# Patient Record
Sex: FEMALE | Race: WHITE | NOT HISPANIC OR LATINO | Employment: OTHER | URBAN - METROPOLITAN AREA
[De-identification: names, ages, dates, MRNs, and addresses within clinical notes are randomized per-mention and may not be internally consistent; named-entity substitution may affect disease eponyms.]

---

## 2019-03-28 ENCOUNTER — APPOINTMENT (EMERGENCY)
Dept: RADIOLOGY | Facility: HOSPITAL | Age: 79
End: 2019-03-28
Payer: MEDICARE

## 2019-03-28 ENCOUNTER — HOSPITAL ENCOUNTER (EMERGENCY)
Facility: HOSPITAL | Age: 79
Discharge: HOME | End: 2019-03-29
Attending: EMERGENCY MEDICINE | Admitting: EMERGENCY MEDICINE
Payer: MEDICARE

## 2019-03-28 VITALS
WEIGHT: 207.67 LBS | OXYGEN SATURATION: 94 % | SYSTOLIC BLOOD PRESSURE: 114 MMHG | HEART RATE: 97 BPM | DIASTOLIC BLOOD PRESSURE: 65 MMHG | RESPIRATION RATE: 18 BRPM | TEMPERATURE: 97.8 F

## 2019-03-28 DIAGNOSIS — M79.602 LEFT ARM PAIN: Primary | ICD-10-CM

## 2019-03-28 LAB
ANION GAP SERPL CALCULATED.3IONS-SCNC: 11 MMOL/L (ref 4–13)
ATRIAL RATE: 416 BPM
BASOPHILS # BLD AUTO: 0.05 THOUSANDS/ΜL (ref 0–0.1)
BASOPHILS NFR BLD AUTO: 1 % (ref 0–1)
BUN SERPL-MCNC: 17 MG/DL (ref 5–25)
CALCIUM SERPL-MCNC: 9.7 MG/DL (ref 8.3–10.1)
CHLORIDE SERPL-SCNC: 99 MMOL/L (ref 100–108)
CO2 SERPL-SCNC: 28 MMOL/L (ref 21–32)
CREAT SERPL-MCNC: 0.8 MG/DL (ref 0.6–1.3)
EOSINOPHIL # BLD AUTO: 0.21 THOUSAND/ΜL (ref 0–0.61)
EOSINOPHIL NFR BLD AUTO: 2 % (ref 0–6)
ERYTHROCYTE [DISTWIDTH] IN BLOOD BY AUTOMATED COUNT: 13.3 % (ref 11.6–15.1)
GFR SERPL CREATININE-BSD FRML MDRD: 71 ML/MIN/1.73SQ M
GLUCOSE SERPL-MCNC: 159 MG/DL (ref 65–140)
HCT VFR BLD AUTO: 47.2 % (ref 34.8–46.1)
HGB BLD-MCNC: 15.8 G/DL (ref 11.5–15.4)
IMM GRANULOCYTES # BLD AUTO: 0.08 THOUSAND/UL (ref 0–0.2)
IMM GRANULOCYTES NFR BLD AUTO: 1 % (ref 0–2)
LYMPHOCYTES # BLD AUTO: 2.21 THOUSANDS/ΜL (ref 0.6–4.47)
LYMPHOCYTES NFR BLD AUTO: 22 % (ref 14–44)
MCH RBC QN AUTO: 31.2 PG (ref 26.8–34.3)
MCHC RBC AUTO-ENTMCNC: 33.5 G/DL (ref 31.4–37.4)
MCV RBC AUTO: 93 FL (ref 82–98)
MONOCYTES # BLD AUTO: 0.55 THOUSAND/ΜL (ref 0.17–1.22)
MONOCYTES NFR BLD AUTO: 6 % (ref 4–12)
NEUTROPHILS # BLD AUTO: 6.83 THOUSANDS/ΜL (ref 1.85–7.62)
NEUTS SEG NFR BLD AUTO: 68 % (ref 43–75)
NRBC BLD AUTO-RTO: 0 /100 WBCS
PLATELET # BLD AUTO: 259 THOUSANDS/UL (ref 149–390)
PMV BLD AUTO: 10.3 FL (ref 8.9–12.7)
POTASSIUM SERPL-SCNC: 3.9 MMOL/L (ref 3.5–5.3)
QRS AXIS: 44 DEGREES
QRSD INTERVAL: 84 MS
QT INTERVAL: 336 MS
QTC INTERVAL: 422 MS
RBC # BLD AUTO: 5.06 MILLION/UL (ref 3.81–5.12)
SODIUM SERPL-SCNC: 138 MMOL/L (ref 136–145)
T WAVE AXIS: 37 DEGREES
TROPONIN I SERPL-MCNC: 0.02 NG/ML
VENTRICULAR RATE: 95 BPM
WBC # BLD AUTO: 9.93 THOUSAND/UL (ref 4.31–10.16)

## 2019-03-28 PROCEDURE — 93010 ELECTROCARDIOGRAM REPORT: CPT | Performed by: INTERNAL MEDICINE

## 2019-03-28 PROCEDURE — 36415 COLL VENOUS BLD VENIPUNCTURE: CPT | Performed by: PHYSICIAN ASSISTANT

## 2019-03-28 PROCEDURE — 73080 X-RAY EXAM OF ELBOW: CPT

## 2019-03-28 PROCEDURE — 80048 BASIC METABOLIC PNL TOTAL CA: CPT | Performed by: PHYSICIAN ASSISTANT

## 2019-03-28 PROCEDURE — 99284 EMERGENCY DEPT VISIT MOD MDM: CPT

## 2019-03-28 PROCEDURE — 93005 ELECTROCARDIOGRAM TRACING: CPT

## 2019-03-28 PROCEDURE — 85025 COMPLETE CBC W/AUTO DIFF WBC: CPT | Performed by: PHYSICIAN ASSISTANT

## 2019-03-28 PROCEDURE — 84484 ASSAY OF TROPONIN QUANT: CPT | Performed by: PHYSICIAN ASSISTANT

## 2019-03-28 RX ORDER — SENNOSIDES 8.6 MG
650 CAPSULE ORAL EVERY 8 HOURS PRN
Qty: 30 TABLET | Refills: 0 | Status: SHIPPED | OUTPATIENT
Start: 2019-03-28 | End: 2019-04-02

## 2019-03-28 RX ORDER — ACETAMINOPHEN 325 MG/1
650 TABLET ORAL ONCE
Status: COMPLETED | OUTPATIENT
Start: 2019-03-28 | End: 2019-03-28

## 2019-03-28 RX ADMIN — ACETAMINOPHEN 650 MG: 325 TABLET, FILM COATED ORAL at 20:26

## 2019-10-23 ENCOUNTER — APPOINTMENT (OUTPATIENT)
Dept: CT IMAGING | Facility: HOSPITAL | Age: 79
DRG: 563 | End: 2019-10-23
Payer: MEDICARE

## 2019-10-23 ENCOUNTER — APPOINTMENT (EMERGENCY)
Dept: CT IMAGING | Facility: HOSPITAL | Age: 79
DRG: 563 | End: 2019-10-23
Payer: MEDICARE

## 2019-10-23 ENCOUNTER — HOSPITAL ENCOUNTER (INPATIENT)
Facility: HOSPITAL | Age: 79
LOS: 5 days | Discharge: NURSING, REHAB OR LONG-TERM CARE | DRG: 563 | End: 2019-10-29
Attending: EMERGENCY MEDICINE | Admitting: STUDENT IN AN ORGANIZED HEALTH CARE EDUCATION/TRAINING PROGRAM
Payer: MEDICARE

## 2019-10-23 ENCOUNTER — APPOINTMENT (EMERGENCY)
Dept: RADIOLOGY | Facility: HOSPITAL | Age: 79
DRG: 563 | End: 2019-10-23
Payer: MEDICARE

## 2019-10-23 DIAGNOSIS — Z86.73 HISTORY OF STROKE: ICD-10-CM

## 2019-10-23 DIAGNOSIS — M25.551 RIGHT HIP PAIN: ICD-10-CM
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
## 2019-10-23 DIAGNOSIS — I35.0 CRITICAL AORTIC VALVE STENOSIS: ICD-10-CM

## 2019-10-23 DIAGNOSIS — M25.561 RIGHT KNEE PAIN: ICD-10-CM

## 2019-10-23 DIAGNOSIS — K59.00 CONSTIPATION: ICD-10-CM

## 2019-10-23 PROBLEM — I48.91 ATRIAL FIBRILLATION (HCC): Status: ACTIVE | Noted: 2019-10-23

## 2019-10-23 PROBLEM — M79.604 RIGHT LEG PAIN: Status: ACTIVE | Noted: 2019-10-23

## 2019-10-23 PROBLEM — R42 DIZZINESS: Status: ACTIVE | Noted: 2019-10-23

## 2019-10-23 LAB
ALBUMIN SERPL BCP-MCNC: 3.9 G/DL (ref 3.5–5)
ALP SERPL-CCNC: 60 U/L (ref 46–116)
ALT SERPL W P-5'-P-CCNC: 24 U/L (ref 12–78)
ANION GAP SERPL CALCULATED.3IONS-SCNC: 9 MMOL/L (ref 4–13)
AST SERPL W P-5'-P-CCNC: 17 U/L (ref 5–45)
ATRIAL RATE: 102 BPM
BACTERIA UR QL AUTO: ABNORMAL /HPF
BASOPHILS # BLD AUTO: 0.05 THOUSANDS/ΜL (ref 0–0.1)
BASOPHILS NFR BLD AUTO: 1 % (ref 0–1)
BILIRUB SERPL-MCNC: 1.2 MG/DL (ref 0.2–1)
BILIRUB UR QL STRIP: NEGATIVE
BUN SERPL-MCNC: 11 MG/DL (ref 5–25)
CALCIUM SERPL-MCNC: 9.2 MG/DL (ref 8.3–10.1)
CHLORIDE SERPL-SCNC: 102 MMOL/L (ref 100–108)
CLARITY UR: CLEAR
CO2 SERPL-SCNC: 28 MMOL/L (ref 21–32)
COLOR UR: YELLOW
CREAT SERPL-MCNC: 0.8 MG/DL (ref 0.6–1.3)
DIGOXIN SERPL-MCNC: 0.5 NG/ML (ref 0.8–2)
EOSINOPHIL # BLD AUTO: 0.17 THOUSAND/ΜL (ref 0–0.61)
EOSINOPHIL NFR BLD AUTO: 2 % (ref 0–6)
ERYTHROCYTE [DISTWIDTH] IN BLOOD BY AUTOMATED COUNT: 13.2 % (ref 11.6–15.1)
GFR SERPL CREATININE-BSD FRML MDRD: 71 ML/MIN/1.73SQ M
GLUCOSE SERPL-MCNC: 135 MG/DL (ref 65–140)
GLUCOSE SERPL-MCNC: 164 MG/DL (ref 65–140)
GLUCOSE UR STRIP-MCNC: NEGATIVE MG/DL
HCT VFR BLD AUTO: 46.7 % (ref 34.8–46.1)
HGB BLD-MCNC: 15.2 G/DL (ref 11.5–15.4)
HGB UR QL STRIP.AUTO: NEGATIVE
IMM GRANULOCYTES # BLD AUTO: 0.08 THOUSAND/UL (ref 0–0.2)
IMM GRANULOCYTES NFR BLD AUTO: 1 % (ref 0–2)
KETONES UR STRIP-MCNC: NEGATIVE MG/DL
LEUKOCYTE ESTERASE UR QL STRIP: ABNORMAL
LYMPHOCYTES # BLD AUTO: 2.22 THOUSANDS/ΜL (ref 0.6–4.47)
LYMPHOCYTES NFR BLD AUTO: 21 % (ref 14–44)
MCH RBC QN AUTO: 30.8 PG (ref 26.8–34.3)
MCHC RBC AUTO-ENTMCNC: 32.5 G/DL (ref 31.4–37.4)
MCV RBC AUTO: 95 FL (ref 82–98)
MONOCYTES # BLD AUTO: 0.64 THOUSAND/ΜL (ref 0.17–1.22)
MONOCYTES NFR BLD AUTO: 6 % (ref 4–12)
NEUTROPHILS # BLD AUTO: 7.55 THOUSANDS/ΜL (ref 1.85–7.62)
NEUTS SEG NFR BLD AUTO: 69 % (ref 43–75)
NITRITE UR QL STRIP: NEGATIVE
NON-SQ EPI CELLS URNS QL MICRO: ABNORMAL /HPF
NRBC BLD AUTO-RTO: 0 /100 WBCS
PH UR STRIP.AUTO: 6 [PH]
PLATELET # BLD AUTO: 255 THOUSANDS/UL (ref 149–390)
PLATELET # BLD AUTO: 275 THOUSANDS/UL (ref 149–390)
PMV BLD AUTO: 10.2 FL (ref 8.9–12.7)
PMV BLD AUTO: 10.5 FL (ref 8.9–12.7)
POTASSIUM SERPL-SCNC: 3.8 MMOL/L (ref 3.5–5.3)
PROT SERPL-MCNC: 7.3 G/DL (ref 6.4–8.2)
PROT UR STRIP-MCNC: NEGATIVE MG/DL
QRS AXIS: 32 DEGREES
QRSD INTERVAL: 78 MS
QT INTERVAL: 288 MS
QTC INTERVAL: 385 MS
RBC # BLD AUTO: 4.94 MILLION/UL (ref 3.81–5.12)
RBC #/AREA URNS AUTO: ABNORMAL /HPF
SODIUM SERPL-SCNC: 139 MMOL/L (ref 136–145)
SP GR UR STRIP.AUTO: <=1.005 (ref 1–1.03)
T WAVE AXIS: 15 DEGREES
UROBILINOGEN UR QL STRIP.AUTO: 0.2 E.U./DL
VENTRICULAR RATE: 108 BPM
WBC # BLD AUTO: 10.71 THOUSAND/UL (ref 4.31–10.16)
WBC #/AREA URNS AUTO: ABNORMAL /HPF

## 2019-10-23 PROCEDURE — 81001 URINALYSIS AUTO W/SCOPE: CPT | Performed by: EMERGENCY MEDICINE

## 2019-10-23 PROCEDURE — 93005 ELECTROCARDIOGRAM TRACING: CPT

## 2019-10-23 PROCEDURE — 99285 EMERGENCY DEPT VISIT HI MDM: CPT

## 2019-10-23 PROCEDURE — 70450 CT HEAD/BRAIN W/O DYE: CPT

## 2019-10-23 PROCEDURE — 96374 THER/PROPH/DIAG INJ IV PUSH: CPT

## 2019-10-23 PROCEDURE — 85049 AUTOMATED PLATELET COUNT: CPT | Performed by: NURSE PRACTITIONER

## 2019-10-23 PROCEDURE — 82948 REAGENT STRIP/BLOOD GLUCOSE: CPT

## 2019-10-23 PROCEDURE — 73502 X-RAY EXAM HIP UNI 2-3 VIEWS: CPT

## 2019-10-23 PROCEDURE — 96361 HYDRATE IV INFUSION ADD-ON: CPT

## 2019-10-23 PROCEDURE — 99220 PR INITIAL OBSERVATION CARE/DAY 70 MINUTES: CPT | Performed by: INTERNAL MEDICINE

## 2019-10-23 PROCEDURE — 73564 X-RAY EXAM KNEE 4 OR MORE: CPT

## 2019-10-23 PROCEDURE — 93010 ELECTROCARDIOGRAM REPORT: CPT | Performed by: INTERNAL MEDICINE

## 2019-10-23 PROCEDURE — 73700 CT LOWER EXTREMITY W/O DYE: CPT

## 2019-10-23 PROCEDURE — 80162 ASSAY OF DIGOXIN TOTAL: CPT | Performed by: NURSE PRACTITIONER

## 2019-10-23 PROCEDURE — 96375 TX/PRO/DX INJ NEW DRUG ADDON: CPT

## 2019-10-23 PROCEDURE — 85025 COMPLETE CBC W/AUTO DIFF WBC: CPT | Performed by: EMERGENCY MEDICINE

## 2019-10-23 PROCEDURE — 1124F ACP DISCUSS-NO DSCNMKR DOCD: CPT | Performed by: INTERNAL MEDICINE

## 2019-10-23 PROCEDURE — 96376 TX/PRO/DX INJ SAME DRUG ADON: CPT

## 2019-10-23 PROCEDURE — 99285 EMERGENCY DEPT VISIT HI MDM: CPT | Performed by: EMERGENCY MEDICINE

## 2019-10-23 PROCEDURE — 73610 X-RAY EXAM OF ANKLE: CPT

## 2019-10-23 PROCEDURE — 36415 COLL VENOUS BLD VENIPUNCTURE: CPT | Performed by: EMERGENCY MEDICINE

## 2019-10-23 PROCEDURE — 80053 COMPREHEN METABOLIC PANEL: CPT | Performed by: EMERGENCY MEDICINE

## 2019-10-23 PROCEDURE — 71045 X-RAY EXAM CHEST 1 VIEW: CPT

## 2019-10-23 PROCEDURE — 73552 X-RAY EXAM OF FEMUR 2/>: CPT

## 2019-10-23 RX ORDER — DOCUSATE SODIUM 100 MG/1
100 CAPSULE, LIQUID FILLED ORAL 2 TIMES DAILY
Status: DISCONTINUED | OUTPATIENT
Start: 2019-10-23 | End: 2019-10-29 | Stop reason: HOSPADM

## 2019-10-23 RX ORDER — ACETAMINOPHEN 325 MG/1
650 TABLET ORAL EVERY 6 HOURS PRN
Status: DISCONTINUED | OUTPATIENT
Start: 2019-10-23 | End: 2019-10-29 | Stop reason: HOSPADM

## 2019-10-23 RX ORDER — ONDANSETRON 2 MG/ML
4 INJECTION INTRAMUSCULAR; INTRAVENOUS EVERY 6 HOURS PRN
Status: DISCONTINUED | OUTPATIENT
Start: 2019-10-23 | End: 2019-10-29 | Stop reason: HOSPADM

## 2019-10-23 RX ORDER — SODIUM CHLORIDE 9 MG/ML
75 INJECTION, SOLUTION INTRAVENOUS CONTINUOUS
Status: DISCONTINUED | OUTPATIENT
Start: 2019-10-23 | End: 2019-10-24

## 2019-10-23 RX ORDER — HEPARIN SODIUM 5000 [USP'U]/ML
5000 INJECTION, SOLUTION INTRAVENOUS; SUBCUTANEOUS EVERY 8 HOURS SCHEDULED
Status: DISCONTINUED | OUTPATIENT
Start: 2019-10-23 | End: 2019-10-29 | Stop reason: HOSPADM

## 2019-10-23 RX ORDER — FENTANYL CITRATE 50 UG/ML
50 INJECTION, SOLUTION INTRAMUSCULAR; INTRAVENOUS ONCE
Status: COMPLETED | OUTPATIENT
Start: 2019-10-23 | End: 2019-10-23

## 2019-10-23 RX ORDER — TEMAZEPAM 30 MG/1
30 CAPSULE ORAL
COMMUNITY

## 2019-10-23 RX ORDER — BISACODYL 10 MG
10 SUPPOSITORY, RECTAL RECTAL DAILY PRN
Status: DISCONTINUED | OUTPATIENT
Start: 2019-10-23 | End: 2019-10-29 | Stop reason: HOSPADM

## 2019-10-23 RX ORDER — ASPIRIN 81 MG/1
81 TABLET ORAL DAILY
Status: DISCONTINUED | OUTPATIENT
Start: 2019-10-24 | End: 2019-10-29 | Stop reason: HOSPADM

## 2019-10-23 RX ORDER — TEMAZEPAM 15 MG/1
30 CAPSULE ORAL DAILY PRN
Status: DISCONTINUED | OUTPATIENT
Start: 2019-10-23 | End: 2019-10-23

## 2019-10-23 RX ORDER — LORAZEPAM 0.5 MG/1
0.5 TABLET ORAL DAILY PRN
Status: DISCONTINUED | OUTPATIENT
Start: 2019-10-23 | End: 2019-10-29 | Stop reason: HOSPADM

## 2019-10-23 RX ORDER — OXYCODONE HYDROCHLORIDE 5 MG/1
2.5 TABLET ORAL EVERY 4 HOURS PRN
Status: DISCONTINUED | OUTPATIENT
Start: 2019-10-23 | End: 2019-10-29 | Stop reason: HOSPADM

## 2019-10-23 RX ORDER — OXYCODONE HYDROCHLORIDE 5 MG/1
5 TABLET ORAL EVERY 4 HOURS PRN
Status: DISCONTINUED | OUTPATIENT
Start: 2019-10-23 | End: 2019-10-29 | Stop reason: HOSPADM

## 2019-10-23 RX ORDER — ACETAMINOPHEN 325 MG/1
650 TABLET ORAL ONCE
Status: COMPLETED | OUTPATIENT
Start: 2019-10-23 | End: 2019-10-23

## 2019-10-23 RX ORDER — POLYETHYLENE GLYCOL 3350 17 G/17G
17 POWDER, FOR SOLUTION ORAL DAILY
Status: DISCONTINUED | OUTPATIENT
Start: 2019-10-24 | End: 2019-10-29 | Stop reason: HOSPADM

## 2019-10-23 RX ORDER — DIGOXIN 125 MCG
1 TABLET ORAL DAILY
COMMUNITY
Start: 2018-02-12 | End: 2020-01-31 | Stop reason: ALTCHOICE

## 2019-10-23 RX ORDER — ONDANSETRON 2 MG/ML
4 INJECTION INTRAMUSCULAR; INTRAVENOUS ONCE
Status: COMPLETED | OUTPATIENT
Start: 2019-10-23 | End: 2019-10-23

## 2019-10-23 RX ORDER — DILTIAZEM HYDROCHLORIDE 60 MG/1
120 CAPSULE, EXTENDED RELEASE ORAL EVERY 12 HOURS SCHEDULED
Status: DISCONTINUED | OUTPATIENT
Start: 2019-10-23 | End: 2019-10-29 | Stop reason: HOSPADM

## 2019-10-23 RX ORDER — DIGOXIN 125 MCG
125 TABLET ORAL DAILY
Status: DISCONTINUED | OUTPATIENT
Start: 2019-10-24 | End: 2019-10-29 | Stop reason: HOSPADM

## 2019-10-23 RX ORDER — DILTIAZEM HYDROCHLORIDE 240 MG/1
240 CAPSULE, EXTENDED RELEASE ORAL DAILY
COMMUNITY
Start: 2018-02-12 | End: 2022-07-04

## 2019-10-23 RX ORDER — ALENDRONATE SODIUM 70 MG/1
70 TABLET ORAL WEEKLY
COMMUNITY

## 2019-10-23 RX ADMIN — ACETAMINOPHEN 650 MG: 325 TABLET, FILM COATED ORAL at 10:37

## 2019-10-23 RX ADMIN — FENTANYL CITRATE 50 MCG: 50 INJECTION INTRAMUSCULAR; INTRAVENOUS at 09:58

## 2019-10-23 RX ADMIN — SODIUM CHLORIDE 75 ML/HR: 0.9 INJECTION, SOLUTION INTRAVENOUS at 18:44

## 2019-10-23 RX ADMIN — SODIUM CHLORIDE 75 ML/HR: 0.9 INJECTION, SOLUTION INTRAVENOUS at 20:43

## 2019-10-23 RX ADMIN — OXYCODONE HYDROCHLORIDE 5 MG: 5 TABLET ORAL at 18:37

## 2019-10-23 RX ADMIN — FENTANYL CITRATE 50 MCG: 50 INJECTION INTRAMUSCULAR; INTRAVENOUS at 12:49

## 2019-10-23 RX ADMIN — SODIUM CHLORIDE 500 ML: 0.9 INJECTION, SOLUTION INTRAVENOUS at 11:53

## 2019-10-23 RX ADMIN — HEPARIN SODIUM 5000 UNITS: 5000 INJECTION INTRAVENOUS; SUBCUTANEOUS at 22:07

## 2019-10-23 RX ADMIN — OXYCODONE HYDROCHLORIDE 5 MG: 5 TABLET ORAL at 23:46

## 2019-10-23 RX ADMIN — DOCUSATE SODIUM 100 MG: 100 CAPSULE, LIQUID FILLED ORAL at 18:37

## 2019-10-23 RX ADMIN — ONDANSETRON 4 MG: 2 INJECTION INTRAMUSCULAR; INTRAVENOUS at 09:59

## 2019-10-23 NOTE — ASSESSMENT & PLAN NOTE
No results found for: HGBA1C    Recent Labs     10/23/19  1557   POCGLU 135       Blood Sugar Average: Last 72 hrs:  (P) 135   Hold oral agent while in the hospital  Obtain hemoglobin A1c  SSI and blood glucose monitoring while in-patient

## 2019-10-23 NOTE — ED NOTES
1  CC-pt c/o right leg pain after fall at 0400 this morning  2  Orientation status-A&OX4    3  Abnormal labs/ abnormal focused assessment/ abnormal vitals-elevated WBC and glucose, pt is diabetic  C/o severe right leg pain  Right leg and arm have limited movement baseline due to previous stroke  4  Medications/drips-n /a    5  Last time narcotics given-1249 fentanyl 50mcg    6  IV lines/drains/etc-20G L AC    7  Isolation status-n/a    8  Skin-intact    9  Ambulation-pt states that she is unable to walk by herself since fall this morning  Normally uses a wheelchair at home, but is able to stand and transfer independently    10  ED nurse's phone number-[phone]  11   Admission related to traumatic injury-yes           Tereza Baker RN  10/23/19 8240

## 2019-10-23 NOTE — ASSESSMENT & PLAN NOTE
· Telemetry monitoring  · Patient is mostly wheelchair-bound but has stay and and can self transferring living independently at home  · Patient is unable to stand given her significant leg pain will initially check lying and sitting orthostatics blood pressures  · Check an echocardiogram  · PT OT evaluation once cleared by Ortho  · Of note Patient reports taking atorvastatin as her only new medication and feels this is contributing her dizziness to that she took before bed

## 2019-10-23 NOTE — H&P
H&P- Daryl Engle 1940, 67 y o  female MRN: [id number]    Unit/Bed#: -01 Encounter: [id number]    Primary Care Provider: Valeria Baumgarten, DO   Date and time admitted to hospital: 10/23/2019  9:32 AM        * Dizziness  Assessment & Plan  · Telemetry monitoring  · Patient is mostly wheelchair-bound but has stay and and can self transferring living independently at home  · Patient is unable to stand given her significant leg pain will initially check lying and sitting orthostatics blood pressures  · Check an echocardiogram  · PT OT evaluation once cleared by Ortho  · Of note Patient reports taking atorvastatin as her only new medication and feels this is contributing her dizziness to that she took before bed    Atrial fibrillation ([facility], [place] [address] )  Assessment & Plan  · Known history  · Patient is not on anticoagulation  · EKG did show some atrial fibrillation with RVR  · Continue Cardizem  · Telemetry monitoring  · Rate appears controlled currently    Right leg pain  Assessment & Plan  · Substantial pain with manipulation appears to be situated around the knee x-rays are negative  · Will obtain a CT of the knee and lower leg  · Orthopedic consult  · PT OT evaluation    Hypertension  Assessment & Plan  · Continue home regimen with parameters    History of stroke  Assessment & Plan  · History of CVA 2008  · Continue ASA patient now listing her atorvastatin as allergy appears to be sensitive to statin  · Recent lipid panel showing total cholesterol 247 and LDL of 147 patient has an upcoming cardiology appointment and should explore this given her stroke history this time patient is declining treatment of her cholesterol    Constipation  Assessment & Plan  · Patient reports history of no bowel movement x4 days  · Initiate MiraLax and Colace scheduled  · Suppository p r n    · Unsuccessful soapsuds enema    Diabetes mellitus [facility])  Assessment & Plan  No results found for: HGBA1C    Recent Labs     10/23/19  0285 POCGLU 135       Blood Sugar Average: Last 72 hrs:  (P) 135   Hold oral agent while in the hospital  Obtain hemoglobin A1c  SSI and blood glucose monitoring while in-patient      VTE Prophylaxis: Enoxaparin (Lovenox)  / sequential compression device   Code Status:  Full code  POLST: POLST is not applicable to this patient  Discussion with family:  None at bedside    Anticipated Length of Stay:  Patient will be admitted on an Observation basis with an anticipated length of stay of  < 2 midnights  Justification for Hospital Stay:  Need for further workup in setting of dizziness with fall    Total Time for Visit, including Counseling / Coordination of Care: 45 minutes  Greater than 50% of this total time spent on direct patient counseling and coordination of care  Chief Complaint:   I felt dizzy with transferring I think it was because MI new medication atorvastatin    History of Present Illness:    Orlin Buckley is a 67 y o  female who presents with known history of stroke 2008 with residual right-sided weakness, atrial fibrillation, hypertension, diabetes presenting after transferring herself to the toilet when she became suddenly dizzy in sustained a fall patient denies loss of consciousness  Patient reports she started her atorvastatin at the request her physician given her hyperlipidemia which patient reports other people she knows had the same thing happened so she is contributing her dizziness episode to the medication and does not want to take a moving forward and she had problems with her previous statin as well  Patient is going to a new cardiologist in January and wants to explore cholesterol medication options with them  Patient reports she would like to see PT OT as she would like to see some short-term rehab  Patient also complaining of significant right leg pain since the fall    Patient denies any dizziness headache lightheadedness currently denies visual disturbance denies shortness of breath chest pain nausea and has no other generalized complaints other than the pain that she is complaining is 8/10 currently  Review of Systems:    Review of Systems   Constitutional: Negative for chills and fatigue  HENT: Negative  Respiratory: Negative  Cardiovascular: Positive for leg swelling  Right lower extremity swelling is chronic   Gastrointestinal: Negative  Genitourinary: Negative  Musculoskeletal: Positive for gait problem  Right leg pain most prominent at the knee   Skin: Negative  Neurological: Positive for dizziness and weakness  Negative for syncope  Residual weakness from 2008 stroke   Psychiatric/Behavioral: Negative  Past Medical and Surgical History:     Past Medical History:   Diagnosis Date    Cardiac disease     Diabetes mellitus ([facility] [address] )     Hypertension     Stroke [facility])        History reviewed  No pertinent surgical history  Meds/Allergies:    Prior to Admission medications    Medication Sig Start Date End Date Taking? Authorizing Provider   alendronate (FOSAMAX) 70 mg tablet Take 70 mg by mouth Once a week   Yes Historical Provider, MD   BABY ASPIRIN PO Take 81 mg by mouth daily 2/12/18  Yes Historical Provider, MD   digoxin (LANOXIN) 0 125 mg tablet Take 1 tablet by mouth daily 2/12/18  Yes Historical Provider, MD   diltiazem (DILACOR XR) 240 MG 24 hr capsule Take 240 mg by mouth daily 2/12/18  Yes Historical Provider, MD   metFORMIN (GLUCOPHAGE) 500 mg tablet Take 500 mg by mouth 2 (two) times a day   Yes Historical Provider, MD   temazepam (RESTORIL) 30 mg capsule Take 30 mg by mouth daily as needed   Yes Historical Provider, MD     I have reviewed home medications with patient personally  Allergies: Allergies   Allergen Reactions    Atorvastatin      dizziness and shaky    Rivaroxaban      Dizziness, and shaky  Social History:     Marital Status:     Occupation:    Patient Pre-hospital Living Situation:    Patient Pre-hospital Level of Mobility:    Patient Pre-hospital Diet Restrictions:    Substance Use History:   Social History     Substance and Sexual Activity   Alcohol Use Not Currently     Social History     Tobacco Use   Smoking Status Never Smoker   Smokeless Tobacco Never Used     Social History     Substance and Sexual Activity   Drug Use Never       Family History:    non-contributory    Physical Exam:     Vitals:   Blood Pressure: 121/71 (10/23/19 1653)  Pulse: 96 (10/23/19 1653)  Temperature: (!) 97 4 °F (36 3 °C) (10/23/19 1653)  Temp Source: Oral (10/23/19 0936)  Respirations: 19 (10/23/19 1653)  Weight - Scale: 91 4 kg (201 lb 8 oz) (10/23/19 0936)  SpO2: 97 % (10/23/19 1653)    Physical Exam   Constitutional: She is oriented to person, place, and time  HENT:   Head: Normocephalic and atraumatic  Eyes: Conjunctivae and EOM are normal    Neck: Normal range of motion  Cardiovascular: Normal rate, regular rhythm and normal heart sounds  Pulmonary/Chest: Effort normal and breath sounds normal    Abdominal: Soft  Bowel sounds are normal    Musculoskeletal: She exhibits edema and tenderness  Contracture right hand ; manipulation right hip patient does not complain ear pain manipulation of right knee significant pain lower leg some pain but not near as prominent in the significant arthritis appreciated on imaging   Neurological: She is alert and oriented to person, place, and time  Skin: Skin is warm and dry  Psychiatric: She has a normal mood and affect  Her behavior is normal          Additional Data:     Lab Results: I have personally reviewed pertinent reports        Results from last 7 days   Lab Units 10/23/19  0959   WBC Thousand/uL 10 71*   HEMOGLOBIN g/dL 15 2   HEMATOCRIT % 46 7*   PLATELETS Thousands/uL 275   NEUTROS PCT % 69   LYMPHS PCT % 21   MONOS PCT % 6   EOS PCT % 2     Results from last 7 days   Lab Units 10/23/19  0959   SODIUM mmol/L 139   POTASSIUM mmol/L 3 8   CHLORIDE mmol/L 102 CO2 mmol/L 28   BUN mg/dL 11   CREATININE mg/dL 0 80   ANION GAP mmol/L 9   CALCIUM mg/dL 9 2   ALBUMIN g/dL 3 9   TOTAL BILIRUBIN mg/dL 1 20*   ALK PHOS U/L 60   ALT U/L 24   AST U/L 17   GLUCOSE RANDOM mg/dL 164*         Results from last 7 days   Lab Units 10/23/19  1557   POC GLUCOSE mg/dl 135               Imaging: I have personally reviewed pertinent reports  XR chest 1 view portable   Final Result by Sophia Thurston MD (10/23 1332)      Mild enlargement of cardiac silhouette and bilateral calcified pleural plaques  No definite acute pulmonary disease            Workstation performed: [id number]         XR hip/pelv 2-3 vws right   Final Result by Sophia Thurston MD (10/23 1325)      No acute osseous abnormality  Workstation performed: [id number]         XR femur 2 views RIGHT   Final Result by Sophia Thurston MD (10/23 1319)      No acute osseous abnormality  Workstation performed: [id number]         XR ankle 3+ views RIGHT   Final Result by Sophia Thurston MD (10/23 1318)      No acute osseous abnormality  Workstation performed: [id number]         XR knee 4+ vw right injury   Final Result by Sophia Thurston MD (10/23 1317)      No acute osseous abnormality  Tricompartmental right knee osteoarthritis and joint effusion         Workstation performed: [id number]         CT head without contrast   Final Result by Bladimir Edwards DO (10/23 1017)      No acute intracranial abnormality  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

## 2019-10-23 NOTE — ED PROVIDER NOTES
Pt Name: Chavo Phipps  MRN: [id number]  Reymundogfjacob 1940  Age/Sex: 67 y o  female  Date of evaluation: 10/23/2019  PCP: Laurie Betancourt, [address] [place] [address]    Chief Complaint   Patient presents with    Fall     pt got dizzy and fell at about 0400 this morning while attempting to use the bathroom  denies any head injury or LOC  c/o right leg pain     Trauma: Evaluation      Mechanism of Injury:  Fall    LOC:  None  Airway:  Intact and widely patent  Breathing:  Equal breath sounds bilaterally, normal rate  Circulation:  2+ pulses in all 4 extremities  Disability:  Right-sided weakness at baseline  Exposure:  As indicated        Numbers; 3-15 (gcs): 15      none      Pt Direct To CT: no         HPI    67 y o  female presenting with right leg pain status post fall  Patient states that about 4 this morning she woke up use the restroom  She states she was able to transfer to her wheelchair, made it to the restroom, when she stood up to transfer to the toilet she fell  She is not sure if her leg gave out because his week or because she was dizzy  Patient does note residual weakness in the right arm and right leg since her stroke  She now complains of sharp, moderate pain in the entire right leg, radiating throughout the leg, worse with movement of the leg and better at rest   Denies any other pain or symptoms to include loss conscious, new numbness or weakness, changes in speech or vision, nausea or vomiting, abdominal pain, chest pain  HPI      Past Medical and Surgical History    Past Medical History:   Diagnosis Date    Cardiac disease     Diabetes mellitus ([facility] [address] )     Hypertension     Stroke [facility])        History reviewed  No pertinent surgical history  History reviewed  No pertinent family history      Social History     Tobacco Use    Smoking status: Never Smoker    Smokeless tobacco: Never Used   Substance Use Topics    Alcohol use: Not Currently    Drug use: Never Allergies    Allergies   Allergen Reactions    Atorvastatin      dizziness and shaky    Rivaroxaban      Dizziness, and shaky  Home Medications    Prior to Admission medications    Not on File           Review of Systems    Review of Systems   Constitutional: Negative for activity change, chills and fever  HENT: Negative for drooling and facial swelling  Eyes: Negative for pain, discharge and visual disturbance  Respiratory: Negative for apnea, cough, chest tightness, shortness of breath and wheezing  Cardiovascular: Negative for chest pain and leg swelling  Gastrointestinal: Negative for abdominal pain, constipation, diarrhea, nausea and vomiting  Genitourinary: Negative for difficulty urinating, dysuria and urgency  Musculoskeletal: Positive for arthralgias and gait problem  Negative for back pain  Skin: Negative for color change and rash  Neurological: Negative for dizziness, speech difficulty, weakness and headaches  Psychiatric/Behavioral: Negative for agitation, behavioral problems and confusion  All other systems reviewed and negative  Physical Exam      ED Triage Vitals [10/23/19 0936]   Temperature Pulse Respirations Blood Pressure SpO2   98 °F (36 7 °C) (!) 112 15 (!) 174/81 98 %      Temp Source Heart Rate Source Patient Position - Orthostatic VS BP Location FiO2 (%)   Oral Monitor Sitting Left arm --      Pain Score       4               Physical Exam   Constitutional: She is oriented to person, place, and time  She appears well-developed and well-nourished  HENT:   Head: Normocephalic and atraumatic  Nose: Nose normal    Mouth/Throat: Oropharynx is clear and moist    Eyes: Pupils are equal, round, and reactive to light  Conjunctivae and EOM are normal    Neck: Normal range of motion  Neck supple  Cardiovascular: Normal heart sounds and intact distal pulses     Irregularly irregular, tachycardic   Pulmonary/Chest: Effort normal and breath sounds normal  No respiratory distress  She has no wheezes  She has no rales  Abdominal: Soft  She exhibits no distension  There is no tenderness  There is no rebound and no guarding  Musculoskeletal: Normal range of motion  She exhibits tenderness  She exhibits no edema or deformity  Patient tender to palpation along the entire right leg, worst at the right hip and right femur  Pulse and sensation intact distal    Neurological: She is alert and oriented to person, place, and time  No cranial nerve deficit or sensory deficit  She exhibits abnormal muscle tone  Coordination normal    5/5 strength in left upper extremity and left lower extremity, 2/5 strength in right lower extremity and 3/5 strength in the right upper extremity shoulder but minimal movement of the contracted hand and forearm, at baseline per patient   Skin: Skin is warm and dry  No rash noted  No erythema  Psychiatric: She has a normal mood and affect  Her behavior is normal  Judgment and thought content normal    Nursing note and vitals reviewed  Diagnostic Results    EKG Interpretation    Rate:  108  BPM  Rhythm:  Atrial fibrillation with rapid ventricular response   Axis:  Normal   Intervals: Normal, no blocks, QTc  385 ms  Q waves:  Normal   T waves:  Normal   ST segments:  No significant elevations or depressions     Impression:  Atrial fibrillation with rapid ventricular response but no evidence of acute ischemia      EKG for comparison:  EKG dated 28 March 2019 similar in character with rate of 95 at that time but no other significant changes    EKG interpreted by me       Labs:    Results for orders placed or performed during the hospital encounter of 10/23/19   CBC and differential   Result Value Ref Range    WBC 10 71 (H) 4 31 - 10 16 Thousand/uL    RBC 4 94 3 81 - 5 12 Million/uL    Hemoglobin 15 2 11 5 - 15 4 g/dL    Hematocrit 46 7 (H) 34 8 - 46 1 %    MCV 95 82 - 98 fL    MCH 30 8 26 8 - 34 3 pg    MCHC 32 5 31 4 - 37 4 g/dL RDW 13 2 11 6 - 15 1 %    MPV 10 5 8 9 - 12 7 fL    Platelets 964 928 - 138 Thousands/uL    nRBC 0 /100 WBCs    Neutrophils Relative 69 43 - 75 %    Immat GRANS % 1 0 - 2 %    Lymphocytes Relative 21 14 - 44 %    Monocytes Relative 6 4 - 12 %    Eosinophils Relative 2 0 - 6 %    Basophils Relative 1 0 - 1 %    Neutrophils Absolute 7 55 1 85 - 7 62 Thousands/µL    Immature Grans Absolute 0 08 0 00 - 0 20 Thousand/uL    Lymphocytes Absolute 2 22 0 60 - 4 47 Thousands/µL    Monocytes Absolute 0 64 0 17 - 1 22 Thousand/µL    Eosinophils Absolute 0 17 0 00 - 0 61 Thousand/µL    Basophils Absolute 0 05 0 00 - 0 10 Thousands/µL   Comprehensive metabolic panel   Result Value Ref Range    Sodium 139 136 - 145 mmol/L    Potassium 3 8 3 5 - 5 3 mmol/L    Chloride 102 100 - 108 mmol/L    CO2 28 21 - 32 mmol/L    ANION GAP 9 4 - 13 mmol/L    BUN 11 5 - 25 mg/dL    Creatinine 0 80 0 60 - 1 30 mg/dL    Glucose 164 (H) 65 - 140 mg/dL    Calcium 9 2 8 3 - 10 1 mg/dL    AST 17 5 - 45 U/L    ALT 24 12 - 78 U/L    Alkaline Phosphatase 60 46 - 116 U/L    Total Protein 7 3 6 4 - 8 2 g/dL    Albumin 3 9 3 5 - 5 0 g/dL    Total Bilirubin 1 20 (H) 0 20 - 1 00 mg/dL    eGFR 71 ml/min/1 73sq m   UA w Reflex to Microscopic w Reflex to Culture   Result Value Ref Range    Color, UA Yellow     Clarity, UA Clear     Specific Gravity, UA <=1 005 1 003 - 1 030    pH, UA 6 0 4 5, 5 0, 5 5, 6 0, 6 5, 7 0, 7 5, 8 0    Leukocytes, UA Trace (A) Negative    Nitrite, UA Negative Negative    Protein, UA Negative Negative mg/dl    Glucose, UA Negative Negative mg/dl    Ketones, UA Negative Negative mg/dl    Urobilinogen, UA 0 2 0 2, 1 0 E U /dl E U /dl    Bilirubin, UA Negative Negative    Blood, UA Negative Negative   Urine Microscopic   Result Value Ref Range    RBC, UA None Seen None Seen, 0-5 /hpf    WBC, UA 2-4 (A) None Seen, 0-5, 5-55, 5-65 /hpf    Epithelial Cells Occasional None Seen, Occasional /hpf    Bacteria, UA Occasional None Seen, Occasional /hpf   ECG 12 lead   Result Value Ref Range    Ventricular Rate 108 BPM    Atrial Rate 102 BPM    KY Interval  ms    QRSD Interval 78 ms    QT Interval 288 ms    QTC Interval 385 ms    P Axis  degrees    QRS Axis 32 degrees    T Wave Axis 15 degrees   Fingerstick Glucose (POCT)   Result Value Ref Range    POC Glucose 135 65 - 140 mg/dl       All labs reviewed and utilized in the medical decision making process    Radiology:    XR chest 1 view portable   Final Result      Mild enlargement of cardiac silhouette and bilateral calcified pleural plaques  No definite acute pulmonary disease            Workstation performed: [id number]         XR hip/pelv 2-3 vws right   Final Result      No acute osseous abnormality  Workstation performed: [id number]         XR femur 2 views RIGHT   Final Result      No acute osseous abnormality  Workstation performed: [id number]         XR ankle 3+ views RIGHT   Final Result      No acute osseous abnormality  Workstation performed: [id number]         XR knee 4+ vw right injury   Final Result      No acute osseous abnormality  Tricompartmental right knee osteoarthritis and joint effusion         Workstation performed: [id number]         CT head without contrast   Final Result      No acute intracranial abnormality  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  Workstation performed: [id number]         CT knee right wo contrast    (Results Pending)       All radiology studies independently viewed by me and interpreted by the radiologist     Procedure    Procedures        ED Course of Care and Re-Assessments      Pain improved with Tylenol and fentanyl  Focused Imaging obtained based on examination and returned reassuring overall  Unfortunately, patient still in significant pain and unable to move at baseline or perform own activities of daily living    As patient lives alone, admitted for further treatment until she recovers to the point of being able to care for herself or further resources are able to be mobilized  Medications   aspirin (ECOTRIN LOW STRENGTH) EC tablet 81 mg (has no administration in time range)   digoxin (LANOXIN) tablet 125 mcg (has no administration in time range)   diltiazem (CARDIZEM SR) 12 hr capsule 120 mg (has no administration in time range)   temazepam (RESTORIL) capsule 30 mg (has no administration in time range)   heparin (porcine) subcutaneous injection 5,000 Units (has no administration in time range)   acetaminophen (TYLENOL) tablet 650 mg (has no administration in time range)   oxyCODONE (ROXICODONE) IR tablet 2 5 mg (has no administration in time range)   oxyCODONE (ROXICODONE) IR tablet 5 mg (5 mg Oral Given 10/23/19 1837)   ondansetron (ZOFRAN) injection 4 mg (has no administration in time range)   sodium chloride 0 9 % infusion (has no administration in time range)   polyethylene glycol (MIRALAX) packet 17 g (has no administration in time range)   docusate sodium (COLACE) capsule 100 mg (100 mg Oral Given 10/23/19 1837)   bisacodyl (DULCOLAX) rectal suppository 10 mg (has no administration in time range)   ondansetron (ZOFRAN) injection 4 mg (4 mg Intravenous Given 10/23/19 0959)   acetaminophen (TYLENOL) tablet 650 mg (650 mg Oral Given 10/23/19 1037)   fentanyl citrate (PF) 100 MCG/2ML 50 mcg (50 mcg Intravenous Given 10/23/19 0958)   sodium chloride 0 9 % bolus 500 mL (0 mL Intravenous Stopped 10/23/19 1419)   fentanyl citrate (PF) 100 MCG/2ML 50 mcg (50 mcg Intravenous Given 10/23/19 1249)           FINAL IMPRESSION    Final diagnoses:   Fall, initial encounter   Right hip pain   Right knee pain         DISPOSITION/PLAN    Fall with right lower extremity pain as above  Vital signs reassuring, examination for pain as above  Workup overall negative, but patient unable to move at baseline or perform ADLs  Admitted as above, hemodynamically stable and comfortable at that time    Time reflects when diagnosis was documented in both MDM as applicable and the Disposition within this note     Time User Action Codes Description Comment    10/23/2019  3:49 PM Wanda Diana EUGENE] Fall, initial encounter     10/23/2019  3:49 PM Wanda Miketa Add [M25 [id number]] Right hip pain     10/23/2019  3:50 PM Wanda Ortiz Pradeep [W84 [id number]] Right knee pain       ED Disposition     ED Disposition Condition Date/Time Comment    Admit Stable Wed Oct 23, 2019  3:49 PM Case was discussed with CHRISTAL and the patient's admission status was agreed to be Admission Status: observation status to the service of Dr Cohen Prudent   Follow-up Information    None           PATIENT REFERRED TO:    No follow-up provider specified  DISCHARGE MEDICATIONS:    Current Discharge Medication List      CONTINUE these medications which have NOT CHANGED    Details   alendronate (FOSAMAX) 70 mg tablet Take 70 mg by mouth Once a week      BABY ASPIRIN PO Take 81 mg by mouth daily      digoxin (LANOXIN) 0 125 mg tablet Take 1 tablet by mouth daily      diltiazem (DILACOR XR) 240 MG 24 hr capsule Take 240 mg by mouth daily      metFORMIN (GLUCOPHAGE) 500 mg tablet Take 500 mg by mouth 2 (two) times a day      temazepam (RESTORIL) 30 mg capsule Take 30 mg by mouth daily as needed             No discharge procedures on file           MD Yadira Lizarraga MD  10/23/19 1325

## 2019-10-23 NOTE — ASSESSMENT & PLAN NOTE
· Patient reports history of no bowel movement x4 days  · Initiate MiraLax and Colace scheduled  · Suppository p r n    · Unsuccessful soapsuds enema

## 2019-10-23 NOTE — SOCIAL WORK
Cm met with patient at bedside, patient alert and oriented during interview  Patient reports residing alone in a first floor one level apartment with ramp access  Patient reports having a previous stroke in 2008 has some left sided residual but reports she is able to transfer herself and even reports being able to cook  patient mentioned having a rolling walker and attempts to use it every day with intentions to keep her muscles strong  Patient denies any current vna and refused vna at this time  Patient mentioned her daughter Klaus Cooper resides in the same apartment community, transports her to MD appointments and assists with her care if needed  Patient fills her prescriptions at [facility] with no current co-payment barriers  Patient denies any MH/SA  Patient mentioned if recommended for str she would like to discharge to [facility]  Cm team will continue to follow  CM reviewed discharge planning process including the following: identifying help at home, patient preference for discharge planning needs, pharmacy preference, and availability of treatment team to discuss questions or concerns patient and/or family may have regarding understanding medications and recognizing signs and symptoms once discharged  CM also encouraged patient to follow up with all recommended appointments after discharge  Patient advised of importance for patient and family to participate in managing patients medical well being

## 2019-10-23 NOTE — ASSESSMENT & PLAN NOTE
· Substantial pain with manipulation appears to be situated around the knee x-rays are negative  · Will obtain a CT of the knee and lower leg  · Orthopedic consult  · PT OT evaluation

## 2019-10-23 NOTE — PLAN OF CARE
Problem: Potential for Falls  Goal: Patient will remain free of falls  Description  INTERVENTIONS:  - Assess patient frequently for physical needs  -  Identify cognitive and physical deficits and behaviors that affect risk of falls    -  [place] fall precautions as indicated by assessment   - Educate patient/family on patient safety including physical limitations  - Instruct patient to call for assistance with activity based on assessment  - Modify environment to reduce risk of injury  - Consider OT/PT consult to assist with strengthening/mobility  10/23/2019 1702 by Anahy Sahni RN  Outcome: Progressing  10/23/2019 1702 by Anahy Sahni RN  Outcome: Progressing     Problem: Prexisting or High Potential for Compromised Skin Integrity  Goal: Skin integrity is maintained or improved  Description  INTERVENTIONS:  - Identify patients at risk for skin breakdown  - Assess and monitor skin integrity  - Assess and monitor nutrition and hydration status  - Monitor labs   - Assess for incontinence   - Turn and reposition patient  - Assist with mobility/ambulation  - Relieve pressure over bony prominences  - Avoid friction and shearing  - Provide appropriate hygiene as needed including keeping skin clean and dry  - Evaluate need for skin moisturizer/barrier cream  - Collaborate with interdisciplinary team   - Patient/family teaching  - Consider wound care consult   10/23/2019 1702 by Anahy Sahni RN  Outcome: Progressing  10/23/2019 1702 by Anahy Sahni RN  Outcome: Progressing     Problem: PAIN - ADULT  Goal: Verbalizes/displays adequate comfort level or baseline comfort level  Description  Interventions:  - Encourage patient to monitor pain and request assistance  - Assess pain using appropriate pain scale  - Administer analgesics based on type and severity of pain and evaluate response  - Implement non-pharmacological measures as appropriate and evaluate response  - Consider cultural and social influences on pain and pain management  - Notify physician/advanced practitioner if interventions unsuccessful or patient reports new pain  10/23/2019 1702 by Jennifer Aguilera RN  Outcome: Progressing  10/23/2019 1702 by Jennifer Aguilera RN  Outcome: Progressing     Problem: INFECTION - ADULT  Goal: Absence or prevention of progression during hospitalization  Description  INTERVENTIONS:  - Assess and monitor for signs and symptoms of infection  - Monitor lab/diagnostic results  - Monitor all insertion sites, i e  indwelling lines, tubes, and drains  - Monitor endotracheal if appropriate and nasal secretions for changes in amount and color  - [place] appropriate cooling/warming therapies per order  - Administer medications as ordered  - Instruct and encourage patient and family to use good hand hygiene technique  - Identify and instruct in appropriate isolation precautions for identified infection/condition  10/23/2019 1702 by Jennifer Aguilera RN  Outcome: Progressing  10/23/2019 1702 by Jennifer Aguilera RN  Outcome: Progressing  Goal: Absence of fever/infection during neutropenic period  Description  INTERVENTIONS:  - Monitor WBC    10/23/2019 1702 by Jennifer Aguilera RN  Outcome: Progressing  10/23/2019 1702 by Jennifer Aguilera RN  Outcome: Progressing     Problem: SAFETY ADULT  Goal: Patient will remain free of falls  Description  INTERVENTIONS:  - Assess patient frequently for physical needs  -  Identify cognitive and physical deficits and behaviors that affect risk of falls    -  [place] fall precautions as indicated by assessment   - Educate patient/family on patient safety including physical limitations  - Instruct patient to call for assistance with activity based on assessment  - Modify environment to reduce risk of injury  - Consider OT/PT consult to assist with strengthening/mobility  10/23/2019 1702 by Jennifer Aguilera RN  Outcome: Progressing  10/23/2019 1702 by Jennifer Aguilera RN  Outcome: Progressing  Goal: Maintain or return to baseline ADL function  Description  INTERVENTIONS:  -  Assess patient's ability to carry out ADLs; assess patient's baseline for ADL function and identify physical deficits which impact ability to perform ADLs (bathing, care of mouth/teeth, toileting, grooming, dressing, etc )  - Assess/evaluate cause of self-care deficits   - Assess range of motion  - Assess patient's mobility; develop plan if impaired  - Assess patient's need for assistive devices and provide as appropriate  - Encourage maximum independence but intervene and supervise when necessary  - Involve family in performance of ADLs  - Assess for home care needs following discharge   - Consider OT consult to assist with ADL evaluation and planning for discharge  - Provide patient education as appropriate  10/23/2019 1702 by Jesús Mtz RN  Outcome: Progressing  10/23/2019 1702 by Jesús Mtz RN  Outcome: Progressing  Goal: Maintain or return mobility status to optimal level  Description  INTERVENTIONS:  - Assess patient's baseline mobility status (ambulation, transfers, stairs, etc )    - Identify cognitive and physical deficits and behaviors that affect mobility  - Identify mobility aids required to assist with transfers and/or ambulation (gait belt, sit-to-stand, lift, walker, cane, etc )  - [place] fall precautions as indicated by assessment  - Record patient progress and toleration of activity level on Mobility SBAR; progress patient to next Phase/Stage  - Instruct patient to call for assistance with activity based on assessment  - Consider rehabilitation consult to assist with strengthening/weightbearing, etc   10/23/2019 1702 by Jesús Mtz RN  Outcome: Progressing  10/23/2019 1702 by Jesús Mtz RN  Outcome: Progressing     Problem: DISCHARGE PLANNING  Goal: Discharge to home or other facility with appropriate resources  Description  INTERVENTIONS:  - Identify barriers to discharge w/patient and caregiver  - Arrange for needed discharge resources and transportation as appropriate  - Identify discharge learning needs (meds, wound care, etc )  - Arrange for interpretive services to assist at discharge as needed  - Refer to Case Management Department for coordinating discharge planning if the patient needs post-hospital services based on physician/advanced practitioner order or complex needs related to functional status, cognitive ability, or social support system  10/23/2019 1702 by Amanda Lee RN  Outcome: Progressing  10/23/2019 1702 by Amanda Lee RN  Outcome: Progressing     Problem: Knowledge Deficit  Goal: Patient/family/caregiver demonstrates understanding of disease process, treatment plan, medications, and discharge instructions  Description  Complete learning assessment and assess knowledge base    Interventions:  - Provide teaching at level of understanding  - Provide teaching via preferred learning methods  10/23/2019 1702 by Amanda Lee RN  Outcome: Progressing  10/23/2019 1702 by Amanda Lee RN  Outcome: Progressing

## 2019-10-23 NOTE — ASSESSMENT & PLAN NOTE
· Known history  · Patient is not on anticoagulation  · EKG did show some atrial fibrillation with RVR  · Continue Cardizem  · Telemetry monitoring  · Rate appears controlled currently

## 2019-10-23 NOTE — ASSESSMENT & PLAN NOTE
· History of CVA 2008  · Continue ASA patient now listing her atorvastatin as allergy appears to be sensitive to statin  · Recent lipid panel showing total cholesterol 247 and LDL of 147 patient has an upcoming cardiology appointment and should explore this given her stroke history this time patient is declining treatment of her cholesterol

## 2019-10-24 ENCOUNTER — APPOINTMENT (OUTPATIENT)
Dept: NON INVASIVE DIAGNOSTICS | Facility: HOSPITAL | Age: 79
DRG: 563 | End: 2019-10-24
Payer: MEDICARE

## 2019-10-24 LAB
ANION GAP SERPL CALCULATED.3IONS-SCNC: 9 MMOL/L (ref 4–13)
BUN SERPL-MCNC: 13 MG/DL (ref 5–25)
CALCIUM SERPL-MCNC: 8 MG/DL (ref 8.3–10.1)
CHLORIDE SERPL-SCNC: 101 MMOL/L (ref 100–108)
CO2 SERPL-SCNC: 26 MMOL/L (ref 21–32)
CREAT SERPL-MCNC: 0.62 MG/DL (ref 0.6–1.3)
ERYTHROCYTE [DISTWIDTH] IN BLOOD BY AUTOMATED COUNT: 13.3 % (ref 11.6–15.1)
EST. AVERAGE GLUCOSE BLD GHB EST-MCNC: 177 MG/DL
GFR SERPL CREATININE-BSD FRML MDRD: 87 ML/MIN/1.73SQ M
GLUCOSE SERPL-MCNC: 154 MG/DL (ref 65–140)
GLUCOSE SERPL-MCNC: 163 MG/DL (ref 65–140)
GLUCOSE SERPL-MCNC: 207 MG/DL (ref 65–140)
HBA1C MFR BLD: 7.8 % (ref 4.2–6.3)
HCT VFR BLD AUTO: 40.7 % (ref 34.8–46.1)
HGB BLD-MCNC: 13.3 G/DL (ref 11.5–15.4)
MCH RBC QN AUTO: 31.3 PG (ref 26.8–34.3)
MCHC RBC AUTO-ENTMCNC: 32.7 G/DL (ref 31.4–37.4)
MCV RBC AUTO: 96 FL (ref 82–98)
PLATELET # BLD AUTO: 235 THOUSANDS/UL (ref 149–390)
PMV BLD AUTO: 10.3 FL (ref 8.9–12.7)
POTASSIUM SERPL-SCNC: 3.9 MMOL/L (ref 3.5–5.3)
RBC # BLD AUTO: 4.25 MILLION/UL (ref 3.81–5.12)
SODIUM SERPL-SCNC: 136 MMOL/L (ref 136–145)
WBC # BLD AUTO: 8.53 THOUSAND/UL (ref 4.31–10.16)

## 2019-10-24 PROCEDURE — 99232 SBSQ HOSP IP/OBS MODERATE 35: CPT | Performed by: NURSE PRACTITIONER

## 2019-10-24 PROCEDURE — 99222 1ST HOSP IP/OBS MODERATE 55: CPT | Performed by: ORTHOPAEDIC SURGERY

## 2019-10-24 PROCEDURE — 27530 TREAT KNEE FRACTURE: CPT | Performed by: ORTHOPAEDIC SURGERY

## 2019-10-24 PROCEDURE — 93306 TTE W/DOPPLER COMPLETE: CPT | Performed by: INTERNAL MEDICINE

## 2019-10-24 PROCEDURE — 83036 HEMOGLOBIN GLYCOSYLATED A1C: CPT | Performed by: NURSE PRACTITIONER

## 2019-10-24 PROCEDURE — 82948 REAGENT STRIP/BLOOD GLUCOSE: CPT

## 2019-10-24 PROCEDURE — 80048 BASIC METABOLIC PNL TOTAL CA: CPT | Performed by: NURSE PRACTITIONER

## 2019-10-24 PROCEDURE — 85027 COMPLETE CBC AUTOMATED: CPT | Performed by: NURSE PRACTITIONER

## 2019-10-24 PROCEDURE — G8987 SELF CARE CURRENT STATUS: HCPCS

## 2019-10-24 PROCEDURE — G8978 MOBILITY CURRENT STATUS: HCPCS

## 2019-10-24 PROCEDURE — 97167 OT EVAL HIGH COMPLEX 60 MIN: CPT

## 2019-10-24 PROCEDURE — 97163 PT EVAL HIGH COMPLEX 45 MIN: CPT

## 2019-10-24 PROCEDURE — 93306 TTE W/DOPPLER COMPLETE: CPT

## 2019-10-24 PROCEDURE — G8988 SELF CARE GOAL STATUS: HCPCS

## 2019-10-24 PROCEDURE — G8979 MOBILITY GOAL STATUS: HCPCS

## 2019-10-24 RX ORDER — METOPROLOL TARTRATE 5 MG/5ML
5 INJECTION INTRAVENOUS ONCE
Status: COMPLETED | OUTPATIENT
Start: 2019-10-24 | End: 2019-10-24

## 2019-10-24 RX ORDER — OXYCODONE HYDROCHLORIDE 5 MG/1
5 TABLET ORAL ONCE
Status: COMPLETED | OUTPATIENT
Start: 2019-10-24 | End: 2019-10-24

## 2019-10-24 RX ADMIN — HEPARIN SODIUM 5000 UNITS: 5000 INJECTION INTRAVENOUS; SUBCUTANEOUS at 06:16

## 2019-10-24 RX ADMIN — INSULIN LISPRO 1 UNITS: 100 INJECTION, SOLUTION INTRAVENOUS; SUBCUTANEOUS at 21:16

## 2019-10-24 RX ADMIN — HEPARIN SODIUM 5000 UNITS: 5000 INJECTION INTRAVENOUS; SUBCUTANEOUS at 14:12

## 2019-10-24 RX ADMIN — ACETAMINOPHEN 650 MG: 325 TABLET, FILM COATED ORAL at 21:29

## 2019-10-24 RX ADMIN — DIGOXIN 125 MCG: 125 TABLET ORAL at 09:33

## 2019-10-24 RX ADMIN — HEPARIN SODIUM 5000 UNITS: 5000 INJECTION INTRAVENOUS; SUBCUTANEOUS at 21:24

## 2019-10-24 RX ADMIN — SODIUM CHLORIDE 75 ML/HR: 0.9 INJECTION, SOLUTION INTRAVENOUS at 09:39

## 2019-10-24 RX ADMIN — METOPROLOL TARTRATE 5 MG: 5 INJECTION INTRAVENOUS at 17:37

## 2019-10-24 RX ADMIN — OXYCODONE HYDROCHLORIDE 5 MG: 5 TABLET ORAL at 01:55

## 2019-10-24 RX ADMIN — POLYETHYLENE GLYCOL 3350 17 G: 17 POWDER, FOR SOLUTION ORAL at 09:33

## 2019-10-24 RX ADMIN — ACETAMINOPHEN 650 MG: 325 TABLET, FILM COATED ORAL at 12:27

## 2019-10-24 RX ADMIN — DOCUSATE SODIUM 100 MG: 100 CAPSULE, LIQUID FILLED ORAL at 09:33

## 2019-10-24 RX ADMIN — ASPIRIN 81 MG: 81 TABLET, COATED ORAL at 09:33

## 2019-10-24 RX ADMIN — OXYCODONE HYDROCHLORIDE 5 MG: 5 TABLET ORAL at 14:12

## 2019-10-24 RX ADMIN — BISACODYL 10 MG: 10 SUPPOSITORY RECTAL at 12:27

## 2019-10-24 NOTE — PLAN OF CARE
Problem: PHYSICAL THERAPY ADULT  Goal: Performs mobility at highest level of function for planned discharge setting  See evaluation for individualized goals  Description  Treatment/Interventions: LE strengthening/ROM, Therapeutic exercise, Endurance training, Patient/family training, Equipment eval/education, Bed mobility, Continued evaluation, Spoke to nursing, Spoke to case management, Spoke to MD, Spoke to advanced practitioner, OT          See flowsheet documentation for full assessment, interventions and recommendations  Note:   Prognosis: Fair  Problem List: Decreased strength, Decreased range of motion, Decreased endurance, Impaired balance, Decreased mobility, Obesity, Orthopedic restrictions, Pain, Impaired tone, Decreased coordination  Assessment: Pt is 67 y o  female seen for PT evaluation s/p admit to [facility] on 10/23/2019 w/ Right leg pain  PT consulted to assess pt's functional mobility and d/c needs  Order placed for PT eval and tx, w/ up w/ A and NWB R LE (with knee immobilizer per orthopedics 10/24/2019)  order  Performed at least 2 patient identifiers during session: Name and wristband  Comorbidities affecting pt's physical performance at time of assessment include: History of stroke, hypertension, diabetes mellitus, dizziness, constipation, atrial fibrillation  PTA, pt was independent w/ all functional mobility w/ manual wheelchair, lives alone in one level apartment and retired  Personal factors affecting pt at time of IE include: inability to ambulate household distances, limited home support, positive fall history, limited insight into impairments, inability to perform IADLs and inability to perform ADLs   Please find objective findings from PT assessment regarding body systems outlined above with impairments and limitations including weakness, decreased ROM, impaired balance, decreased endurance, impaired coordination, pain, decreased activity tolerance, decreased functional mobility tolerance, fall risk, orthopedic restrictions, obesity and impaired tone  The following objective measures performed on IE also reveal limitations: Barthel Index: 20/100 and Modified [employer]: 5 (severe disability)  Pt's clinical presentation is currently unstable/unpredictable seen in pt's presentation of ongoing medical management/monitoring, evident in need for assist w/ all phases of mobility when usually mobilizing independently, and pain + fatigue impacting overall mobility status  Pt to benefit from continued PT tx to address deficits as defined above and maximize level of functional independent mobility and consistency  From PT/mobility standpoint, recommendation at time of d/c would be STR pending progress in order to facilitate return to PLOF  Barriers to Discharge: Decreased caregiver support, Inaccessible home environment     Recommendation: Short-term skilled PT     PT - OK to Discharge: Yes(when medically cleared; if to STR)    See flowsheet documentation for full assessment

## 2019-10-24 NOTE — UTILIZATION REVIEW
Initial Clinical Review  OBSERVATION 10/23/19 @ 1551 CONVERTED TO INPATIENT 10/24/19 @1509 DUE TO TIBIAL PLATEAU FRACTURE WITH PAIN, AND AFIB WITH RVR   10/24/19 1510  Inpatient Admission Once     Transfer Service: General Medicine    Expected Discharge Date: 10/25/19       Question Answer Comment   Admitting Physician Perry Foreman V    Level of Care Med Surg    Estimated length of stay More than 2 Midnights    Certification I certify that inpatient services are medically necessary for this patient for a duration of greater than two midnights  See H&P and MD Progress Notes for additional information about the patient's course of treatment  10/24/19 15         ED Arrival Information     Expected Arrival Acuity Means of Arrival Escorted By Service Admission Type    - 10/23/2019 09:32 Emergent Ambulance [address] Emergency    Arrival Complaint    -        Chief Complaint   Patient presents with    Fall     pt got dizzy and fell at about 0400 this morning while attempting to use the bathroom  denies any head injury or LOC  c/o right leg pain     Assessment/Plan: 67year old female to the ED from home via EMS with dizziness, fall about 5 hours prior to her arrival  Admitted under observation for dizziness, afib, right leg pain  H/O prior CVA with right sided deficits, she lives at home independently, mostly wheelchair bound when she was transferring herself to the toilet, she became suddenly dizzy and fell  NO LOC  Just started taking atorvastatin which she thinks caused her dizziness  She complains of right leg pain since the fall  H/O afib not on anticoagulation  Is currently in afib with RVR on the monitor  Check ECHO, PT/ot eval, orthostatic bp  Check CT of right knee  Tenderness to palpation right knee edema appears to be at baseline per patient   UPdate 10/24: Chronic afib with some episodes of RVR  IV metoprolol as needed     Right leg pain  Assessment & Plan  · Substantial pain with manipulation appears to be situated around the knee x-rays are negative  · CT right knee obtained with evidence of a tibial plateau fracture nondisplaced  · Orthopedic recommending nonweightbearing with immobilizer  · PT OT evaluation recommending short-term rehab patient is requesting [address] [facility] [address]  · Will monitor overnight as patient is having some hyper flexibility that is residual from her previous stroke with need for frequent q 1 hour turning and skin checks     Atrial fibrillation ([address] )  Assessment & Plan  · Known history  · Patient is not on anticoagulation likely suspect this is due to fall risk  · EKG did show some atrial fibrillation with RVR with a peak rate of 109 suspect this is in setting of pain will give patient a dose of metoprolol 5 mg IV  · Continue Cardizem     ED Triage Vitals [10/23/19 0936]   Temperature Pulse Respirations Blood Pressure SpO2   98 °F (36 7 °C) (!) 112 15 (!) 174/81 98 %      Temp Source Heart Rate Source Patient Position - Orthostatic VS BP Location FiO2 (%)   Oral Monitor Sitting Left arm --      Pain Score       4        Wt Readings from Last 1 Encounters:   10/23/19 91 4 kg (201 lb 8 oz)     Additional Vital Signs:   Date/Time  Temp  Pulse  Resp  BP  MAP (mmHg)  SpO2  O2 Device  Patient Position - Orthostatic VS   10/24/19 02:13:13  --  100  18  111/73  86  95 %  --  --   10/23/19 23:53:44  98 6 °F (37 °C)  101  --  94/57  69  96 %  --  --   10/23/19 2208  --  --  --  107/58  --  --  --  --   10/23/19 20:12:48  98 3 °F (36 8 °C)  105  18  113/62  79  93 %  --  --   10/23/19 16:53:49  97 4 °F (36 3 °C)Abnormal   96  19  121/71  88  97 %  --  --   10/23/19 1544  --  96  20  98/54  --  91 %  None (Room air)  Lying   10/23/19 1420  --  109Abnormal   16  121/64  --  95 %  None (Room air)  Lying   10/23/19 1201  --  107Abnormal   17  122/58  --  96 %  --  Lying   10/23/19 1130  --  99  14  118/59  --  96 %  --  Lying   10/23/19 1115  --  95  15  104/57  --  96 %  -- Lying   10/23/19 1030  --  100  15  121/60  --  92 %  --  Lying   10/23/19 09:50:53  --  104  17  124/78  --  97 %           Pertinent Labs/Diagnostic Test Results:   ECHO:  LEFT VENTRICLE: Size was normal  Ejection fraction was estimated in the range of 50 % to 55 % to be 50 %  Wall thickness was mildly increased  DOPPLER: Left ventricular diastolic function parameters were abnormal  Doppler parameters were  consistent with elevated ventricular end-diastolic filling pressure      RIGHT VENTRICLE: The ventricle was mildly dilated  Systolic function was normal      LEFT ATRIUM: The atrium was mildly dilated      RIGHT ATRIUM: Size was normal      MITRAL VALVE: There was mild annular calcification  There was mild calcification  DOPPLER: There was moderate regurgitation      AORTIC VALVE: DOPPLER: There was critical stenosis  ROCHELLE 0 4 cm2 There was mild regurgitation      TRICUSPID VALVE: DOPPLER: There was mild regurgitation  Estimated peak PA pressure was 50 mmHg      PULMONIC VALVE: Not well visualized  DOPPLER: There was no regurgitation      PERICARDIUM: There was no pericardial effusion  The pericardium was normal in appearance      AORTA: The root exhibited normal size and mild fibrocalcific change      SYSTEMIC VEINS: IVC: The inferior vena cava was normal in size and course  Respirophasic changes were normal   CT right knee:  Acute, nondisplaced fracture involving the posterior most aspect of the medial tibial plateau (series 414 images 54 through 63, and series 3 images 104 through 121 )    Severe tricompartmental osteoarthritis  EKG 10/23 @0947:  Atrial fibrillation with rapid ventricular response  Abnormal ECG  10/23/19 CXRL  Mild enlargement of cardiac silhouette and bilateral calcified pleural plaques  No definite acute pulmonary disease  Xray right hip/pelvix 10/23: No acute osseous abnormality    Xray right femur 10/23: No acute osseous abnormality  XRay right ankle 10/23: No acute osseous abnormality  Xray right knee 10/23: No acute osseous abnormality  Tricompartmental right knee osteoarthritis and joint effusion   CTA head 10/23:   No acute intracranial abnormality  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    Results from last 7 days   Lab Units 10/24/19  0558 10/23/19  2050 10/23/19  0959   WBC Thousand/uL 8 53  --  10 71*   HEMOGLOBIN g/dL 13 3  --  15 2   HEMATOCRIT % 40 7  --  46 7*   PLATELETS Thousands/uL 235 255 275   NEUTROS ABS Thousands/µL  --   --  7 55         Results from last 7 days   Lab Units 10/24/19  0558 10/23/19  0959   SODIUM mmol/L 136 139   POTASSIUM mmol/L 3 9 3 8   CHLORIDE mmol/L 101 102   CO2 mmol/L 26 28   ANION GAP mmol/L 9 9   BUN mg/dL 13 11   CREATININE mg/dL 0 62 0 80   EGFR ml/min/1 73sq m 87 71   CALCIUM mg/dL 8 0* 9 2     Results from last 7 days   Lab Units 10/23/19  0959   AST U/L 17   ALT U/L 24   ALK PHOS U/L 60   TOTAL PROTEIN g/dL 7 3   ALBUMIN g/dL 3 9   TOTAL BILIRUBIN mg/dL 1 20*     Results from last 7 days   Lab Units 10/23/19  1557   POC GLUCOSE mg/dl 135     Results from last 7 days   Lab Units 10/24/19  0558 10/23/19  0959   GLUCOSE RANDOM mg/dL 154* 164*       Results from last 7 days   Lab Units 10/23/19  0959   DIGOXIN LVL ng/mL 0 5*       Results from last 7 days   Lab Units 10/23/19  1438   CLARITY UA  Clear   COLOR UA  Yellow   SPEC GRAV UA  <=1 005   PH UA  6 0   GLUCOSE UA mg/dl Negative   KETONES UA mg/dl Negative   BLOOD UA  Negative   PROTEIN UA mg/dl Negative   NITRITE UA  Negative   BILIRUBIN UA  Negative   UROBILINOGEN UA E U /dl 0 2   LEUKOCYTES UA  Trace*   WBC UA /hpf 2-4*   RBC UA /hpf None Seen   BACTERIA UA /hpf Occasional   EPITHELIAL CELLS WET PREP /hpf Occasional     ED Treatment:   Medication Administration from 10/23/2019 0932 to 10/23/2019 1647       Date/Time Order Dose Route Action     10/23/2019 0959 ondansetron (ZOFRAN) injection 4 mg 4 mg Intravenous Given     10/23/2019 1037 acetaminophen (TYLENOL) tablet 650 mg 650 mg Oral Given     10/23/2019 0958 fentanyl citrate (PF) 100 MCG/2ML 50 mcg 50 mcg Intravenous Given     10/23/2019 1153 sodium chloride 0 9 % bolus 500 mL 500 mL Intravenous New Bag     10/23/2019 1249 fentanyl citrate (PF) 100 MCG/2ML 50 mcg 50 mcg Intravenous Given        Past Medical History:   Diagnosis Date    Cardiac disease     Diabetes mellitus ([address] )     Hypertension     Stroke [facility])        Admitting Diagnosis: Leg injury [S89 90XA]  Right hip pain [M25 551]  Right knee pain [M25 561]  Fall, initial encounter [W19  XXXA]  Age/Sex: 67 y o  female  Admission Orders:  Tele  CT right knee  Medications:  aspirin 81 mg Oral Daily   digoxin 125 mcg Oral Daily   diltiazem 120 mg Oral Q12H [facility]   docusate sodium 100 mg Oral BID   heparin (porcine) 5,000 Units Subcutaneous Q8H [facility]   polyethylene glycol 17 g Oral Daily       sodium chloride 75 mL/hr Intravenous Continuous       acetaminophen 650 mg Oral Q6H PRN   bisacodyl 10 mg Rectal Daily PRN   LORazepam 0 5 mg Oral Daily PRN   ondansetron 4 mg Intravenous Q6H PRN   oxyCODONE 2 5 mg Oral Q4H PRN   oxyCODONE 5 mg Oral Q4H PRN       IP CONSULT TO ORTHOPEDIC SURGERY    Network Utilization Review Department  [email or web address]  ATTENTION: Please call with any questions or concerns to [phone] and carefully listen to the prompts so that you are directed to the right person  All voicemails are confidential   Ella Burch all requests for admission clinical reviews, approved or denied determinations and any other requests to dedicated fax number below belonging to the campus where the patient is receiving treatment    FACILITY NAME UR FAX NUMBER   ADMISSION DENIALS (Administrative/Medical Necessity) [phone]   PARENT CHILD HEALTH (Maternity/NICU/Pediatrics) [phone]   [place] [id number] [facility] [address] [facility] [phone]   [address] [facility]  [phone]     Xavier Cabrera [phone]   [facility] [address] [place] [address] Jamie Macdonald [phone]

## 2019-10-24 NOTE — ASSESSMENT & PLAN NOTE
· Telemetry monitoring  · Patient has chronic atrial fibrillation noted to have some mild RVR to 109 likely in setting of her fracture will give a 1 time dose of metoprolol 5 mg IV  · No further dizziness  · Patient is currently asymptomatic and has remained asymptomatic since her event  · Patient is mostly wheelchair-bound but has stay and and can self transferring living independently at home  · Patient is unable to stand given her significant leg pain will initially check lying and sitting orthostatics blood pressures  · Echocardiogram showing a preserved ejection fraction between 50-55%  · Of note Patient reports taking atorvastatin as her only new medication and feels this is contributing her dizziness to that she took before bed

## 2019-10-24 NOTE — ASSESSMENT & PLAN NOTE
Lab Results   Component Value Date    HGBA1C 7 8 (H) 10/24/2019       Recent Labs     10/23/19  1557   POCGLU 135       Blood Sugar Average: Last 72 hrs:  (P) 135   Hold oral agent while in the hospital  Obtain hemoglobin A1c  SSI and blood glucose monitoring while in-patient

## 2019-10-24 NOTE — CONSULTS
Orthopedics   Dameon Deleon 67 y o  female MRN: [id number]  Unit/Bed#: MO ECHO      Chief Complaint:   right knee pain    HPI:   67 y  o female complaining of right knee pain  Patient has a known past medical history of a stroke in 2008 with residual right-sided weakness, atrial fibrillation, hypertension, diabetes after transferring herself to the toilet when she became suddenly dizzy and sustained a fall  She denies any loss of consciousness or hitting her head  She states she has pain from the groin down to her foot  When asking where most of her pain is she states it is over the medial aspect of her knee  She has pain with any motion of her knee as well as difficulty ambulating  She is able to move her ankle and her hip with only mild discomfort  She states she normally ambulates at home with a walker  Currently she denies any numbness or tingling  She denies any chest pain, shortness of breath, nausea, vomiting, diarrhea, lightheadedness, dizziness  Review Of Systems:   · Skin: Normal  · Neuro: See HPI  · Musculoskeletal: See HPI  · 14 point review of systems negative except as stated above     Past Medical History:   Past Medical History:   Diagnosis Date    Cardiac disease     [facility] mellitus [address] [address] )     Hypertension     Stroke [facility])        Past Surgical History:   History reviewed  No pertinent surgical history  Family History:  Family history reviewed and non-contributory  History reviewed  No pertinent family history  Social History:  Social History     Socioeconomic History    Marital status:       Spouse name: None    Number of children: None    Years of education: None    Highest education level: None   Occupational History    None   Social Needs    Financial resource strain: None    Food insecurity:     Worry: None     Inability: None    Transportation needs:     Medical: None     Non-medical: None   Tobacco Use    Smoking status: Never Smoker    Smokeless tobacco: Never Used   Substance and Sexual Activity    Alcohol use: Not Currently    Drug use: Never    Sexual activity: None   Lifestyle    Physical activity:     Days per week: None     Minutes per session: None    Stress: None   Relationships    Social connections:     Talks on phone: None     Gets together: None     Attends Mu-ism service: None     Active member of club or organization: None     Attends meetings of clubs or organizations: None     Relationship status: None    Intimate partner violence:     Fear of current or ex partner: None     Emotionally abused: None     Physically abused: None     Forced sexual activity: None   Other Topics Concern    None   Social History Narrative    None       Allergies: Allergies   Allergen Reactions    Atorvastatin      dizziness and shaky    Rivaroxaban      Dizziness, and shaky             Labs:  0   Lab Value Date/Time    HCT 40 7 10/24/2019 0558    HCT 46 7 (H) 10/23/2019 0959    HCT 47 2 (H) 03/28/2019 2030    HGB 13 3 10/24/2019 0558    HGB 15 2 10/23/2019 0959    HGB 15 8 (H) 03/28/2019 2030    WBC 8 53 10/24/2019 0558    WBC 10 71 (H) 10/23/2019 0959    WBC 9 93 03/28/2019 2030       Meds:    Current Facility-Administered Medications:     acetaminophen (TYLENOL) tablet 650 mg, 650 mg, Oral, Q6H PRN, Darus Yashk, CRNP    aspirin (ECOTRIN LOW STRENGTH) EC tablet 81 mg, 81 mg, Oral, Daily, Darus Bunk, CRNP    bisacodyl (DULCOLAX) rectal suppository 10 mg, 10 mg, Rectal, Daily PRN, Darus Bunk, CRNP    digoxin (LANOXIN) tablet 125 mcg, 125 mcg, Oral, Daily, Darus Bunk, CRNP    diltiazem (CARDIZEM SR) 12 hr capsule 120 mg, 120 mg, Oral, Q12H JAELYN, Darus Bunk, CRNP    docusate sodium (COLACE) capsule 100 mg, 100 mg, Oral, BID, Darus Bunluis angel, CRNP, 100 mg at 10/23/19 1837    heparin (porcine) subcutaneous injection 5,000 Units, 5,000 Units, Subcutaneous, Q8H [facility], 5,000 Units at 10/24/19 0616 **AND** [COMPLETED] Platelet count, , , Once, Murl Pickles, CRNP    LORazepam (ATIVAN) tablet 0 5 mg, 0 5 mg, Oral, Daily PRN, Murl Pickles, CRNP    ondansetron [facility]) injection 4 mg, 4 mg, Intravenous, Q6H PRN, Murl Pickles, CRNP    oxyCODONE (ROXICODONE) IR tablet 2 5 mg, 2 5 mg, Oral, Q4H PRN, Murl Pickles, CRNP    oxyCODONE (ROXICODONE) IR tablet 5 mg, 5 mg, Oral, Q4H PRN, Murl Pickles, CRNP, 5 mg at 10/23/19 2346    polyethylene glycol (MIRALAX) packet 17 g, 17 g, Oral, Daily, Murl Pickles, CRNP    sodium chloride 0 9 % infusion, 75 mL/hr, Intravenous, Continuous, Murl Pickles, CRNP, Last Rate: 75 mL/hr at 10/23/19 2043, 75 mL/hr at 10/23/19 2043    Blood Culture:   No results found for: BLOODCX    Wound Culture:   No results found for: WOUNDCULT    Ins and Outs:  I/O last 24 hours: In: 1860 [P O :360; I V :1000; IV Piggyback:500]  Out: 3584 [Urine:1240]          Physical Exam:   /73   Pulse 100   Temp 98 6 °F (37 °C)   Resp 18   Ht 5' 4" (1 626 m)   Wt 91 4 kg (201 lb 8 oz)   SpO2 95%   BMI 34 59 kg/m²   Gen: Alert and oriented to person, place, time  HEENT: EOMI, eyes clear, moist mucus membranes, hearing intact  Respiratory: Bilateral chest rise  No audible wheezing found  Cardiovascular: Regular Rate and Rhythm  Abdomen: soft nontender/nondistended  Musculoskeletal: right lower extremity  · Skin intact  · Tender to palpation over Medial and posterior knee, no tenderness to palpation over the ankle or hip joint  · Small effusion appreciated over the right knee  · Tenderness as well as mild laxity is appreciated with valgus stress test, varus stress test is stable, negative lachmans, posterior draw, negative Yovanny's test  · Sensation intact L3-S1  · 4/5 strength to hip flexion/extension, knee flexion/extension, ankle dorsi/plantar flexion, EHL/FHL secondary to residual weakness from a stroke in 2008  · 2+ DP pulse    Radiology:   I personally reviewed the films    X-rays right knee shows severe tricompartmental osteoarthritis with osteophyte formation and subchondral sclerosis  X-rays of the right ankle demonstrate no acute fractures or dislocations    X-rays of the right hip demonstrate no acute fractures or dislocations    CT scan of the right knee demonstrate a posterior medial nondisplaced tibial plateau fracture    _*_*_*_*_*_*_*_*_*_*_*_*_*_*_*_*_*_*_*_*_*_*_*_*_*_*_*_*_*_*_*_*_*_*_*_*_*_*_*_*_*    Assessment:  67 y o  female with right knee pain with posterior medial nondisplaced tibial plateau fracture    Plan:   · Nonweightbearing right lower extremity  · PT for transfers  · Pain control as per primary team  · Body mass index is 34 59 kg/m²  moderately obese  Recommend nutrition and physical activity  · Dispo: Ortho will follow  · Case will be discussed with Dr Randi Alicia for recommendations of treatment  There is a small posterior medial nondisplaced tibial plateau fracture  Patient has known severe osteoarthritis of the right knee  Patient should obtain knee immobilizer  Мария Cespedes PA-C

## 2019-10-24 NOTE — PLAN OF CARE
Problem: Potential for Falls  Goal: Patient will remain free of falls  Description  INTERVENTIONS:  - Assess patient frequently for physical needs  -  Identify cognitive and physical deficits and behaviors that affect risk of falls    -  [place] fall precautions as indicated by assessment   - Educate patient/family on patient safety including physical limitations  - Instruct patient to call for assistance with activity based on assessment  - Modify environment to reduce risk of injury  - Consider OT/PT consult to assist with strengthening/mobility  Outcome: Progressing     Problem: Prexisting or High Potential for Compromised Skin Integrity  Goal: Skin integrity is maintained or improved  Description  INTERVENTIONS:  - Identify patients at risk for skin breakdown  - Assess and monitor skin integrity  - Assess and monitor nutrition and hydration status  - Monitor labs   - Assess for incontinence   - Turn and reposition patient  - Assist with mobility/ambulation  - Relieve pressure over bony prominences  - Avoid friction and shearing  - Provide appropriate hygiene as needed including keeping skin clean and dry  - Evaluate need for skin moisturizer/barrier cream  - Collaborate with interdisciplinary team   - Patient/family teaching  - Consider wound care consult   Outcome: Progressing     Problem: PAIN - ADULT  Goal: Verbalizes/displays adequate comfort level or baseline comfort level  Description  Interventions:  - Encourage patient to monitor pain and request assistance  - Assess pain using appropriate pain scale  - Administer analgesics based on type and severity of pain and evaluate response  - Implement non-pharmacological measures as appropriate and evaluate response  - Consider cultural and social influences on pain and pain management  - Notify physician/advanced practitioner if interventions unsuccessful or patient reports new pain  Outcome: Progressing     Problem: INFECTION - ADULT  Goal: Absence or prevention of progression during hospitalization  Description  INTERVENTIONS:  - Assess and monitor for signs and symptoms of infection  - Monitor lab/diagnostic results  - Monitor all insertion sites, i e  indwelling lines, tubes, and drains  - Monitor endotracheal if appropriate and nasal secretions for changes in amount and color  - [place] appropriate cooling/warming therapies per order  - Administer medications as ordered  - Instruct and encourage patient and family to use good hand hygiene technique  - Identify and instruct in appropriate isolation precautions for identified infection/condition  Outcome: Progressing  Goal: Absence of fever/infection during neutropenic period  Description  INTERVENTIONS:  - Monitor WBC    Outcome: Progressing     Problem: SAFETY ADULT  Goal: Patient will remain free of falls  Description  INTERVENTIONS:  - Assess patient frequently for physical needs  -  Identify cognitive and physical deficits and behaviors that affect risk of falls    -  [place] fall precautions as indicated by assessment   - Educate patient/family on patient safety including physical limitations  - Instruct patient to call for assistance with activity based on assessment  - Modify environment to reduce risk of injury  - Consider OT/PT consult to assist with strengthening/mobility  Outcome: Progressing  Goal: Maintain or return to baseline ADL function  Description  INTERVENTIONS:  -  Assess patient's ability to carry out ADLs; assess patient's baseline for ADL function and identify physical deficits which impact ability to perform ADLs (bathing, care of mouth/teeth, toileting, grooming, dressing, etc )  - Assess/evaluate cause of self-care deficits   - Assess range of motion  - Assess patient's mobility; develop plan if impaired  - Assess patient's need for assistive devices and provide as appropriate  - Encourage maximum independence but intervene and supervise when necessary  - Involve family in performance of ADLs  - Assess for home care needs following discharge   - Consider OT consult to assist with ADL evaluation and planning for discharge  - Provide patient education as appropriate  Outcome: Progressing  Goal: Maintain or return mobility status to optimal level  Description  INTERVENTIONS:  - Assess patient's baseline mobility status (ambulation, transfers, stairs, etc )    - Identify cognitive and physical deficits and behaviors that affect mobility  - Identify mobility aids required to assist with transfers and/or ambulation (gait belt, sit-to-stand, lift, walker, cane, etc )  - [place] fall precautions as indicated by assessment  - Record patient progress and toleration of activity level on Mobility SBAR; progress patient to next Phase/Stage  - Instruct patient to call for assistance with activity based on assessment  - Consider rehabilitation consult to assist with strengthening/weightbearing, etc   Outcome: Progressing     Problem: DISCHARGE PLANNING  Goal: Discharge to home or other facility with appropriate resources  Description  INTERVENTIONS:  - Identify barriers to discharge w/patient and caregiver  - Arrange for needed discharge resources and transportation as appropriate  - Identify discharge learning needs (meds, wound care, etc )  - Arrange for interpretive services to assist at discharge as needed  - Refer to Case Management Department for coordinating discharge planning if the patient needs post-hospital services based on physician/advanced practitioner order or complex needs related to functional status, cognitive ability, or social support system  Outcome: Progressing     Problem: Knowledge Deficit  Goal: Patient/family/caregiver demonstrates understanding of disease process, treatment plan, medications, and discharge instructions  Description  Complete learning assessment and assess knowledge base    Interventions:  - Provide teaching at level of understanding  - Provide teaching via preferred learning methods  Outcome: Progressing

## 2019-10-24 NOTE — ASSESSMENT & PLAN NOTE
· Known history  · Patient is not on anticoagulation likely suspect this is due to fall risk  · EKG did show some atrial fibrillation with RVR with a peak rate of 109 suspect this is in setting of pain will give patient a dose of metoprolol 5 mg IV  · Continue Cardizem

## 2019-10-24 NOTE — ASSESSMENT & PLAN NOTE
· Substantial pain with manipulation appears to be situated around the knee x-rays are negative  · CT right knee obtained with evidence of a tibial plateau fracture nondisplaced  · Orthopedic recommending nonweightbearing with immobilizer  · PT OT evaluation recommending short-term rehab patient is requesting [address] [place] [address]  · Will monitor overnight as patient is having some hyper flexibility that is residual from her previous stroke with need for frequent q 1 hour turning and skin checks

## 2019-10-24 NOTE — PHYSICAL THERAPY NOTE
Physical Therapy Evaluation     Patient's Name: Dameon Deleon    Admitting Diagnosis  Leg injury [S89 90XA]  Right hip pain [M25 551]  Right knee pain [M25 561]  Fall, initial encounter [W19  XXXA]    Problem List  Patient Active Problem List   Diagnosis    History of stroke    Hypertension    Diabetes mellitus ([facility] )    Dizziness    Right leg pain    Constipation    Atrial fibrillation [facility])       Past Medical History  Past Medical History:   Diagnosis Date    Cardiac disease     Diabetes mellitus ([facility] )     Hypertension     Stroke [facility])        Past Surgical History  History reviewed  No pertinent surgical history  10/24/19 1337   Note Type   Note type Eval only   Pain Assessment   Pain Assessment 0-10   Pain Score 6   Pain Type Acute pain   Pain Location Knee;Leg   Pain Orientation Right   Pain Descriptors Aching   Pain Frequency Constant/continuous   Pain Onset Ongoing   Clinical Progression Gradually improving   Hospital Pain Intervention(s) Emotional support;Distraction;Repositioned   Diversional Activities Television   Multiple Pain Sites No   Home Living   Type of Home Apartment  (apartment next door to dtr's house)   Home Layout One level;Ramped entrance;Performs ADLs on one level; Able to live on main level with bedroom/bathroom   Bathroom Shower/Tub Walk-in shower   Bathroom Toilet Standard   Bathroom Equipment Grab bars in shower; Shower chair   Bathroom Accessibility Accessible via wheelchair   [address] Wheelchair-manual;Quad cane   Additional Comments Pt uses w/c for functional mobility in home, reports using quad cane to stand while completing meal prep   Prior Function   Level of [place] Independent with ADLs and functional mobility   Lives With Alone   Receives Help From Family   ADL Assistance Independent   IADLs Independent   Falls in the last 6 months 1 to 4   Vocational Retired  (nurse)   Restrictions/Precautions   Denny Lang[place] Bearing Precautions Per Order Yes   RLE Denny Langford Bearing Per Order NWB  (per orthopedics 10/24/2019)   Braces or Orthoses LE Immobilizer  (knee immobilizer R LE, per orthopedics 10/24/19)   Other Precautions Bed Alarm;WBS;Pain; Fall Risk;Telemetry;Multiple lines;Limb alert   General   Additional Pertinent History CT knee right wo contrast (10/23/2019): IMPRESSION: Acute, nondisplaced fracture involving the posterior most aspect of the medial tibial plateau; Severe tricompartmental osteoarthritis  Family/Caregiver Present No   Cognition   Overall Cognitive Status WFL   Arousal/Participation Cooperative   Attention Within functional limits   Orientation Level Oriented X4   Memory Within functional limits   Following Commands Follows one step commands without difficulty   Comments Pt able to identify self by full name and birthdate  Slight oral motor deficits observed when speaking, however otherwise cognition appears WFL  RUE Assessment   RUE Assessment X  (please refer to OT eval for further details regarding PROM)   RUE Overall AROM   R Shoulder Flexion impaired secondary to history of CVA    R Mass Grasp closed at rest   RUE Strength   RUE Overall Strength Deficits   R Shoulder Flexion 1/5   RUE Tone   RUE Tone Hypertonic   LUE Assessment   LUE Assessment X   LUE Strength   LUE Overall Strength Within Functional Limits - strength 5/5   L Shoulder Flexion 5/5   L Elbow Extension 5/5   RLE Assessment   RLE Assessment X  (not formally tested secondary to NWB R LE; chronic strength deficits following CVA in 2008)   Tone RLE   RLE Tone Hyperreflexia   LLE Assessment   LLE Assessment X  (grossly assessed with functional mobility: at least 3-/5)   Bed Mobility   Rolling L 1  Dependent  (to 5% completion only; initiated; limited by pain)   Additional Comments NWB R LE in knee immobilzer per ortho consult 10/24/2019  Spoke with Kulwinder Badillo PA-C via [employer] prior to initiating PT eval; confirmed NWB R LE in knee immobilizer  Knee immobilizer to be worn while in bed  Spoke with Dr Francisco Javier Sheehan during evaluation; confirmed knee immobilizer to be worn while in bed  Knee immobilizer fit with additional A of 2 secondary to hyperreflexia and chronic strength deficits to R LE following CVA in 2008 for enhanced safety  Pt educated that knee immobilizer may need to be repositioned throughout the day and educated on the need for frequent skin checks  Spoke with Tianna Schwarz and Tricia Damon PA-C post-session  Confirmed with Tricia Damon PA-C knee immobilizer to be worn while in bed  Will need frequent q 1 hour repositioning and skin checks  Tianna Schwarz made aware and DARYL Kim made aware of such  Transfers   Sit to Stand Unable to assess   Ambulation/Elevation   Gait pattern Not tested; Not appropriate   Balance   Static Sitting   (Unable to assess)   Endurance Deficit   Endurance Deficit Yes   Activity Tolerance   Activity Tolerance Patient limited by pain; Patient limited by fatigue   Medical Staff Made Aware OT Justice Allan; Mayr Hilliard; LINDA; Dr Neo Carroll confirmed pt appropriate for therapy; made aware of session outcomes; post-session: pt repositioned for comfort, all needs within reach and bed alarm engaged   Assessment   Prognosis Fair   Problem List Decreased strength;Decreased range of motion;Decreased endurance; Impaired balance;Decreased mobility;Obesity;Orthopedic restrictions;Pain; Impaired tone;Decreased coordination   Assessment Pt is 67 y o  female seen for PT evaluation s/p admit to [facility] on 10/23/2019 w/ Right leg pain  PT consulted to assess pt's functional mobility and d/c needs  Order placed for PT eval and tx, w/ up w/ A and NWB R LE (with knee immobilizer per orthopedics 10/24/2019)  order  Performed at least 2 patient identifiers during session: Name and wristband   Comorbidities affecting pt's physical performance at time of assessment include: History of stroke, hypertension, diabetes mellitus, dizziness, constipation, atrial fibrillation  PTA, pt was independent w/ all functional mobility w/ manual wheelchair, lives alone in one level apartment and retired  Personal factors affecting pt at time of IE include: inability to ambulate household distances, limited home support, positive fall history, limited insight into impairments, inability to perform IADLs and inability to perform ADLs  Please find objective findings from PT assessment regarding body systems outlined above with impairments and limitations including weakness, decreased ROM, impaired balance, decreased endurance, impaired coordination, pain, decreased activity tolerance, decreased functional mobility tolerance, fall risk, orthopedic restrictions, obesity and impaired tone  The following objective measures performed on IE also reveal limitations: Barthel Index: 20/100 and Modified Swain: 5 (severe disability)  Pt's clinical presentation is currently unstable/unpredictable seen in pt's presentation of ongoing medical management/monitoring, evident in need for assist w/ all phases of mobility when usually mobilizing independently, and pain + fatigue impacting overall mobility status  Pt to benefit from continued PT tx to address deficits as defined above and maximize level of functional independent mobility and consistency  From PT/mobility standpoint, recommendation at time of d/c would be STR pending progress in order to facilitate return to PLOF  Barriers to Discharge Decreased caregiver support; Inaccessible home environment   Goals   Patient Goals to be independent again   STG Expiration Date 11/03/19   Short Term Goal #1 In 7-10 days: Increase L LE strength 1 grade to facilitate independent mobility, Perform all bed mobility tasks with mod A of 1 maintaining NWB % of the time to decrease caregiver burden and PT to see and establish goals for functional transfers when appropriate   PT Treatment Day 0   Plan   Treatment/Interventions LE strengthening/ROM; Therapeutic exercise; Endurance training;Patient/family training;Equipment eval/education; Bed mobility;Continued evaluation;Spoke to nursing;Spoke to case management;Spoke to MD;Spoke to advanced practitioner;OT   PT Frequency Other (Comment)  (3-5x/wk)   Recommendation   Recommendation Short-term skilled PT   PT - OK to Discharge Yes  (when medically cleared; if to STR)   Modified [place] Scale   Modified [place] Scale 5   Barthel Index   Feeding 5   Bathing 0   Grooming Score 0   Dressing Score 5   Bladder Score 0   Bowels Score 10   Toilet Use Score 0   Transfers (Bed/Chair) Score 0   Mobility (Level Surface) Score 0   Stairs Score 0   Barthel Index Score 20         Marty Hui, PT, DPT

## 2019-10-24 NOTE — SOCIAL WORK
obs notice provided, patient reports understanding  Patient seen by ortho, reporting possible fracture  Patient awaiting Dr Kamran Springer

## 2019-10-24 NOTE — SOCIAL WORK
No surgical intervention per ortho at this time  PT to see pt but she will need STR  Pt status changed from observation to inpatient  CM spoke with pt and daughter Martir Morton to discuss discharge planning  Pt and daughter made aware of Medicare inpatient status a sit relates to coverage of STR at SNF  They are aware if there is no medical reason for pt to remain the hospital she will have to go to STR at SNF as self pay or apply for medical assiatnce to pay  Pt and daughter report pt has no savings or assets  Pt reports she worked at [employer] for many years and that is the only facility she will agree to go to  Referral is pended to same  Cm made her aware she will not have a choice but to go to a facility willing to accept there if [facility] is unable to accommodate  Will need updated PT/OT  CM to follow up with [facility] on 10/25 to check status of referral  CM to follow

## 2019-10-24 NOTE — OCCUPATIONAL THERAPY NOTE
OccupationalTherapy Evaluation     Patient Name: Dameon NGUYENKNKimW Date: 10/24/2019  Problem List  Principal Problem:    Right leg pain  Active Problems:    History of stroke    Hypertension    Diabetes mellitus ([facility] )    Dizziness    Constipation    Atrial fibrillation [facility])    Past Medical History  Past Medical History:   Diagnosis Date    Cardiac disease     [facility] ([facility] )     Hypertension     Stroke [facility])      Past Surgical History  History reviewed  No pertinent surgical history  10/24/19 1242   Note Type   Note type Eval only   Restrictions/Precautions   Weight Bearing Precautions Per Order Yes   RLE Weight Bearing Per Order NWB   Braces or Orthoses LE Immobilizer  (R LE, per order)   Other Precautions Chair Alarm; Bed Alarm;WBS;Pain; Fall Risk;Telemetry;Multiple lines   Pain Assessment   Pain Assessment 0-10   Pain Score 6   Pain Type Acute pain   Pain Location Knee;Leg   Pain Orientation Right   Pain [address] Complex Media Pain Intervention(s) Emotional support;Distraction;Repositioned   Home Living   Type of Home Apartment  (apartment next door to dtr's house)   Home Layout One level;Ramped entrance;Performs ADLs on one level; Able to live on main level with bedroom/bathroom   Bathroom Shower/Tub Walk-in shower   Bathroom Toilet Standard   Bathroom Equipment Grab bars in shower; Shower chair   Bathroom Accessibility Accessible via wheelchair   [address] [facility]; Wheelchair-manual  (quad cane)   Additional Comments Pt uses w/c for functional mobility in home, reports using quad cane to stand while completing meal prep   Prior Function   Level of [place] Independent with ADLs and functional mobility   Lives With Alone   Receives Help From Family   ADL Assistance Independent   IADLs Independent   Falls in the last 6 months 1 to 4   Vocational Retired  (nurse)   Comments Pt does not drive, dtr provides transportation or uses [employer]   Lifestyle   Autonomy Pt reports INDEP with ADLs, IADLs, and functional transfers/mobility using w/c  Reciprocal Relationships Pt has supportive dtr   Service to Others Pt is retired RN from [address] Pt watches TV and reads   Psychosocial   Psychosocial (WDL) WDL   ADL   Eating Assistance 5  Supervision/Setup   Eating Deficit Setup  (opening containers)   Grooming Assistance 4  Minimal Assistance   Grooming Deficit Other (Comment)  (supply management; based on functional assessment)   [address] 4  Minimal Assistance   UB Bathing Deficit Other (Comment)  (based on functional assessment of skills/deficits)   LB Bathing Assistance 2  Maximal Assistance   LB Bathing Deficit Other (Comment)  (based on functional assessment of skills/deficits)   [address] 4  Minimal Assistance   UB Dressing Deficit Other (Comment)  (based on functional assessment of skills/deficits)   LB Dressing Assistance 1  Total Assistance   LB Dressing Deficit Other (Comment)  (based on functional assessment of skills/deficits)   Toileting Assistance  1  Total Assistance   Toileting Deficit Other (Comment); Use of bedpan/urinal setup  (based on functional assessment of skills/deficits)   Bed Mobility   Rolling L Unable to assess   Additional Comments Pt supine in bed at start of session and agreeable to OT  Pt seen with PT due to decreased activity tolerance  Attempted to complete rolling to L, however this was deferred 2nd to increase in pain in R LE  Much time was spent prior to this attempt, fitting R LE immobilizer; decreasing pt pain tolerance at time of bed mobility attempt  Pt supine in bed at end of session with all needs met, call bell within reach, and bed alarm active  Activity Tolerance   Activity Tolerance Patient limited by pain; Patient limited by fatigue   Medical Staff Made Aware PT Marta   Nurse Made Aware DARYL DE LA ROSA Assessment   RUE Assessment X  (hypertonic)   RUE Overall AROM   R Shoulder Flexion impaired 2nd to hx of CVA    R Mass Grasp closed at rest   RUE Overall PROM   R Shoulder Flexion 95*   R Elbow Flexion WFL   R Elbow Extension slightly resistant however able to achieve full ROM   R Shoulder Extension WFL   R Wrist Flexion WFL; at rest wrist slightly flexed   R Wrist Extension slightly resistant however able to achieve mostly full ROM   R Mass Grasp closed at rest; pt reports not having a splint for this   R Finger Flexion digits flexed to nearly closed fist at rest   R Finger Extension hypertonic; resistance felt with passive extension    RUE Strength   RUE Overall Strength Deficits   R Shoulder Flexion 1/5   RUE Tone   RUE Tone Hypertonic   LUE Assessment   LUE Assessment X   LUE Overall AROM   L Shoulder Flexion WFL   L Elbow Flexion WFL   L Wrist Flexion WFl   L Mass Grasp WFL   LUE Strength   LUE Overall Strength Within Functional Limits - strength 5/5   L Shoulder Flexion 5/5   L Elbow Extension 5/5   Hand Function   Gross Motor Coordination Functional  (impaired R UE)   Fine Motor Coordination Functional  (impaired R UE)   Sensation   Light Touch No apparent deficits   Vision-Basic Assessment   Patient Visual Report Other (Comment)  (no acute visual changes per pt)   Vision - Complex Assessment   Acuity Able to read clock/calendar on wall without difficulty   Cognition   Overall Cognitive Status [facility]   Arousal/Participation Alert; Responsive;Arousable; Cooperative   Attention Within functional limits   Orientation Level Oriented X4   Memory Within functional limits   Following Commands Follows one step commands without difficulty   Comments Pt able to identify self by full name and birthdate  Slight oral motor deficits observed when speaking, however otherwise cognition appears WFL  Assessment   Limitation Decreased ADL status; Decreased UE ROM; Decreased UE strength;Decreased Safe judgement during ADL;Decreased endurance;Decreased self-care trans;Decreased high-level ADLs; Decreased fine motor control;Non-func R UE   Assessment Pt is a 67 y o  female seen for OT evaluation (time 5054-4691) s/[facility] w/ Right leg pain  Comorbidities affecting pt's functional performance at time of assessment include: hx of CVA- 2008; HTN, DM, dizziness, constipation, a-fib, ambulatory dysfunction  Evaluation required a review of medical and/ or therapy records and extensive additional review of physical, cognitive, or psychosocial history related to current functional performance    Personal factors affecting pt at time of IE include:limited home support, difficulty performing ADLS, difficulty performing IADLS , decreased initiation and engagement , health management  and environment  Prior to admission, pt was INDEP with ADLs and most IADLs (did not drive), and Mod I using w/c mostly for functional mobility with occasional use of quad cane for standing during meal prep  Pt was Mod I for functional transfers from w/c, PTA  Upon evaluation: Pt requires set-up for feeding, Min A for grooming and UB dressing, Max-total A for LB ADLs, total A for toileting, DEP for bed mobility rolling - pt initiated 5 % completion of rolling to L (unable to complete during eval 2nd to pain) 2* the following deficits impacting occupational performance: weakness, decreased ROM, decreased strength, decreased balance, decreased tolerance, impaired [address], impaired [address], decreased safety awareness, abnormal tone, increased pain, orthopedic restrictions and decreased coping skills  Cognition appears to be [facility] is [facility]  Development of pt POC requires clinical decision making of high analytic complexity  Significant modification of tasks or assistance (e g , physical or verbal) is necessary to enable patient to complete evaluation component     Pt to benefit from continued skilled OT tx while in the hospital to address deficits as defined above and maximize level of functional independence w ADL's and functional mobility  Occupational Performance areas to address include: eating, grooming, bathing/shower, toilet hygiene, dressing, medication management, socialization, health maintenance, functional mobility, clothing management, cleaning, meal prep, money management, household maintenance and social participation  From OT standpoint, recommendation at time of d/c would be short term rehab  Goals   Patient Goals to be independent again   Plan   Treatment Interventions ADL retraining;Functional transfer training;UE strengthening/ROM; Endurance training;Patient/family training;Equipment evaluation/education; Compensatory technique education;Continued evaluation; Fine motor coordination activities; Activityengagement; Energy conservation;UE splinting  (UE splinting if possible for contracture prevention)   Goal Expiration Date 11/03/19   OT Frequency 3-5x/wk   Recommendation   OT Discharge Recommendation Short Term Rehab   Equipment Recommended Tub seat with back ; resting hand splint for R hand   OT - OK to Discharge   (once medically cleared)   Barthel Index   Feeding 5   Bathing 0   Grooming Score 0   Dressing Score 5   Bladder Score 0   Bowels Score 10   Toilet Use Score 0   Transfers (Bed/Chair) Score 0   Mobility (Level Surface) Score 0   Stairs Score 0   Barthel Index Score 20   Modified Conde Scale   Modified Christina Scale 5     Goals to be met within 10 days:      Pt will complete grooming/oral hygiene tasks Supervision after set-up while supine in bed HOB elevated    Pt will complete UB bathing/dressing Supervision after set-up while supine in bed    Pt will improve functional activity tolerance while in long sit position in bed to 15+ minutes in order to increase safety and independence during functional transfers and ADL tasks  Pt will participate in continued assessment of bed mobility, and functional transfers in order to develop most appropriate POC      Pt will complete R UE PROM HEP independently in order to decrease risk of contracture and maximize functional use in daily routine  Pt will participate in continued assessment of R UE to determine if orthotic/brace indicated for contracture management  Pt will attend to continued cognitive assessment 100% of the time in order to provide most appropriate recommendations for d/c plans  Pt will identify and implement at least 3 healthy coping strategies to increase participation in meaningful activities and decrease risk for readmission      Nas Moncada, OTR/L

## 2019-10-24 NOTE — PLAN OF CARE
Problem: OCCUPATIONAL THERAPY ADULT  Goal: Performs self-care activities at highest level of function for planned discharge setting  See evaluation for individualized goals  Description  Treatment Interventions: ADL retraining, Functional transfer training, UE strengthening/ROM, Endurance training, Patient/family training, Equipment evaluation/education, Compensatory technique education, Continued evaluation, Fine motor coordination activities, Activityengagement, Energy conservation, UE splinting(UE splinting if possible for contracture prevention)  Equipment Recommended: Tub seat with back       See flowsheet documentation for full assessment, interventions and recommendations  Note:   Limitation: Decreased ADL status, Decreased UE ROM, Decreased UE strength, Decreased Safe judgement during ADL, Decreased endurance, Decreased self-care trans, Decreased high-level ADLs, Decreased fine motor control, Non-func R UE     Assessment: Pt is a 67 y o  female seen for OT evaluation (time 8287-0571) s/p admit to [facility] on 10/23/2019 w/ Right leg pain  Comorbidities affecting pt's functional performance at time of assessment include: hx of CVA- 2008; HTN, DM, dizziness, constipation, a-fib, ambulatory dysfunction  Evaluation required a review of medical and/ or therapy records and extensive additional review of physical, cognitive, or psychosocial history related to current functional performance    Personal factors affecting pt at time of IE include:limited home support, difficulty performing ADLS, difficulty performing IADLS , decreased initiation and engagement , health management  and environment  Prior to admission, pt was INDEP with ADLs and most IADLs (did not drive), and Mod I using w/c mostly for functional mobility with occasional use of quad cane for standing during meal prep  Pt was Mod I for functional transfers from w/c, PTA   Upon evaluation: Pt requires set-up for feeding, Min A for grooming and UB dressing, Max-total A for LB ADLs, total A for toileting, DEP for bed mobility rolling - pt initiated 5 % completion of rolling to L (unable to complete during eval 2nd to pain) 2* the following deficits impacting occupational performance: weakness, decreased ROM, decreased strength, decreased balance, decreased tolerance, impaired [facility], impaired [address], decreased safety awareness, abnormal tone, increased pain, orthopedic restrictions and decreased coping skills  Cognition appears to be [facility]  Development of pt POC requires clinical decision making of high analytic complexity  Significant modification of tasks or assistance (e g , physical or verbal) is necessary to enable patient to complete evaluation component    Pt to benefit from continued skilled OT tx while in the hospital to address deficits as defined above and maximize level of functional independence w ADL's and functional mobility  Occupational Performance areas to address include: eating, grooming, bathing/shower, toilet hygiene, dressing, medication management, socialization, health maintenance, functional mobility, clothing management, cleaning, meal prep, money management, household maintenance and social participation  From OT standpoint, recommendation at time of d/c would be short term rehab       OT Discharge Recommendation: Short Term Rehab  OT - OK to Discharge: (once medically cleared)

## 2019-10-24 NOTE — PROGRESS NOTES
Progress Note - Evern Poll 1940, 67 y o  female MRN: [id number]    Unit/Bed#: -01 Encounter: [id number]    Primary Care Provider: Kelley West DO   Date and time admitted to hospital: 10/23/2019  9:32 AM        * Right leg pain  Assessment & Plan  · Substantial pain with manipulation appears to be situated around the knee x-rays are negative  · CT right knee obtained with evidence of a tibial plateau fracture nondisplaced  · Orthopedic recommending nonweightbearing with immobilizer  · PT OT evaluation recommending short-term rehab patient is requesting [address] [place] [address]  · Will monitor overnight as patient is having some hyper flexibility that is residual from her previous stroke with need for frequent q 1 hour turning and skin checks    Atrial fibrillation ([facility] )  Assessment & Plan  · Known history  · Patient is not on anticoagulation likely suspect this is due to fall risk  · EKG did show some atrial fibrillation with RVR with a peak rate of 109 suspect this is in setting of pain will give patient a dose of metoprolol 5 mg IV  · Continue Cardizem    Dizziness  Assessment & Plan  · Telemetry monitoring  · Patient has chronic atrial fibrillation noted to have some mild RVR to 109 likely in setting of her fracture will give a 1 time dose of metoprolol 5 mg IV  · No further dizziness  · Patient is currently asymptomatic and has remained asymptomatic since her event  · Patient is mostly wheelchair-bound but has stay and and can self transferring living independently at home  · Patient is unable to stand given her significant leg pain will initially check lying and sitting orthostatics blood pressures  · Echocardiogram showing a preserved ejection fraction between 50-55%  · Of note Patient reports taking atorvastatin as her only new medication and feels this is contributing her dizziness to that she took before bed    Hypertension  Assessment & Plan  · Continue home regimen with parameters    History of stroke  Assessment & Plan  · History of CVA 2008  · Continue ASA patient now listing her atorvastatin as allergy appears to be sensitive to statin  · Recent lipid panel showing total cholesterol 247 and LDL of 147 patient has an upcoming cardiology appointment and should explore this given her stroke history this time patient is declining treatment of her cholesterol    Constipation  Assessment & Plan  · Patient reports history of no bowel movement x4 days  · Initiate MiraLax and Colace scheduled  · Suppository p r n  · Unsuccessful soapsuds enema    Diabetes mellitus [facility])  Assessment & Plan  Lab Results   Component Value Date    HGBA1C 7 8 (H) 10/24/2019       Recent Labs     10/23/19  1557   POCGLU 135       Blood Sugar Average: Last 72 hrs:  (P) 135   Hold oral agent while in the hospital  Obtain hemoglobin A1c  SSI and blood glucose monitoring while in-patient    VTE Pharmacologic Prophylaxis:   Pharmacologic: Heparin  Mechanical VTE Prophylaxis in Place: Yes    Patient Centered Rounds: I have performed bedside rounds with nursing staff today  Discussions with Specialists or Other Care Team Provider:      Education and Discussions with Family / Patient:  Patient; attempt to reach daughter unsuccessful    Time Spent for Care: 30 minutes  More than 50% of total time spent on counseling and coordination of care as described above  Current Length of Stay: 0 day(s)    Current Patient Status: Inpatient   Certification Statement: The patient will continue to require additional inpatient hospital stay due to In setting of tibial plateau fracture right lower extremity with need for frequent monitoring    Discharge Plan:  Not medically cleared    Code Status: Level 1 - Full Code      Subjective:   Patient reports some pain in the right knee that is improved on the current regimen patient understands that she does have a little pain but has been tolerable on the current regimen    Patient would like a call placed to her daughter which was attempted however it went to a business so a message was not left at that time  Objective:     Vitals:   Temp (24hrs), Av °F (36 7 °C), Min:97 4 °F (36 3 °C), Max:98 6 °F (37 °C)    Temp:  [97 4 °F (36 3 °C)-98 6 °F (37 °C)] 97 5 °F (36 4 °C)  HR:  [] 92  Resp:  [16-20] 20  BP: ()/(54-73) 105/65  SpO2:  [91 %-97 %] 94 %  Body mass index is 34 59 kg/m²  Input and Output Summary (last 24 hours): Intake/Output Summary (Last 24 hours) at 10/24/2019 1532  Last data filed at 10/24/2019 1408  Gross per 24 hour   Intake 2666 25 ml   Output 1320 ml   Net 1346 25 ml       Physical Exam:     Physical Exam   Constitutional: She is oriented to person, place, and time  HENT:   Head: Normocephalic and atraumatic  Eyes: Conjunctivae and EOM are normal    Neck: Normal range of motion  Cardiovascular: Normal rate and normal heart sounds  An irregular rhythm present  Pulmonary/Chest: Effort normal and breath sounds normal    Abdominal: Soft  Bowel sounds are normal    Musculoskeletal: She exhibits edema and tenderness  Tenderness to palpation right knee edema appears to be at baseline per patient   Neurological: She is alert and oriented to person, place, and time  Skin: Skin is warm and dry  Psychiatric: She has a normal mood and affect  Her behavior is normal          Additional Data:     Labs:    Results from last 7 days   Lab Units 10/24/19  0558  10/23/19  0959   WBC Thousand/uL 8 53  --  10 71*   HEMOGLOBIN g/dL 13 3  --  15 2   HEMATOCRIT % 40 7  --  46 7*   PLATELETS Thousands/uL 235   < > 275   NEUTROS PCT %  --   --  69   LYMPHS PCT %  --   --  21   MONOS PCT %  --   --  6   EOS PCT %  --   --  2    < > = values in this interval not displayed       Results from last 7 days   Lab Units 10/24/19  0558 10/23/19  0959   SODIUM mmol/L 136 139   POTASSIUM mmol/L 3 9 3 8   CHLORIDE mmol/L 101 102   CO2 mmol/L 26 28   BUN mg/dL 13 11 CREATININE mg/dL 0 62 0 80   ANION GAP mmol/L 9 9   CALCIUM mg/dL 8 0* 9 2   ALBUMIN g/dL  --  3 9   TOTAL BILIRUBIN mg/dL  --  1 20*   ALK PHOS U/L  --  60   ALT U/L  --  24   AST U/L  --  17   GLUCOSE RANDOM mg/dL 154* 164*         Results from last 7 days   Lab Units 10/23/19  1557   POC GLUCOSE mg/dl 135     Results from last 7 days   Lab Units 10/24/19  0558   HEMOGLOBIN A1C % 7 8*               * I Have Reviewed All Lab Data Listed Above  * Additional Pertinent Lab Tests Reviewed: All Labs Within Last 24 Hours Reviewed    Imaging:    Imaging Reports Reviewed Today Include:  CT as mentioned above      Recent Cultures (last 7 days):           Last 24 Hours Medication List:     Current Facility-Administered Medications:  acetaminophen 650 mg Oral Q6H PRN Ramiro Bake, CRNP   aspirin 81 mg Oral Daily Ramiro Bake, CRNP   bisacodyl 10 mg Rectal Daily PRN Ramiro Bake, CRNP   digoxin 125 mcg Oral Daily Ramiro Bake, CRNP   diltiazem 120 mg Oral Q12H [facility] Ramiro Bakmarianna, CRNP   docusate sodium 100 mg Oral BID Ramiro Bake, CRNP   heparin (porcine) 5,000 Units Subcutaneous Q8H [facility] Ramiro Rah, CRNP   insulin lispro 1-5 Units Subcutaneous TID AC Ramiro Bake, CRNP   insulin lispro 1-5 Units Subcutaneous HS Ramiro Bake, CRNP   LORazepam 0 5 mg Oral Daily PRN Ramiro Bake, CRNP   metoprolol 5 mg Intravenous Once Ramiro Bake, LINDA   ondansetron 4 mg Intravenous Q6H PRN Ramiro Bake, TIFFANYNP   oxyCODONE 2 5 mg Oral Q4H PRN Ramiro Bake, CRNP   oxyCODONE 5 mg Oral Q4H PRN Ramiro Bake, CRNP   polyethylene glycol 17 g Oral Daily Ramiro LINDA Monreal        Today, Patient Was Seen By: LINDA Malin    ** Please Note: Dictation voice to text software may have been used in the creation of this document   **

## 2019-10-25 PROBLEM — S82.143A TIBIAL PLATEAU FRACTURE: Status: ACTIVE | Noted: 2019-10-23

## 2019-10-25 LAB
ANION GAP SERPL CALCULATED.3IONS-SCNC: 9 MMOL/L (ref 4–13)
BUN SERPL-MCNC: 9 MG/DL (ref 5–25)
CALCIUM SERPL-MCNC: 8.7 MG/DL (ref 8.3–10.1)
CHLORIDE SERPL-SCNC: 103 MMOL/L (ref 100–108)
CO2 SERPL-SCNC: 26 MMOL/L (ref 21–32)
CREAT SERPL-MCNC: 0.57 MG/DL (ref 0.6–1.3)
ERYTHROCYTE [DISTWIDTH] IN BLOOD BY AUTOMATED COUNT: 13.2 % (ref 11.6–15.1)
GFR SERPL CREATININE-BSD FRML MDRD: 89 ML/MIN/1.73SQ M
GLUCOSE SERPL-MCNC: 155 MG/DL (ref 65–140)
GLUCOSE SERPL-MCNC: 159 MG/DL (ref 65–140)
GLUCOSE SERPL-MCNC: 164 MG/DL (ref 65–140)
GLUCOSE SERPL-MCNC: 173 MG/DL (ref 65–140)
GLUCOSE SERPL-MCNC: 200 MG/DL (ref 65–140)
GLUCOSE SERPL-MCNC: 207 MG/DL (ref 65–140)
HCT VFR BLD AUTO: 41.1 % (ref 34.8–46.1)
HGB BLD-MCNC: 13.4 G/DL (ref 11.5–15.4)
MCH RBC QN AUTO: 30.9 PG (ref 26.8–34.3)
MCHC RBC AUTO-ENTMCNC: 32.6 G/DL (ref 31.4–37.4)
MCV RBC AUTO: 95 FL (ref 82–98)
PLATELET # BLD AUTO: 227 THOUSANDS/UL (ref 149–390)
PMV BLD AUTO: 10.5 FL (ref 8.9–12.7)
POTASSIUM SERPL-SCNC: 4 MMOL/L (ref 3.5–5.3)
RBC # BLD AUTO: 4.34 MILLION/UL (ref 3.81–5.12)
SODIUM SERPL-SCNC: 138 MMOL/L (ref 136–145)
WBC # BLD AUTO: 8.58 THOUSAND/UL (ref 4.31–10.16)

## 2019-10-25 PROCEDURE — 99232 SBSQ HOSP IP/OBS MODERATE 35: CPT | Performed by: PHYSICIAN ASSISTANT

## 2019-10-25 PROCEDURE — 80048 BASIC METABOLIC PNL TOTAL CA: CPT | Performed by: NURSE PRACTITIONER

## 2019-10-25 PROCEDURE — 85027 COMPLETE CBC AUTOMATED: CPT | Performed by: NURSE PRACTITIONER

## 2019-10-25 PROCEDURE — 82948 REAGENT STRIP/BLOOD GLUCOSE: CPT

## 2019-10-25 RX ORDER — DIGOXIN 125 MCG
500 TABLET ORAL EVERY 6 HOURS
Status: COMPLETED | OUTPATIENT
Start: 2019-10-25 | End: 2019-10-25

## 2019-10-25 RX ADMIN — INSULIN LISPRO 1 UNITS: 100 INJECTION, SOLUTION INTRAVENOUS; SUBCUTANEOUS at 17:11

## 2019-10-25 RX ADMIN — DOCUSATE SODIUM 100 MG: 100 CAPSULE, LIQUID FILLED ORAL at 17:03

## 2019-10-25 RX ADMIN — DILTIAZEM HYDROCHLORIDE 120 MG: 60 CAPSULE, EXTENDED RELEASE ORAL at 21:56

## 2019-10-25 RX ADMIN — ASPIRIN 81 MG: 81 TABLET, COATED ORAL at 09:50

## 2019-10-25 RX ADMIN — INSULIN LISPRO 1 UNITS: 100 INJECTION, SOLUTION INTRAVENOUS; SUBCUTANEOUS at 12:38

## 2019-10-25 RX ADMIN — DIGOXIN 500 MCG: 125 TABLET ORAL at 17:02

## 2019-10-25 RX ADMIN — BISACODYL 10 MG: 10 SUPPOSITORY RECTAL at 21:58

## 2019-10-25 RX ADMIN — HEPARIN SODIUM 5000 UNITS: 5000 INJECTION INTRAVENOUS; SUBCUTANEOUS at 05:28

## 2019-10-25 RX ADMIN — HEPARIN SODIUM 5000 UNITS: 5000 INJECTION INTRAVENOUS; SUBCUTANEOUS at 21:54

## 2019-10-25 RX ADMIN — POLYETHYLENE GLYCOL 3350 17 G: 17 POWDER, FOR SOLUTION ORAL at 09:50

## 2019-10-25 RX ADMIN — DIGOXIN 125 MCG: 125 TABLET ORAL at 09:50

## 2019-10-25 RX ADMIN — INSULIN LISPRO 1 UNITS: 100 INJECTION, SOLUTION INTRAVENOUS; SUBCUTANEOUS at 21:58

## 2019-10-25 RX ADMIN — HEPARIN SODIUM 5000 UNITS: 5000 INJECTION INTRAVENOUS; SUBCUTANEOUS at 14:10

## 2019-10-25 RX ADMIN — DILTIAZEM HYDROCHLORIDE 120 MG: 60 CAPSULE, EXTENDED RELEASE ORAL at 09:50

## 2019-10-25 RX ADMIN — DIGOXIN 500 MCG: 125 TABLET ORAL at 21:52

## 2019-10-25 RX ADMIN — DOCUSATE SODIUM 100 MG: 100 CAPSULE, LIQUID FILLED ORAL at 09:50

## 2019-10-25 NOTE — ASSESSMENT & PLAN NOTE
Lab Results   Component Value Date    HGBA1C 7 8 (H) 10/24/2019       Recent Labs     10/24/19  1757 10/24/19  2102 10/25/19  0136 10/25/19  0521   POCGLU 163* 207* 155* 159*       Blood Sugar Average: Last 72 hrs:  (P) 163 8   · Hold PO agent while in the hospital  · Continue SSI coverage  · QID glucose checks  · Consistent carb diet  · Monitor and adjust regimen as needed

## 2019-10-25 NOTE — ASSESSMENT & PLAN NOTE
· Patient reports history of no bowel movement x4 days prior to admission   · Had BM x 2 on 10/24  · Continue scheduled miralax and colace for bowel regimen

## 2019-10-25 NOTE — ASSESSMENT & PLAN NOTE
· Pt presented complaining of right knee pain   · Xrays negative, CT right knee with acute non-displaced fracture involving posterior aspect of medial tibial plateau   · Ortho following,  · NWB RLE, except toe touch/partial weight bearing with brace on   · PT/OT recommending STR, CM following  · Continue knee immobilizer  · Appreciate additional recommendations

## 2019-10-25 NOTE — PROGRESS NOTES
Progress Note - Ely Duffy 1940, 67 y o  female MRN: [id number]    Unit/Bed#: -01 Encounter: [id number]    Primary Care Provider: Rikki Hodge DO   Date and time admitted to hospital: 10/23/2019  9:32 AM      DOS: 10/25/2019    * Tibial plateau fracture  Assessment & Plan  · Pt presented complaining of right knee pain   · Xrays negative, CT right knee with acute non-displaced fracture involving posterior aspect of medial tibial plateau   · Ortho following,  · NWB RLE, except toe touch/partial weight bearing with brace on   · PT/OT recommending STR, CM following  · Continue knee immobilizer  · Appreciate additional recommendations     Atrial fibrillation ([address] )  Assessment & Plan  · Known history  · Patient is not on anticoagulation likely suspect this is due to fall risk  · EKG on admission with atrial fibrillation with RVR with a peak rate of 108 suspect this is in setting of pain  · Pt's digoxin level also 0 5, adjust digoxin dosing, discussed with cardiology, will load patient with 500 mg Q6h x 2 doses and resume home dosing thereafter   · Continue Cardizem 120 mg Q12H  · HR currently in the 90s to low 100s    Constipation  Assessment & Plan  · Patient reports history of no bowel movement x4 days prior to admission   · Had BM x 2 on 10/24  · Continue scheduled miralax and colace for bowel regimen    Dizziness  Assessment & Plan  · Pt now asymptomatic, during conversation today pt denying every being dizzy and complaining only of generalized weakness   · Patient is mostly wheelchair-bound but has a cane and self transferring living independently at home  · Echocardiogram showing a preserved ejection fraction between 50-55%  · Of note, pt recently started on atorvastatin, only new medication and felt she is sensitive to this medication, symptoms improved after discontinuation   Consider alternative for treatment of her hyperlipidemia    Diabetes mellitus [facility])  Assessment & Plan  Lab Results Component Value Date    HGBA1C 7 8 (H) 10/24/2019       Recent Labs     10/24/19  1757 10/24/19  2102 10/25/19  0136 10/25/19  0521   POCGLU 163* 207* 155* 159*       Blood Sugar Average: Last 72 hrs:  (P) 163 8   · Hold PO agent while in the hospital  · Continue SSI coverage  · QID glucose checks  · Consistent carb diet  · Monitor and adjust regimen as needed    Hypertension  Assessment & Plan  · BP appears to be stable on review  · Continue Cardizem 120 mg BID  · Monitor    History of stroke  Assessment & Plan  · History of CVA in 2008  · Continue ASA patient now listing her atorvastatin as allergy appears to be sensitive to statin  · Recent lipid panel showing total cholesterol 247 and LDL of 147 patient has an upcoming cardiology appointment and should explore this given her stroke history  At this time patient is declining treatment of her cholesterol here  Recommend close follow up      VTE Pharmacologic Prophylaxis:   Pharmacologic: Heparin  Mechanical VTE Prophylaxis in Place: Yes    Patient Centered Rounds: I have performed bedside rounds with nursing staff today  Discussions with Specialists or Other Care Team Provider: Discussed with RN, AUGUSTINA and reviewed previous notes     Education and Discussions with Family / Patient: Discussed with patient at bedside, will discuss with patient's daughter over the phone as well regarding plan of care per patient request    Time Spent for Care: 20 minutes  More than 50% of total time spent on counseling and coordination of care as described above  Current Length of Stay: 1 day(s)    Current Patient Status: Inpatient   Certification Statement: The patient will continue to require additional inpatient hospital stay due to continued orthopedic management for tibial plateau fracture, placement to STR    Discharge Plan: Not medically stable as above, anticipate next 24 hours if HR stabilized to STR      Code Status: Level 1 - Full Code      Subjective:   Pt reports that she feels fine today  Denies any dizziness or lightheadedness  States that she lives at home alone but her daughter lives right next door  States that she uses a cane to transfer but has right sided baseline weakness from a previous stroke  States that at home she was transferring in the bathroom and her legs "gave out" on her  Denied any LOC or dizziness  Currently denies any chest pain, shortness of breath, abdominal pain  States that she had two bowel movements yesterday  Still agreeable to rehab, would like a call placed to her daughter for update  Objective:     Vitals:   Temp (24hrs), Av 6 °F (37 °C), Min:97 5 °F (36 4 °C), Max:99 1 °F (37 3 °C)    Temp:  [97 5 °F (36 4 °C)-99 1 °F (37 3 °C)] 98 7 °F (37 1 °C)  HR:  [88-92] 92  Resp:  [18-20] 18  BP: (104-108)/(8-65) 107/63  SpO2:  [94 %-95 %] 95 %  Body mass index is 34 59 kg/m²  Input and Output Summary (last 24 hours): Intake/Output Summary (Last 24 hours) at 10/25/2019 1102  Last data filed at 10/25/2019 0400  Gross per 24 hour   Intake 696 25 ml   Output 1925 ml   Net -1228 75 ml       Physical Exam:     Physical Exam   Constitutional: No distress  Pt is in no acute distress lying in her hospital bed resting comfortably  Right sided baseline hemiparesis  HENT:   Head: Normocephalic and atraumatic  Eyes: Pupils are equal, round, and reactive to light  Conjunctivae are normal    Cardiovascular: Normal rate and intact distal pulses  Irregular rhythm   Pulmonary/Chest: Effort normal and breath sounds normal  No stridor  No respiratory distress  She has no wheezes  Abdominal: Soft  Bowel sounds are normal  She exhibits no distension  There is no tenderness  There is no guarding  Musculoskeletal:   Right knee in immobilizer, mild edema noted   Neurological: She is alert  Skin: Skin is warm and dry  She is not diaphoretic  No erythema  Vitals reviewed        Additional Data:     Labs:    Results from last 7 days   Lab Units 10/25/19  0518  10/23/19  0959   WBC Thousand/uL 8 58   < > 10 71*   HEMOGLOBIN g/dL 13 4   < > 15 2   HEMATOCRIT % 41 1   < > 46 7*   PLATELETS Thousands/uL 227   < > 275   NEUTROS PCT %  --   --  69   LYMPHS PCT %  --   --  21   MONOS PCT %  --   --  6   EOS PCT %  --   --  2    < > = values in this interval not displayed  Results from last 7 days   Lab Units 10/25/19  0518  10/23/19  0959   POTASSIUM mmol/L 4 0   < > 3 8   CHLORIDE mmol/L 103   < > 102   CO2 mmol/L 26   < > 28   BUN mg/dL 9   < > 11   CREATININE mg/dL 0 57*   < > 0 80   CALCIUM mg/dL 8 7   < > 9 2   ALK PHOS U/L  --   --  60   ALT U/L  --   --  24   AST U/L  --   --  17    < > = values in this interval not displayed  * I Have Reviewed All Lab Data Listed Above  * Additional Pertinent Lab Tests Reviewed:  All Labs Within Last 24 Hours Reviewed    Imaging:    Imaging Reports Reviewed Today Include: knee xrays, CT  Imaging Personally Reviewed by Myself Includes:  None    Recent Cultures (last 7 days):           Last 24 Hours Medication List:     Current Facility-Administered Medications:  acetaminophen 650 mg Oral Q6H PRN Felix Wayne, CRNP   aspirin 81 mg Oral Daily Adams Wayne, CRNP   bisacodyl 10 mg Rectal Daily PRN Adams Wayne, CRNP   digoxin 125 mcg Oral Daily Felix Wayne, CRNP   diltiazem 120 mg Oral Q12H [address] Felix Wayne, CRNP   docusate sodium 100 mg Oral BID Felix Wayne, CRNP   heparin (porcine) 5,000 Units Subcutaneous Q8H [address] Adams Wayne, CRNP   insulin lispro 1-5 Units Subcutaneous TID AC Felix Wayne, CRNP   insulin lispro 1-5 Units Subcutaneous HS Felix Wayne, CRNP   LORazepam 0 5 mg Oral Daily PRN Adams Wayne, CRNP   ondansetron 4 mg Intravenous Q6H PRN Adams Wayne, CRNP   oxyCODONE 2 5 mg Oral Q4H PRN Felix Wayne, CRNP   oxyCODONE 5 mg Oral Q4H PRN Felix Wayne, CRNP   polyethylene glycol 17 g Oral Daily Felix Wayne, CRNP        Today, Patient Was Seen By: Jasmin Julien, FLORINA    ** Please Note: Dictation voice to text software may have been used in the creation of this document   **

## 2019-10-25 NOTE — ASSESSMENT & PLAN NOTE
· Pt now asymptomatic, during conversation today pt denying every being dizzy and complaining only of generalized weakness   · Patient is mostly wheelchair-bound but has a cane and self transferring living independently at home  · Echocardiogram showing a preserved ejection fraction between 50-55%  · Of note, pt recently started on atorvastatin, only new medication and felt she is sensitive to this medication, symptoms improved after discontinuation   Consider alternative for treatment of her hyperlipidemia

## 2019-10-25 NOTE — ASSESSMENT & PLAN NOTE
· Known history  · Patient is not on anticoagulation likely suspect this is due to fall risk  · EKG on admission with atrial fibrillation with RVR with a peak rate of 108 suspect this is in setting of pain  · Pt's digoxin level also 0 5, adjust digoxin dosing  · Continue Cardizem 120 mg Q12H  · HR currently in the 90s to low 100s

## 2019-10-25 NOTE — PLAN OF CARE
Problem: Potential for Falls  Goal: Patient will remain free of falls  Description  INTERVENTIONS:  - Assess patient frequently for physical needs  -  Identify cognitive and physical deficits and behaviors that affect risk of falls    -  [place] fall precautions as indicated by assessment   - Educate patient/family on patient safety including physical limitations  - Instruct patient to call for assistance with activity based on assessment  - Modify environment to reduce risk of injury  - Consider OT/PT consult to assist with strengthening/mobility  Outcome: Progressing     Problem: Prexisting or High Potential for Compromised Skin Integrity  Goal: Skin integrity is maintained or improved  Description  INTERVENTIONS:  - Identify patients at risk for skin breakdown  - Assess and monitor skin integrity  - Assess and monitor nutrition and hydration status  - Monitor labs   - Assess for incontinence   - Turn and reposition patient  - Assist with mobility/ambulation  - Relieve pressure over bony prominences  - Avoid friction and shearing  - Provide appropriate hygiene as needed including keeping skin clean and dry  - Evaluate need for skin moisturizer/barrier cream  - Collaborate with interdisciplinary team   - Patient/family teaching  - Consider wound care consult   Outcome: Progressing     Problem: PAIN - ADULT  Goal: Verbalizes/displays adequate comfort level or baseline comfort level  Description  Interventions:  - Encourage patient to monitor pain and request assistance  - Assess pain using appropriate pain scale  - Administer analgesics based on type and severity of pain and evaluate response  - Implement non-pharmacological measures as appropriate and evaluate response  - Consider cultural and social influences on pain and pain management  - Notify physician/advanced practitioner if interventions unsuccessful or patient reports new pain  Outcome: Progressing     Problem: INFECTION - ADULT  Goal: Absence or prevention of progression during hospitalization  Description  INTERVENTIONS:  - Assess and monitor for signs and symptoms of infection  - Monitor lab/diagnostic results  - Monitor all insertion sites, i e  indwelling lines, tubes, and drains  - Monitor endotracheal if appropriate and nasal secretions for changes in amount and color  - [place] appropriate cooling/warming therapies per order  - Administer medications as ordered  - Instruct and encourage patient and family to use good hand hygiene technique  - Identify and instruct in appropriate isolation precautions for identified infection/condition  Outcome: Progressing  Goal: Absence of fever/infection during neutropenic period  Description  INTERVENTIONS:  - Monitor WBC    Outcome: Progressing     Problem: SAFETY ADULT  Goal: Patient will remain free of falls  Description  INTERVENTIONS:  - Assess patient frequently for physical needs  -  Identify cognitive and physical deficits and behaviors that affect risk of falls    -  [place] fall precautions as indicated by assessment   - Educate patient/family on patient safety including physical limitations  - Instruct patient to call for assistance with activity based on assessment  - Modify environment to reduce risk of injury  - Consider OT/PT consult to assist with strengthening/mobility  Outcome: Progressing  Goal: Maintain or return to baseline ADL function  Description  INTERVENTIONS:  -  Assess patient's ability to carry out ADLs; assess patient's baseline for ADL function and identify physical deficits which impact ability to perform ADLs (bathing, care of mouth/teeth, toileting, grooming, dressing, etc )  - Assess/evaluate cause of self-care deficits   - Assess range of motion  - Assess patient's mobility; develop plan if impaired  - Assess patient's need for assistive devices and provide as appropriate  - Encourage maximum independence but intervene and supervise when necessary  - Involve family in performance of ADLs  - Assess for home care needs following discharge   - Consider OT consult to assist with ADL evaluation and planning for discharge  - Provide patient education as appropriate  Outcome: Progressing  Goal: Maintain or return mobility status to optimal level  Description  INTERVENTIONS:  - Assess patient's baseline mobility status (ambulation, transfers, stairs, etc )    - Identify cognitive and physical deficits and behaviors that affect mobility  - Identify mobility aids required to assist with transfers and/or ambulation (gait belt, sit-to-stand, lift, walker, cane, etc )  - [place] fall precautions as indicated by assessment  - Record patient progress and toleration of activity level on Mobility SBAR; progress patient to next Phase/Stage  - Instruct patient to call for assistance with activity based on assessment  - Consider rehabilitation consult to assist with strengthening/weightbearing, etc   Outcome: Progressing     Problem: DISCHARGE PLANNING  Goal: Discharge to home or other facility with appropriate resources  Description  INTERVENTIONS:  - Identify barriers to discharge w/patient and caregiver  - Arrange for needed discharge resources and transportation as appropriate  - Identify discharge learning needs (meds, wound care, etc )  - Arrange for interpretive services to assist at discharge as needed  - Refer to Case Management Department for coordinating discharge planning if the patient needs post-hospital services based on physician/advanced practitioner order or complex needs related to functional status, cognitive ability, or social support system  Outcome: Progressing     Problem: Knowledge Deficit  Goal: Patient/family/caregiver demonstrates understanding of disease process, treatment plan, medications, and discharge instructions  Description  Complete learning assessment and assess knowledge base    Interventions:  - Provide teaching at level of understanding  - Provide teaching via preferred learning methods  Outcome: Progressing

## 2019-10-25 NOTE — ASSESSMENT & PLAN NOTE
· History of CVA in 2008  · Continue ASA patient now listing her atorvastatin as allergy appears to be sensitive to statin  · Recent lipid panel showing total cholesterol 247 and LDL of 147 patient has an upcoming cardiology appointment and should explore this given her stroke history  At this time patient is declining treatment of her cholesterol here   Recommend close follow up

## 2019-10-26 PROBLEM — I35.0 CRITICAL AORTIC VALVE STENOSIS: Status: ACTIVE | Noted: 2019-10-26

## 2019-10-26 LAB
DIGOXIN SERPL-MCNC: 1.4 NG/ML (ref 0.8–2)
GLUCOSE SERPL-MCNC: 168 MG/DL (ref 65–140)
GLUCOSE SERPL-MCNC: 185 MG/DL (ref 65–140)
GLUCOSE SERPL-MCNC: 208 MG/DL (ref 65–140)

## 2019-10-26 PROCEDURE — 82948 REAGENT STRIP/BLOOD GLUCOSE: CPT

## 2019-10-26 PROCEDURE — 80162 ASSAY OF DIGOXIN TOTAL: CPT | Performed by: PHYSICIAN ASSISTANT

## 2019-10-26 PROCEDURE — 99232 SBSQ HOSP IP/OBS MODERATE 35: CPT | Performed by: PHYSICIAN ASSISTANT

## 2019-10-26 RX ADMIN — BISACODYL 10 MG: 10 SUPPOSITORY RECTAL at 17:58

## 2019-10-26 RX ADMIN — HEPARIN SODIUM 5000 UNITS: 5000 INJECTION INTRAVENOUS; SUBCUTANEOUS at 22:52

## 2019-10-26 RX ADMIN — POLYETHYLENE GLYCOL 3350 17 G: 17 POWDER, FOR SOLUTION ORAL at 08:26

## 2019-10-26 RX ADMIN — DOCUSATE SODIUM 100 MG: 100 CAPSULE, LIQUID FILLED ORAL at 17:24

## 2019-10-26 RX ADMIN — DILTIAZEM HYDROCHLORIDE 120 MG: 60 CAPSULE, EXTENDED RELEASE ORAL at 08:26

## 2019-10-26 RX ADMIN — INSULIN LISPRO 1 UNITS: 100 INJECTION, SOLUTION INTRAVENOUS; SUBCUTANEOUS at 22:53

## 2019-10-26 RX ADMIN — HEPARIN SODIUM 5000 UNITS: 5000 INJECTION INTRAVENOUS; SUBCUTANEOUS at 14:56

## 2019-10-26 RX ADMIN — DOCUSATE SODIUM 100 MG: 100 CAPSULE, LIQUID FILLED ORAL at 08:25

## 2019-10-26 RX ADMIN — INSULIN LISPRO 1 UNITS: 100 INJECTION, SOLUTION INTRAVENOUS; SUBCUTANEOUS at 13:25

## 2019-10-26 RX ADMIN — INSULIN LISPRO 1 UNITS: 100 INJECTION, SOLUTION INTRAVENOUS; SUBCUTANEOUS at 17:24

## 2019-10-26 RX ADMIN — ASPIRIN 81 MG: 81 TABLET, COATED ORAL at 08:25

## 2019-10-26 RX ADMIN — DILTIAZEM HYDROCHLORIDE 120 MG: 60 CAPSULE, EXTENDED RELEASE ORAL at 22:45

## 2019-10-26 RX ADMIN — HEPARIN SODIUM 5000 UNITS: 5000 INJECTION INTRAVENOUS; SUBCUTANEOUS at 06:09

## 2019-10-26 RX ADMIN — DIGOXIN 125 MCG: 125 TABLET ORAL at 08:25

## 2019-10-26 RX ADMIN — INSULIN LISPRO 1 UNITS: 100 INJECTION, SOLUTION INTRAVENOUS; SUBCUTANEOUS at 08:23

## 2019-10-26 NOTE — ASSESSMENT & PLAN NOTE
· History of CVA in 2008  · Continue ASA   · Patient now listing her atorvastatin as allergy appears to be sensitive to statin  · Recent lipid panel showing total cholesterol 247 and LDL of 147 patient has an upcoming cardiology appointment and should explore this given her stroke history  At this time patient is declining treatment of her cholesterol here   Recommend close follow up

## 2019-10-26 NOTE — PLAN OF CARE
Problem: Potential for Falls  Goal: Patient will remain free of falls  Description  INTERVENTIONS:  - Assess patient frequently for physical needs  -  Identify cognitive and physical deficits and behaviors that affect risk of falls    -  [place] fall precautions as indicated by assessment   - Educate patient/family on patient safety including physical limitations  - Instruct patient to call for assistance with activity based on assessment  - Modify environment to reduce risk of injury  - Consider OT/PT consult to assist with strengthening/mobility  Outcome: Progressing     Problem: Prexisting or High Potential for Compromised Skin Integrity  Goal: Skin integrity is maintained or improved  Description  INTERVENTIONS:  - Identify patients at risk for skin breakdown  - Assess and monitor skin integrity  - Assess and monitor nutrition and hydration status  - Monitor labs   - Assess for incontinence   - Turn and reposition patient  - Assist with mobility/ambulation  - Relieve pressure over bony prominences  - Avoid friction and shearing  - Provide appropriate hygiene as needed including keeping skin clean and dry  - Evaluate need for skin moisturizer/barrier cream  - Collaborate with interdisciplinary team   - Patient/family teaching  - Consider wound care consult   Outcome: Progressing     Problem: PAIN - ADULT  Goal: Verbalizes/displays adequate comfort level or baseline comfort level  Description  Interventions:  - Encourage patient to monitor pain and request assistance  - Assess pain using appropriate pain scale  - Administer analgesics based on type and severity of pain and evaluate response  - Implement non-pharmacological measures as appropriate and evaluate response  - Consider cultural and social influences on pain and pain management  - Notify physician/advanced practitioner if interventions unsuccessful or patient reports new pain  Outcome: Progressing     Problem: INFECTION - ADULT  Goal: Absence or prevention of progression during hospitalization  Description  INTERVENTIONS:  - Assess and monitor for signs and symptoms of infection  - Monitor lab/diagnostic results  - Monitor all insertion sites, i e  indwelling lines, tubes, and drains  - Monitor endotracheal if appropriate and nasal secretions for changes in amount and color  - [place] appropriate cooling/warming therapies per order  - Administer medications as ordered  - Instruct and encourage patient and family to use good hand hygiene technique  - Identify and instruct in appropriate isolation precautions for identified infection/condition  Outcome: Progressing  Goal: Absence of fever/infection during neutropenic period  Description  INTERVENTIONS:  - Monitor WBC    Outcome: Progressing     Problem: SAFETY ADULT  Goal: Patient will remain free of falls  Description  INTERVENTIONS:  - Assess patient frequently for physical needs  -  Identify cognitive and physical deficits and behaviors that affect risk of falls    -  [place] fall precautions as indicated by assessment   - Educate patient/family on patient safety including physical limitations  - Instruct patient to call for assistance with activity based on assessment  - Modify environment to reduce risk of injury  - Consider OT/PT consult to assist with strengthening/mobility  Outcome: Progressing  Goal: Maintain or return to baseline ADL function  Description  INTERVENTIONS:  -  Assess patient's ability to carry out ADLs; assess patient's baseline for ADL function and identify physical deficits which impact ability to perform ADLs (bathing, care of mouth/teeth, toileting, grooming, dressing, etc )  - Assess/evaluate cause of self-care deficits   - Assess range of motion  - Assess patient's mobility; develop plan if impaired  - Assess patient's need for assistive devices and provide as appropriate  - Encourage maximum independence but intervene and supervise when necessary  - Involve family in performance of ADLs  - Assess for home care needs following discharge   - Consider OT consult to assist with ADL evaluation and planning for discharge  - Provide patient education as appropriate  Outcome: Progressing  Goal: Maintain or return mobility status to optimal level  Description  INTERVENTIONS:  - Assess patient's baseline mobility status (ambulation, transfers, stairs, etc )    - Identify cognitive and physical deficits and behaviors that affect mobility  - Identify mobility aids required to assist with transfers and/or ambulation (gait belt, sit-to-stand, lift, walker, cane, etc )  - [place] fall precautions as indicated by assessment  - Record patient progress and toleration of activity level on Mobility SBAR; progress patient to next Phase/Stage  - Instruct patient to call for assistance with activity based on assessment  - Consider rehabilitation consult to assist with strengthening/weightbearing, etc   Outcome: Progressing     Problem: DISCHARGE PLANNING  Goal: Discharge to home or other facility with appropriate resources  Description  INTERVENTIONS:  - Identify barriers to discharge w/patient and caregiver  - Arrange for needed discharge resources and transportation as appropriate  - Identify discharge learning needs (meds, wound care, etc )  - Arrange for interpretive services to assist at discharge as needed  - Refer to Case Management Department for coordinating discharge planning if the patient needs post-hospital services based on physician/advanced practitioner order or complex needs related to functional status, cognitive ability, or social support system  Outcome: Progressing     Problem: Knowledge Deficit  Goal: Patient/family/caregiver demonstrates understanding of disease process, treatment plan, medications, and discharge instructions  Description  Complete learning assessment and assess knowledge base    Interventions:  - Provide teaching at level of understanding  - Provide teaching via preferred learning methods  Outcome: Progressing

## 2019-10-26 NOTE — ASSESSMENT & PLAN NOTE
· Pt now asymptomatic  · Patient is mostly wheelchair-bound but has a cane and self transferring living independently at home  · Echocardiogram showing a preserved ejection fraction between 50-55%  · Of note, pt recently started on atorvastatin, only new medication and felt she is sensitive to this medication, symptoms improved after discontinuation   Consider alternative for treatment of her hyperlipidemia

## 2019-10-26 NOTE — ASSESSMENT & PLAN NOTE
· Known history  · Patient is not on anticoagulation likely suspect this is due to fall risk  · EKG on admission with atrial fibrillation with RVR with a peak rate of 108 suspect this is in setting of pain  · Pt's digoxin level also 0 5, discussed with cardiology, recommended dig load with 500 mcg Q6H x 2 and resume 125 mcg daily   · Continue Cardizem 120 mg Q12H  · HR significantly improved today  · Obtain repeat digoxin level

## 2019-10-26 NOTE — PROGRESS NOTES
Progress Note - Sebastián Moon 1940, 67 y o  female MRN: [id number]    Unit/Bed#: -Frankie Encounter: [id number]    Primary Care Provider: Remedios Echeverria DO   Date and time admitted to hospital: 10/23/2019  9:32 AM     DOS: 10/26/2019    Worsening murmur noted today during evaluation, ECHO performed 10/24 and re-evaluated, pt noted to have critical aortic valve stenosis with moderate mitral valve regurg  Place cardiology consultation  Monitor closely  * Tibial plateau fracture  Assessment & Plan  · Pt presented complaining of right knee pain   · Xrays negative, CT right knee with acute non-displaced fracture involving posterior aspect of medial tibial plateau   · Ortho following,  · NWB RLE, except toe touch/partial weight bearing with brace on   · PT/OT recommending STR, CM following  · Continue knee immobilizer    Atrial fibrillation (HCC)  Assessment & Plan  · Known history  · Patient is not on anticoagulation likely suspect this is due to fall risk  · EKG on admission with atrial fibrillation with RVR with a peak rate of 108 suspect this is in setting of pain  · Pt's digoxin level also 0 5, discussed with cardiology, recommended dig load with 500 mcg Q6H x 2 and resume 125 mcg daily   · Continue Cardizem 120 mg Q12H  · HR significantly improved today  · Obtain repeat digoxin level    Constipation  Assessment & Plan  · Resolved on current bowel regimen  · Continue scheduled miralax and colace    Dizziness  Assessment & Plan  · Pt now asymptomatic  · Patient is mostly wheelchair-bound but has a cane and self transferring living independently at home  · Echocardiogram showing a preserved ejection fraction between 50-55%  · Of note, pt recently started on atorvastatin, only new medication and felt she is sensitive to this medication, symptoms improved after discontinuation   Consider alternative for treatment of her hyperlipidemia    Diabetes mellitus [facility])  Assessment & Plan  Lab Results Component Value Date    HGBA1C 7 8 (H) 10/24/2019       Recent Labs     10/25/19  1535 10/25/19  2008 10/26/19  0558 10/26/19  1134   POCGLU 173* 200* 168* 208*       Blood Sugar Average: Last 72 hrs:  (P) 177 5   · Hold PO agent while in the hospital  · Continue SSI coverage  · QID glucose checks  · Consistent carb diet  · Monitor and adjust regimen as needed    Hypertension  Assessment & Plan  · BP appears to be stable on review  · Continue Cardizem 120 mg BID  · Monitor    History of stroke  Assessment & Plan  · History of CVA in 2008  · Continue ASA   · Patient now listing her atorvastatin as allergy appears to be sensitive to statin  · Recent lipid panel showing total cholesterol 247 and LDL of 147 patient has an upcoming cardiology appointment and should explore this given her stroke history  At this time patient is declining treatment of her cholesterol here  Recommend close follow up      VTE Pharmacologic Prophylaxis:   Pharmacologic: Heparin  Mechanical VTE Prophylaxis in Place: Yes    Patient Centered Rounds: I have performed bedside rounds with nursing staff today  Discussions with Specialists or Other Care Team Provider: Discussed with RN, AUGUSTINA and reviewed previous notes     Education and Discussions with Family / Patient: Discussed with patient at bedside, will discuss with patient's daughter over the phone for update per patient request    Time Spent for Care: 20 minutes  More than 50% of total time spent on counseling and coordination of care as described above  Current Length of Stay: 2 day(s)    Current Patient Status: Inpatient   Certification Statement: The patient will continue to require additional inpatient hospital stay due to awaiting placement to STR    Discharge Plan: Pt is medically stable, awaiting placement to STR    Code Status: Level 1 - Full Code      Subjective:   Pt reports that she feels well today   Continues to report minimal pain at rest  Denies any abdominal pain, nausea, vomiting, chest pain or shortness of breath  Continues to ask if we can update her daughter over the phone for updates  Objective:     Vitals:   Temp (24hrs), Av 1 °F (37 3 °C), Min:98 5 °F (36 9 °C), Max:99 6 °F (37 6 °C)    Temp:  [98 5 °F (36 9 °C)-99 6 °F (37 6 °C)] 98 7 °F (37 1 °C)  HR:  [82-97] 90  Resp:  [16-18] 16  BP: (111-128)/(64-67) 128/65  SpO2:  [93 %-96 %] 94 %  Body mass index is 34 59 kg/m²  Input and Output Summary (last 24 hours): Intake/Output Summary (Last 24 hours) at 10/26/2019 1431  Last data filed at 10/26/2019 1300  Gross per 24 hour   Intake 480 ml   Output 2350 ml   Net -1870 ml       Physical Exam:     Physical Exam   Constitutional: No distress  Pt is in no acute distress lying in her hospital bed  Baseline right sided hemiparesis from previous stroke   HENT:   Head: Normocephalic and atraumatic  Eyes: Pupils are equal, round, and reactive to light  Conjunctivae are normal    Cardiovascular: Normal rate and intact distal pulses  Irregular rhythm    Pulmonary/Chest: Effort normal and breath sounds normal  No stridor  No respiratory distress  She has no wheezes  Abdominal: Soft  Bowel sounds are normal  She exhibits no distension  There is no tenderness  There is no guarding  Musculoskeletal:   Right knee in immobilizer    Neurological: She is alert  Skin: Skin is warm and dry  She is not diaphoretic  No erythema  Vitals reviewed  Additional Data:     Labs:    Results from last 7 days   Lab Units 10/25/19  0518  10/23/19  0959   WBC Thousand/uL 8 58   < > 10 71*   HEMOGLOBIN g/dL 13 4   < > 15 2   HEMATOCRIT % 41 1   < > 46 7*   PLATELETS Thousands/uL 227   < > 275   NEUTROS PCT %  --   --  69   LYMPHS PCT %  --   --  21   MONOS PCT %  --   --  6   EOS PCT %  --   --  2    < > = values in this interval not displayed       Results from last 7 days   Lab Units 10/25/19  0518  10/23/19  0959   POTASSIUM mmol/L 4 0   < > 3 8   CHLORIDE mmol/L 103 < > 102   CO2 mmol/L 26   < > 28   BUN mg/dL 9   < > 11   CREATININE mg/dL 0 57*   < > 0 80   CALCIUM mg/dL 8 7   < > 9 2   ALK PHOS U/L  --   --  60   ALT U/L  --   --  24   AST U/L  --   --  17    < > = values in this interval not displayed  * I Have Reviewed All Lab Data Listed Above  * Additional Pertinent Lab Tests Reviewed: No New Labs Available For Today    Imaging:    Imaging Reports Reviewed Today Include: None  Imaging Personally Reviewed by Myself Includes:  None    Recent Cultures (last 7 days):           Last 24 Hours Medication List:     Current Facility-Administered Medications:  acetaminophen 650 mg Oral Q6H PRN Darus Bunk, CRNP   aspirin 81 mg Oral Daily Darus Bunk, CRNP   bisacodyl 10 mg Rectal Daily PRN Darus Bunk, CRNP   digoxin 125 mcg Oral Daily Darus Bunk, CRNP   diltiazem 120 mg Oral Q12H [facility] Darus Bunk, CRNP   docusate sodium 100 mg Oral BID Darus Bunk, CRNP   heparin (porcine) 5,000 Units Subcutaneous Q8H [facility] Darus Bunk, CRNP   insulin lispro 1-5 Units Subcutaneous TID AC Darus Bunk, CRNP   insulin lispro 1-5 Units Subcutaneous HS Darus Bunk, CRNP   LORazepam 0 5 mg Oral Daily PRN Darus Bunk, CRNP   ondansetron 4 mg Intravenous Q6H PRN Darus Bunk, CRNP   oxyCODONE 2 5 mg Oral Q4H PRN Darus Bunk, CRNP   oxyCODONE 5 mg Oral Q4H PRN Darus Bunk, CRNP   polyethylene glycol 17 g Oral Daily Darus Bunk, CRNP        Today, Patient Was Seen By: Jackie Laguerre PA-C    ** Please Note: Dictation voice to text software may have been used in the creation of this document   **

## 2019-10-26 NOTE — ASSESSMENT & PLAN NOTE
· Pt presented complaining of right knee pain   · Xrays negative, CT right knee with acute non-displaced fracture involving posterior aspect of medial tibial plateau   · Ortho following,  · NWB RLE, except toe touch/partial weight bearing with brace on   · PT/OT recommending STR, CM following  · Continue knee immobilizer

## 2019-10-26 NOTE — ASSESSMENT & PLAN NOTE
Lab Results   Component Value Date    HGBA1C 7 8 (H) 10/24/2019       Recent Labs     10/25/19  1535 10/25/19  2008 10/26/19  0558 10/26/19  1134   POCGLU 173* 200* 168* 208*       Blood Sugar Average: Last 72 hrs:  (P) 177 5   · Hold PO agent while in the hospital  · Continue SSI coverage  · QID glucose checks  · Consistent carb diet  · Monitor and adjust regimen as needed

## 2019-10-27 LAB
GLUCOSE SERPL-MCNC: 180 MG/DL (ref 65–140)
GLUCOSE SERPL-MCNC: 190 MG/DL (ref 65–140)
GLUCOSE SERPL-MCNC: 239 MG/DL (ref 65–140)
GLUCOSE SERPL-MCNC: 270 MG/DL (ref 65–140)

## 2019-10-27 PROCEDURE — 82948 REAGENT STRIP/BLOOD GLUCOSE: CPT

## 2019-10-27 PROCEDURE — 99222 1ST HOSP IP/OBS MODERATE 55: CPT | Performed by: INTERNAL MEDICINE

## 2019-10-27 PROCEDURE — 99232 SBSQ HOSP IP/OBS MODERATE 35: CPT | Performed by: PHYSICIAN ASSISTANT

## 2019-10-27 RX ORDER — PRAVASTATIN SODIUM 20 MG
20 TABLET ORAL
Status: DISCONTINUED | OUTPATIENT
Start: 2019-10-27 | End: 2019-10-29 | Stop reason: HOSPADM

## 2019-10-27 RX ADMIN — HEPARIN SODIUM 5000 UNITS: 5000 INJECTION INTRAVENOUS; SUBCUTANEOUS at 06:13

## 2019-10-27 RX ADMIN — DILTIAZEM HYDROCHLORIDE 120 MG: 60 CAPSULE, EXTENDED RELEASE ORAL at 09:25

## 2019-10-27 RX ADMIN — DOCUSATE SODIUM 100 MG: 100 CAPSULE, LIQUID FILLED ORAL at 09:25

## 2019-10-27 RX ADMIN — BISACODYL 10 MG: 10 SUPPOSITORY RECTAL at 21:05

## 2019-10-27 RX ADMIN — DOCUSATE SODIUM 100 MG: 100 CAPSULE, LIQUID FILLED ORAL at 17:22

## 2019-10-27 RX ADMIN — INSULIN LISPRO 4 UNITS: 100 INJECTION, SOLUTION INTRAVENOUS; SUBCUTANEOUS at 12:37

## 2019-10-27 RX ADMIN — DIGOXIN 125 MCG: 125 TABLET ORAL at 09:25

## 2019-10-27 RX ADMIN — POLYETHYLENE GLYCOL 3350 17 G: 17 POWDER, FOR SOLUTION ORAL at 09:25

## 2019-10-27 RX ADMIN — PRAVASTATIN SODIUM 20 MG: 20 TABLET ORAL at 17:21

## 2019-10-27 RX ADMIN — DILTIAZEM HYDROCHLORIDE 120 MG: 60 CAPSULE, EXTENDED RELEASE ORAL at 21:41

## 2019-10-27 RX ADMIN — HEPARIN SODIUM 5000 UNITS: 5000 INJECTION INTRAVENOUS; SUBCUTANEOUS at 13:59

## 2019-10-27 RX ADMIN — INSULIN LISPRO 2 UNITS: 100 INJECTION, SOLUTION INTRAVENOUS; SUBCUTANEOUS at 21:07

## 2019-10-27 RX ADMIN — HEPARIN SODIUM 5000 UNITS: 5000 INJECTION INTRAVENOUS; SUBCUTANEOUS at 21:06

## 2019-10-27 RX ADMIN — ASPIRIN 81 MG: 81 TABLET, COATED ORAL at 09:25

## 2019-10-27 RX ADMIN — INSULIN LISPRO 1 UNITS: 100 INJECTION, SOLUTION INTRAVENOUS; SUBCUTANEOUS at 09:26

## 2019-10-27 RX ADMIN — INSULIN LISPRO 1 UNITS: 100 INJECTION, SOLUTION INTRAVENOUS; SUBCUTANEOUS at 17:00

## 2019-10-27 NOTE — PLAN OF CARE
Problem: Potential for Falls  Goal: Patient will remain free of falls  Description  INTERVENTIONS:  - Assess patient frequently for physical needs  -  Identify cognitive and physical deficits and behaviors that affect risk of falls    -  [place] fall precautions as indicated by assessment   - Educate patient/family on patient safety including physical limitations  - Instruct patient to call for assistance with activity based on assessment  - Modify environment to reduce risk of injury  - Consider OT/PT consult to assist with strengthening/mobility  Outcome: Progressing     Problem: Prexisting or High Potential for Compromised Skin Integrity  Goal: Skin integrity is maintained or improved  Description  INTERVENTIONS:  - Identify patients at risk for skin breakdown  - Assess and monitor skin integrity  - Assess and monitor nutrition and hydration status  - Monitor labs   - Assess for incontinence   - Turn and reposition patient  - Assist with mobility/ambulation  - Relieve pressure over bony prominences  - Avoid friction and shearing  - Provide appropriate hygiene as needed including keeping skin clean and dry  - Evaluate need for skin moisturizer/barrier cream  - Collaborate with interdisciplinary team   - Patient/family teaching  - Consider wound care consult   Outcome: Progressing     Problem: PAIN - ADULT  Goal: Verbalizes/displays adequate comfort level or baseline comfort level  Description  Interventions:  - Encourage patient to monitor pain and request assistance  - Assess pain using appropriate pain scale  - Administer analgesics based on type and severity of pain and evaluate response  - Implement non-pharmacological measures as appropriate and evaluate response  - Consider cultural and social influences on pain and pain management  - Notify physician/advanced practitioner if interventions unsuccessful or patient reports new pain  Outcome: Progressing     Problem: INFECTION - ADULT  Goal: Absence or prevention of progression during hospitalization  Description  INTERVENTIONS:  - Assess and monitor for signs and symptoms of infection  - Monitor lab/diagnostic results  - Monitor all insertion sites, i e  indwelling lines, tubes, and drains  - Monitor endotracheal if appropriate and nasal secretions for changes in amount and color  - [place] appropriate cooling/warming therapies per order  - Administer medications as ordered  - Instruct and encourage patient and family to use good hand hygiene technique  - Identify and instruct in appropriate isolation precautions for identified infection/condition  Outcome: Progressing  Goal: Absence of fever/infection during neutropenic period  Description  INTERVENTIONS:  - Monitor WBC    Outcome: Progressing     Problem: SAFETY ADULT  Goal: Patient will remain free of falls  Description  INTERVENTIONS:  - Assess patient frequently for physical needs  -  Identify cognitive and physical deficits and behaviors that affect risk of falls    -  [place] fall precautions as indicated by assessment   - Educate patient/family on patient safety including physical limitations  - Instruct patient to call for assistance with activity based on assessment  - Modify environment to reduce risk of injury  - Consider OT/PT consult to assist with strengthening/mobility  Outcome: Progressing  Goal: Maintain or return to baseline ADL function  Description  INTERVENTIONS:  -  Assess patient's ability to carry out ADLs; assess patient's baseline for ADL function and identify physical deficits which impact ability to perform ADLs (bathing, care of mouth/teeth, toileting, grooming, dressing, etc )  - Assess/evaluate cause of self-care deficits   - Assess range of motion  - Assess patient's mobility; develop plan if impaired  - Assess patient's need for assistive devices and provide as appropriate  - Encourage maximum independence but intervene and supervise when necessary  - Involve family in performance of ADLs  - Assess for home care needs following discharge   - Consider OT consult to assist with ADL evaluation and planning for discharge  - Provide patient education as appropriate  Outcome: Progressing  Goal: Maintain or return mobility status to optimal level  Description  INTERVENTIONS:  - Assess patient's baseline mobility status (ambulation, transfers, stairs, etc )    - Identify cognitive and physical deficits and behaviors that affect mobility  - Identify mobility aids required to assist with transfers and/or ambulation (gait belt, sit-to-stand, lift, walker, cane, etc )  - [place] fall precautions as indicated by assessment  - Record patient progress and toleration of activity level on Mobility SBAR; progress patient to next Phase/Stage  - Instruct patient to call for assistance with activity based on assessment  - Consider rehabilitation consult to assist with strengthening/weightbearing, etc   Outcome: Progressing     Problem: DISCHARGE PLANNING  Goal: Discharge to home or other facility with appropriate resources  Description  INTERVENTIONS:  - Identify barriers to discharge w/patient and caregiver  - Arrange for needed discharge resources and transportation as appropriate  - Identify discharge learning needs (meds, wound care, etc )  - Arrange for interpretive services to assist at discharge as needed  - Refer to Case Management Department for coordinating discharge planning if the patient needs post-hospital services based on physician/advanced practitioner order or complex needs related to functional status, cognitive ability, or social support system  Outcome: Progressing     Problem: Knowledge Deficit  Goal: Patient/family/caregiver demonstrates understanding of disease process, treatment plan, medications, and discharge instructions  Description  Complete learning assessment and assess knowledge base    Interventions:  - Provide teaching at level of understanding  - Provide teaching via preferred learning methods  Outcome: Progressing

## 2019-10-27 NOTE — ASSESSMENT & PLAN NOTE
· Known history  · Patient is not on anticoagulation secondary to life-threatening hematoma in 2014 from review of outpatient cardiology notes  · EKG on admission with atrial fibrillation with RVR with a peak rate of 108, fluctuations noted here  · Pt's digoxin level noted to be 0 5 on admission, discussed with cardiology, pt received dig load with 500 mcg Q6H x 2 and resumed 125 mcg daily   · Repeat digoxin level WNL  · Continue Cardizem 120 mg Q12H  · HR stabilized, however continues to have some fluctuations, awaiting cardiology input

## 2019-10-27 NOTE — PROGRESS NOTES
Progress Note - [facility] 1940, 67 y o  female MRN: [id number]    Unit/Bed#: -01 Encounter: [id number]    Primary Care Provider: Laurie Betancourt DO   Date and time admitted to hospital: 10/23/2019  9:32 AM      DOS: 10/27/2019    * Tibial plateau fracture  Assessment & Plan  · Pt presented complaining of right knee pain   · Xrays negative, CT right knee with acute non-displaced fracture involving posterior aspect of medial tibial plateau   · Ortho following,  · NWB RLE, except toe touch/partial weight bearing with brace on   · PT/OT recommending STR, CM following  · Continue knee immobilizer    Critical aortic valve stenosis  Assessment & Plan  · ECHO performed 10/24 - critical aortic valve stenosis and moderate mitral regurg  · Pt had previously experienced dizziness prior to admission with subsequent fall   · Cardiology consultation,  · Per review of outpatient cardiology notes, patient has history of severe aortic stenosis and has previously deferred treatment (from 2018)  · After discussion with cardiology, patient agreeable to TAVR outpatient next week  · Cleared for discharge from cardiac standpoint   · Place on telemetry   · Monitor     Atrial fibrillation ([facility] [address] )  Assessment & Plan  · Known history  · Patient is not on anticoagulation secondary to life-threatening hematoma in 2014 from review of outpatient cardiology notes  · EKG on admission with atrial fibrillation with RVR with a peak rate of 108, fluctuations noted here  · Pt's digoxin level noted to be 0 5 on admission, discussed with cardiology, pt received dig load with 500 mcg Q6H x 2 and resumed 125 mcg daily   · Repeat digoxin level WNL  · Continue Cardizem 120 mg Q12H  · HR stabilized, however continues to have some fluctuations, awaiting cardiology input     Constipation  Assessment & Plan  · Resolved on current bowel regimen  · Continue scheduled miralax and colace    Dizziness  Assessment & Plan  · Pt now asymptomatic  · Patient is mostly wheelchair-bound but has a cane and self transferring living independently at home  · Echocardiogram showing a preserved ejection fraction between 50-55%, critical aortic stenosis  · Cardiology consultation is pending   · Of note, pt recently started on atorvastatin, only new medication and felt she is sensitive to this medication, symptoms improved after discontinuation  Consider alternative for treatment of her hyperlipidemia    Diabetes mellitus [facility])  Assessment & Plan  Lab Results   Component Value Date    HGBA1C 7 8 (H) 10/24/2019       Recent Labs     10/26/19  0558 10/26/19  1134 10/26/19  1615 10/27/19  0642   POCGLU 168* 208* 185* 180*       Blood Sugar Average: Last 72 hrs:  (P) 182 [id number]   · Hold PO agent while in the hospital  · Continue SSI coverage  · QID glucose checks  · Consistent carb diet  · Monitor and adjust regimen as needed    Hypertension  Assessment & Plan  · BP appears to be stable on review  · Continue Cardizem 120 mg BID  · Monitor    History of stroke  Assessment & Plan  · History of CVA in 2008  · Continue ASA   · Patient now listing her atorvastatin as allergy appears to be sensitive to statin  · Recent lipid panel showing total cholesterol 247 and LDL of 147  Recommend trial pravastatin per cardiology     VTE Pharmacologic Prophylaxis:   Pharmacologic: Heparin  Mechanical VTE Prophylaxis in Place: No    Patient Centered Rounds: I have evaluated patient without nursing staff present due to speaking to [address] with Specialists or Other Care Team Provider: Discussed with cardiology, RN and CM    Education and Discussions with Family / Patient: Discussed with patient at bedside, will discuss with patient's daughter Burgess Og over the phone for update as well per patient request     Time Spent for Care: 20 minutes  More than 50% of total time spent on counseling and coordination of care as described above      Current Length of Stay: 3 day(s)    Current Patient Status: Inpatient   Certification Statement: The patient will continue to require additional inpatient hospital stay due to awaiting STR placement    Discharge Plan: Awaiting placement to STR    Code Status: Level 1 - Full Code      Subjective:   Pt reports that she feels well today, denies any lightheadedness, dizziness, chest pain, shortness of breath, abdominal pain, nausea or vomiting  Reports that she is now agreeable to having intervention on her aortic valve  Objective:     Vitals:   Temp (24hrs), Av 5 °F (36 9 °C), Min:98 2 °F (36 8 °C), Max:98 8 °F (37 1 °C)    Temp:  [98 2 °F (36 8 °C)-98 8 °F (37 1 °C)] 98 5 °F (36 9 °C)  HR:  [] 98  Resp:  [16-18] 16  BP: (113-129)/(45-68) 129/65  SpO2:  [92 %-97 %] 92 %  Body mass index is 34 59 kg/m²  Input and Output Summary (last 24 hours): Intake/Output Summary (Last 24 hours) at 10/27/2019 1051  Last data filed at 10/27/2019 0931  Gross per 24 hour   Intake 480 ml   Output 1300 ml   Net -820 ml       Physical Exam:     Physical Exam   Constitutional: No distress  Pt is in no acute distress sitting in her hospital chair resting comfortably  Baseline right sided hemiparesis  HENT:   Head: Normocephalic and atraumatic  Eyes: Pupils are equal, round, and reactive to light  Conjunctivae are normal    Cardiovascular: Normal rate and intact distal pulses  Murmur heard  Irregular rhythm   Pulmonary/Chest: Effort normal and breath sounds normal  No stridor  No respiratory distress  She has no wheezes  Abdominal: Soft  Bowel sounds are normal  She exhibits no distension  There is no tenderness  There is no guarding  Musculoskeletal: She exhibits edema (right leg in knee immobilizer )  Neurological: She is alert  Lower extremities neurovascularly intact   Skin: Skin is warm and dry  She is not diaphoretic  No erythema  Vitals reviewed        Additional Data:     Labs:    Results from last 7 days   Lab Units 10/25/19  0518  10/23/19  0959   WBC Thousand/uL 8 58   < > 10 71*   HEMOGLOBIN g/dL 13 4   < > 15 2   HEMATOCRIT % 41 1   < > 46 7*   PLATELETS Thousands/uL 227   < > 275   NEUTROS PCT %  --   --  69   LYMPHS PCT %  --   --  21   MONOS PCT %  --   --  6   EOS PCT %  --   --  2    < > = values in this interval not displayed  Results from last 7 days   Lab Units 10/25/19  0518  10/23/19  0959   POTASSIUM mmol/L 4 0   < > 3 8   CHLORIDE mmol/L 103   < > 102   CO2 mmol/L 26   < > 28   BUN mg/dL 9   < > 11   CREATININE mg/dL 0 57*   < > 0 80   CALCIUM mg/dL 8 7   < > 9 2   ALK PHOS U/L  --   --  60   ALT U/L  --   --  24   AST U/L  --   --  17    < > = values in this interval not displayed  * I Have Reviewed All Lab Data Listed Above    * Additional Pertinent Lab Tests Reviewed: No New Labs Available For Today    Imaging:    Imaging Reports Reviewed Today Include: ECHO  Imaging Personally Reviewed by Myself Includes:  None    Recent Cultures (last 7 days):           Last 24 Hours Medication List:     Current Facility-Administered Medications:  acetaminophen 650 mg Oral Q6H PRN Marvene Shanae, CRNP   aspirin 81 mg Oral Daily [facility], [facility]   bisacodyl 10 mg Rectal Daily PRN [facility], CRNP   digoxin 125 mcg Oral Daily [facility], CRNP   diltiazem 120 mg Oral Q12H [facility], CRNP   docusate sodium 100 mg Oral BID [facility], CRNP   heparin (porcine) 5,000 Units Subcutaneous Q8H [facility] Shanae, CRNP   insulin lispro 1-5 Units Subcutaneous HS [facility], CRNP   insulin lispro 1-6 Units Subcutaneous TID RUTH Harrison PA-C   LORazepam 0 5 mg Oral Daily PRN Marvene Shanae, CRNP   ondansetron 4 mg Intravenous Q6H PRN [facility], CRNP   oxyCODONE 2 5 mg Oral Q4H PRN Marvene Shanae, CRNP   oxyCODONE 5 mg Oral Q4H PRN Marvene Shanae, CRNP   polyethylene glycol 17 g Oral Daily LINDA Chopra        Today, Patient Was Seen By: Rik Rudolph PA-C    ** Please Note: Dictation voice to text software may have been used in the creation of this document   **

## 2019-10-27 NOTE — ASSESSMENT & PLAN NOTE
Lab Results   Component Value Date    HGBA1C 7 8 (H) 10/24/2019       Recent Labs     10/26/19  0558 10/26/19  1134 10/26/19  1615 10/27/19  0642   POCGLU 168* 208* 185* 180*       Blood Sugar Average: Last 72 hrs:  (P) 182 [id number]   · Hold PO agent while in the hospital  · Continue SSI coverage  · QID glucose checks  · Consistent carb diet  · Monitor and adjust regimen as needed

## 2019-10-27 NOTE — ASSESSMENT & PLAN NOTE
· Pt now asymptomatic  · Patient is mostly wheelchair-bound but has a cane and self transferring living independently at home  · Echocardiogram showing a preserved ejection fraction between 50-55%, critical aortic stenosis  · Cardiology consultation is pending   · Of note, pt recently started on atorvastatin, only new medication and felt she is sensitive to this medication, symptoms improved after discontinuation   Consider alternative for treatment of her hyperlipidemia

## 2019-10-27 NOTE — SOCIAL WORK
Pt likely ready today   VM left for [facility] at [facility]  To see if they can accept

## 2019-10-27 NOTE — CONSULTS
Consultation - Cardiology Team One  Miguel Victoria 67 y o  female MRN: [id number]  Unit/Bed#: -01 Encounter: [id number]    Consults    Physician Requesting Consult: Elvira Guerrero MD  Reason for Consult / Principal Problem:  Critical aortic stenosis    Assessment/Plan:    1  Critical aortic stenosis  -TTE showed aortic valve area 0 4cm2 and mild regurgitation  -will set up outpatient follow-up for TAVR evaluation      2  Persistent atrial fibrillation  -continue Cardizem 120 mg b i d  and digoxin 125 mcg For rate control  -patient not on anticoagulation secondary to history of retroperitoneal hematoma  -continue telemetry monitoring    3  Hypertension, currently controlled  -continue diltiazem  -continue to monitor blood pressures    4  Hyperlipidemia  -patient with a noted allergy to atorvastatin  -recommend trialing pravastatin    HPI: Cardiologist Dr Jone Martinez is a 67y o  year old female who has a history of critical aortic stenosis, CVA, persistent atrial fibrillation, hypertension, hyperlipidemia, diabetes who presented to the emergency room on 10/23/2019 with a fall after suddenly become being dizzy  Initial EKG in the emergency room she did show atrial fibrillation with RVR  She was admitted for workup and an echocardiogram was done which revealed critical aortic stenosis  Patient was noted to have critical aortic stenosis since at least February of 2018 when she followed up with Dr Hina fernandez at [facility]  Per his noted that time she was to for no surgical intervention and opted for conservative management  Currently the patient agrees to proceed with TAVR workup        REVIEW OF SYSTEMS:  Constitutional:  Denies fever or chills, +dizziness   Eyes:  Denies change in visual acuity   HENT:  Denies nasal congestion or sore throat   Respiratory:  Denies cough or shortness of breath   Cardiovascular:  Denies chest pain or edema   GI:  Denies abdominal pain, nausea, vomiting, bloody stools or diarrhea   :  Denies dysuria, frequency, difficulty in micturition and nocturia  Musculoskeletal:  Denies back pain or joint pain   Neurologic:  Denies headache, focal weakness or sensory changes   Endocrine:  Denies polyuria or polydipsia   Lymphatic:  Denies swollen glands   Psychiatric:  Denies depression or anxiety     Historical Information   Past Medical History:   Diagnosis Date    Cardiac disease     Diabetes mellitus ([facility] [address] )     Hypertension     Stroke [facility])      History reviewed  No pertinent surgical history    Social History     Substance and Sexual Activity   Alcohol Use Not Currently     Social History     Substance and Sexual Activity   Drug Use Never     Social History     Tobacco Use   Smoking Status Never Smoker   Smokeless Tobacco Never Used       Family History: non-contributory    MEDS & ALLERGIES:  all current active meds have been reviewed and current meds:   Current Facility-Administered Medications   Medication Dose Route Frequency    acetaminophen (TYLENOL) tablet 650 mg  650 mg Oral Q6H PRN    aspirin (ECOTRIN LOW STRENGTH) EC tablet 81 mg  81 mg Oral Daily    bisacodyl (DULCOLAX) rectal suppository 10 mg  10 mg Rectal Daily PRN    digoxin (LANOXIN) tablet 125 mcg  125 mcg Oral Daily    diltiazem (CARDIZEM SR) 12 hr capsule 120 mg  120 mg Oral Q12H JAELYN    docusate sodium (COLACE) capsule 100 mg  100 mg Oral BID    heparin (porcine) subcutaneous injection 5,000 Units  5,000 Units Subcutaneous Q8H [facility]    insulin lispro (HumaLOG) 100 units/mL subcutaneous injection 1-5 Units  1-5 Units Subcutaneous HS    insulin lispro (HumaLOG) 100 units/mL subcutaneous injection 1-6 Units  1-6 Units Subcutaneous TID AC    LORazepam (ATIVAN) tablet 0 5 mg  0 5 mg Oral Daily PRN    ondansetron (ZOFRAN) injection 4 mg  4 mg Intravenous Q6H PRN    oxyCODONE (ROXICODONE) IR tablet 2 5 mg  2 5 mg Oral Q4H PRN    oxyCODONE (ROXICODONE) IR tablet 5 mg  5 mg Oral Q4H PRN    polyethylene glycol (MIRALAX) packet 17 g  17 g Oral Daily        Allergies   Allergen Reactions    Atorvastatin      dizziness and shaky    Rivaroxaban      Dizziness, and shaky  OBJECTIVE:  Vitals:   Vitals:    10/27/19 1100   BP: 125/88   Pulse: 98   Resp: 17   Temp: 98 5 °F (36 9 °C)   SpO2: 92%     Body mass index is 34 59 kg/m²  Systolic (79OWD), BGZ:686 , Min:113 , MLF:063     Diastolic (23YAA), XAW:42, Min:45, Max:88      Intake/Output Summary (Last 24 hours) at 10/27/2019 1157  Last data filed at 10/27/2019 0931  Gross per 24 hour   Intake 480 ml   Output 1100 ml   Net -620 ml     Weight (last 2 days)     None        Invasive Devices     Peripheral Intravenous Line            Peripheral IV 10/23/19 Left Antecubital 4 days          Drain            External Urinary Catheter less than 1 day                PHYSICAL EXAMS:  General:  Patient is not in acute distress, laying in the bed comfortably, awake, alert responding to commands  Head: Normocephalic, Atraumatic     HEENT: White sclera, pink conjunctiva  Neck:  Supple, no neck vein distention  Respiratory: clear to P/A  Cardiovascular:  PMI normal, S1-S2 normal, No  Murmurs, thrills, gallops, rubs, irregularly irregular rhythm  GI:  Abdomen soft ,non-tender, non-distended  Extremities: No edema, normal pulses  Integument:  No skin rashes or ulceration  Lymphatic:  No cervical or inguinal lymphadenopathy  Neurologic:  Patient is awake alert, responding to command, oriented to person, place and time     LABORATORY RESULTS:      CBC with diff:   Results from last 7 days   Lab Units 10/25/19  0518 10/24/19  0558 10/23/19  2050 10/23/19  0959   WBC Thousand/uL 8 58 8 53  --  10 71*   HEMOGLOBIN g/dL 13 4 13 3  --  15 2   HEMATOCRIT % 41 1 40 7  --  46 7*   MCV fL 95 96  --  95   PLATELETS Thousands/uL 227 235 255 275   MCH pg 30 9 31 3  --  30 8   MCHC g/dL 32 6 32 7  --  32 5   RDW % 13 2 13 3  --  13 2   MPV fL 10 5 10 3 10 2 10 5   NRBC AUTO /100 WBCs  --   --   --  0       CMP:  Results from last 7 days   Lab Units 10/25/19  0518 10/24/19  0558 10/23/19  0959   POTASSIUM mmol/L 4 0 3 9 3 8   CHLORIDE mmol/L 103 101 102   CO2 mmol/L 26 26 28   BUN mg/dL 9 13 11   CREATININE mg/dL 0 57* 0 62 0 80   CALCIUM mg/dL 8 7 8 0* 9 2   AST U/L  --   --  17   ALT U/L  --   --  24   ALK PHOS U/L  --   --  60   EGFR ml/min/1 73sq m 89 87 71       BMP:  Results from last 7 days   Lab Units 10/25/19  0518 10/24/19  0558 10/23/19  0959   POTASSIUM mmol/L 4 0 3 9 3 8   CHLORIDE mmol/L 103 101 102   CO2 mmol/L 26 26 28   BUN mg/dL 9 13 11   CREATININE mg/dL 0 57* 0 62 0 80   CALCIUM mg/dL 8 7 8 0* 9 2                Results from last 7 days   Lab Units 10/24/19  0558   HEMOGLOBIN A1C % 7 8*               Lipid Profile:   No results found for: CHOL  No results found for: HDL  No results found for: LDLCALC  No results found for: TRIG    Cardiac testing:   Results for orders placed during the hospital encounter of 10/23/19   Echo complete with contrast if indicated    Narrative [address]  [place], [place] [address]  [phone]    Transthoracic Echocardiogram  2D, M-mode, Doppler, and Color Doppler    Study date:  24-Oct-2019    Patient: Jen Barker  MR number: [id number]  Account number: [de-identified]  : 1940  Age: 67 years  Gender: Female  Status: Outpatient  Location: Bedside  Height: 64 in  Weight: 201 lb  BP: 111/ 73 mmHg    Indications: Syncope and collapse,    Diagnoses: R55  - Syncope and collapse    Sonographer:   [facility]  Referring Physician:  LINDA Haines  Group:  [facility]  Interpreting Physician:  Dory Rodriguez MD    SUMMARY    LEFT VENTRICLE:  Wall thickness was mildly increased  Doppler parameters were consistent with elevated ventricular end-diastolic filling pressure  RIGHT VENTRICLE:  The ventricle was mildly dilated      LEFT ATRIUM:  The atrium was mildly dilated  MITRAL VALVE:  There was mild annular calcification  There was moderate regurgitation  AORTIC VALVE:  There was critical stenosis  ROCHELLE 0 4 cm2  There was mild regurgitation  TRICUSPID VALVE:  There was mild regurgitation  AORTA:  The root exhibited normal size and mild fibrocalcific change  HISTORY: Dizziness  PRIOR HISTORY: Atrial fibrillation  Risk factors: hypertension and diabetes  Remote stroke  PROCEDURE: The procedure was performed at the bedside  This was a routine study  The transthoracic approach was used  The study included complete 2D imaging, M-mode, complete spectral Doppler, and color Doppler  The heart rate was 95 bpm,  at the start of the study  Images were obtained from the parasternal, apical, subcostal, and suprasternal notch acoustic windows  Image quality was adequate  LEFT VENTRICLE: Size was normal  Ejection fraction was estimated in the range of 50 % to 55 % to be 50 %  Wall thickness was mildly increased  DOPPLER: Left ventricular diastolic function parameters were abnormal  Doppler parameters were  consistent with elevated ventricular end-diastolic filling pressure  RIGHT VENTRICLE: The ventricle was mildly dilated  Systolic function was normal     LEFT ATRIUM: The atrium was mildly dilated  RIGHT ATRIUM: Size was normal     MITRAL VALVE: There was mild annular calcification  There was mild calcification  DOPPLER: There was moderate regurgitation  AORTIC VALVE: DOPPLER: There was critical stenosis  ROCHELLE 0 4 cm2 There was mild regurgitation  TRICUSPID VALVE: DOPPLER: There was mild regurgitation  Estimated peak PA pressure was 50 mmHg  PULMONIC VALVE: Not well visualized  DOPPLER: There was no regurgitation  PERICARDIUM: There was no pericardial effusion  The pericardium was normal in appearance  AORTA: The root exhibited normal size and mild fibrocalcific change      SYSTEMIC VEINS: IVC: The inferior vena cava was normal in size and course  Respirophasic changes were normal     SYSTEM MEASUREMENT TABLES    2D  %FS: 35 5 %  Ao Diam: 3 cm  EDV(Teich): 71 4 ml  EF(Teich): 65 5 %  ESV(Teich): 24 7 ml  IVSd: 1 1 cm  LA Area: 24 2 cm2  LA Diam: 4 6 cm  LVEDV MOD A4C: 96 9 ml  LVEF MOD A4C: 67 3 %  LVESV MOD A4C: 31 7 ml  LVIDd: 4 cm  LVIDs: 2 6 cm  LVLd A4C: 8 3 cm  LVLs A4C: 6 8 cm  LVOT Diam: 2 cm  LVPWd: 1 1 cm  RA Area: 18 1 cm2  RVIDd: 3 9 cm  SV MOD A4C: 65 2 ml  SV(Teich): 46 8 ml    CW  AR Dec [place]: 2 9 m/s2  AR Dec Time: 1202 6 ms  AR PHT: 348 8 ms  AR Vmax: 3 4 m/s  AR maxP 1 mmHg  AV Env  Ti: 306 4 ms  AV VTI: 122 7 cm  AV Vmax: 5 m/s  AV Vmean: 4 m/s  AV maxP 7 mmHg  AV meanP 5 mmHg  TR Vmax: 3 4 m/s  TR maxP 3 mmHg    MM  TAPSE: 1 2 cm    PW  ROCHELLE (VTI): 0 4 cm2  ROCHELLE Vmax: 0 4 cm2  E': 0 1 m/s  E/E': 17 8  LVOT Env  Ti: 306 4 ms  LVOT VTI: 15 4 cm  LVOT Vmax: 0 6 m/s  LVOT Vmean: 0 5 m/s  LVOT maxP 6 mmHg  LVOT meanP 1 mmHg  LVSV Dopp: 48 2 ml  MV A [place]: 0 3 m/s  MV Dec [place]: 8 3 m/s2  MV DecT: 144 1 ms  MV E [place]: 1 2 m/s  MV E/A Ratio: 4 5  MV PHT: 41 8 ms  MVA By PHT: 5 3 cm2    [employer] Accredited Echocardiography Laboratory    Prepared and electronically signed by    Patria Handley MD  Signed 24-Oct-2019 10:44:52             Imaging:   I have personally reviewed pertinent reports  EKG reviewed personally:  Atrial fibrillation with RVR    Telemetry reviewed personally:   Atrial fibrillation with a rate in the 80s      Code Status: Level 1 - Full Code    Counseling / Coordination of Care  Total floor / unit time spent today 15 minutes  Greater than 50% of total time was spent with the patient and / or family counseling and / or coordination of care  A description of the counseling / coordination of care: Review of history, current assessment, development of a plan      [address] [place] [address]  10/27/2019,11:57 AM

## 2019-10-27 NOTE — ASSESSMENT & PLAN NOTE
General · Pt presented complaining of right knee pain   · Xrays negative, CT right knee with acute non-displaced fracture involving posterior aspect of medial tibial plateau   · Ortho following,  · NWB RLE, except toe touch/partial weight bearing with brace on   · PT/OT recommending STR, CM following  · Continue knee immobilizer

## 2019-10-27 NOTE — ASSESSMENT & PLAN NOTE
· ECHO performed 10/24 - critical aortic valve stenosis and moderate mitral regurg  · Pt had previously experienced dizziness prior to admission with subsequent fall   · Cardiology consultation,  · Per review of outpatient cardiology notes, patient has history of severe aortic stenosis and has previously deferred treatment (from 2018)  · After discussion with cardiology, patient agreeable to TAVR outpatient next week  · Cleared for discharge from cardiac standpoint   · Place on telemetry   · Monitor

## 2019-10-28 PROBLEM — K59.00 CONSTIPATION: Status: RESOLVED | Noted: 2019-10-23 | Resolved: 2019-10-28

## 2019-10-28 PROBLEM — R42 DIZZINESS: Status: RESOLVED | Noted: 2019-10-23 | Resolved: 2019-10-28

## 2019-10-28 LAB
GLUCOSE SERPL-MCNC: 198 MG/DL (ref 65–140)
GLUCOSE SERPL-MCNC: 218 MG/DL (ref 65–140)
GLUCOSE SERPL-MCNC: 227 MG/DL (ref 65–140)
GLUCOSE SERPL-MCNC: 254 MG/DL (ref 65–140)
GLUCOSE SERPL-MCNC: 256 MG/DL (ref 65–140)

## 2019-10-28 PROCEDURE — 99232 SBSQ HOSP IP/OBS MODERATE 35: CPT | Performed by: PHYSICIAN ASSISTANT

## 2019-10-28 PROCEDURE — 82948 REAGENT STRIP/BLOOD GLUCOSE: CPT

## 2019-10-28 RX ORDER — DOCUSATE SODIUM 100 MG/1
100 CAPSULE, LIQUID FILLED ORAL 2 TIMES DAILY
Qty: 10 CAPSULE | Refills: 0
Start: 2019-10-28 | End: 2020-02-13

## 2019-10-28 RX ORDER — POLYETHYLENE GLYCOL 3350 17 G/17G
17 POWDER, FOR SOLUTION ORAL DAILY
Qty: 14 EACH | Refills: 0
Start: 2019-10-29

## 2019-10-28 RX ORDER — PRAVASTATIN SODIUM 20 MG
40 TABLET ORAL
Refills: 0
Start: 2019-10-28 | End: 2020-01-31 | Stop reason: ALTCHOICE

## 2019-10-28 RX ADMIN — HEPARIN SODIUM 5000 UNITS: 5000 INJECTION INTRAVENOUS; SUBCUTANEOUS at 21:37

## 2019-10-28 RX ADMIN — DILTIAZEM HYDROCHLORIDE 120 MG: 60 CAPSULE, EXTENDED RELEASE ORAL at 09:28

## 2019-10-28 RX ADMIN — INSULIN LISPRO 2 UNITS: 100 INJECTION, SOLUTION INTRAVENOUS; SUBCUTANEOUS at 12:36

## 2019-10-28 RX ADMIN — DILTIAZEM HYDROCHLORIDE 120 MG: 60 CAPSULE, EXTENDED RELEASE ORAL at 21:37

## 2019-10-28 RX ADMIN — PRAVASTATIN SODIUM 20 MG: 20 TABLET ORAL at 17:34

## 2019-10-28 RX ADMIN — INSULIN LISPRO 2 UNITS: 100 INJECTION, SOLUTION INTRAVENOUS; SUBCUTANEOUS at 17:35

## 2019-10-28 RX ADMIN — DIGOXIN 125 MCG: 125 TABLET ORAL at 09:27

## 2019-10-28 RX ADMIN — BISACODYL 10 MG: 10 SUPPOSITORY RECTAL at 17:34

## 2019-10-28 RX ADMIN — POLYETHYLENE GLYCOL 3350 17 G: 17 POWDER, FOR SOLUTION ORAL at 09:30

## 2019-10-28 RX ADMIN — ASPIRIN 81 MG: 81 TABLET, COATED ORAL at 09:26

## 2019-10-28 RX ADMIN — HEPARIN SODIUM 5000 UNITS: 5000 INJECTION INTRAVENOUS; SUBCUTANEOUS at 14:59

## 2019-10-28 RX ADMIN — HEPARIN SODIUM 5000 UNITS: 5000 INJECTION INTRAVENOUS; SUBCUTANEOUS at 05:36

## 2019-10-28 RX ADMIN — DOCUSATE SODIUM 100 MG: 100 CAPSULE, LIQUID FILLED ORAL at 17:34

## 2019-10-28 RX ADMIN — INSULIN LISPRO 2 UNITS: 100 INJECTION, SOLUTION INTRAVENOUS; SUBCUTANEOUS at 09:29

## 2019-10-28 RX ADMIN — INSULIN LISPRO 2 UNITS: 100 INJECTION, SOLUTION INTRAVENOUS; SUBCUTANEOUS at 21:37

## 2019-10-28 RX ADMIN — DOCUSATE SODIUM 100 MG: 100 CAPSULE, LIQUID FILLED ORAL at 09:29

## 2019-10-28 NOTE — PROGRESS NOTES
Patient being  discharge and complained of feeling dizzy and sweaty  Vital signs 135/66, HR 91, afibril , accu check 254 post prandial  Shy BENEDICT aware   In route

## 2019-10-28 NOTE — DISCHARGE SUMMARY
Discharge- Aboila Sandoval 1940, 67 y o  female MRN: [id number]    Unit/Bed#: -01 Encounter: [id number]    Primary Care Provider: Jovanni Lynne DO   Date and time admitted to hospital: 10/23/2019  9:32 AM      DOS: 10/28/2019    * Tibial plateau fracture  Assessment & Plan  · Pt presented complaining of right knee pain   · Xrays negative, CT right knee with acute non-displaced fracture involving posterior aspect of medial tibial plateau   · Ortho following,  · NWB RLE, except toe touch/partial weight bearing with brace on   · PT/OT recommending STR, CM following  · Continue knee immobilizer  · Follow up with ortho as an outpatient     Critical aortic valve stenosis  Assessment & Plan  · ECHO performed 10/24 - critical aortic valve stenosis and moderate mitral regurg  · Pt had previously experienced dizziness prior to admission with subsequent fall   · Cardiology consultation,  · Per review of outpatient cardiology notes, patient has history of severe aortic stenosis and has previously deferred treatment (from 2018)  · After discussion with cardiology, patient agreeable to TAVR outpatient evaluation next week  · Cleared for discharge from cardiac standpoint   · Monitor     Atrial fibrillation ([facility] [address] )  Assessment & Plan  · Known history  · Patient is not on anticoagulation secondary to life-threatening hematoma in 2014 from review of outpatient cardiology notes  · EKG on admission with atrial fibrillation with RVR with a peak rate of 108, fluctuations noted here  · Pt's digoxin level noted to be 0 5 on admission, discussed with cardiology, pt received dig load with 500 mcg Q6H x 2 and resumed 125 mcg daily   · Repeat digoxin level WNL  · Continue Cardizem 120 mg Q12H  · HR stabilized    Constipation  Assessment & Plan  · Resolved on current bowel regimen  · Continue scheduled miralax and colace    Dizziness  Assessment & Plan  · Pt now asymptomatic  · Patient is mostly wheelchair-bound but has a cane and self transferring living independently at home  · Echocardiogram showing a preserved ejection fraction between 50-55%, critical aortic stenosis  · Cardiology evaluated,  ·  Recommend outpatient follow up for TAVR evaluation   · No additional inpatient recommendations at this time  · Of note, pt recently started on atorvastatin, only new medication and felt she is sensitive to this medication, symptoms improved after discontinuation  Consider alternative for treatment of her hyperlipidemia with pravastatin and follow up with cardiology    Diabetes mellitus [facility])  Assessment & Plan  Lab Results   Component Value Date    HGBA1C 7 8 (H) 10/24/2019       Recent Labs     10/27/19  1049 10/27/19  1559 10/27/19  2059 10/28/19  0644   POCGLU 270* 190* 239* 218*       Blood Sugar Average: Last 72 hrs:  (P) 897 [id number]   · Hold PO agent while in the hospital, resume metformin on discharge  · Continue SSI coverage  · QID glucose checks  · Consistent carb diet  · Monitor and adjust regimen as needed    Hypertension  Assessment & Plan  · BP appears to be stable on review  · Continue Cardizem 120 mg BID  · Monitor    History of stroke  Assessment & Plan  · History of CVA in 2008  · Continue ASA   · Patient now listing her atorvastatin as allergy appears to be sensitive to statin  · Recent lipid panel showing total cholesterol 247 and LDL of 147 patient has an upcoming cardiology appointment and should explore this given her stroke history   At this time would continue with pravastatin per inpatient cardiology recommendations and monitor for any recurrent symptoms      Discharging Physician / Practitioner: Amber Howe PA-C  PCP: Remedios Echeverria DO  Admission Date:   Admission Orders (From admission, onward)     Ordered        10/24/19 1509  Inpatient Admission  Once         10/23/19 1551  Place in Observation  Once                   Discharge Date: 10/28/19    Resolved Problems  Date Reviewed: 10/28/2019    None Consultations During Hospital Stay:  · Orthopedics  · Cardiology    Procedures Performed:   · CT head 10/23-no acute intracranial abnormality  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  · Chest x-ray 10/23-mild enlargement of cardiac silhouette and bilateral calcified pleural plaques  No definite acute pulmonary disease  · X-ray hip/pelvis 10/23-no acute osseous abnormality  · X-ray femur right 10/23-no acute osseous abnormality  · X-ray ankle right 10/23-no acute osseous abnormality  · X-ray right 10/23-no acute osseous abnormality  Tricompartmental right knee osteoarthritis and joint effusion  · CT knee right 10/23-acute, nondisplaced fracture involving posterior most aspect of the medial tibial plateau  Severe tricompartmental osteoarthritis  · Echo 10/24-critical aortic valve stenosis  ROCHELLE 0 4 cm2    Significant Findings / Test Results:   · Leukocytosis resolved  · Dig level within normal limits on discharge  · Hemoglobin A1c 7 8%    Incidental Findings:   · None     Test Results Pending at Discharge (will require follow up): · None     Outpatient Tests Requested:  · Per PCP/Cardiology, pt have TAVR evaluation outpatient     Complications:  None    Reason for Admission: Fall with tibial plateau fracture    Hospital Course:     Hemanth Duncan is a 67 y o  female patient with significant past medical history of AFib, hypertension, history of CVA with baseline right-sided hemiparesis, diabetes mellitus who originally presented to the hospital on 10/23/2019 due to knee pain status post fall at home  X-rays were originally negative, however patient with severe right knee pain with range of motion  Therefore CT right lower extremity obtained and patient noted to have a posterior tibial plateau fracture  Patient also noted dizziness prior to her fall but denies any loss of consciousness  Therefore echocardiogram was obtained and patient noted to have critical aortic stenosis which cardiology evaluated  Patient is to have workup for TAVR as an outpatient in the upcoming weeks  Patient was also evaluated by Orthopedics and placed in knee immobilizer  There is no indication for any surgical intervention at that point  Patient has follow-up with Orthopedics as an outpatient  Patient was evaluated by PT/OT and recommended short-term rehab  Patient is to discharge to [facility]  Patient was discharged in stable condition  Patient's dizziness resolved  Patient was noted to be in AFib with RVR on admission and digoxin level was low, therefore she had a digoxin load with improvement of heart rate  Patient's Lipitor was switched to pravastatin for her cholesterol as there is concern the Lipitor could have been contributing to her symptoms  For additional information please refer to medical records  Medication changes include: Discontinuation of lipitor, addition of pravastatin 40 mg daily       Please see above list of diagnoses and related plan for additional information  Condition at Discharge: stable     Discharge Day Visit / Exam:     Subjective:  Pt reports that she continues to feel well today  Denies any pain in the knee at rest  Denies any dizziness or lightheadedness  No shortness of breath, nausea or vomiting  Pt is [facility] to [facility] to [facility]  Vitals: Blood Pressure: 138/60 (10/28/19 0926)  Pulse: 96 (10/27/19 2327)  Temperature: 98 2 °F (36 8 °C) (10/28/19 0723)  Temp Source: Oral (10/27/19 2327)  Respirations: 19 (10/27/19 2327)  Height: 5' 4" (162 6 cm) (10/23/19 1700)  Weight - Scale: 91 4 kg (201 lb 8 oz) (10/23/19 0936)  SpO2: 95 % (10/27/19 2327)  Exam:   Physical Exam   Constitutional: No distress  Patient is in no acute distress lying in her hospital bed resting comfortably  Baseline right-sided hemiparesis   HENT:   Head: Normocephalic  Eyes: Pupils are equal, round, and reactive to light   Conjunctivae are normal    Cardiovascular: Normal rate and intact distal pulses  Murmur heard  Irregular rhythm   Pulmonary/Chest: Effort normal and breath sounds normal  No stridor  No respiratory distress  She has no wheezes  Abdominal: Soft  Bowel sounds are normal  She exhibits no distension  There is no tenderness  There is no guarding  Musculoskeletal: She exhibits no edema  Neurological: She is alert  Skin: Skin is warm and dry  She is not diaphoretic  No erythema  Psychiatric: She has a normal mood and affect  Vitals reviewed  Discussion with Family: Discussed with pt and patient's daughter regarding plan of care  Discussed symptoms to monitor for including myalgias with pravastatin  Also advised follow up with cardiology for TAVR and ortho  Pt and family in agreement to plan and verbalized understanding  Discharge instructions/Information to patient and family:   See after visit summary for information provided to patient and family  Provisions for Follow-Up Care:  See after visit summary for information related to follow-up care and any pertinent home health orders  Disposition:     [facility] at [address] to [facility]:   · Not Applicable to this Patient - Not Applicable to this Patient    Planned Readmission: None     Discharge Statement:  I spent 35 minutes discharging the patient  This time was spent on the day of discharge  I had direct contact with the patient on the day of discharge  Greater than 50% of the total time was spent examining patient, answering all patient questions, arranging and discussing plan of care with patient as well as directly providing post-discharge instructions  Additional time then spent on discharge activities  Discharge Medications:  See after visit summary for reconciled discharge medications provided to patient and family        ** Please Note: This note has been constructed using a voice recognition system **

## 2019-10-28 NOTE — ASSESSMENT & PLAN NOTE
· History of CVA in 2008  · Continue ASA   · Patient now listing her atorvastatin as allergy appears to be sensitive to statin  · Recent lipid panel showing total cholesterol 247 and LDL of 147 patient has an upcoming cardiology appointment and should explore this given her stroke history   At this time would continue with pravastatin per inpatient cardiology recommendations and monitor for any recurrent symptoms

## 2019-10-28 NOTE — DISCHARGE INSTRUCTIONS
Please follow up with your primary care provider within one week   Please follow up with cardiology for TAVR   Please follow up with orthopedics  If you have any more lightheadedness, dizziness, chest pain or shortness of breath please come back to the hospital for further evaluation  Aortic Stenosis   WHAT YOU NEED TO KNOW:   What is aortic stenosis? Aortic stenosis is a condition where the aortic valve in your heart is narrowed  The aortic valve is between the left ventricle and the aorta  The left ventricle is the lower left chamber of your heart  The aorta is a blood vessel that pumps blood to your head and body  The aortic valve opens and closes to direct blood flow through your heart  When the aortic valve is narrowed, blood flow may decrease  Your tissues and organs will not have enough oxygen and nutrients to function properly  What causes aortic stenosis? · Calcium buildup:  As you age, calcium can build up on the aortic valve walls  The calcium stiffens and thickens the valve  · Congenital heart defect:  Some people are born with a damaged aortic valve that leads to narrowing and blockage  · Rheumatic fever: This is fever and inflammation of your joints  It can develop after you have a strep throat infection  Rheumatic fever can cause inflammation and damage to your aortic valve  The walls of your aortic valve may narrow, and may even join together  What are the signs and symptoms of aortic stenosis? · Chest pain or tightness    · Fast, jumpy, or fluttery heartbeat    · Shortness of breath during activity or when you lie down    · Severe tiredness    · Dizziness or feeling faint  How is aortic stenosis diagnosed? Your healthcare provider will ask about your signs and symptoms and listen to your heart  He will ask if you have had strep throat or rheumatic fever in the past  You may need any of the following tests:  · Blood tests:   You may need blood taken to give caregivers information about how your body is working  The blood may be taken from your hand, arm, or IV  · Chest x-ray: This is used to check the size of your heart and look for fluid around your heart and lungs  · EKG: This test records the electrical activity of your heart  It is used to check for abnormal heart rhythm caused by aortic stenosis  · An echocardiogram  is a type of ultrasound  Sound waves are used to show the structure and function of your heart  · A stress test  may show the changes that take place in your heart while it is under stress  Stress may be placed on your heart with exercise or medicine  Ask for more information about this test     · Cardiac catheterization: This procedure is done to find and treat heart blockages  A thin, bendable tube is inserted into your arm, neck, or groin and moved into your heart  An x-ray may be used to guide the tube to the right place  Dye may be put into your vein so the pictures show up better on a monitor  Tell the healthcare provider if you have ever had an allergic reaction to contrast dye  How is aortic stenosis treated? · Medicines: You may have the following medicines to improve your symptoms or prevent problems caused by aortic stenosis:    ¨ Cholesterol medicine: This medicine will help decrease the amount of cholesterol in your blood  ¨ Antibiotics: This medicine will help fight or prevent an infection  You may need it if you had rheumatic fever in the past  You may need to take the medicine every day, or once a month  · Procedures:      ¨ Valvotomy: This helps widen your aortic valve and allow blood to flow through easier  A catheter (long thin tube) with a balloon on the tip is inserted through a small incision in your arm or groin  The catheter is guided through a blood vessel and into your left atrium near your aortic valve  When the balloon is inflated, it stretches the valve opening      ¨ Valvuloplasty:  Healthcare providers make an incision in your chest to repair and widen your aortic valve  The valve walls are  or calcium buildup is removed  This helps improve the blood flow through your heart  ¨ Replacement:  Healthcare Providers make an incision in your chest to replace your damaged aortic valve  Part or all of your aortic valve is removed, and a new valve is secured in place  The new valve may be from a donor (another person or animal), or may be a manmade valve  What are the risks of aortic stenosis? Aortic stenosis may cause endocarditis  Endocarditis is an infection of the inner lining of the heart  Aortic stenosis can also cause congestive heart failure (CHF)  This is when the heart cannot pump enough blood for the body  This may cause irregular heartbeats and can lead to cardiac arrest (the heart stops beating)  This can be life-threatening  How can I manage my symptoms? · Eat a variety of healthy foods:  Healthy foods include fruits, vegetables, whole-grain breads, low-fat dairy products, beans, lean meats, and fish  Ask if you need to be on a special diet  · Exercise: This will improve your heart health  Ask your healthcare provider about the best exercise plan for you  · Maintain a healthy weight:  Ask your healthcare provider how much you should weigh  Ask him to help you create a weight loss plan if you are overweight  When should I contact my healthcare provider? · You are bleeding from your nose or gums  · The veins in your neck look swollen or are bulging  · You have a fever  · You have blood in your urine or bowel movements  · You have questions or concerns about your condition or care  When should I seek immediate care or call 911? · Your arm or leg feels warm, tender, and painful  It may look swollen and red  · Your heart is beating faster than normal for you, and you feel fluttering in your chest     · You suddenly feel lightheaded and short of breath      · You have chest pain that feels like squeezing, pressure, fullness, or pain  · You have chest pain that lasts for more than a few minutes or returns  · You are nauseated and have trouble breathing  · You have a severe headache, cold sweats, and feel lightheaded or dizzy  · You have weakness or numbness on one side of your arm, leg, or face  · You are confused and cannot speak clearly  CARE AGREEMENT:   You have the right to help plan your care  Learn about your health condition and how it may be treated  Discuss treatment options with your caregivers to decide what care you want to receive  You always have the right to refuse treatment  The above information is an  only  It is not intended as medical advice for individual conditions or treatments  Talk to your doctor, nurse or pharmacist before following any medical regimen to see if it is safe and effective for you  © 2017 [employer] [employer] Information is for End User's use only and may not be sold, redistributed or otherwise used for commercial purposes  All illustrations and images included in CareNotes® are the copyrighted property of A D A M [employer]  or Moshe Samano

## 2019-10-28 NOTE — ASSESSMENT & PLAN NOTE
· ECHO performed 10/24 - critical aortic valve stenosis and moderate mitral regurg  · Pt had previously experienced dizziness prior to admission with subsequent fall   · Cardiology consultation,  · Per review of outpatient cardiology notes, patient has history of severe aortic stenosis and has previously deferred treatment (from 2018)  · After discussion with cardiology, patient agreeable to TAVR outpatient evaluation next week  · Cleared for discharge from cardiac standpoint   · Monitor

## 2019-10-28 NOTE — SOCIAL WORK
LOS 4 GMLOS 3 CM received a call from Neeru Jaimes at [facility] who notified patient's transport time to [facility] to be 12PM today    CM notified, SRINATH, Enma, nurse and HINAIM

## 2019-10-28 NOTE — ASSESSMENT & PLAN NOTE
· Known history  · Patient is not on anticoagulation secondary to life-threatening hematoma in 2014 from review of outpatient cardiology notes  · EKG on admission with atrial fibrillation with RVR with a peak rate of 108, fluctuations noted here  · Pt's digoxin level noted to be 0 5 on admission, discussed with cardiology, pt received dig load with 500 mcg Q6H x 2 and resumed 125 mcg daily   · Repeat digoxin level WNL  · Continue Cardizem 120 mg Q12H  · HR stabilized

## 2019-10-28 NOTE — TRANSPORTATION MEDICAL NECESSITY
Section I - General Information    Name of Patient: Luke Deleon                 : 1940    Medicare #: [id number]  Transport Date: 10/28/19 (PCS is valid for round trips on this date and for all repetitive trips in the 60-day range as noted below )  [address]: [address]  Is the pt's stay covered under Medicare Part A (PPS/DRG)   [x]     Closest appropriate facility? If no, why is transport to more distant facility required? Yes  If hospice pt, is this transport related to pt's terminal illness? No       Section II - Medical Necessity Questionnaire  Ambulance transportation is medically necessary only if other means of transport are contraindicated or would be potentially harmful to the patient  To meet this requirement, the patient must either be "bed confined" or suffer from a condition such that transport by means other than ambulance is contraindicated by the patient's condition  The following questions must be answered by the medical professional signing below for this form to be valid:    1)  Describe the MEDICAL CONDITION (physical and/or mental) of this patient AT [address] [place] [address] that requires the patient to be transported in an ambulance and why transport by other means is contraindicated by the patient's condition:patient has right sided weaknes  Patient has a fractured right knee  Patient has an immobilizer on the right knee  2) Is the patient "bed confined" as defined below? No  To be "be confined" the patient must satisfy all three of the following conditions: (1) unable to get up from bed without Assistance; AND (2) unable to ambulate; AND (3) unable to sit in a chair or wheelchair      3) Can this patient safely be transported by car or wheelchair van (i e , seated during transport without a medical attendant or monitoring)? Yes    4) In addition to completing questions 1-3 above, please check any of the following conditions that apply*:   *Note: supporting documentation for any boxes checked must be maintained in the patient's medical records  If hosp-hosp transfer, describe services needed at 2nd facility not available at 1st facility? Medical attendant required   Orthopedic device (backboard, halo, pins, traction, brace, wedge, etc,) requiring special handling during transport       Section III - Signature of Physician or Healthcare Professional  I certify that the above information is true and correct based on my evaluation of this patient, and represent that the patient requires transport by ambulance and that other forms of transport are contraindicated  I understand that this information will be used by the Centers for Medicare and Medicaid Services (CMS) to support the determination of medical necessity for ambulance services, and I represent that I have personal knowledge of the patient's condition at time of transport  []  If this box is checked, I also certify that the patient is physically or mentally incapable of signing the ambulance service's claim and that the institution with which I am affiliated has furnished care, services, or assistance to the patient  My signature below is made on behalf of the patient pursuant to 42 CFR §424 36(b)(4)  In accordance with 42 CFR §424 37, the specific reason(s) that the patient is physically or mentally incapable of signing the claim form is as follows: N/A        Signature of Physician* or Healthcare Professional______________________________________________________________  Signature Date 10/28/19 (For scheduled repetitive transports, this form is not valid for transports performed more than 60 days after this date)    Printed Name & Credentials of Physician or Healthcare Professional (MD, DO, RN, etc )_BRIAN Sandoval_  *Form must be signed by patient's attending physician for scheduled, repetitive transports   For non-repetitive, unscheduled ambulance transports, if unable to obtain the signature of the attending physician, any of the following may sign (choose appropriate option below)  [] Physician Assistant []  Clinical Nurse Specialist []  Registered Nurse  []  Nurse Practitioner  [x] Discharge Planner

## 2019-10-28 NOTE — ASSESSMENT & PLAN NOTE
· Pt now asymptomatic  · Patient is mostly wheelchair-bound but has a cane and self transferring living independently at home  · Echocardiogram showing a preserved ejection fraction between 50-55%, critical aortic stenosis  · Cardiology evaluated,  ·  Recommend outpatient follow up for TAVR evaluation   · No additional inpatient recommendations at this time  · Of note, pt recently started on atorvastatin, only new medication and felt she is sensitive to this medication, symptoms improved after discontinuation   Consider alternative for treatment of her hyperlipidemia with pravastatin and follow up with cardiology

## 2019-10-28 NOTE — PLAN OF CARE
Problem: Potential for Falls  Goal: Patient will remain free of falls  Description  INTERVENTIONS:  - Assess patient frequently for physical needs  -  Identify cognitive and physical deficits and behaviors that affect risk of falls    -  [place] fall precautions as indicated by assessment   - Educate patient/family on patient safety including physical limitations  - Instruct patient to call for assistance with activity based on assessment  - Modify environment to reduce risk of injury  - Consider OT/PT consult to assist with strengthening/mobility  Outcome: Progressing     Problem: Prexisting or High Potential for Compromised Skin Integrity  Goal: Skin integrity is maintained or improved  Description  INTERVENTIONS:  - Identify patients at risk for skin breakdown  - Assess and monitor skin integrity  - Assess and monitor nutrition and hydration status  - Monitor labs   - Assess for incontinence   - Turn and reposition patient  - Assist with mobility/ambulation  - Relieve pressure over bony prominences  - Avoid friction and shearing  - Provide appropriate hygiene as needed including keeping skin clean and dry  - Evaluate need for skin moisturizer/barrier cream  - Collaborate with interdisciplinary team   - Patient/family teaching  - Consider wound care consult   Outcome: Progressing     Problem: PAIN - ADULT  Goal: Verbalizes/displays adequate comfort level or baseline comfort level  Description  Interventions:  - Encourage patient to monitor pain and request assistance  - Assess pain using appropriate pain scale  - Administer analgesics based on type and severity of pain and evaluate response  - Implement non-pharmacological measures as appropriate and evaluate response  - Consider cultural and social influences on pain and pain management  - Notify physician/advanced practitioner if interventions unsuccessful or patient reports new pain  Outcome: Progressing     Problem: INFECTION - ADULT  Goal: Absence or prevention of progression during hospitalization  Description  INTERVENTIONS:  - Assess and monitor for signs and symptoms of infection  - Monitor lab/diagnostic results  - Monitor all insertion sites, i e  indwelling lines, tubes, and drains  - Monitor endotracheal if appropriate and nasal secretions for changes in amount and color  - [place] appropriate cooling/warming therapies per order  - Administer medications as ordered  - Instruct and encourage patient and family to use good hand hygiene technique  - Identify and instruct in appropriate isolation precautions for identified infection/condition  Outcome: Progressing  Goal: Absence of fever/infection during neutropenic period  Description  INTERVENTIONS:  - Monitor WBC    Outcome: Progressing     Problem: SAFETY ADULT  Goal: Patient will remain free of falls  Description  INTERVENTIONS:  - Assess patient frequently for physical needs  -  Identify cognitive and physical deficits and behaviors that affect risk of falls  -  [place] fall precautions as indicated by assessment   - Educate patient/family on patient safety including physical limitations  - Instruct patient to call for assistance with activity based on assessment  - Modify environment to reduce risk of injury  - Consider OT/PT consult to assist with strengthening/mobility  Outcome: Progressing  Goal: Maintain or return to baseline ADL function  Description  INTERVENTIONS:  - Assess patient frequently for physical needs  -  Identify cognitive and physical deficits and behaviors that affect risk of falls    -  [place] fall precautions as indicated by assessment   - Educate patient/family on patient safety including physical limitations  - Instruct patient to call for assistance with activity based on assessment  - Modify environment to reduce risk of injury  - Consider OT/PT consult to assist with strengthening/mobility  Outcome: Progressing  Goal: Maintain or return mobility status to optimal level  Description  INTERVENTIONS:  -  Assess patient's ability to carry out ADLs; assess patient's baseline for ADL function and identify physical deficits which impact ability to perform ADLs (bathing, care of mouth/teeth, toileting, grooming, dressing, etc )  - Assess/evaluate cause of self-care deficits   - Assess range of motion  - Assess patient's mobility; develop plan if impaired  - Assess patient's need for assistive devices and provide as appropriate  - Encourage maximum independence but intervene and supervise when necessary  - Involve family in performance of ADLs  - Assess for home care needs following discharge   - Consider OT consult to assist with ADL evaluation and planning for discharge  - Provide patient education as appropriate  Outcome: Progressing     Problem: DISCHARGE PLANNING  Goal: Discharge to home or other facility with appropriate resources  Description  INTERVENTIONS:  - Identify barriers to discharge w/patient and caregiver  - Arrange for needed discharge resources and transportation as appropriate  - Identify discharge learning needs (meds, wound care, etc )  - Arrange for interpretive services to assist at discharge as needed  - Refer to Case Management Department for coordinating discharge planning if the patient needs post-hospital services based on physician/advanced practitioner order or complex needs related to functional status, cognitive ability, or social support system  Outcome: Progressing     Problem: Knowledge Deficit  Goal: Patient/family/caregiver demonstrates understanding of disease process, treatment plan, medications, and discharge instructions  Description  Complete learning assessment and assess knowledge base    Interventions:  - Provide teaching at level of understanding  - Provide teaching via preferred learning methods  Outcome: Progressing     Problem: CARDIOVASCULAR - ADULT  Goal: Maintains optimal cardiac output and hemodynamic stability  Description  INTERVENTIONS:  - Monitor I/O, vital signs and rhythm  - Monitor for S/S and trends of decreased cardiac output  - Administer and titrate ordered vasoactive medications to optimize hemodynamic stability  - Assess quality of pulses, skin color and temperature  - Assess for signs of decreased coronary artery perfusion  - Instruct patient to report change in severity of symptoms  Outcome: Progressing  Goal: Absence of cardiac dysrhythmias or at baseline rhythm  Description  INTERVENTIONS:  - Continuous cardiac monitoring, vital signs, obtain 12 lead EKG if ordered  - Administer antiarrhythmic and heart rate control medications as ordered  - Monitor electrolytes and administer replacement therapy as ordered  Outcome: Progressing

## 2019-10-28 NOTE — ASSESSMENT & PLAN NOTE
· Pt presented complaining of right knee pain   · Xrays negative, CT right knee with acute non-displaced fracture involving posterior aspect of medial tibial plateau   · Ortho following,  · NWB RLE, except toe touch/partial weight bearing with brace on   · PT/OT recommending STR, CM following  · Continue knee immobilizer  · Follow up with ortho as an outpatient

## 2019-10-28 NOTE — SOCIAL WORK
LOS 4 GMLOS 3 CM met with patient at bedside  Patient is informed per SLIM she is cleared for discharge today  Patient is informed of IMM and given a copy  Signed IMM is in the chart  Patient is informed [facility] accepted her and they are prepared to accept her today  CM phoned patient's dtr-Tea who informed she is fine with patient going to [facility] today  Patient is a retired nurse and worked at [employer]  CM [facility] Adamaris at [facility]  Nurse and SLIM notified

## 2019-10-28 NOTE — QUICK NOTE
Pt seen and re-evaluated at [phone] due to nursing staff reporting patient with acute dizziness  Pt reports that she cannot even keep her eyes open as she felt so dizzy, also reporting diaphoresis  Transport discontinued, recommended cardiology to re-evaluate  Likely secondary to patient's aortic stenosis, no additional recommendations at this time  Pt re-evaluated again around 1500  Continues to state that she has some dizziness  Unlikely related to the pravastatin, would continue for now  Discharge discontinued, continue pt on telemetry, consider gentle IVF hydration and hopeful for discharge tomorrow once dizziness improves  Update given to patient's daughter over the phone who is also in agreement to plan       Gerardo Romano PA-C

## 2019-10-28 NOTE — ASSESSMENT & PLAN NOTE
Lab Results   Component Value Date    HGBA1C 7 8 (H) 10/24/2019       Recent Labs     10/27/19  1049 10/27/19  1559 10/27/19  2059 10/28/19  0644   POCGLU 270* 190* 239* 218*       Blood Sugar Average: Last 72 hrs:  (P) 338 [id number]   · Hold PO agent while in the hospital, resume metformin on discharge  · Continue SSI coverage  · QID glucose checks  · Consistent carb diet  · Monitor and adjust regimen as needed

## 2019-10-29 ENCOUNTER — TELEPHONE (OUTPATIENT)
Dept: CARDIOLOGY CLINIC | Facility: CLINIC | Age: 79
End: 2019-10-29

## 2019-10-29 VITALS
HEIGHT: 64 IN | SYSTOLIC BLOOD PRESSURE: 127 MMHG | BODY MASS INDEX: 34.4 KG/M2 | OXYGEN SATURATION: 95 % | WEIGHT: 201.5 LBS | TEMPERATURE: 97.7 F | HEART RATE: 70 BPM | DIASTOLIC BLOOD PRESSURE: 70 MMHG | RESPIRATION RATE: 17 BRPM

## 2019-10-29 LAB
GLUCOSE SERPL-MCNC: 191 MG/DL (ref 65–140)
GLUCOSE SERPL-MCNC: 198 MG/DL (ref 65–140)
GLUCOSE SERPL-MCNC: 206 MG/DL (ref 65–140)
GLUCOSE SERPL-MCNC: 280 MG/DL (ref 65–140)

## 2019-10-29 PROCEDURE — 82948 REAGENT STRIP/BLOOD GLUCOSE: CPT

## 2019-10-29 PROCEDURE — 99239 HOSP IP/OBS DSCHRG MGMT >30: CPT | Performed by: NURSE PRACTITIONER

## 2019-10-29 RX ADMIN — DOCUSATE SODIUM 100 MG: 100 CAPSULE, LIQUID FILLED ORAL at 08:50

## 2019-10-29 RX ADMIN — PRAVASTATIN SODIUM 20 MG: 20 TABLET ORAL at 17:13

## 2019-10-29 RX ADMIN — HEPARIN SODIUM 5000 UNITS: 5000 INJECTION INTRAVENOUS; SUBCUTANEOUS at 06:11

## 2019-10-29 RX ADMIN — ASPIRIN 81 MG: 81 TABLET, COATED ORAL at 08:50

## 2019-10-29 RX ADMIN — INSULIN LISPRO 2 UNITS: 100 INJECTION, SOLUTION INTRAVENOUS; SUBCUTANEOUS at 17:14

## 2019-10-29 RX ADMIN — DILTIAZEM HYDROCHLORIDE 120 MG: 60 CAPSULE, EXTENDED RELEASE ORAL at 08:51

## 2019-10-29 RX ADMIN — DIGOXIN 125 MCG: 125 TABLET ORAL at 08:50

## 2019-10-29 RX ADMIN — POLYETHYLENE GLYCOL 3350 17 G: 17 POWDER, FOR SOLUTION ORAL at 08:50

## 2019-10-29 RX ADMIN — INSULIN LISPRO 2 UNITS: 100 INJECTION, SOLUTION INTRAVENOUS; SUBCUTANEOUS at 08:51

## 2019-10-29 RX ADMIN — INSULIN LISPRO 4 UNITS: 100 INJECTION, SOLUTION INTRAVENOUS; SUBCUTANEOUS at 12:00

## 2019-10-29 NOTE — ASSESSMENT & PLAN NOTE
· ECHO performed 10/24 - critical aortic valve stenosis and moderate mitral regurg  · Pt had previously experienced dizziness prior to admission with subsequent fall   · Cardiology consultation,  · Per review of outpatient cardiology notes, patient has history of severe aortic stenosis and has previously deferred treatment (from 2018)  · After discussion with cardiology, patient agreeable to TAVR outpatient evaluation next week  · Cleared for discharge from cardiac standpoint

## 2019-10-29 NOTE — SOCIAL WORK
LOS 5 GMLOS 3 CM spoke to Janis Casillas [phone] at [facility] they are prepared to accept  Patient today  CM awaits transport time from [facility]

## 2019-10-29 NOTE — SOCIAL WORK
LOS 5 GMLOS 3 per SLIM patient is cleared for discharge today  CM phoned [facility] to schedule transport  CM awaits transport time

## 2019-10-29 NOTE — TRANSPORTATION MEDICAL NECESSITY
Section I - General Information    Name of Patient: Sebastián Moon                 : 1940    Medicare #: [id number]  Transport Date: 10/29/19 (PCS is valid for round trips on this date and for all repetitive trips in the 60-day range as noted below )  Origin: [address] [place] [address] Road: [address] [place] [address] STR  Is the pt's stay covered under Medicare Part A (PPS/DRG)   [x]     Closest appropriate facility? If no, why is transport to more distant facility required? Yes  If hospice pt, is this transport related to pt's terminal illness? NA       Section II - Medical Necessity Questionnaire  Ambulance transportation is medically necessary only if other means of transport are contraindicated or would be potentially harmful to the patient  To meet this requirement, the patient must either be "bed confined" or suffer from a condition such that transport by means other than ambulance is contraindicated by the patient's condition  The following questions must be answered by the medical professional signing below for this form to be valid:    1)  Describe the MEDICAL CONDITION (physical and/or mental) of this patient AT [address] [place] [address] that requires the patient to be transported in an ambulance and why transport by other means is contraindicated by the patient's condition:Patient has as fractured right knee, patient has an immobilizer on the right knee  2) Is the patient "bed confined" as defined below? No  To be "be confined" the patient must satisfy all three of the following conditions: (1) unable to get up from bed without Assistance; AND (2) unable to ambulate; AND (3) unable to sit in a chair or wheelchair  3) Can this patient safely be transported by car or wheelchair van (i e , seated during transport without a medical attendant or monitoring)?    No    4) In addition to completing questions 1-3 above, please check any of the following conditions that apply*:   *Note: supporting documentation for any boxes checked must be maintained in the patient's medical records  If hosp-hosp transfer, describe services needed at 2nd facility not available at 1st facility? Non-Healed Fractures  Moderate/severe pain on movement   Medical attendant required   Unable to tolerate seated position for time needed to transport   Orthopedic device (backboard, halo, pins, traction, brace, wedge, etc,) requiring special handling during transport       Section III - Signature of Physician or Healthcare Professional  I certify that the above information is true and correct based on my evaluation of this patient, and represent that the patient requires transport by ambulance and that other forms of transport are contraindicated  I understand that this information will be used by the Centers for Medicare and Medicaid Services (CMS) to support the determination of medical necessity for ambulance services, and I represent that I have personal knowledge of the patient's condition at time of transport  []  If this box is checked, I also certify that the patient is physically or mentally incapable of signing the ambulance service's claim and that the institution with which I am affiliated has furnished care, services, or assistance to the patient  My signature below is made on behalf of the patient pursuant to 42 CFR §424 36(b)(4)  In accordance with 42 CFR §424 37, the specific reason(s) that the patient is physically or mentally incapable of signing the claim form is as follows: n/a        Signature of Physician* or Healthcare Professional______________________________________________________________  Signature Date 10/29/19 (For scheduled repetitive transports, this form is not valid for transports performed more than 60 days after this date)    Printed Name & Credentials of Physician or Healthcare Professional (MD, DO, RN, etc )________________________________  *Form must be signed by patient's attending physician for scheduled, repetitive transports   For non-repetitive, unscheduled ambulance transports, if unable to obtain the signature of the attending physician, any of the following may sign (choose appropriate option below)  [] Physician Assistant []  Clinical Nurse Specialist []  Registered Nurse  []  Nurse Practitioner  [x] Discharge Planner

## 2019-10-29 NOTE — TELEPHONE ENCOUNTER
Larry Jaramillo : 1940 follow-up in 1 week with Dr Magaly Stephenson for evaluation for TAVR -[place]    Spoke to Dr Magaly Stephenson & he wants pt to come in on 19 at 1140am; Spoke to Violette from [facility] & pt is coming in that day

## 2019-10-29 NOTE — DISCHARGE INSTR - AVS FIRST PAGE
Thank you for choosing [facility], please take all prescriptions as instructed, please make appropriate follow-up visits

## 2019-10-29 NOTE — PLAN OF CARE
Problem: Potential for Falls  Goal: Patient will remain free of falls  Description  INTERVENTIONS:  - Assess patient frequently for physical needs  -  Identify cognitive and physical deficits and behaviors that affect risk of falls    -  [place] fall precautions as indicated by assessment   - Educate patient/family on patient safety including physical limitations  - Instruct patient to call for assistance with activity based on assessment  - Modify environment to reduce risk of injury  - Consider OT/PT consult to assist with strengthening/mobility  Outcome: Progressing     Problem: Prexisting or High Potential for Compromised Skin Integrity  Goal: Skin integrity is maintained or improved  Description  INTERVENTIONS:  - Identify patients at risk for skin breakdown  - Assess and monitor skin integrity  - Assess and monitor nutrition and hydration status  - Monitor labs   - Assess for incontinence   - Turn and reposition patient  - Assist with mobility/ambulation  - Relieve pressure over bony prominences  - Avoid friction and shearing  - Provide appropriate hygiene as needed including keeping skin clean and dry  - Evaluate need for skin moisturizer/barrier cream  - Collaborate with interdisciplinary team   - Patient/family teaching  - Consider wound care consult   Outcome: Progressing     Problem: PAIN - ADULT  Goal: Verbalizes/displays adequate comfort level or baseline comfort level  Description  Interventions:  - Encourage patient to monitor pain and request assistance  - Assess pain using appropriate pain scale  - Administer analgesics based on type and severity of pain and evaluate response  - Implement non-pharmacological measures as appropriate and evaluate response  - Consider cultural and social influences on pain and pain management  - Notify physician/advanced practitioner if interventions unsuccessful or patient reports new pain  Outcome: Progressing     Problem: INFECTION - ADULT  Goal: Absence or prevention of progression during hospitalization  Description  INTERVENTIONS:  - Assess and monitor for signs and symptoms of infection  - Monitor lab/diagnostic results  - Monitor all insertion sites, i e  indwelling lines, tubes, and drains  - Monitor endotracheal if appropriate and nasal secretions for changes in amount and color  - [place] appropriate cooling/warming therapies per order  - Administer medications as ordered  - Instruct and encourage patient and family to use good hand hygiene technique  - Identify and instruct in appropriate isolation precautions for identified infection/condition  Outcome: Progressing  Goal: Absence of fever/infection during neutropenic period  Description  INTERVENTIONS:  - Monitor WBC    Outcome: Progressing     Problem: SAFETY ADULT  Goal: Patient will remain free of falls  Description  INTERVENTIONS:  - Assess patient frequently for physical needs  -  Identify cognitive and physical deficits and behaviors that affect risk of falls    -  [place] fall precautions as indicated by assessment   - Educate patient/family on patient safety including physical limitations  - Instruct patient to call for assistance with activity based on assessment  - Modify environment to reduce risk of injury  - Consider OT/PT consult to assist with strengthening/mobility  Outcome: Progressing  Goal: Maintain or return to baseline ADL function  Description  INTERVENTIONS:  -  Assess patient's ability to carry out ADLs; assess patient's baseline for ADL function and identify physical deficits which impact ability to perform ADLs (bathing, care of mouth/teeth, toileting, grooming, dressing, etc )  - Assess/evaluate cause of self-care deficits   - Assess range of motion  - Assess patient's mobility; develop plan if impaired  - Assess patient's need for assistive devices and provide as appropriate  - Encourage maximum independence but intervene and supervise when necessary  - Involve family in performance of ADLs  - Assess for home care needs following discharge   - Consider OT consult to assist with ADL evaluation and planning for discharge  - Provide patient education as appropriate  Outcome: Progressing  Goal: Maintain or return mobility status to optimal level  Description  INTERVENTIONS:  - Assess patient's baseline mobility status (ambulation, transfers, stairs, etc )    - Identify cognitive and physical deficits and behaviors that affect mobility  - Identify mobility aids required to assist with transfers and/or ambulation (gait belt, sit-to-stand, lift, walker, cane, etc )  - [place] fall precautions as indicated by assessment  - Record patient progress and toleration of activity level on Mobility SBAR; progress patient to next Phase/Stage  - Instruct patient to call for assistance with activity based on assessment  - Consider rehabilitation consult to assist with strengthening/weightbearing, etc   Outcome: Progressing     Problem: DISCHARGE PLANNING  Goal: Discharge to home or other facility with appropriate resources  Description  INTERVENTIONS:  - Identify barriers to discharge w/patient and caregiver  - Arrange for needed discharge resources and transportation as appropriate  - Identify discharge learning needs (meds, wound care, etc )  - Arrange for interpretive services to assist at discharge as needed  - Refer to Case Management Department for coordinating discharge planning if the patient needs post-hospital services based on physician/advanced practitioner order or complex needs related to functional status, cognitive ability, or social support system  Outcome: Progressing     Problem: Knowledge Deficit  Goal: Patient/family/caregiver demonstrates understanding of disease process, treatment plan, medications, and discharge instructions  Description  Complete learning assessment and assess knowledge base    Interventions:  - Provide teaching at level of understanding  - Provide teaching via preferred learning methods  Outcome: Progressing     Problem: CARDIOVASCULAR - ADULT  Goal: Maintains optimal cardiac output and hemodynamic stability  Description  INTERVENTIONS:  - Monitor I/O, vital signs and rhythm  - Monitor for S/S and trends of decreased cardiac output  - Administer and titrate ordered vasoactive medications to optimize hemodynamic stability  - Assess quality of pulses, skin color and temperature  - Assess for signs of decreased coronary artery perfusion  - Instruct patient to report change in severity of symptoms  Outcome: Progressing  Goal: Absence of cardiac dysrhythmias or at baseline rhythm  Description  INTERVENTIONS:  - Continuous cardiac monitoring, vital signs, obtain 12 lead EKG if ordered  - Administer antiarrhythmic and heart rate control medications as ordered  - Monitor electrolytes and administer replacement therapy as ordered  Outcome: Progressing

## 2019-10-29 NOTE — DISCHARGE SUMMARY
[address] Internal Medicine  Discharge- Mckenzie Mendoza 1940, 67 y o  female MRN: [id number]    Unit/Bed#: -[facility] Encounter: [id number]    Primary Care Provider: Rosio Farah DO   Date and time admitted to hospital: 10/23/2019  9:32 AM    * Tibial plateau fracture  Assessment & Plan  · Pt presented complaining of right knee pain   · Xrays negative, CT right knee with acute non-displaced fracture involving posterior aspect of medial tibial plateau   · Ortho following,  · NWB RLE, except toe touch/partial weight bearing with brace on   · PT/OT recommending STR, CM following  · Continue knee immobilizer  · Follow up with ortho as an outpatient     Critical aortic valve stenosis  Assessment & Plan  · ECHO performed 10/24 - critical aortic valve stenosis and moderate mitral regurg  · Pt had previously experienced dizziness prior to admission with subsequent fall   · Cardiology consultation,  · Per review of outpatient cardiology notes, patient has history of severe aortic stenosis and has previously deferred treatment (from 2018)  · After discussion with cardiology, patient agreeable to TAVR outpatient evaluation next week  · Cleared for discharge from cardiac standpoint     Atrial fibrillation ([address] )  Assessment & Plan  · Known history  · Patient is not on anticoagulation secondary to life-threatening hematoma in 2014 from review of outpatient cardiology notes  · EKG on admission with atrial fibrillation with RVR with a peak rate of 108, fluctuations noted here  · Pt's digoxin level noted to be 0 5 on admission, discussed with cardiology, pt received dig load with 500 mcg Q6H x 2 and resumed 125 mcg daily   · Repeat digoxin level WNL  · Continue Cardizem 120 mg Q12H  · HR stabilized    Diabetes mellitus [facility])  Assessment & Plan  Lab Results   Component Value Date    HGBA1C 7 8 (H) 10/24/2019       Recent Labs     10/28/19  2056 10/29/19  0328 10/29/19  0601 10/29/19  1150   POCGLU 256* 191* 206* 280* Blood Sugar Average: Last 72 hrs:  (P) 218   · Hold PO agent while in the hospital, resume metformin on discharge  · Continue SSI coverage  · QID glucose checks  · Consistent carb diet    Hypertension  Assessment & Plan  · BP appears to be stable on review  · Continue Cardizem 120 mg BID    History of stroke  Assessment & Plan  · History of CVA in 2008  · Continue ASA   · Patient now listing her atorvastatin as allergy appears to be sensitive to statin  · Recent lipid panel showing total cholesterol 247 and LDL of 147 patient has an upcoming cardiology appointment and should explore this given her stroke history  At this time would continue with pravastatin per inpatient cardiology recommendations and monitor for any recurrent symptoms            Discharging Physician / Practitioner:  Momo Morrison, [address]    PCP: Svitlana West,   Admission Date:       Admission Orders (From admission, onward)              Ordered          10/24/19 1509   Inpatient Admission  Once           10/23/19 1551   Place in Observation  Once                       Discharge Date: 10/29/19    Consultations During Hospital Stay:  · Orthopedics  · Cardiology     Procedures Performed:   · CT head 10/23-no acute intracranial abnormality  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  · Chest x-ray 10/23-mild enlargement of cardiac silhouette and bilateral calcified pleural plaques  No definite acute pulmonary disease  · X-ray hip/pelvis 10/23-no acute osseous abnormality  · X-ray femur right 10/23-no acute osseous abnormality  · X-ray ankle right 10/23-no acute osseous abnormality  · X-ray right 10/23-no acute osseous abnormality  Tricompartmental right knee osteoarthritis and joint effusion  · CT knee right 10/23-acute, nondisplaced fracture involving posterior most aspect of the medial tibial plateau  Severe tricompartmental osteoarthritis  · Echo 10/24-critical aortic valve stenosis    ROCHELLE 0 4 cm2     Significant Findings / Test Results: · Leukocytosis resolved  · Dig level within normal limits on discharge  · Hemoglobin A1c 7 8%     Incidental Findings:   · None      Test Results Pending at Discharge (will require follow up): · None     Outpatient Tests Requested:  · Per PCP/Cardiology, pt have TAVR evaluation outpatient      Complications:  None     Reason for Admission: Fall with tibial plateau fracture     Hospital Course:      Magnolia Tejeda is a 67 y o  female patient with significant past medical history of AFib, hypertension, history of CVA with baseline right-sided hemiparesis, diabetes mellitus who originally presented to the hospital on 10/23/2019 due to knee pain status post fall at home  X-rays were originally negative, however patient with severe right knee pain with range of motion  Therefore CT right lower extremity obtained and patient noted to have a posterior tibial plateau fracture  Patient also noted dizziness prior to her fall but denies any loss of consciousness  Therefore echocardiogram was obtained and patient noted to have critical aortic stenosis which cardiology evaluated  Patient is to have workup for TAVR as an outpatient in the upcoming weeks  Patient was also evaluated by Orthopedics and placed in knee immobilizer  There is no indication for any surgical intervention at that point  Patient has follow-up with Orthopedics as an outpatient  Patient was evaluated by PT/OT and recommended short-term rehab  Patient is to discharge to [facility]  Patient was discharged in stable condition  Patient's dizziness resolved  Patient was noted to be in AFib with RVR on admission and digoxin level was low, therefore she had a digoxin load with improvement of heart rate  Patient's Lipitor was switched to pravastatin for her cholesterol as there is concern the Lipitor could have been contributing to her symptoms    For additional information please refer to medical records  Medication changes include: Discontinuation of lipitor, addition of pravastatin 40 mg daily         Please see above list of diagnoses and related plan for additional information       Condition at Discharge: stable      Discharge Day Visit / Exam:      Subjective:  Pt reports that she continues to feel well today  Denies any pain in the knee at rest  Denies any dizziness or lightheadedness  No shortness of breath, nausea or vomiting  Pt is ready to go to [facility] today  Vitals: Blood Pressure: 138/60 (10/28/19 0926)  Pulse: 96 (10/27/19 2327)  Temperature: 98 2 °F (36 8 °C) (10/28/19 0723)  Temp Source: Oral (10/27/19 2327)  Respirations: 19 (10/27/19 2327)  Height: 5' 4" (162 6 cm) (10/23/19 1700)  Weight - Scale: 91 4 kg (201 lb 8 oz) (10/23/19 0936)  SpO2: 95 % (10/27/19 2327)  Exam:   Physical Exam   Constitutional: No distress  Patient is in no acute distress lying in her hospital bed resting comfortably  Baseline right-sided hemiparesis   HENT:   Head: Normocephalic  Eyes: Pupils are equal, round, and reactive to light  Conjunctivae are normal    Cardiovascular: Normal rate and intact distal pulses  Murmur heard  Irregular rhythm   Pulmonary/Chest: Effort normal and breath sounds normal  No stridor  No respiratory distress  She has no wheezes  Abdominal: Soft  Bowel sounds are normal  She exhibits no distension  There is no tenderness  There is no guarding  Musculoskeletal: She exhibits no edema  Neurological: She is alert  Skin: Skin is warm and dry  She is not diaphoretic  No erythema  Psychiatric: She has a normal mood and affect  Vitals reviewed      Discussion with Family: Discussed with pt and patient's daughter regarding plan of care  Discussed symptoms to monitor for including myalgias with pravastatin  Also advised follow up with cardiology for TAVR and ortho   Pt and family in agreement to plan and verbalized understanding      Discharge instructions/Information to patient and family:   See after visit summary for information provided to patient and family        Provisions for Follow-Up Care:  See after visit summary for information related to follow-up care and any pertinent home health orders        Disposition:      Laura Jimenez at [address] to [facility]:   · Not Applicable to this Patient - Not Applicable to this Patient     Planned Readmission: None     Discharge Statement:  I spent 35 minutes discharging the patient  This time was spent on the day of discharge  I had direct contact with the patient on the day of discharge  Greater than 50% of the total time was spent examining patient, answering all patient questions, arranging and discussing plan of care with patient as well as directly providing post-discharge instructions    Additional time then spent on discharge activities      Discharge Medications:  See after visit summary for reconciled discharge medications provided to patient and family        ** Please Note: This note has been constructed using a voice recognition system **

## 2019-10-29 NOTE — ASSESSMENT & PLAN NOTE
Lab Results   Component Value Date    HGBA1C 7 8 (H) 10/24/2019       Recent Labs     10/28/19  2056 10/29/19  0328 10/29/19  0601 10/29/19  1150   POCGLU 256* 191* 206* 280*       Blood Sugar Average: Last 72 hrs:  (P) 218   · Hold PO agent while in the hospital, resume metformin on discharge  · Continue SSI coverage  · QID glucose checks  · Consistent carb diet

## 2019-11-06 ENCOUNTER — OFFICE VISIT (OUTPATIENT)
Dept: CARDIOLOGY CLINIC | Facility: CLINIC | Age: 79
End: 2019-11-06
Payer: MEDICARE

## 2019-11-06 VITALS
HEART RATE: 92 BPM | WEIGHT: 191 LBS | HEIGHT: 64 IN | SYSTOLIC BLOOD PRESSURE: 122 MMHG | DIASTOLIC BLOOD PRESSURE: 78 MMHG | BODY MASS INDEX: 32.61 KG/M2 | OXYGEN SATURATION: 93 %

## 2019-11-06 DIAGNOSIS — I10 HYPERTENSION, UNSPECIFIED TYPE: ICD-10-CM

## 2019-11-06 DIAGNOSIS — I35.0 CRITICAL AORTIC VALVE STENOSIS: Primary | ICD-10-CM

## 2019-11-06 DIAGNOSIS — Z86.73 HISTORY OF STROKE: ICD-10-CM

## 2019-11-06 PROCEDURE — 99215 OFFICE O/P EST HI 40 MIN: CPT | Performed by: INTERNAL MEDICINE

## 2019-11-06 RX ORDER — ACETAMINOPHEN 325 MG/1
650 TABLET ORAL EVERY 6 HOURS PRN
COMMUNITY

## 2019-11-06 NOTE — PATIENT INSTRUCTIONS
A cardiac catheterization has been ordered to evaluate your coronary anatomy  You will be called to schedule this procedure  You have been referred to cardiothoracic surgery for evaluation for transcatheter aortic valve replacement (TAVR)  He will receive a call from the valve coordinator about coordinating further testing regarding her valve replacement  Please follow up in our office in 3 months, sooner if any acute issues arise  We will be in contact with you regarding her test results and procedure before then

## 2019-11-06 NOTE — H&P (VIEW-ONLY)
Cardiology Consultation     Gordon Reyez  [id number]  1940  [address] [facility]      Dear Dr Miguel Melgar is a 78-year-old female who has been referred to the [address] [place] [address] Cardiology in [place] [address] with the following issues:    1  Critical aortic valve stenosis  2  Atrial fibrillation  3  History of CVA  4  Hypertension  5  Diabetes    Ms Chanel Blanchard was recently hospitalized for a near syncopal episode with fall associated with a tibial plateau fracture  While hospitalized, she underwent repeat echocardiogram given her known history of severe aortic stenosis  This echocardiogram revealed that her aortic stenosis has progressed to critical with a valve area estimated at 0 4 centimeter squared  She has been referred to [facility] for evaluation of transcatheter aortic valve replacement  She denies any explicit angina or heart failure symptoms but she has had episodes of fall as previously noted  She is currently residing in [facility] while she rehabs from her tibial plateau fracture  She is a lifelong nonsmoker and nondrinker  Mother and father are both   She they did have cardiac issues, but non premature  Patient Active Problem List   Diagnosis    History of stroke    Hypertension    [facility] mellitus [facility]    Tibial plateau fracture    Atrial fibrillation ([facility]    Critical aortic valve stenosis     Past Medical History:   Diagnosis Date    Cardiac disease     [facility] mellitus ([facility]     Hypertension     Stroke [facility])      Social History     Socioeconomic History    Marital status:       Spouse name: Not on file    Number of children: Not on file    Years of education: Not on file    Highest education level: Not on file   Occupational History    Not on file   Social Needs    Financial resource strain: Not on file    Food insecurity:     Worry: Not on file     Inability: Not on file    Transportation needs:     Medical: Not on file     Non-medical: Not on file   Tobacco Use    Smoking status: Never Smoker    Smokeless tobacco: Never Used   Substance and Sexual Activity    Alcohol use: Not Currently    Drug use: Never    Sexual activity: Not on file   Lifestyle    Physical activity:     Days per week: Not on file     Minutes per session: Not on file    Stress: Not on file   Relationships    Social connections:     Talks on phone: Not on file     Gets together: Not on file     Attends Denominational service: Not on file     Active member of club or organization: Not on file     Attends meetings of clubs or organizations: Not on file     Relationship status: Not on file    Intimate partner violence:     Fear of current or ex partner: Not on file     Emotionally abused: Not on file     Physically abused: Not on file     Forced sexual activity: Not on file   Other Topics Concern    Not on file   Social History Narrative    Not on file        History reviewed  No pertinent family history  History reviewed  No pertinent surgical history         Current Outpatient Medications:     acetaminophen (TYLENOL) 325 mg tablet, Take 650 mg by mouth every 6 (six) hours as needed for mild pain, Disp: , Rfl:     alendronate (FOSAMAX) 70 mg tablet, Take 70 mg by mouth Once a week, Disp: , Rfl:     BABY ASPIRIN PO, Take 81 mg by mouth daily, Disp: , Rfl:     digoxin (LANOXIN) 0 125 mg tablet, Take 1 tablet by mouth daily, Disp: , Rfl:     diltiazem (DILACOR XR) 240 MG 24 hr capsule, Take 240 mg by mouth daily, Disp: , Rfl:     docusate sodium (COLACE) 100 mg capsule, Take 1 capsule (100 mg total) by mouth 2 (two) times a day, Disp: 10 capsule, Rfl: 0    magnesium hydroxide (MILK OF MAGNESIA) 400 mg/5 mL oral suspension, Take by mouth daily as needed for constipation, Disp: , Rfl:    metFORMIN (GLUCOPHAGE) 500 mg tablet, Take 500 mg by mouth 2 (two) times a day, Disp: , Rfl:     polyethylene glycol (MIRALAX) 17 g packet, Take 17 g by mouth daily, Disp: 14 each, Rfl: 0    pravastatin (PRAVACHOL) 20 mg tablet, Take 2 tablets (40 mg total) by mouth daily with dinner, Disp: , Rfl: 0    temazepam (RESTORIL) 30 mg capsule, Take 30 mg by mouth daily as needed, Disp: , Rfl:   Allergies   Allergen Reactions    Atorvastatin      dizziness and shaky    Rivaroxaban      Dizziness, and shaky  Vitals:    11/06/19 1121   BP: 122/78   Pulse: 92   SpO2: 93%   Weight: 86 6 kg (191 lb)   Height: 5' 4" (1 626 m)       Labs:  Results for Teresa Arora (MRN [id number]) as of 11/6/2019 12:06   10/25/2019 05:18   Sodium 138   Potassium 4 0   Chloride 103   CO2 26   Anion Gap 9   BUN 9   Creatinine 0 57 (L)   Glucose, Random 164 (H)   Calcium 8 7   eGFR 89     Results for Teresa Arora (MRN [id number]) as of 11/6/2019 12:06   10/25/2019 05:18   WBC 8 58   Red Blood Cell Count 4 34   Hemoglobin 13 4   HCT 41 1   MCV 95   MCH 30 9   MCHC 32 6   RDW 13 2   Platelet Count 841       Imaging:   Transthoracic echocardiogram, 10/24/2019  SUMMARY  LEFT VENTRICLE:  Wall thickness was mildly increased  Doppler parameters were consistent with elevated ventricular end-diastolic filling pressure      RIGHT VENTRICLE:  The ventricle was mildly dilated      LEFT ATRIUM:  The atrium was mildly dilated      MITRAL VALVE:  There was mild annular calcification  There was moderate regurgitation      AORTIC VALVE:  There was critical stenosis  ROCHELLE 0 4 cm2  There was mild regurgitation      TRICUSPID VALVE:  There was mild regurgitation      AORTA:  The root exhibited normal size and mild fibrocalcific change  Review of Systems:  Review of Systems   Constitutional: Negative  HENT: Negative  Respiratory: Negative  Cardiovascular: Negative  Gastrointestinal: Negative  Endocrine: Negative  Musculoskeletal: Negative  Neurological: Positive for syncope  Hematological: Negative  Physical Exam:  Physical Exam   Constitutional: She is oriented to person, place, and time  She appears well-developed and well-nourished  HENT:   Head: Normocephalic and atraumatic  Eyes: Conjunctivae and EOM are normal    Neck: No JVD present  Carotid bruit is not present  No tracheal deviation present  No thyromegaly present  Cardiovascular: Normal rate and S1 normal  An irregularly irregular rhythm present  No extrasystoles are present  PMI is not displaced  Exam reveals no gallop  Murmur heard  Systolic murmur is present with a grade of 3/6  Pulses:       Carotid pulses are 2+ on the right side, and 2+ on the left side  Radial pulses are 2+ on the right side, and 2+ on the left side  Femoral pulses are 2+ on the right side, and 2+ on the left side  Pulmonary/Chest: Breath sounds normal  No accessory muscle usage  No respiratory distress  Abdominal: Soft  Bowel sounds are normal  She exhibits no distension  There is no tenderness  Musculoskeletal: She exhibits no edema  Lymphadenopathy:        Head (right side): No submental, no submandibular, no tonsillar, no preauricular, no posterior auricular and no occipital adenopathy present  Head (left side): No submental, no submandibular, no tonsillar, no preauricular, no posterior auricular and no occipital adenopathy present  Right cervical: No superficial cervical adenopathy present  Left cervical: No superficial cervical adenopathy present  Neurological: She is alert and oriented to person, place, and time  Skin: Skin is warm and dry  Discussion/Summary:  Critical aortic valve stenosis  Atrial fibrillation  History of CVA  Hypertension  Hyperlipidemia  Type 2 diabetes    Critical aortic stenosis requires treatment at this time  I explained the risks and benefits of aortic valve replacement    Given her history of CVA as well as her recent tibial plateau fracture, I believe that she is an excellent candidate for transcatheter aortic valve replacement  I will forward her name on to the valve coordinator and I have referred her to CT surgery for their evaluation  I have ordered a cardiac catheterization to clarify her coronary anatomy as a preamble to her TAVR  Blood pressure and heart rate are currently controlled  No medication changes were made along these lines  We will be in contact with her regarding her testing results and scheduling her procedure  Coordination should be conducted through [facility] where she is expected to convalesce for the next 2 months  They can be reached at [phone], extension [phone]  I would like her to follow up in our office in 3 months, sooner if any acute issues arise  We will be seeing her for her preoperative testing before then  Thank you for the opportunity to consult on this patient  If you have any questions, please feel free to call my office

## 2019-11-06 NOTE — PROGRESS NOTES
Cardiology Consultation     Emma Cinthia  [id number]  1940  [address] [facility] [address]      Dear Dr Suzan Joseph is a 78-year-old female who has been referred to the [address] [place] [address] of Cardiology in [place] with the following issues:    1  Critical aortic valve stenosis  2  Atrial fibrillation  3  History of CVA  4  Hypertension  5  Diabetes    Ms Michael Peterson was recently hospitalized for a near syncopal episode with fall associated with a tibial plateau fracture  While hospitalized, she underwent repeat echocardiogram given her known history of severe aortic stenosis  This echocardiogram revealed that her aortic stenosis has progressed to critical with a valve area estimated at 0 4 centimeter squared  She has been referred to  [facility] for evaluation of transcatheter aortic valve replacement  She denies any explicit angina or heart failure symptoms but she has had episodes of fall as previously noted  She is currently residing in [facility] while she rehabs from her tibial plateau fracture  She is a lifelong nonsmoker and nondrinker  Mother and father are both   She they did have cardiac issues, but non premature  Patient Active Problem List   Diagnosis    History of stroke    Hypertension    [facility] mellitus ([facility]    Tibial plateau fracture    Atrial fibrillation ([facility]    Critical aortic valve stenosis     Past Medical History:   Diagnosis Date    Cardiac disease     Diabetes mellitus ([facility]     Hypertension     Stroke [facility])      Social History     Socioeconomic History    Marital status:       Spouse name: Not on file    Number of children: Not on file    Years of education: Not on file    Highest education level: Not on file   Occupational History    Not on file   Social Needs    Financial resource strain: Not on file    Food insecurity:     Worry: Not on file     Inability: Not on file    Transportation needs:     Medical: Not on file     Non-medical: Not on file   Tobacco Use    Smoking status: Never Smoker    Smokeless tobacco: Never Used   Substance and Sexual Activity    Alcohol use: Not Currently    Drug use: Never    Sexual activity: Not on file   Lifestyle    Physical activity:     Days per week: Not on file     Minutes per session: Not on file    Stress: Not on file   Relationships    Social connections:     Talks on phone: Not on file     Gets together: Not on file     Attends Hindu service: Not on file     Active member of club or organization: Not on file     Attends meetings of clubs or organizations: Not on file     Relationship status: Not on file    Intimate partner violence:     Fear of current or ex partner: Not on file     Emotionally abused: Not on file     Physically abused: Not on file     Forced sexual activity: Not on file   Other Topics Concern    Not on file   Social History Narrative    Not on file        History reviewed  No pertinent family history  History reviewed  No pertinent surgical history         Current Outpatient Medications:     acetaminophen (TYLENOL) 325 mg tablet, Take 650 mg by mouth every 6 (six) hours as needed for mild pain, Disp: , Rfl:     alendronate (FOSAMAX) 70 mg tablet, Take 70 mg by mouth Once a week, Disp: , Rfl:     BABY ASPIRIN PO, Take 81 mg by mouth daily, Disp: , Rfl:     digoxin (LANOXIN) 0 125 mg tablet, Take 1 tablet by mouth daily, Disp: , Rfl:     diltiazem (DILACOR XR) 240 MG 24 hr capsule, Take 240 mg by mouth daily, Disp: , Rfl:     docusate sodium (COLACE) 100 mg capsule, Take 1 capsule (100 mg total) by mouth 2 (two) times a day, Disp: 10 capsule, Rfl: 0    magnesium hydroxide (MILK OF MAGNESIA) 400 mg/5 mL oral suspension, Take by mouth daily as needed for constipation, Disp: , Rfl:    metFORMIN (GLUCOPHAGE) 500 mg tablet, Take 500 mg by mouth 2 (two) times a day, Disp: , Rfl:     polyethylene glycol (MIRALAX) 17 g packet, Take 17 g by mouth daily, Disp: 14 each, Rfl: 0    pravastatin (PRAVACHOL) 20 mg tablet, Take 2 tablets (40 mg total) by mouth daily with dinner, Disp: , Rfl: 0    temazepam (RESTORIL) 30 mg capsule, Take 30 mg by mouth daily as needed, Disp: , Rfl:   Allergies   Allergen Reactions    Atorvastatin      dizziness and shaky    Rivaroxaban      Dizziness, and shaky  Vitals:    11/06/19 1121   BP: 122/78   Pulse: 92   SpO2: 93%   Weight: 86 6 kg (191 lb)   Height: 5' 4" (1 626 m)       Labs:  Results for Petrona Kendall (MRN [id number]) as of 11/6/2019 12:06   10/25/2019 05:18   Sodium 138   Potassium 4 0   Chloride 103   CO2 26   Anion Gap 9   BUN 9   Creatinine 0 57 (L)   Glucose, Random 164 (H)   Calcium 8 7   eGFR 89     Results for Petrona Kendall (MRN [id number]) as of 11/6/2019 12:06   10/25/2019 05:18   WBC 8 58   Red Blood Cell Count 4 34   Hemoglobin 13 4   HCT 41 1   MCV 95   MCH 30 9   MCHC 32 6   RDW 13 2   Platelet Count 939       Imaging:   Transthoracic echocardiogram, 10/24/2019  SUMMARY  LEFT VENTRICLE:  Wall thickness was mildly increased  Doppler parameters were consistent with elevated ventricular end-diastolic filling pressure      RIGHT VENTRICLE:  The ventricle was mildly dilated      LEFT ATRIUM:  The atrium was mildly dilated      MITRAL VALVE:  There was mild annular calcification  There was moderate regurgitation      AORTIC VALVE:  There was critical stenosis  ROCHELLE 0 4 cm2  There was mild regurgitation      TRICUSPID VALVE:  There was mild regurgitation      AORTA:  The root exhibited normal size and mild fibrocalcific change  Review of Systems:  Review of Systems   Constitutional: Negative  HENT: Negative  Respiratory: Negative  Cardiovascular: Negative  Gastrointestinal: Negative  Endocrine: Negative  Musculoskeletal: Negative  Neurological: Positive for syncope  Hematological: Negative  Physical Exam:  Physical Exam   Constitutional: She is oriented to person, place, and time  She appears well-developed and well-nourished  HENT:   Head: Normocephalic and atraumatic  Eyes: Conjunctivae and EOM are normal    Neck: No JVD present  Carotid bruit is not present  No tracheal deviation present  No thyromegaly present  Cardiovascular: Normal rate and S1 normal  An irregularly irregular rhythm present  No extrasystoles are present  PMI is not displaced  Exam reveals no gallop  Murmur heard  Systolic murmur is present with a grade of 3/6  Pulses:       Carotid pulses are 2+ on the right side, and 2+ on the left side  Radial pulses are 2+ on the right side, and 2+ on the left side  Femoral pulses are 2+ on the right side, and 2+ on the left side  Pulmonary/Chest: Breath sounds normal  No accessory muscle usage  No respiratory distress  Abdominal: Soft  Bowel sounds are normal  She exhibits no distension  There is no tenderness  Musculoskeletal: She exhibits no edema  Lymphadenopathy:        Head (right side): No submental, no submandibular, no tonsillar, no preauricular, no posterior auricular and no occipital adenopathy present  Head (left side): No submental, no submandibular, no tonsillar, no preauricular, no posterior auricular and no occipital adenopathy present  Right cervical: No superficial cervical adenopathy present  Left cervical: No superficial cervical adenopathy present  Neurological: She is alert and oriented to person, place, and time  Skin: Skin is warm and dry  Discussion/Summary:  Critical aortic valve stenosis  Atrial fibrillation  History of CVA  Hypertension  Hyperlipidemia  Type 2 diabetes    Critical aortic stenosis requires treatment at this time  I explained the risks and benefits of aortic valve replacement    Given her history of CVA as well as her recent tibial plateau fracture, I believe that she is an excellent candidate for transcatheter aortic valve replacement  I will forward her name on to the valve coordinator and I have referred her to CT surgery for their evaluation  I have ordered a cardiac catheterization to clarify her coronary anatomy as a preamble to her TAVR  Blood pressure and heart rate are currently controlled  No medication changes were made along these lines  We will be in contact with her regarding her testing results and scheduling her procedure  Coordination should be conducted through [facility] where she is expected to convalesce for the next 2 months  They can be reached at [phone], extension [phone]  I would like her to follow up in our office in 3 months, sooner if any acute issues arise  We will be seeing her for her preoperative testing before then  Thank you for the opportunity to consult on this patient  If you have any questions, please feel free to call my office

## 2019-11-06 NOTE — LETTER
2019     Jeremiah Cid DO  [address] [address]    Patient: Lindsey Sorensen   YOB: 1940   Date of Visit: 2019       Dear Dr Bridget Wen: Thank you for referring Lindsey Sorensen to me for evaluation  Below are my notes for this consultation  If you have questions, please do not hesitate to call me  I look forward to following your patient along with you  Sincerely,        Elysia Sanchez DO        CC: No Recipients  Elysia Sanchez DO  2019 12:15 PM  Sign at close encounter                                             Cardiology Consultation     Lindsey Sorensen  [id number]  1940  [address] [facility]      Dear Dr Chris Livingston is a 36-year-old female who has been referred to the [address] [place] [address] Place of Cardiology in [facility] with the following issues:    1  Critical aortic valve stenosis  2  Atrial fibrillation  3  History of CVA  4  Hypertension  5  Diabetes    Ms Napoleon Johnson was recently hospitalized for a near syncopal episode with fall associated with a tibial plateau fracture  While hospitalized, she underwent repeat echocardiogram given her known history of severe aortic stenosis  This echocardiogram revealed that her aortic stenosis has progressed to critical with a valve area estimated at 0 4 centimeter squared  She has been referred to [facility] for evaluation of transcatheter aortic valve replacement  She denies any explicit angina or heart failure symptoms but she has had episodes of fall as previously noted  She is currently residing in [facility] while she rehabs from her tibial plateau fracture  She is a lifelong nonsmoker and nondrinker  Mother and father are both   She they did have cardiac issues, but non premature      Patient Active Problem List   Diagnosis    History of stroke    Hypertension    Diabetes mellitus ([facility] )    Tibial plateau fracture    Atrial fibrillation (HCC)    Critical aortic valve stenosis     Past Medical History:   Diagnosis Date    Cardiac disease     [facility] mellitus ([facility] )     Hypertension     Stroke [facility])      Social History     Socioeconomic History    Marital status:      Spouse name: Not on file    Number of children: Not on file    Years of education: Not on file    Highest education level: Not on file   Occupational History    Not on file   Social Needs    Financial resource strain: Not on file    Food insecurity:     Worry: Not on file     Inability: Not on file    Transportation needs:     Medical: Not on file     Non-medical: Not on file   Tobacco Use    Smoking status: Never Smoker    Smokeless tobacco: Never Used   Substance and Sexual Activity    Alcohol use: Not Currently    Drug use: Never    Sexual activity: Not on file   Lifestyle    Physical activity:     Days per week: Not on file     Minutes per session: Not on file    Stress: Not on file   Relationships    Social connections:     Talks on phone: Not on file     Gets together: Not on file     Attends [employer] service: Not on file     Active member of club or organization: Not on file     Attends meetings of clubs or organizations: Not on file     Relationship status: Not on file    Intimate partner violence:     Fear of current or ex partner: Not on file     Emotionally abused: Not on file     Physically abused: Not on file     Forced sexual activity: Not on file   Other Topics Concern    Not on file   Social History Narrative    Not on file        History reviewed  No pertinent family history  History reviewed  No pertinent surgical history         Current Outpatient Medications:     acetaminophen (TYLENOL) 325 mg tablet, Take 650 mg by mouth every 6 (six) hours as needed for mild pain, Disp: , Rfl:     alendronate (FOSAMAX) 70 mg tablet, Take 70 mg by mouth Once a week, Disp: , Rfl:     BABY ASPIRIN PO, Take 81 mg by mouth daily, Disp: , Rfl:     digoxin (LANOXIN) 0 125 mg tablet, Take 1 tablet by mouth daily, Disp: , Rfl:     diltiazem (DILACOR XR) 240 MG 24 hr capsule, Take 240 mg by mouth daily, Disp: , Rfl:     docusate sodium (COLACE) 100 mg capsule, Take 1 capsule (100 mg total) by mouth 2 (two) times a day, Disp: 10 capsule, Rfl: 0    magnesium hydroxide (MILK OF MAGNESIA) 400 mg/5 mL oral suspension, Take by mouth daily as needed for constipation, Disp: , Rfl:     metFORMIN (GLUCOPHAGE) 500 mg tablet, Take 500 mg by mouth 2 (two) times a day, Disp: , Rfl:     polyethylene glycol (MIRALAX) 17 g packet, Take 17 g by mouth daily, Disp: 14 each, Rfl: 0    pravastatin (PRAVACHOL) 20 mg tablet, Take 2 tablets (40 mg total) by mouth daily with dinner, Disp: , Rfl: 0    temazepam (RESTORIL) 30 mg capsule, Take 30 mg by mouth daily as needed, Disp: , Rfl:   Allergies   Allergen Reactions    Atorvastatin      dizziness and shaky    Rivaroxaban      Dizziness, and shaky  Vitals:    11/06/19 1121   BP: 122/78   Pulse: 92   SpO2: 93%   Weight: 86 6 kg (191 lb)   Height: 5' 4" (1 626 m)       Labs:  Results for Mateus Hernandez (MRN [id number]) as of 11/6/2019 12:06   10/25/2019 05:18   Sodium 138   Potassium 4 0   Chloride 103   CO2 26   Anion Gap 9   BUN 9   Creatinine 0 57 (L)   Glucose, Random 164 (H)   Calcium 8 7   eGFR 89     Results for Mateus Hernandez (MRN [id number]) as of 11/6/2019 12:06   10/25/2019 05:18   WBC 8 58   Red Blood Cell Count 4 34   Hemoglobin 13 4   HCT 41 1   MCV 95   MCH 30 9   MCHC 32 6   RDW 13 2   Platelet Count 167       Imaging:   Transthoracic echocardiogram, 10/24/2019  SUMMARY  LEFT VENTRICLE:  Wall thickness was mildly increased    Doppler parameters were consistent with elevated ventricular end-diastolic filling pressure      RIGHT VENTRICLE:  The ventricle was mildly dilated      LEFT ATRIUM:  The atrium was mildly dilated      MITRAL VALVE:  There was mild annular calcification  There was moderate regurgitation      AORTIC VALVE:  There was critical stenosis  ROCHELLE 0 4 cm2  There was mild regurgitation      TRICUSPID VALVE:  There was mild regurgitation      AORTA:  The root exhibited normal size and mild fibrocalcific change  Review of Systems:  Review of Systems   Constitutional: Negative  HENT: Negative  Respiratory: Negative  Cardiovascular: Negative  Gastrointestinal: Negative  Endocrine: Negative  Musculoskeletal: Negative  Neurological: Positive for syncope  Hematological: Negative  Physical Exam:  Physical Exam   Constitutional: She is oriented to person, place, and time  She appears well-developed and well-nourished  HENT:   Head: Normocephalic and atraumatic  Eyes: Conjunctivae and EOM are normal    Neck: No JVD present  Carotid bruit is not present  No tracheal deviation present  No thyromegaly present  Cardiovascular: Normal rate and S1 normal  An irregularly irregular rhythm present  No extrasystoles are present  PMI is not displaced  Exam reveals no gallop  Murmur heard  Systolic murmur is present with a grade of 3/6  Pulses:       Carotid pulses are 2+ on the right side, and 2+ on the left side  Radial pulses are 2+ on the right side, and 2+ on the left side  Femoral pulses are 2+ on the right side, and 2+ on the left side  Pulmonary/Chest: Breath sounds normal  No accessory muscle usage  No respiratory distress  Abdominal: Soft  Bowel sounds are normal  She exhibits no distension  There is no tenderness  Musculoskeletal: She exhibits no edema  Lymphadenopathy:        Head (right side): No submental, no submandibular, no tonsillar, no preauricular, no posterior auricular and no occipital adenopathy present          Head (left side): No submental, no submandibular, no tonsillar, no preauricular, no posterior auricular and no occipital adenopathy present  Right cervical: No superficial cervical adenopathy present  Left cervical: No superficial cervical adenopathy present  Neurological: She is alert and oriented to person, place, and time  Skin: Skin is warm and dry  Discussion/Summary:  Critical aortic valve stenosis  Atrial fibrillation  History of CVA  Hypertension  Hyperlipidemia  Type 2 diabetes    Critical aortic stenosis requires treatment at this time  I explained the risks and benefits of aortic valve replacement  Given her history of CVA as well as her recent tibial plateau fracture, I believe that she is an excellent candidate for transcatheter aortic valve replacement  I will forward her name on to the valve coordinator and I have referred her to CT surgery for their evaluation  I have ordered a cardiac catheterization to clarify her coronary anatomy as a preamble to her TAVR  Blood pressure and heart rate are currently controlled  No medication changes were made along these lines  We will be in contact with her regarding her testing results and scheduling her procedure  Coordination should be conducted through [facility] where she is expected to convalesce for the next 2 months  They can be reached at [phone], extension [phone]  I would like her to follow up in our office in 3 months, sooner if any acute issues arise  We will be seeing her for her preoperative testing before then  Thank you for the opportunity to consult on this patient  If you have any questions, please feel free to call my office

## 2019-11-08 ENCOUNTER — TELEPHONE (OUTPATIENT)
Dept: CARDIOLOGY CLINIC | Facility: CLINIC | Age: 79
End: 2019-11-08

## 2019-11-08 NOTE — TELEPHONE ENCOUNTER
S/w Hank Wolfe, advised on med holds and instructions, verbally understood     -[phone] ext- [phone] needs to be called with the time to come to the hospital    Paperwork has been faxed to the hospital for scheduling

## 2019-11-12 ENCOUNTER — APPOINTMENT (OUTPATIENT)
Dept: RADIOLOGY | Facility: CLINIC | Age: 79
End: 2019-11-12
Payer: COMMERCIAL

## 2019-11-12 ENCOUNTER — OFFICE VISIT (OUTPATIENT)
Dept: OBGYN CLINIC | Facility: CLINIC | Age: 79
End: 2019-11-12

## 2019-11-12 VITALS
HEART RATE: 98 BPM | BODY MASS INDEX: 33.02 KG/M2 | HEIGHT: 64 IN | WEIGHT: 193.4 LBS | DIASTOLIC BLOOD PRESSURE: 72 MMHG | SYSTOLIC BLOOD PRESSURE: 123 MMHG

## 2019-11-12 DIAGNOSIS — M25.561 RIGHT KNEE PAIN, UNSPECIFIED CHRONICITY: Primary | ICD-10-CM

## 2019-11-12 DIAGNOSIS — S82.131D CLOSED FRACTURE OF MEDIAL PORTION OF RIGHT TIBIAL PLATEAU WITH ROUTINE HEALING, SUBSEQUENT ENCOUNTER: ICD-10-CM

## 2019-11-12 DIAGNOSIS — M25.561 RIGHT KNEE PAIN, UNSPECIFIED CHRONICITY: ICD-10-CM

## 2019-11-12 PROCEDURE — 73560 X-RAY EXAM OF KNEE 1 OR 2: CPT

## 2019-11-12 PROCEDURE — 99024 POSTOP FOLLOW-UP VISIT: CPT | Performed by: ORTHOPAEDIC SURGERY

## 2019-11-12 RX ORDER — TRAMADOL HYDROCHLORIDE 50 MG/1
50 TABLET ORAL EVERY 6 HOURS PRN
COMMUNITY
End: 2020-01-31 | Stop reason: ALTCHOICE

## 2019-11-12 RX ORDER — BISACODYL 10 MG
10 SUPPOSITORY, RECTAL RECTAL DAILY
COMMUNITY
End: 2020-01-31 | Stop reason: ALTCHOICE

## 2019-11-12 NOTE — PROGRESS NOTES
HPI:  Patient is a 67y o  year old RHD female who presents with chief complaint of RIGHT knee pain  She was seen in the ED on 10/23/19, due to a a fall at home causing painful ROM  A CT was performed showing a small minimally displaced posterior medial tibial plateau fracture and severe tricompartmental osteoarthritis  She was placed in a a knee immobilizer with a partial weight bearing status She is ambulating primarily with wheelchair  She is accompanied with a nurse today  She is a resident [facility]  She has significant OA of the knee  She had no prior pain in the knee prior to the fall     She has significant past medical history of AFib, hypertension, history of CVA with baseline right-sided hemiparesis, diabetes mellitus      ROS:   General: No fever, no chills, no weight loss, no weight gain  HEENT:  No loss of hearing, no nose bleeds, no sore throat  Eyes:  No eye pain, no red eyes, no visual disturbance  Respiratory:  + cough, no shortness of breath, no wheezing  Cardiovascular:  No chest pain, no palpitations, no edema  GI: No abdominal pain, no nausea, no vomiting  Endocrine: + frequent urination, no excessive thirst  Urinary:  No dysuria, no hematuria, no incontinence  Musculoskeletal: see HPI and PE  Skin:  No rash, no wounds  Neurological:  No dizziness, no headache, no numbness  Psychiatric:  No difficulty concentrating, no depression, no suicide thoughts, no anxiety  Review of all other systems is negative    PMH:  Past Medical History:   Diagnosis Date    Atrial fibrillation ([facility] )     Cardiac disease     [facility]     Hypertension     Stroke ([facility] )        PSH:  Past Surgical History:   Procedure Laterality Date     SECTION      HYSTERECTOMY      TONSILLECTOMY         Medications:  Current Outpatient Medications   Medication Sig Dispense Refill    acetaminophen (TYLENOL) 325 mg tablet Take 650 mg by mouth every 6 (six) hours as needed for mild pain  alendronate (FOSAMAX) 70 mg tablet Take 70 mg by mouth Once a week      BABY ASPIRIN PO Take 81 mg by mouth daily      bisacodyl (DULCOLAX) 10 mg suppository Insert 10 mg into the rectum daily      digoxin (LANOXIN) 0 125 mg tablet Take 1 tablet by mouth daily      diltiazem (DILACOR XR) 240 MG 24 hr capsule Take 240 mg by mouth daily      docusate sodium (COLACE) 100 mg capsule Take 1 capsule (100 mg total) by mouth 2 (two) times a day 10 capsule 0    magnesium hydroxide (MILK OF MAGNESIA) 400 mg/5 mL oral suspension Take by mouth daily as needed for constipation      metFORMIN (GLUCOPHAGE) 500 mg tablet Take 500 mg by mouth 2 (two) times a day      polyethylene glycol (MIRALAX) 17 g packet Take 17 g by mouth daily 14 each 0    pravastatin (PRAVACHOL) 20 mg tablet Take 2 tablets (40 mg total) by mouth daily with dinner  0    temazepam (RESTORIL) 30 mg capsule Take 30 mg by mouth daily as needed      traMADol (ULTRAM) 50 mg tablet Take 50 mg by mouth every 6 (six) hours as needed for moderate pain      guaiFENesin (MUCINEX) 600 mg 12 hr tablet Take 600 mg by mouth every 12 (twelve) hours       No current facility-administered medications for this visit  Allergies: Allergies   Allergen Reactions    Atorvastatin      dizziness and shaky    Rivaroxaban      Dizziness, and shaky  Family History:  Family History   Problem Relation Age of Onset    Diabetes Mother     Heart disease Mother     Diabetes Father     Heart disease Father        Social History:  Social History     Occupational History    Not on file   Tobacco Use    Smoking status: Never Smoker    Smokeless tobacco: Never Used   Substance and Sexual Activity    Alcohol use: Not Currently    Drug use: Never    Sexual activity: Not on file       Physical Exam:  General :  Alert, cooperative, no distress, appears stated age  Blood pressure 123/72, pulse 98, height 5' 4" (1 626 m), weight 87 7 kg (193 lb 6 4 oz)     Head: Normocephalic, without obvious abnormality, atraumatic   Eyes:  Conjunctiva/corneas clear, EOM's intact,   Ears: Both ears normal appearance, no hearing deficits  Nose: Nares normal, septum midline, no drainage    Neck: Supple,  trachea midline, no adenopathy, no tenderness, no mass   Back:   Symmetric, no curvature, ROM normal, no tenderness   Lungs:   Respirations unlabored   Chest Wall:  No tenderness or deformity   Extremities: Extremities normal, atraumatic, no cyanosis or edema      Pulses: 2+ and symmetric   Skin: Skin color, texture, turgor normal, no rashes or lesions      Neurologic: Normal           Right Knee Exam     Tenderness   The patient is experiencing tenderness in the medial joint line  Tests   Patellar apprehension: negative    Other   Erythema: absent  Sensation: normal  Pulse: present  Swelling: mild    Comments:  ROM and strength not tested            Imaging Studies: The following imaging studies were reviewed in office today  My findings are noted  CT of the right knee was reviewed from 10/23/19: posterior tibial plateau fracture  X-ray today right knee - no change in alignment of fracture      Assessment  Encounter Diagnoses   Name Primary?  Right knee pain, unspecified chronicity Yes    Closed fracture of medial portion of right tibial plateau with routine healing, subsequent encounter          Plan:  - She should continue with the knee immobilizer with non-weight bearing status for anther 3 weeks  - At 3 weeks, PT can have her come out of the immobilizer to do gentle ROM of the knee     - Follow up in 3 weeks      Scribe Attestation    I,:   Eugenio Valentin am acting as a scribe while in the presence of the attending physician :        I,:   Inna Craig MD personally performed the services described in this documentation    as scribed in my presence :

## 2019-11-18 ENCOUNTER — TELEPHONE (OUTPATIENT)
Dept: INTERVENTIONAL RADIOLOGY/VASCULAR | Facility: HOSPITAL | Age: 79
End: 2019-11-18

## 2019-11-18 RX ORDER — SODIUM CHLORIDE 9 MG/ML
50 INJECTION, SOLUTION INTRAVENOUS CONTINUOUS
Status: CANCELLED | OUTPATIENT
Start: 2019-11-20

## 2019-11-19 ENCOUNTER — TELEPHONE (OUTPATIENT)
Dept: SURGERY | Facility: HOSPITAL | Age: 79
End: 2019-11-19

## 2019-11-20 ENCOUNTER — HOSPITAL ENCOUNTER (OUTPATIENT)
Dept: INTERVENTIONAL RADIOLOGY/VASCULAR | Facility: HOSPITAL | Age: 79
Discharge: HOME | End: 2019-11-20
Attending: INTERNAL MEDICINE | Admitting: INTERNAL MEDICINE
Payer: MEDICARE

## 2019-11-20 VITALS
DIASTOLIC BLOOD PRESSURE: 49 MMHG | SYSTOLIC BLOOD PRESSURE: 98 MMHG | TEMPERATURE: 97.8 F | WEIGHT: 186.29 LBS | BODY MASS INDEX: 31.8 KG/M2 | HEIGHT: 64 IN | HEART RATE: 90 BPM | RESPIRATION RATE: 16 BRPM | OXYGEN SATURATION: 96 %

## 2019-11-20 DIAGNOSIS — I35.0 CRITICAL AORTIC VALVE STENOSIS: ICD-10-CM

## 2019-11-20 LAB — GLUCOSE SERPL-MCNC: 189 MG/DL (ref 65–140)

## 2019-11-20 PROCEDURE — 82948 REAGENT STRIP/BLOOD GLUCOSE: CPT

## 2019-11-20 PROCEDURE — C1769 GUIDE WIRE: HCPCS | Performed by: INTERNAL MEDICINE

## 2019-11-20 PROCEDURE — 99152 MOD SED SAME PHYS/QHP 5/>YRS: CPT | Performed by: INTERNAL MEDICINE

## 2019-11-20 PROCEDURE — 93460 R&L HRT ART/VENTRICLE ANGIO: CPT | Performed by: INTERNAL MEDICINE

## 2019-11-20 PROCEDURE — C1894 INTRO/SHEATH, NON-LASER: HCPCS | Performed by: INTERNAL MEDICINE

## 2019-11-20 PROCEDURE — 82810 BLOOD GASES O2 SAT ONLY: CPT | Performed by: INTERNAL MEDICINE

## 2019-11-20 RX ORDER — VERAPAMIL HCL 2.5 MG/ML
AMPUL (ML) INTRAVENOUS CODE/TRAUMA/SEDATION MEDICATION
Status: COMPLETED | OUTPATIENT
Start: 2019-11-20 | End: 2019-11-20

## 2019-11-20 RX ORDER — LIDOCAINE WITH 8.4% SOD BICARB 0.9%(10ML)
SYRINGE (ML) INJECTION CODE/TRAUMA/SEDATION MEDICATION
Status: COMPLETED | OUTPATIENT
Start: 2019-11-20 | End: 2019-11-20

## 2019-11-20 RX ORDER — SODIUM CHLORIDE 9 MG/ML
50 INJECTION, SOLUTION INTRAVENOUS CONTINUOUS
Status: DISCONTINUED | OUTPATIENT
Start: 2019-11-20 | End: 2019-11-24 | Stop reason: HOSPADM

## 2019-11-20 RX ORDER — LIDOCAINE HYDROCHLORIDE 10 MG/ML
INJECTION, SOLUTION EPIDURAL; INFILTRATION; INTRACAUDAL; PERINEURAL CODE/TRAUMA/SEDATION MEDICATION
Status: COMPLETED | OUTPATIENT
Start: 2019-11-20 | End: 2019-11-20

## 2019-11-20 RX ORDER — NITROGLYCERIN 20 MG/100ML
INJECTION INTRAVENOUS CODE/TRAUMA/SEDATION MEDICATION
Status: COMPLETED | OUTPATIENT
Start: 2019-11-20 | End: 2019-11-20

## 2019-11-20 RX ORDER — FENTANYL CITRATE 50 UG/ML
INJECTION, SOLUTION INTRAMUSCULAR; INTRAVENOUS CODE/TRAUMA/SEDATION MEDICATION
Status: COMPLETED | OUTPATIENT
Start: 2019-11-20 | End: 2019-11-20

## 2019-11-20 RX ORDER — MIDAZOLAM HYDROCHLORIDE 2 MG/2ML
INJECTION, SOLUTION INTRAMUSCULAR; INTRAVENOUS CODE/TRAUMA/SEDATION MEDICATION
Status: COMPLETED | OUTPATIENT
Start: 2019-11-20 | End: 2019-11-20

## 2019-11-20 RX ORDER — SODIUM CHLORIDE 9 MG/ML
125 INJECTION, SOLUTION INTRAVENOUS CONTINUOUS
Status: DISPENSED | OUTPATIENT
Start: 2019-11-20 | End: 2019-11-20

## 2019-11-20 RX ORDER — HEPARIN SODIUM 1000 [USP'U]/ML
INJECTION, SOLUTION INTRAVENOUS; SUBCUTANEOUS CODE/TRAUMA/SEDATION MEDICATION
Status: COMPLETED | OUTPATIENT
Start: 2019-11-20 | End: 2019-11-20

## 2019-11-20 RX ADMIN — NITROGLYCERIN 200 MCG: 20 INJECTION INTRAVENOUS at 08:41

## 2019-11-20 RX ADMIN — SODIUM CHLORIDE 50 ML/HR: 0.9 INJECTION, SOLUTION INTRAVENOUS at 07:12

## 2019-11-20 RX ADMIN — VERAPAMIL HYDROCHLORIDE 2.5 MG: 2.5 INJECTION, SOLUTION INTRAVENOUS at 08:41

## 2019-11-20 RX ADMIN — Medication 3 ML: at 08:42

## 2019-11-20 RX ADMIN — HEPARIN SODIUM 5000 UNITS: 1000 INJECTION INTRAVENOUS; SUBCUTANEOUS at 08:53

## 2019-11-20 RX ADMIN — FENTANYL CITRATE 25 MCG: 50 INJECTION, SOLUTION INTRAMUSCULAR; INTRAVENOUS at 08:52

## 2019-11-20 RX ADMIN — LIDOCAINE HYDROCHLORIDE 3 ML: 10 INJECTION, SOLUTION EPIDURAL; INFILTRATION; INTRACAUDAL; PERINEURAL at 08:39

## 2019-11-20 RX ADMIN — IODIXANOL 40 ML: 320 INJECTION, SOLUTION INTRAVASCULAR at 09:03

## 2019-11-20 RX ADMIN — MIDAZOLAM HYDROCHLORIDE 0.5 MG: 1 INJECTION, SOLUTION INTRAMUSCULAR; INTRAVENOUS at 08:52

## 2019-11-20 NOTE — DISCHARGE INSTRUCTIONS
After Radial Heart Catheterization   WHAT YOU NEED TO KNOW:   What will happen after a radial heart catheterization? · You will be attached to a heart monitor until you are fully awake  A heart monitor is an EKG that stays on continuously to record your heart's electrical activity  Healthcare providers will monitor your vital signs and pulses in your arm  They will frequently check your pressure bandage for bleeding or swelling  · You may have a band wrapped tightly around your wrist  The band puts pressure on your wound and helps prevent bleeding  A healthcare provider can put air into the band or remove air from the band  A healthcare provider will gradually remove air from the band and decrease pressure on your wrist  The band may be removed in 2 hours or when your wound stops bleeding  · You will need to keep your wrist straight for 2 to 4 hours  Do not  push or pull with your arm  Arm movements can cause serious bleeding  After you are monitored for several hours, you may go home or may need to stay in the hospital overnight  What do I need to know before I go home? · Care for your wound as directed  Remove the bandage in 24 hours  Mild bruising is normal and expected  The provided [employer] bandage can be placed on your wound after you remove the initial bandage  Do not put powders, lotions, or creams on your wound  They may cause your wound to get infected  Monitor your wound every day for signs of infection, such as redness, swelling, or pus  · Shower the day after your procedure  Remove the initial bandage before you shower  Do not take baths or go in hot tubs or pools  Carefully wash the wound with soap and water  Pat the area dry  The provided [employer] bandage can be placed on your wound after you shower  You will replace the wrist bandage every day for the next 4 days  You will replace the antecubital (fold of arm) bandage everyday for the next 2 days      · Apply firm, steady pressure to your wound if it bleeds  Apply pressure with a clean gauze or towel for 5 to 10 minutes  Call 911 if bleeding becomes heavy or does not stop  · Drink liquids as directed  Liquids will help flush the contrast liquid from your body  Ask how much liquid to drink each day and which liquids are best for you  · Do not lift anything heavier than a gallon of milk for 1 week  Heavy lifting can put stress on your wound and cause bleeding  Do not push or pull with the arm that was used for the procedure  Do not do vigorous activity for at least 48 hours  Vigorous activity may cause bleeding from your wound  Rest and do quiet activities  Take short walks around the house to prevent a blood clot  Ask your healthcare provider when you can return to your normal activities  · Do not drive or return to work until your healthcare provider says it is okay  Your healthcare provider may tell you to wait 48 hours before you drive to decrease your risk for bleeding  You may not be able to return to work for at least 2 days after your procedure if your job involves heavy lifting  What medicines may I need? You may need any of the following:  · Acetaminophen  helps decrease pain and fever  This medicine is available without a doctor's order  Ask how much medicine is safe to take, and how often to take it  Acetaminophen can cause liver damage if not taken correctly  · Take your medicine as directed  Contact your healthcare provider if you think your medicine is not helping or if you have side effects  Tell him or her if you are allergic to any medicine  Keep a list of the medicines, vitamins, and herbs you take  Include the amounts, and when and why you take them  Bring the list or the pill bottles to follow-up visits  Carry your medicine list with you in case of an emergency    Call 911 for any of the following:   · You have any of the following signs of a heart attack:      ¨ Squeezing, pressure, or pain in your chest that lasts longer than 5 minutes or returns    ¨ Discomfort or pain in your back, neck, jaw, stomach, or arm     ¨ Trouble breathing    ¨ Nausea or vomiting    ¨ Lightheadedness or a sudden cold sweat, especially with chest pain or trouble breathing    · You have any of the following signs of a stroke:      ¨ Numbness or drooping on one side of your face     ¨ Weakness in an arm or leg    ¨ Confusion or difficulty speaking    ¨ Dizziness, a severe headache, or vision loss    · You feel lightheaded, short of breath, and have chest pain  · You cough up blood  · You have trouble breathing  · You cannot stop the bleeding from your wound even after you hold firm pressure for 10 minutes  When should I seek immediate care? · Blood soaks through your bandage  · Your stitches come apart  · Your hand or arm feels numb, cool, or looks pale  · Your wound gets swollen quickly  When should I contact my healthcare provider? · You have a fever or chills  · Your wound is red, swollen, or draining pus  · Your wound looks more bruised or you have new bruising on the side of your wrist      · You have nausea or are vomiting  · Your skin is itchy, swollen, or you have a rash  · You have questions or concerns about your condition or care  CARE AGREEMENT:   You have the right to help plan your care  Learn about your health condition and how it may be treated  Discuss treatment options with your caregivers to decide what care you want to receive  You always have the right to refuse treatment  The above information is an  only  It is not intended as medical advice for individual conditions or treatments  Talk to your doctor, nurse or pharmacist before following any medical regimen to see if it is safe and effective for you    © 2017 [employer] Information is for End User's use only and may not be sold, redistributed or otherwise used for commercial purposes  All illustrations and images included in CareNotes® are the copyrighted property of A D A M , Inc  or [employer]he Samano

## 2019-11-20 NOTE — INTERVAL H&P NOTE
Update: (This section must be completed if the H&P was completed greater than 24 hrs to procedure or admission)    H&P reviewed  After examining the patient, I find no changed to the H&P since it had been written  /91   Pulse 96   Temp 97 6 °F (36 4 °C) (Temporal)   Resp 18   Ht 5' 4" (1 626 m)   Wt 84 5 kg (186 lb 4 6 oz)   SpO2 94%   BMI 31 98 kg/m²     Patient re-evaluated   Accept as history and physical     Krystyna Hinton, DO/November 20, 2019/8:14 AM

## 2019-12-03 ENCOUNTER — TELEPHONE (OUTPATIENT)
Dept: CARDIOLOGY CLINIC | Facility: CLINIC | Age: 79
End: 2019-12-03

## 2019-12-03 NOTE — TELEPHONE ENCOUNTER
Please review and advise, note states:  "I will forward her name on to the valve coordinator and I have referred her to CT surgery for their evaluation  I have ordered a cardiac catheterization to clarify her coronary anatomy as a preamble to her TAVR "    Thanks

## 2019-12-03 NOTE — TELEPHONE ENCOUNTER
Pt is asking when she will be sanjay for the TAVR; she already had the Cath done  Call [facility] from [facility]

## 2019-12-03 NOTE — TELEPHONE ENCOUNTER
Please call the patient and inform her that I reached out to the valve coordinator to make sure she is contacted regarding her TAVR      [address]

## 2019-12-04 ENCOUNTER — APPOINTMENT (OUTPATIENT)
Dept: RADIOLOGY | Facility: CLINIC | Age: 79
End: 2019-12-04
Payer: COMMERCIAL

## 2019-12-04 ENCOUNTER — OFFICE VISIT (OUTPATIENT)
Dept: OBGYN CLINIC | Facility: CLINIC | Age: 79
End: 2019-12-04
Payer: MEDICARE

## 2019-12-04 ENCOUNTER — TELEPHONE (OUTPATIENT)
Dept: OBGYN CLINIC | Facility: HOSPITAL | Age: 79
End: 2019-12-04

## 2019-12-04 VITALS
DIASTOLIC BLOOD PRESSURE: 84 MMHG | HEIGHT: 64 IN | BODY MASS INDEX: 31.69 KG/M2 | WEIGHT: 185.6 LBS | SYSTOLIC BLOOD PRESSURE: 148 MMHG | HEART RATE: 101 BPM

## 2019-12-04 DIAGNOSIS — S82.141P: Primary | ICD-10-CM

## 2019-12-04 DIAGNOSIS — S82.141P: ICD-10-CM

## 2019-12-04 PROCEDURE — 99024 POSTOP FOLLOW-UP VISIT: CPT | Performed by: ORTHOPAEDIC SURGERY

## 2019-12-04 PROCEDURE — 73560 X-RAY EXAM OF KNEE 1 OR 2: CPT

## 2019-12-04 RX ORDER — GUAIFENESIN 600 MG
600 TABLET, EXTENDED RELEASE 12 HR ORAL AS NEEDED
COMMUNITY
End: 2020-01-31 | Stop reason: ALTCHOICE

## 2019-12-04 NOTE — PROGRESS NOTES
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  That is one of the reasons why she initially did fall  Patient has been in a knee immobilizer and has been nonweightbearing  Patient states she has no complaints of pain  She has not attempted to remove the immobilizer and has not attempted to bend the knee since the injury    Denies any numbness tingling lower extremity      ROS:  Review of systems form reviewed December 4, 2019  General: no fever, no chills  Respiratory:  No coughing, shortness of breath or wheezing  Cardiovascular:  No chest pain, no palpitations  Musculoskeletal: see HPI and PE  SKIN:  No skin rash, no dry skin  Neurological:  No headaches, no confusion  Psychiatric:  No suicide thoughts, no anxiety, no depression  Review of all other systems is negative    Past Medical History:   Diagnosis Date    Atrial fibrillation ([facility] )     Cardiac disease     Diabetes mellitus ([facility] )     Hypertension     Stroke [facility])        Current Outpatient Medications on File Prior to Visit   Medication Sig Dispense Refill    acetaminophen (TYLENOL) 325 mg tablet Take 650 mg by mouth every 6 (six) hours as needed for mild pain      alendronate (FOSAMAX) 70 mg tablet Take 70 mg by mouth Once a week      BABY ASPIRIN PO Take 81 mg by mouth daily      bisacodyl (DULCOLAX) 10 mg suppository Insert 10 mg into the rectum daily      digoxin (LANOXIN) 0 125 mg tablet Take 1 tablet by mouth daily      diltiazem (DILACOR XR) 240 MG 24 hr capsule Take 240 mg by mouth daily      docusate sodium (COLACE) 100 mg capsule Take 1 capsule (100 mg total) by mouth 2 (two) times a day 10 capsule 0    guaiFENesin (MUCINEX) 600 mg 12 hr tablet Take 600 mg by mouth every 12 (twelve) hours      magnesium hydroxide (MILK OF MAGNESIA) 400 mg/5 mL oral suspension Take by mouth daily as needed for constipation      metFORMIN (GLUCOPHAGE) 500 mg tablet Take 500 mg by mouth 2 (two) times a day      polyethylene glycol (MIRALAX) 17 g packet Take 17 g by mouth daily 14 each 0    pravastatin (PRAVACHOL) 20 mg tablet Take 2 tablets (40 mg total) by mouth daily with dinner  0    temazepam (RESTORIL) 30 mg capsule Take 30 mg by mouth daily as needed      traMADol (ULTRAM) 50 mg tablet Take 50 mg by mouth every 6 (six) hours as needed for moderate pain       No current facility-administered medications on file prior to visit  Allergies   Allergen Reactions    Atorvastatin      dizziness and shaky    Rivaroxaban      Dizziness, and shaky  Physical Exam:    Vitals:    12/04/19 1213   BP: 148/84   Pulse: 101   Weight: 84 2 kg (185 lb 9 6 oz)   Height: 5' 4" (1 626 m)       General Appearance:  Alert, cooperative, no distress, appears stated age   Lungs:   respirations unlabored   Heart:  Normal heart rate noted   Abdomen:   Soft, non-tender,  no masses   Extremities: Extremities normal, atraumatic, no cyanosis or edema   Pulses: 2+ and symmetric   Skin: Skin color, texture, turgor normal, no rashes or lesions   Neurologic: Normal         Ortho Exam  Right knee examination skin intact no erythema  No tenderness with palpation along the posterior aspect of the tibial plateau  Mild tenderness with palpation along the anterior knee  Passive range of motion is 0-80 degrees with pain beyond that  Sensation is intact to light touch    Imaging  CT scan dated October 23, 2018 was reviewed by  [facility] last visit which was positive for posterior tibial plateau fracture    X-rays December 4, 2019 right knee shows severe degenerative joint disease    ASSESSMENT:    Meron January was seen today for follow-up      Diagnoses and all orders for this visit:    Closed fracture of right tibial plateau with malunion, subsequent encounter  -     XR knee 3 vw right non injury; Future          PLAN: S/P 6 weeks right knee posterior tibial plateau fracture  Discontinued use of knee immobilizer today  Patient was given a prescription to have a hinged knee brace fitted for the right lower extremity  Patient is weight-bearing as tolerated with the brace on  Patient may remove brace for sleeping, bathing and physical therapy  Physical therapy can start doing gentle range-of-motion exercises with the patient  Will follow up with us in four weeks for re-evaluation      Scribe Attestation    I,:   Anisa Astudillo PA-C am acting as a scribe while in the presence of the attending physician :        I,:   Victor Manuel Samuel MD personally performed the services described in this documentation    as scribed in my presence :

## 2019-12-04 NOTE — TELEPHONE ENCOUNTER
Spoke with Quique Deluca and told her what dr Alfredito Lazar said   I told her to call back on Friday if they dont call her about the TAVR

## 2019-12-04 NOTE — TELEPHONE ENCOUNTER
Patient sees Dr Beryle Frieze Yves Rubenstein from [facility] is calling to see if a Brace order was in the system for the patient  I advised it was and he is going to order it for her

## 2019-12-06 DIAGNOSIS — I35.0 SEVERE AORTIC STENOSIS: Primary | ICD-10-CM

## 2019-12-24 ENCOUNTER — APPOINTMENT (OUTPATIENT)
Dept: RADIOLOGY | Facility: CLINIC | Age: 79
End: 2019-12-24
Payer: COMMERCIAL

## 2019-12-24 ENCOUNTER — OFFICE VISIT (OUTPATIENT)
Dept: OBGYN CLINIC | Facility: CLINIC | Age: 79
End: 2019-12-24

## 2019-12-24 VITALS
WEIGHT: 185 LBS | DIASTOLIC BLOOD PRESSURE: 82 MMHG | HEIGHT: 64 IN | BODY MASS INDEX: 31.58 KG/M2 | HEART RATE: 93 BPM | SYSTOLIC BLOOD PRESSURE: 134 MMHG

## 2019-12-24 DIAGNOSIS — S82.141P: Primary | ICD-10-CM

## 2019-12-24 DIAGNOSIS — S46.911A STRAIN OF RIGHT SHOULDER, INITIAL ENCOUNTER: ICD-10-CM

## 2019-12-24 DIAGNOSIS — S82.141P: ICD-10-CM

## 2019-12-24 DIAGNOSIS — M17.11 PRIMARY OSTEOARTHRITIS OF RIGHT KNEE: ICD-10-CM

## 2019-12-24 PROCEDURE — 73562 X-RAY EXAM OF KNEE 3: CPT

## 2019-12-24 PROCEDURE — 99024 POSTOP FOLLOW-UP VISIT: CPT | Performed by: ORTHOPAEDIC SURGERY

## 2019-12-24 NOTE — PROGRESS NOTES
Chief Complaint   Patient presents with    Right Knee - Follow-up         Subjective   Patient here following up for her right knee  Patient was initially seen as a consult in the hospital October 24, 2019 after sustaining a fall and injuring her right knee  Patient has been treating this non operatively feeling better  She does have right-sided weakness which contributed fall  She is able to ambulate but still has pain in the knee  Patient understands she does have severe arthritis as well  Last time she was in the office she states when they got her up for x-ray she did injure her shoulder  She has been having some soreness over the anterior aspect of the shoulder    She has rested it in a sling      ROS:  Review of systems form reviewed December 24, 2019  General: no fever, no chills  Respiratory:  No coughing, shortness of breath or wheezing  Cardiovascular:  No chest pain, no palpitations  Musculoskeletal: see HPI and PE  SKIN:  No skin rash, no dry skin  Neurological:  No headaches, no confusion  Psychiatric:  No suicide thoughts, no anxiety, no depression  Review of all other systems is negative    Past Medical History:   Diagnosis Date    [facility] fibrillation [facility] )     Cardiac disease     Diabetes mellitus ([facility] )     Hypertension     Stroke [facility])        Current Outpatient Medications on File Prior to Visit   Medication Sig Dispense Refill    acetaminophen (TYLENOL) 325 mg tablet Take 650 mg by mouth every 6 (six) hours as needed for mild pain      alendronate (FOSAMAX) 70 mg tablet Take 70 mg by mouth Once a week      BABY ASPIRIN PO Take 81 mg by mouth daily      bisacodyl (DULCOLAX) 10 mg suppository Insert 10 mg into the rectum daily      digoxin (LANOXIN) 0 125 mg tablet Take 1 tablet by mouth daily      diltiazem (DILACOR XR) 240 MG 24 hr capsule Take 240 mg by mouth daily      docusate sodium (COLACE) 100 mg capsule Take 1 capsule (100 mg total) by mouth 2 (two) times a day 10 capsule 0    guaiFENesin (MUCINEX) 600 mg 12 hr tablet Take 600 mg by mouth every 12 (twelve) hours      magnesium hydroxide (MILK OF MAGNESIA) 400 mg/5 mL oral suspension Take by mouth daily as needed for constipation      metFORMIN (GLUCOPHAGE) 500 mg tablet Take 500 mg by mouth 2 (two) times a day      polyethylene glycol (MIRALAX) 17 g packet Take 17 g by mouth daily 14 each 0    pravastatin (PRAVACHOL) 20 mg tablet Take 2 tablets (40 mg total) by mouth daily with dinner  0    temazepam (RESTORIL) 30 mg capsule Take 30 mg by mouth daily as needed      traMADol (ULTRAM) 50 mg tablet Take 50 mg by mouth every 6 (six) hours as needed for moderate pain       No current facility-administered medications on file prior to visit  Allergies   Allergen Reactions    Atorvastatin      dizziness and shaky    Rivaroxaban      Dizziness, and shaky  Physical Exam:    Vitals:    12/24/19 1122   BP: 134/82   Pulse: 93   Weight: 83 9 kg (185 lb)   Height: 5' 4" (1 626 m)       General Appearance:  Alert, cooperative, no distress, appears stated age   Lungs:   respirations unlabored   Heart:  Normal heart rate noted   Abdomen:   Soft, non-tender,  no masses   Extremities: Extremities normal, atraumatic, no cyanosis or edema   Pulses: 2+ and symmetric   Skin: Skin color, texture, turgor normal, no rashes or lesions   Neurologic: Normal         Ortho Exam  Examination right knee skin intact no erythema noted  No tenderness with palpation along the posterior aspect of the tibial plateau  Mild tenderness with palpation along the medial joint line  Range of motion 0-105° with no effusion  Sensation is intact light touch  Patient weakness right lower extremity which is her baseline from prior stroke    Imaging  X-rays right knee December 24, 2019 shows severe tricompartmental degenerative joint disease no clear fractures lines seen   Lateral tibial subluxation    ASSESSMENT:    Leyla Childs was seen today for follow-up  Diagnoses and all orders for this visit:    Closed fracture of right tibial plateau with malunion, subsequent encounter  -     XR knee 3 vw right non injury; Future    Strain of right shoulder, initial encounter    Primary osteoarthritis of right knee          PLAN: S/P nine weeks right tibial plateau fracture  Patient also with right shoulder strain  She is currently still residing at a facility  She will continue with physical therapy working on her right knee and they will include her right shoulder as well  No restrictions regarding physical therapy for the right knee and right shoulder  Patient to only use sling as needed  In  She is weight-bearing as tolerated right lower extremity  Patient has right-sided weakness as her baseline from prior stroke  Will follow with us since four weeks for re-evaluation    X-rays of the right knee and right shoulder upon arrival    Scribe Attestation    I,:   Suleman Romero PA-C am acting as a scribe while in the presence of the attending physician :        I,:   Sandra Joel MD personally performed the services described in this documentation    as scribed in my presence :

## 2019-12-27 ENCOUNTER — TELEPHONE (OUTPATIENT)
Dept: OBGYN CLINIC | Facility: HOSPITAL | Age: 79
End: 2019-12-27

## 2019-12-27 NOTE — TELEPHONE ENCOUNTER
She can wear the brace as needed at this point  If she feels more comfortable wearing the brace she can if not she may remove it      [place]

## 2019-12-27 NOTE — TELEPHONE ENCOUNTER
Portia ESCOBAR [facility]  [phone] ext [phone]    Needing call back about how long patient should wear knee brace

## 2020-01-15 ENCOUNTER — OFFICE VISIT (OUTPATIENT)
Dept: CARDIAC SURGERY | Facility: CLINIC | Age: 80
End: 2020-01-15
Payer: MEDICARE

## 2020-01-15 VITALS
OXYGEN SATURATION: 97 % | DIASTOLIC BLOOD PRESSURE: 78 MMHG | RESPIRATION RATE: 14 BRPM | HEIGHT: 64 IN | BODY MASS INDEX: 30.73 KG/M2 | HEART RATE: 102 BPM | SYSTOLIC BLOOD PRESSURE: 122 MMHG | WEIGHT: 180 LBS | TEMPERATURE: 98.2 F

## 2020-01-15 DIAGNOSIS — Z01.810 PREOPERATIVE CARDIOVASCULAR EXAMINATION: ICD-10-CM

## 2020-01-15 DIAGNOSIS — I35.0 CRITICAL AORTIC VALVE STENOSIS: Primary | ICD-10-CM

## 2020-01-15 DIAGNOSIS — I35.0 NONRHEUMATIC AORTIC VALVE STENOSIS: ICD-10-CM

## 2020-01-15 PROCEDURE — 99204 OFFICE O/P NEW MOD 45 MIN: CPT | Performed by: PHYSICIAN ASSISTANT

## 2020-01-15 RX ORDER — ASCORBIC ACID 500 MG
500 TABLET ORAL 2 TIMES DAILY
COMMUNITY

## 2020-01-15 RX ORDER — MELATONIN
1000 DAILY
COMMUNITY

## 2020-01-15 RX ORDER — UBIDECARENONE 75 MG
CAPSULE ORAL DAILY
COMMUNITY

## 2020-01-15 NOTE — PROGRESS NOTES
Consultation - Cardiothoracic Surgery   [facility] 67 y o  female MRN: [id number]    Physician Requesting Consult: Dr Justine Noyola    Reason for Consult / Principal Problem: Aortic stenosis, Non-Rheumatic    History of Present Illness: [facility] is a 67y o  year old female who presents for initial outpatient surgical consultation for critical symptomatic aortic stenosis  She was referred to us by Dr Justine Noyola following a hospitalization in October 2019 following a fall with right tibial plateau fracture  The patient states she was transferring from her wheelchair to the toilet, when she suddenly became dizzy and fell  She denies loss of consciousness, chest pain, shortness of breath at that time  During her hospitalization, an echocardiogram was performed, which revealed critical AS with an ROCHELLE 0 4cm2  The patient reports having a history of a heart murmur since the age of 13  She is somewhat of a poor historian, as she states she has never had workup/follow-up for her her murmur and did not know she had a valvular problem in the past  However, per chart review, she followed with Dr Nithya Lu @ [facility] and was offered intervention for her aortic stenosis in the past, which the patient declined and opted for conservative treatment  Currently, the patient reports having left sided chest pain with minimal exertion  She states she had chest pain with standing on the scale to get weighed for this appointment  Her pain has now resolved with rest  She denies shortness of breath, PND, orthopnea, syncope, palpitations  She admits her right leg is chronically edematous  She has a PMH of CVA in 2008 with residual right sided hemiparesis and mild expressive aphasia  She utilizes a wheelchair to assist with her mobility and will occasionally use a cane for minimal activities    She also has a history of atrial fibrillation, but is not anticoagulated due to a "life-threatening retroperitoneal hematoma" in      She is a lifelong non-smoker, denies alcohol or drug use  She lives alone in a 1 level apartment behind her daughter and son-in law's house  She is able to cook and do her laundry and is relatively active  She is edentulous and has full upper and lower dentures  Past Medical History:  Past Medical History:   Diagnosis Date    Aortic valve disease     At[facility] ([facility] [address] [facility]     Cardiac disease     Diabetes mellitus ([facility] [address] [facility]     Heart murmur     Heart valve disease     Hypertension     Stroke [facility])          Past Surgical History:   Past Surgical History:   Procedure Laterality Date     SECTION      x 2    HYSTERECTOMY      TONSILLECTOMY           Family History:  Family History   Problem Relation Age of Onset    Diabetes Mother     Heart disease Mother     Diabetes Father     Heart disease Father          Social History:    Social History     Substance and Sexual Activity   Alcohol Use Not Currently     Social History     Substance and Sexual Activity   Drug Use Never     Social History     Tobacco Use   Smoking Status Never Smoker   Smokeless Tobacco Never Used       Home Medications:   Prior to Admission medications    Medication Sig Start Date End Date Taking?  Authorizing Provider   acetaminophen (TYLENOL) 325 mg tablet Take 650 mg by mouth every 6 (six) hours as needed for mild pain   Yes Historical Provider, MD   alendronate (FOSAMAX) 70 mg tablet Take 70 mg by mouth Once a week   Yes Historical Provider, MD   ascorbic acid (VITAMIN C) 500 mg tablet Take 500 mg by mouth 2 (two) times a day   Yes Historical Provider, MD   BABY ASPIRIN PO Take 81 mg by mouth daily 18  Yes Historical Provider, MD   bisacodyl (DULCOLAX) 10 mg suppository Insert 10 mg into the rectum daily   Yes Historical Provider, MD   cholecalciferol (VITAMIN D3) 1,000 units tablet Take 1,000 Units by mouth daily   Yes Historical Provider, MD   CINNAMON PO Take by mouth   Yes Historical Provider, MD cyanocobalamin (VITAMIN B-12) 100 mcg tablet Take by mouth daily   Yes Historical Provider, MD   digoxin (LANOXIN) 0 125 mg tablet Take 1 tablet by mouth daily 2/12/18  Yes Historical Provider, MD   diltiazem (DILACOR XR) 240 MG 24 hr capsule Take 240 mg by mouth daily 2/12/18  Yes Historical Provider, MD   docusate sodium (COLACE) 100 mg capsule Take 1 capsule (100 mg total) by mouth 2 (two) times a day 10/28/19  Yes Sajan Sosa PA-C   guaiFENesin (MUCINEX) 600 mg 12 hr tablet Take 600 mg by mouth as needed    Yes Historical Provider, MD   magnesium hydroxide (MILK OF MAGNESIA) 400 mg/5 mL oral suspension Take by mouth daily as needed for constipation   Yes Historical Provider, MD   metFORMIN (GLUCOPHAGE) 500 mg tablet Take 500 mg by mouth 2 (two) times a day   Yes Historical Provider, MD   polyethylene glycol (MIRALAX) 17 g packet Take 17 g by mouth daily  Patient taking differently: Take 17 g by mouth as needed  10/29/19  Yes Sajan Sosa PA-C   pravastatin (PRAVACHOL) 20 mg tablet Take 2 tablets (40 mg total) by mouth daily with dinner 10/28/19  Yes Sajan Sosa PA-C   temazepam (RESTORIL) 30 mg capsule Take 30 mg by mouth daily at bedtime    Yes Historical Provider, MD   traMADol (ULTRAM) 50 mg tablet Take 50 mg by mouth every 6 (six) hours as needed for moderate pain   Yes Historical Provider, MD   vitamin E 100 UNIT capsule Take 100 Units by mouth daily   Yes Historical Provider, MD       Allergies: Allergies   Allergen Reactions    Atorvastatin      dizziness and shaky    Rivaroxaban      Dizziness, and shaky  Review of Systems:  Review of Systems   Constitutional: Negative  Negative for activity change, fatigue and unexpected weight change  HENT: Negative for dental problem, facial swelling, sinus pressure, sneezing and trouble swallowing  Eyes: Negative  Respiratory: Positive for chest tightness  Negative for shortness of breath and wheezing      Cardiovascular: Positive for chest pain  Negative for palpitations and leg swelling  Gastrointestinal: Negative  Negative for abdominal pain, constipation, diarrhea, nausea and vomiting  Endocrine: Negative  Genitourinary: Negative  Musculoskeletal: Positive for gait problem  Allergic/Immunologic: Negative  Neurological: Positive for dizziness and weakness  Negative for syncope, light-headedness and numbness  Hematological: Negative for adenopathy  Does not bruise/bleed easily  Psychiatric/Behavioral: Negative  All other systems reviewed and are negative  Vital Signs:     Vitals:    01/15/20 0800   BP: 122/78   BP Location: Left arm   Cuff Size: Adult   Pulse: 102   Resp: 14   Temp: 98 2 °F (36 8 °C)   TempSrc: Oral   SpO2: 97%   Weight: 81 6 kg (180 lb)   Height: 5' 4" (1 626 m)       Physical Exam:  Physical Exam   Constitutional: She is oriented to person, place, and time  Vital signs are normal  She appears well-developed and well-nourished  No distress  HENT:   Head: Normocephalic and atraumatic  Right Ear: External ear normal    Left Ear: External ear normal    Nose: Nose normal    Mouth/Throat: Oropharynx is clear and moist  No oropharyngeal exudate  Eyes: Pupils are equal, round, and reactive to light  Conjunctivae and EOM are normal  Right eye exhibits no discharge  Left eye exhibits no discharge  No scleral icterus  Neck: Normal range of motion  Neck supple  No JVD present  No tracheal deviation present  Cardiovascular: Normal rate and intact distal pulses  An irregularly irregular rhythm present  Exam reveals no gallop and no friction rub  Murmur heard  Systolic murmur is present with a grade of 4/6  Pulmonary/Chest: Effort normal  No stridor  No respiratory distress  She has decreased breath sounds in the right lower field  She has no wheezes  She has rhonchi in the right upper field, the right middle field, the left upper field and the left middle field  She has no rales     Abdominal: Soft  Bowel sounds are normal  She exhibits no distension  There is no tenderness  There is no rebound and no guarding  Musculoskeletal: Normal range of motion  She exhibits no edema, tenderness or deformity  Neurological: She is alert and oriented to person, place, and time  She displays normal reflexes  No cranial nerve deficit or sensory deficit  She exhibits normal muscle tone  Coordination normal    Right sided weakness of upper and lower extremities  Mild expressive aphasia with word-finding   Skin: Skin is warm and dry  No rash noted  She is not diaphoretic  No erythema  No pallor  Psychiatric: She has a normal mood and affect  Her behavior is normal  Judgment and thought content normal    Nursing note and vitals reviewed  Lab Results:               Invalid input(s): LABGLOM      Lab Results   Component Value Date    HGBA1C 7 8 (H) 10/24/2019     Lab Results   Component Value Date    TROPONINI 0 02 03/28/2019       Imaging Studies:     Echocardiogram 10/24/19: LEFT VENTRICLE: Size was normal  Ejection fraction was estimated in the range of 50 % to 55 % to be 50 %  Wall thickness was mildly increased  DOPPLER: Left ventricular diastolic function parameters were abnormal  Doppler parameters were  consistent with elevated ventricular end-diastolic filling pressure      RIGHT VENTRICLE: The ventricle was mildly dilated  Systolic function was normal      LEFT ATRIUM: The atrium was mildly dilated      RIGHT ATRIUM: Size was normal      MITRAL VALVE: There was mild annular calcification  There was mild calcification  DOPPLER: There was moderate regurgitation      AORTIC VALVE: DOPPLER: There was critical stenosis  ROCHELLE 0 4 cm2 There was mild regurgitation      TRICUSPID VALVE: DOPPLER: There was mild regurgitation  Estimated peak PA pressure was 50 mmHg      PULMONIC VALVE: Not well visualized  DOPPLER: There was no regurgitation      PERICARDIUM: There was no pericardial effusion   The pericardium was normal in appearance      AORTA: The root exhibited normal size and mild fibrocalcific change      SYSTEMIC VEINS: IVC: The inferior vena cava was normal in size and course  Respirophasic changes were normal      SYSTEM MEASUREMENT TABLES     2D  %FS: 35 5 %  Ao Diam: 3 cm  EDV(Teich): 71 4 ml  EF(Teich): 65 5 %  ESV(Teich): 24 7 ml  IVSd: 1 1 cm  LA Area: 24 2 cm2  LA Diam: 4 6 cm  LVEDV MOD A4C: 96 9 ml  LVEF MOD A4C: 67 3 %  LVESV MOD A4C: 31 7 ml  LVIDd: 4 cm  LVIDs: 2 6 cm  LVLd A4C: 8 3 cm  LVLs A4C: 6 8 cm  LVOT Diam: 2 cm  LVPWd: 1 1 cm  RA Area: 18 1 cm2  RVIDd: 3 9 cm  SV MOD A4C: 65 2 ml  SV(Teich): 46 8 ml     CW  AR Dec Butts: 2 9 m/s2  AR Dec Time: 1202 6 ms  AR PHT: 348 8 ms  AR Vmax: 3 4 m/s  AR maxP 1 mmHg  AV Env  Ti: 306 4 ms  AV VTI: 122 7 cm  AV Vmax: 5 m/s  AV Vmean: 4 m/s  AV maxP 7 mmHg  AV meanP 5 mmHg  TR Vmax: 3 4 m/s  TR maxP 3 mmHg     MM  TAPSE: 1 2 cm     PW  ROCHELLE (VTI): 0 4 cm2  ROCHELLE Vmax: 0 4 cm2  E': 0 1 m/s  E/E': 17 8  LVOT Env  Ti: 306 4 ms  LVOT VTI: 15 4 cm  LVOT Vmax: 0 6 m/s  LVOT Vmean: 0 5 m/s  LVOT maxP 6 mmHg  LVOT meanP 1 mmHg  LVSV Dopp: 48 2 ml  MV A Geovani: 0 3 m/s  MV Dec Butts: 8 3 m/s2  MV DecT: 144 1 ms  MV E Geovani: 1 2 m/s  MV E/A Ratio: 4 5  MV PHT: 41 8 ms  MVA By PHT: 5 3 cm2    Cardiac cath 19: CORONARY CIRCULATION:  Coronary angiography demonstrated minor luminal irregularities  The coronary circulation is left dominant  Left main: The vessel was normal sized  Angiography showed no evidence of disease  LAD: The vessel was normal sized and moderately tortuous  Angiography showed minor luminal irregularities  There was one major diagonal branch  Circumflex: The vessel was large sized (dominant)  Angiography showed no evidence of disease  There were two major obtuse marginals  1st obtuse marginal: The vessel was normal sized  Angiography showed no evidence of disease  2nd obtuse marginal: The vessel was normal sized   Angiography showed no evidence of disease  RCA: The vessel was small (non-dominant)  Angiography showed no evidence of disease      CARDIAC STRUCTURES:  The aortic valve leaflets exhibited severe calcification  There was no pulmonic stenosis  There was no tricuspid stenosis      HEMODYNAMICS:  RA x = 11  RV s/x = 40/11  PA s/d/x = 40/25/30  PCWP x = 15  Ao s/d/x = 130/76/92     CO = 4 84 L/min  CI = 2 50 L/min/m2     TPG = 15 mmHg  PVR = 3 1 Woods Units  SVR = 1338 dynes*sec*cm5     AVO2 Diff = 4 82 Volume%  Hemodynamic assessment demonstrated mild systemic hypertension, no hypotension, normal cardiac output, mildly elevated pulmonary capillary wedge pressure, mildly elevated systemic vascular resistance, and mildly elevated pulmonary vascular  resistance  I have personally reviewed pertinent reports  and I have personally reviewed pertinent films in PACS    TAVR evaluation Assessment:     Riccardo Yorkobdulia 122: III    STS Risk Score: 2 37 %, mortality risk    KCCQ-12 completed    **Walk test not completed due to patient having chest pain with minimal exertion today        Assessment:  Patient Active Problem List    Diagnosis Date Noted    Critical aortic valve stenosis 10/26/2019    Tibial plateau fracture 42/65/0975    Atrial fibrillation ([facility] [address] [facility] 10/23/2019    History of stroke     Hypertension     Diabetes mellitus ([facility] [address] [facility]      Severe aortic stenosis; Ongoing TAVR workup    Plan:    Cornelius Zuniga has severe symptomatic aortic stenosis  Based on their STS risk assessment, they will undergo the following testing for transcatheter aortic valve replacement: Gated CTA of the chest/abdomen/pelvis, 3D BOLIVAR, left and right cardiac catheterization, dental clearance, pulmonary function tests with ABG, and carotid artery ultrasound  Once these studies have been completed, Cornelius Zuniga will follow up in our office to review the results and confirm the suitability of proceeding with transcatheter aortic valve replacment  Maryann Yang was comfortable with our recommendations, and their questions were answered to their satisfaction  We will continue to evaluate the patient daily with further recommendations as work up is completed  Thank you for allowing us to participate in the care of this patient       Routine referral to gastroenterology for colonoscopy screening was not indicated, as the patient is over 77years old    SIGNATURE: Jolie Oscar  DATE: January 15, 2020  TIME: 8:58 AM

## 2020-01-15 NOTE — LETTER
January 15, 2020     [address]    Patient: Ken Claire   YOB: 1940   Date of Visit: 1/15/2020       Dear Dr Jaylen Mcnulty:    Thank you for referring Ken Claire to me for evaluation  Below are my notes for this consultation  If you have questions, please do not hesitate to call me  I look forward to following your patient along with you  Sincerely,        Priyank Weber DO        CC: DO Bobo Christina PA-C  1/15/2020  9:23 AM  Attested  Consultation - Cardiothoracic Surgery   Ken Claire 67 y o  female MRN: [id number]    Physician Requesting Consult: Dr Jaylen Mcnulty    Reason for Consult / Principal Problem: Aortic stenosis, Non-Rheumatic    History of Present Illness: Ken Claire is a 67y o  year old female who presents for initial outpatient surgical consultation for critical symptomatic aortic stenosis  She was referred to us by Dr Jaylen Mcnulty following a hospitalization in October 2019 following a fall with right tibial plateau fracture  The patient states she was transferring from her wheelchair to the toilet, when she suddenly became dizzy and fell  She denies loss of consciousness, chest pain, shortness of breath at that time  During her hospitalization, an echocardiogram was performed, which revealed critical AS with an ROCHELLE 0 4cm2  The patient reports having a history of a heart murmur since the age of 13  She is somewhat of a poor historian, as she states she has never had workup/follow-up for her her murmur and did not know she had a valvular problem in the past  However, per chart review, she followed with Dr Joon Chu @ [facility] and was offered intervention for her aortic stenosis in the past, which the patient declined and opted for conservative treatment  Currently, the patient reports having left sided chest pain with minimal exertion   She states she had chest pain with standing on the scale to get weighed for this appointment  Her pain has now resolved with rest  She denies shortness of breath, PND, orthopnea, syncope, palpitations  She admits her right leg is chronically edematous  She has a PMH of CVA in  with residual right sided hemiparesis and mild expressive aphasia  She utilizes a wheelchair to assist with her mobility and will occasionally use a cane for minimal activities  She also has a history of atrial fibrillation, but is not anticoagulated due to a "life-threatening retroperitoneal hematoma" in   She is a lifelong non-smoker, denies alcohol or drug use  She lives alone in a 1 level apartment behind her daughter and son-in law's house  She is able to cook and do her laundry and is relatively active  She is edentulous and has full upper and lower dentures  Past Medical History:  Past Medical History:   Diagnosis Date    Aortic valve disease     Atrial fibrillation [facility] [address] )     Cardiac disease     [facility] ([email or web address]     Heart murmur     Heart valve disease     Hypertension     Stroke [facility])          Past Surgical History:   Past Surgical History:   Procedure Laterality Date     SECTION      x 2    HYSTERECTOMY      TONSILLECTOMY           Family History:  Family History   Problem Relation Age of Onset    Diabetes Mother     Heart disease Mother     Diabetes Father     Heart disease Father          Social History:    Social History     Substance and Sexual Activity   Alcohol Use Not Currently     Social History     Substance and Sexual Activity   Drug Use Never     Social History     Tobacco Use   Smoking Status Never Smoker   Smokeless Tobacco Never Used       Home Medications:   Prior to Admission medications    Medication Sig Start Date End Date Taking?  Authorizing Provider   acetaminophen (TYLENOL) 325 mg tablet Take 650 mg by mouth every 6 (six) hours as needed for mild pain   Yes Historical Provider, MD   alendronate (FOSAMAX) 70 mg tablet Take 70 mg by mouth Once a week   Yes Historical Provider, MD   ascorbic acid (VITAMIN C) 500 mg tablet Take 500 mg by mouth 2 (two) times a day   Yes Historical Provider, MD   BABY ASPIRIN PO Take 81 mg by mouth daily 2/12/18  Yes Historical Provider, MD   bisacodyl (DULCOLAX) 10 mg suppository Insert 10 mg into the rectum daily   Yes Historical Provider, MD   cholecalciferol (VITAMIN D3) 1,000 units tablet Take 1,000 Units by mouth daily   Yes Historical Provider, MD   CINNAMON PO Take by mouth   Yes Historical Provider, MD   cyanocobalamin (VITAMIN B-12) 100 mcg tablet Take by mouth daily   Yes Historical Provider, MD   digoxin (LANOXIN) 0 125 mg tablet Take 1 tablet by mouth daily 2/12/18  Yes Historical Provider, MD   diltiazem (DILACOR XR) 240 MG 24 hr capsule Take 240 mg by mouth daily 2/12/18  Yes Historical Provider, MD   docusate sodium (COLACE) 100 mg capsule Take 1 capsule (100 mg total) by mouth 2 (two) times a day 10/28/19  Yes Davi Mathews PA-C   guaiFENesin (MUCINEX) 600 mg 12 hr tablet Take 600 mg by mouth as needed    Yes Historical Provider, MD   magnesium hydroxide (MILK OF MAGNESIA) 400 mg/5 mL oral suspension Take by mouth daily as needed for constipation   Yes Historical Provider, MD   metFORMIN (GLUCOPHAGE) 500 mg tablet Take 500 mg by mouth 2 (two) times a day   Yes Historical Provider, MD   polyethylene glycol (MIRALAX) 17 g packet Take 17 g by mouth daily  Patient taking differently: Take 17 g by mouth as needed  10/29/19  Yes Davi Mathews PA-C   pravastatin (PRAVACHOL) 20 mg tablet Take 2 tablets (40 mg total) by mouth daily with dinner 10/28/19  Yes Davi Mathews PA-C   temazepam (RESTORIL) 30 mg capsule Take 30 mg by mouth daily at bedtime    Yes Historical Provider, MD   traMADol (ULTRAM) 50 mg tablet Take 50 mg by mouth every 6 (six) hours as needed for moderate pain   Yes Historical Provider, MD   vitamin E 100 UNIT capsule Take 100 Units by mouth daily   Yes Historical Provider, MD       Allergies: Allergies   Allergen Reactions    Atorvastatin      dizziness and shaky    Rivaroxaban      Dizziness, and shaky  Review of Systems:  Review of Systems   Constitutional: Negative  Negative for activity change, fatigue and unexpected weight change  HENT: Negative for dental problem, facial swelling, sinus pressure, sneezing and trouble swallowing  Eyes: Negative  Respiratory: Positive for chest tightness  Negative for shortness of breath and wheezing  Cardiovascular: Positive for chest pain  Negative for palpitations and leg swelling  Gastrointestinal: Negative  Negative for abdominal pain, constipation, diarrhea, nausea and vomiting  Endocrine: Negative  Genitourinary: Negative  Musculoskeletal: Positive for gait problem  Allergic/Immunologic: Negative  Neurological: Positive for dizziness and weakness  Negative for syncope, light-headedness and numbness  Hematological: Negative for adenopathy  Does not bruise/bleed easily  Psychiatric/Behavioral: Negative  All other systems reviewed and are negative  Vital Signs:     Vitals:    01/15/20 0800   BP: 122/78   BP Location: Left arm   Cuff Size: Adult   Pulse: 102   Resp: 14   Temp: 98 2 °F (36 8 °C)   TempSrc: Oral   SpO2: 97%   Weight: 81 6 kg (180 lb)   Height: 5' 4" (1 626 m)       Physical Exam:  Physical Exam   Constitutional: She is oriented to person, place, and time  Vital signs are normal  She appears well-developed and well-nourished  No distress  HENT:   Head: Normocephalic and atraumatic  Right Ear: External ear normal    Left Ear: External ear normal    Nose: Nose normal    Mouth/Throat: Oropharynx is clear and moist  No oropharyngeal exudate  Eyes: Pupils are equal, round, and reactive to light  Conjunctivae and EOM are normal  Right eye exhibits no discharge  Left eye exhibits no discharge  No scleral icterus  Neck: Normal range of motion  Neck supple   No JVD present  No tracheal deviation present  Cardiovascular: Normal rate and intact distal pulses  An irregularly irregular rhythm present  Exam reveals no gallop and no friction rub  Murmur heard  Systolic murmur is present with a grade of 4/6  Pulmonary/Chest: Effort normal  No stridor  No respiratory distress  She has decreased breath sounds in the right lower field  She has no wheezes  She has rhonchi in the right upper field, the right middle field, the left upper field and the left middle field  She has no rales  Abdominal: Soft  Bowel sounds are normal  She exhibits no distension  There is no tenderness  There is no rebound and no guarding  Musculoskeletal: Normal range of motion  She exhibits no edema, tenderness or deformity  Neurological: She is alert and oriented to person, place, and time  She displays normal reflexes  No cranial nerve deficit or sensory deficit  She exhibits normal muscle tone  Coordination normal    Right sided weakness of upper and lower extremities  Mild expressive aphasia with word-finding   Skin: Skin is warm and dry  No rash noted  She is not diaphoretic  No erythema  No pallor  Psychiatric: She has a normal mood and affect  Her behavior is normal  Judgment and thought content normal    Nursing note and vitals reviewed  Lab Results:               Invalid input(s): LABGLOM      Lab Results   Component Value Date    HGBA1C 7 8 (H) 10/24/2019     Lab Results   Component Value Date    TROPONINI 0 02 03/28/2019       Imaging Studies:     Echocardiogram 10/24/19: LEFT VENTRICLE: Size was normal  Ejection fraction was estimated in the range of 50 % to 55 % to be 50 %  Wall thickness was mildly increased  DOPPLER: Left ventricular diastolic function parameters were abnormal  Doppler parameters were  consistent with elevated ventricular end-diastolic filling pressure      RIGHT VENTRICLE: The ventricle was mildly dilated   Systolic function was normal      LEFT ATRIUM: The atrium was mildly dilated      RIGHT ATRIUM: Size was normal      MITRAL VALVE: There was mild annular calcification  There was mild calcification  DOPPLER: There was moderate regurgitation      AORTIC VALVE: DOPPLER: There was critical stenosis  ROCHELLE 0 4 cm2 There was mild regurgitation      TRICUSPID VALVE: DOPPLER: There was mild regurgitation  Estimated peak PA pressure was 50 mmHg      PULMONIC VALVE: Not well visualized  DOPPLER: There was no regurgitation      PERICARDIUM: There was no pericardial effusion  The pericardium was normal in appearance      AORTA: The root exhibited normal size and mild fibrocalcific change      SYSTEMIC VEINS: IVC: The inferior vena cava was normal in size and course  Respirophasic changes were normal      SYSTEM MEASUREMENT TABLES     2D  %FS: 35 5 %  Ao Diam: 3 cm  EDV(Teich): 71 4 ml  EF(Teich): 65 5 %  ESV(Teich): 24 7 ml  IVSd: 1 1 cm  LA Area: 24 2 cm2  LA Diam: 4 6 cm  LVEDV MOD A4C: 96 9 ml  LVEF MOD A4C: 67 3 %  LVESV MOD A4C: 31 7 ml  LVIDd: 4 cm  LVIDs: 2 6 cm  LVLd A4C: 8 3 cm  LVLs A4C: 6 8 cm  LVOT Diam: 2 cm  LVPWd: 1 1 cm  RA Area: 18 1 cm2  RVIDd: 3 9 cm  SV MOD A4C: 65 2 ml  SV(Teich): 46 8 ml     CW  AR Dec Logan: 2 9 m/s2  AR Dec Time: 1202 6 ms  AR PHT: 348 8 ms  AR Vmax: 3 4 m/s  AR maxP 1 mmHg  AV Env  Ti: 306 4 ms  AV VTI: 122 7 cm  AV Vmax: 5 m/s  AV Vmean: 4 m/s  AV maxP 7 mmHg  AV meanP 5 mmHg  TR Vmax: 3 4 m/s  TR maxP 3 mmHg     MM  TAPSE: 1 2 cm     PW  ROCHELLE (VTI): 0 4 cm2  ROCHELLE Vmax: 0 4 cm2  E': 0 1 m/s  E/E': 17 8  LVOT Env  Ti: 306 4 ms  LVOT VTI: 15 4 cm  LVOT Vmax: 0 6 m/s  LVOT Vmean: 0 5 m/s  LVOT maxP 6 mmHg  LVOT meanP 1 mmHg  LVSV Dopp: 48 2 ml  MV A Geovani: 0 3 m/s  MV Dec Logan: 8 3 m/s2  MV DecT: 144 1 ms  MV E Geovani: 1 2 m/s  MV E/A Ratio: 4 5  MV PHT: 41 8 ms  MVA By PHT: 5 3 cm2    Cardiac cath 19: CORONARY CIRCULATION:  Coronary angiography demonstrated minor luminal irregularities    The coronary circulation is left dominant  Left main: The vessel was normal sized  Angiography showed no evidence of disease  LAD: The vessel was normal sized and moderately tortuous  Angiography showed minor luminal irregularities  There was one major diagonal branch  Circumflex: The vessel was large sized (dominant)  Angiography showed no evidence of disease  There were two major obtuse marginals  1st obtuse marginal: The vessel was normal sized  Angiography showed no evidence of disease  2nd obtuse marginal: The vessel was normal sized  Angiography showed no evidence of disease  RCA: The vessel was small (non-dominant)  Angiography showed no evidence of disease      CARDIAC STRUCTURES:  The aortic valve leaflets exhibited severe calcification  There was no pulmonic stenosis  There was no tricuspid stenosis      HEMODYNAMICS:  RA x = 11  RV s/x = 40/11  PA s/d/x = 40/25/30  PCWP x = 15  Ao s/d/x = 130/76/92     CO = 4 84 L/min  CI = 2 50 L/min/m2     TPG = 15 mmHg  PVR = 3 1 Woods Units  SVR = 1338 dynes*sec*cm5     AVO2 Diff = 4 82 Volume%  Hemodynamic assessment demonstrated mild systemic hypertension, no hypotension, normal cardiac output, mildly elevated pulmonary capillary wedge pressure, mildly elevated systemic vascular resistance, and mildly elevated pulmonary vascular  resistance  I have personally reviewed pertinent reports  and I have personally reviewed pertinent films in PACS    TAVR evaluation Assessment:     Riccardo Doyle 122: III    STS Risk Score: 2 37 %, mortality risk    KCCQ-12 completed    **Walk test not completed due to patient having chest pain with minimal exertion today        Assessment:  Patient Active Problem List    Diagnosis Date Noted    Critical aortic valve stenosis 10/26/2019    Tibial plateau fracture 12/69/9248    Atrial fibrillation ([facility] [address] ) 10/23/2019    History of stroke     Hypertension     Diabetes mellitus ([facility] [address] )      Severe aortic stenosis;  Ongoing TAVR workup    Plan:    Rebeka Johnson Ariana Rodriguez has severe symptomatic aortic stenosis  Based on their STS risk assessment, they will undergo the following testing for transcatheter aortic valve replacement: Gated CTA of the chest/abdomen/pelvis, 3D BOLIVAR, left and right cardiac catheterization, dental clearance, pulmonary function tests with ABG, and carotid artery ultrasound  Once these studies have been completed, Dameon Pancho will follow up in our office to review the results and confirm the suitability of proceeding with transcatheter aortic valve replacment  Dameon Deleon was comfortable with our recommendations, and their questions were answered to their satisfaction  We will continue to evaluate the patient daily with further recommendations as work up is completed  Thank you for allowing us to participate in the care of this patient  Routine referral to gastroenterology for colonoscopy screening was not indicated, as the patient is over 77years old    SIGNATURE: Brina Deleon  DATE: January 15, 2020  TIME: 8:58 AM  Attestation signed by Joshua Mclaughlin DO at 1/15/2020 10:24 AM:  The patient was seen and examined, and I agree with the midlevel's history, physical exam, assessment and plan with the following additions:    Josesito Hogan was seen in the office today accompanied by her son  Her echocardiogram was reviewed by myself personally discussed with her  She did have a syncopal episode which resulted in a fall  She has an echo finding consistent with severe aortic stenosis  She is intermediate risk on frailty for open surgical aortic valve replacement  I feel she would be better suited with transcatheter aortic valve  The risks, benefits, and alternatives to TAVR been discussed in detail with her and her son  She wishes to proceed

## 2020-01-23 ENCOUNTER — HOSPITAL ENCOUNTER (OUTPATIENT)
Dept: CT IMAGING | Facility: HOSPITAL | Age: 80
Discharge: HOME | End: 2020-01-23
Payer: MEDICARE

## 2020-01-23 ENCOUNTER — HOSPITAL ENCOUNTER (OUTPATIENT)
Dept: PULMONOLOGY | Facility: HOSPITAL | Age: 80
Discharge: HOME | End: 2020-01-23
Payer: MEDICARE

## 2020-01-23 ENCOUNTER — HOSPITAL ENCOUNTER (OUTPATIENT)
Dept: VASCULAR ULTRASOUND | Facility: HOSPITAL | Age: 80
Discharge: HOME | End: 2020-01-23
Payer: MEDICARE

## 2020-01-23 ENCOUNTER — APPOINTMENT (OUTPATIENT)
Dept: LAB | Facility: HOSPITAL | Age: 80
End: 2020-01-23
Payer: MEDICARE

## 2020-01-23 DIAGNOSIS — I35.0 SEVERE AORTIC STENOSIS: ICD-10-CM

## 2020-01-23 DIAGNOSIS — Z01.810 PREOPERATIVE CARDIOVASCULAR EXAMINATION: ICD-10-CM

## 2020-01-23 DIAGNOSIS — I35.0 CRITICAL AORTIC VALVE STENOSIS: ICD-10-CM

## 2020-01-23 DIAGNOSIS — I65.23 BILATERAL CAROTID ARTERY STENOSIS: Primary | ICD-10-CM

## 2020-01-23 LAB
ALBUMIN SERPL BCP-MCNC: 3.6 G/DL (ref 3.5–5)
ALP SERPL-CCNC: 86 U/L (ref 46–116)
ALT SERPL W P-5'-P-CCNC: 26 U/L (ref 12–78)
ANION GAP SERPL CALCULATED.3IONS-SCNC: 10 MMOL/L (ref 4–13)
AST SERPL W P-5'-P-CCNC: 17 U/L (ref 5–45)
BASE EXCESS BLDA CALC-SCNC: 1 MMOL/L (ref -2–3)
BILIRUB SERPL-MCNC: 0.9 MG/DL (ref 0.2–1)
BUN SERPL-MCNC: 9 MG/DL (ref 5–25)
CA-I BLD-SCNC: 1.26 MMOL/L (ref 1.12–1.32)
CALCIUM SERPL-MCNC: 9.3 MG/DL (ref 8.3–10.1)
CHLORIDE SERPL-SCNC: 101 MMOL/L (ref 100–108)
CO2 SERPL-SCNC: 28 MMOL/L (ref 21–32)
CREAT SERPL-MCNC: 0.75 MG/DL (ref 0.6–1.3)
ERYTHROCYTE [DISTWIDTH] IN BLOOD BY AUTOMATED COUNT: 13 % (ref 11.6–15.1)
FIO2 GAS DIL.REBREATH: 21 L
GFR SERPL CREATININE-BSD FRML MDRD: 77 ML/MIN/1.73SQ M
GLUCOSE P FAST SERPL-MCNC: 174 MG/DL (ref 65–99)
GLUCOSE SERPL-MCNC: 157 MG/DL (ref 65–140)
HCO3 BLDA-SCNC: 25.9 MMOL/L (ref 22–28)
HCT VFR BLD AUTO: 45.4 % (ref 34.8–46.1)
HCT VFR BLD CALC: 44 % (ref 34.8–46.1)
HGB BLD-MCNC: 14.7 G/DL (ref 11.5–15.4)
HGB BLDA-MCNC: 15 G/DL (ref 11.5–15.4)
MCH RBC QN AUTO: 30.5 PG (ref 26.8–34.3)
MCHC RBC AUTO-ENTMCNC: 32.4 G/DL (ref 31.4–37.4)
MCV RBC AUTO: 94 FL (ref 82–98)
PCO2 BLD: 27 MMOL/L (ref 21–32)
PCO2 BLD: 40.4 MM HG (ref 36–44)
PH BLD: 7.42 [PH] (ref 7.35–7.45)
PLATELET # BLD AUTO: 288 THOUSANDS/UL (ref 149–390)
PMV BLD AUTO: 10 FL (ref 8.9–12.7)
PO2 BLD: 76 MM HG (ref 75–129)
POTASSIUM BLD-SCNC: 3.9 MMOL/L (ref 3.5–5.3)
POTASSIUM SERPL-SCNC: 3.8 MMOL/L (ref 3.5–5.3)
PROT SERPL-MCNC: 7.2 G/DL (ref 6.4–8.2)
RBC # BLD AUTO: 4.82 MILLION/UL (ref 3.81–5.12)
SAO2 % BLD FROM PO2: 95 % (ref 60–85)
SODIUM BLD-SCNC: 138 MMOL/L (ref 136–145)
SODIUM SERPL-SCNC: 139 MMOL/L (ref 136–145)
SPECIMEN SOURCE: ABNORMAL
WBC # BLD AUTO: 7.92 THOUSAND/UL (ref 4.31–10.16)

## 2020-01-23 PROCEDURE — 84295 ASSAY OF SERUM SODIUM: CPT

## 2020-01-23 PROCEDURE — 36415 COLL VENOUS BLD VENIPUNCTURE: CPT

## 2020-01-23 PROCEDURE — 82330 ASSAY OF CALCIUM: CPT

## 2020-01-23 PROCEDURE — 94727 GAS DIL/WSHOT DETER LNG VOL: CPT | Performed by: INTERNAL MEDICINE

## 2020-01-23 PROCEDURE — 75572 CT HRT W/3D IMAGE: CPT

## 2020-01-23 PROCEDURE — 93880 EXTRACRANIAL BILAT STUDY: CPT | Performed by: SURGERY

## 2020-01-23 PROCEDURE — 85027 COMPLETE CBC AUTOMATED: CPT

## 2020-01-23 PROCEDURE — 94060 EVALUATION OF WHEEZING: CPT

## 2020-01-23 PROCEDURE — 93880 EXTRACRANIAL BILAT STUDY: CPT

## 2020-01-23 PROCEDURE — 94729 DIFFUSING CAPACITY: CPT

## 2020-01-23 PROCEDURE — 80053 COMPREHEN METABOLIC PANEL: CPT

## 2020-01-23 PROCEDURE — 74174 CTA ABD&PLVS W/CONTRAST: CPT

## 2020-01-23 PROCEDURE — 94727 GAS DIL/WSHOT DETER LNG VOL: CPT

## 2020-01-23 PROCEDURE — 82947 ASSAY GLUCOSE BLOOD QUANT: CPT

## 2020-01-23 PROCEDURE — 94060 EVALUATION OF WHEEZING: CPT | Performed by: INTERNAL MEDICINE

## 2020-01-23 PROCEDURE — 36600 WITHDRAWAL OF ARTERIAL BLOOD: CPT

## 2020-01-23 PROCEDURE — 94729 DIFFUSING CAPACITY: CPT | Performed by: INTERNAL MEDICINE

## 2020-01-23 PROCEDURE — 84132 ASSAY OF SERUM POTASSIUM: CPT

## 2020-01-23 PROCEDURE — 85014 HEMATOCRIT: CPT

## 2020-01-23 PROCEDURE — 82803 BLOOD GASES ANY COMBINATION: CPT

## 2020-01-23 RX ORDER — ALBUTEROL SULFATE 2.5 MG/3ML
2.5 SOLUTION RESPIRATORY (INHALATION) ONCE
Status: COMPLETED | OUTPATIENT
Start: 2020-01-23 | End: 2020-01-23

## 2020-01-23 RX ADMIN — IODIXANOL 130 ML: 320 INJECTION, SOLUTION INTRAVASCULAR at 07:14

## 2020-01-23 RX ADMIN — ALBUTEROL SULFATE 2.5 MG: 2.5 SOLUTION RESPIRATORY (INHALATION) at 10:23

## 2020-01-28 ENCOUNTER — HOSPITAL ENCOUNTER (OUTPATIENT)
Dept: CT IMAGING | Facility: CLINIC | Age: 80
Discharge: HOME | End: 2020-01-28
Payer: MEDICARE

## 2020-01-28 DIAGNOSIS — I65.23 BILATERAL CAROTID ARTERY STENOSIS: ICD-10-CM

## 2020-01-28 PROCEDURE — 70498 CT ANGIOGRAPHY NECK: CPT

## 2020-01-28 PROCEDURE — 70496 CT ANGIOGRAPHY HEAD: CPT

## 2020-01-28 RX ADMIN — IOHEXOL 100 ML: 350 INJECTION, SOLUTION INTRAVENOUS at 10:31

## 2020-01-31 ENCOUNTER — OFFICE VISIT (OUTPATIENT)
Dept: VASCULAR SURGERY | Facility: CLINIC | Age: 80
End: 2020-01-31
Payer: MEDICARE

## 2020-01-31 ENCOUNTER — TELEPHONE (OUTPATIENT)
Dept: ADMINISTRATIVE | Facility: HOSPITAL | Age: 80
End: 2020-01-31

## 2020-01-31 VITALS
HEART RATE: 98 BPM | TEMPERATURE: 97.8 F | DIASTOLIC BLOOD PRESSURE: 72 MMHG | HEIGHT: 64 IN | SYSTOLIC BLOOD PRESSURE: 120 MMHG | BODY MASS INDEX: 30.9 KG/M2

## 2020-01-31 DIAGNOSIS — I65.21 CAROTID STENOSIS, ASYMPTOMATIC, RIGHT: Primary | ICD-10-CM

## 2020-01-31 DIAGNOSIS — I65.22 CAROTID OCCLUSION, LEFT: ICD-10-CM

## 2020-01-31 PROCEDURE — 99204 OFFICE O/P NEW MOD 45 MIN: CPT | Performed by: SURGERY

## 2020-01-31 RX ORDER — ATORVASTATIN CALCIUM 10 MG/1
10 TABLET, FILM COATED ORAL DAILY
COMMUNITY
End: 2022-07-07

## 2020-01-31 NOTE — ASSESSMENT & PLAN NOTE
71yo Female with HTN, A fib, DM, history of left hemispheric stroke, and severe aortic stenosis undergoing pre-op work-up  Assessment of her carotid arteries with ultrasound and subsequently CT-scan demonstrates known left ICA occlusion and right ICA stenosis of ~70%  BIlateral vertebral arteries are patent  Clinically she is asymptomatic and denies any new signs of stroke or TIA, including unilateral numbness, tingling, weakness, paralysis, dysarthria, or amaurosis fugax  No acute indication for carotid revascularization  Continue statin and recommend 81mg asa  Will monitor known stenosis with surveillance ultrasond  6 month carotid duplex with clinic visit

## 2020-01-31 NOTE — PROGRESS NOTES
Assessment/Plan:    Carotid occlusion, left  Known left ICA occlusion and old Left hemispheric CVA resulting in right sided hemiplegia    Carotid stenosis, asymptomatic, right  71yo Female with HTN, A fib, DM, history of left hemispheric stroke, and severe aortic stenosis undergoing pre-op work-up  Assessment of her carotid arteries with ultrasound and subsequently CT-scan demonstrates known left ICA occlusion and right ICA stenosis of ~70%  BIlateral vertebral arteries are patent  Clinically she is asymptomatic and denies any new signs of stroke or TIA, including unilateral numbness, tingling, weakness, paralysis, dysarthria, or amaurosis fugax  No acute indication for carotid revascularization  Continue statin and recommend 81mg asa  Will monitor known stenosis with surveillance ultrasond  6 month carotid duplex with clinic visit  Problem List Items Addressed This Visit        Cardiovascular and Mediastinum    Carotid occlusion, left     Known left ICA occlusion and old Left hemispheric CVA resulting in right sided hemiplegia         Carotid stenosis, asymptomatic, right - Primary     71yo Female with HTN, A fib, DM, history of left hemispheric stroke, and severe aortic stenosis undergoing pre-op work-up  Assessment of her carotid arteries with ultrasound and subsequently CT-scan demonstrates known left ICA occlusion and right ICA stenosis of ~70%  BIlateral vertebral arteries are patent  Clinically she is asymptomatic and denies any new signs of stroke or TIA, including unilateral numbness, tingling, weakness, paralysis, dysarthria, or amaurosis fugax  No acute indication for carotid revascularization  Continue statin and recommend 81mg asa  Will monitor known stenosis with surveillance ultrasond  6 month carotid duplex with clinic visit  Relevant Orders    VAS carotid complete study            Subjective:      Patient ID: Dameon Deleon is a 67 y o  female    New patient, here today for eval of carotid artery stenosis  Patient has h/o stoke in 2008  She has residual deficits of R-sided weakness and speech and word finding  Patient is currently asymptomatic  No h/o carotid surgery  The following portions of the patient's history were reviewed and updated as appropriate: allergies, current medications, past family history, past medical history, past social history, past surgical history and problem list     Review of Systems   Constitutional: Positive for fatigue  HENT: Negative  Eyes: Negative  Respiratory: Positive for cough and shortness of breath  Cardiovascular: Negative  Gastrointestinal: Negative  Endocrine: Negative  Genitourinary: Negative  Musculoskeletal: Positive for gait problem  Skin: Negative  Allergic/Immunologic: Negative  Neurological: Positive for dizziness and weakness  Hematological: Negative  Psychiatric/Behavioral: Negative  I have reviewed and updated the ROS as appropriate  Objective:      /72 (BP Location: Left arm, Patient Position: Sitting)   Pulse 98   Temp 97 8 °F (36 6 °C) (Tympanic)   Ht 5' 4" (1 626 m)   BMI 30 90 kg/m²          Physical Exam   Constitutional: She is oriented to person, place, and time  Wheelchair bound, hemiplegic, pleasant   HENT:   Head: Normocephalic and atraumatic  Eyes: Pupils are equal, round, and reactive to light  EOM are normal    Neck: Normal range of motion  Neck supple  Transmitted cardiac bruit on the right   Cardiovascular: Normal rate and regular rhythm  Murmur heard  Pulmonary/Chest: Effort normal and breath sounds normal    Abdominal: Soft  Bowel sounds are normal    Musculoskeletal: Normal range of motion  She exhibits no edema  Neurological: She is alert and oriented to person, place, and time  A sensory deficit is present  She exhibits abnormal muscle tone  Skin: Skin is warm and dry  Capillary refill takes less than 2 seconds  Psychiatric: She has a normal mood and affect   Her behavior is normal  Thought content normal

## 2020-02-04 PROCEDURE — 1123F ACP DISCUSS/DSCN MKR DOCD: CPT | Performed by: INTERNAL MEDICINE

## 2020-02-13 ENCOUNTER — TRANSCRIBE ORDERS (OUTPATIENT)
Dept: ADMINISTRATIVE | Facility: HOSPITAL | Age: 80
End: 2020-02-13

## 2020-02-13 ENCOUNTER — APPOINTMENT (OUTPATIENT)
Dept: LAB | Facility: HOSPITAL | Age: 80
End: 2020-02-13
Payer: MEDICARE

## 2020-02-13 ENCOUNTER — OFFICE VISIT (OUTPATIENT)
Dept: CARDIAC SURGERY | Facility: CLINIC | Age: 80
End: 2020-02-13
Payer: MEDICARE

## 2020-02-13 VITALS
OXYGEN SATURATION: 93 % | WEIGHT: 178 LBS | TEMPERATURE: 97.8 F | SYSTOLIC BLOOD PRESSURE: 106 MMHG | BODY MASS INDEX: 30.39 KG/M2 | HEIGHT: 64 IN | HEART RATE: 74 BPM | DIASTOLIC BLOOD PRESSURE: 72 MMHG | RESPIRATION RATE: 14 BRPM

## 2020-02-13 DIAGNOSIS — I35.0 CRITICAL AORTIC VALVE STENOSIS: Primary | ICD-10-CM

## 2020-02-13 DIAGNOSIS — Z01.818 PRE-OP TESTING: Primary | ICD-10-CM

## 2020-02-13 DIAGNOSIS — I35.0 CRITICAL AORTIC VALVE STENOSIS: ICD-10-CM

## 2020-02-13 DIAGNOSIS — Z01.818 PRE-OP TESTING: ICD-10-CM

## 2020-02-13 LAB
ABO GROUP BLD: NORMAL
BACTERIA UR QL AUTO: ABNORMAL /HPF
BILIRUB UR QL STRIP: NEGATIVE
BLD GP AB SCN SERPL QL: NEGATIVE
CLARITY UR: ABNORMAL
COLOR UR: YELLOW
GLUCOSE UR STRIP-MCNC: NEGATIVE MG/DL
HGB UR QL STRIP.AUTO: ABNORMAL
INR PPP: 1.03 (ref 0.84–1.19)
KETONES UR STRIP-MCNC: NEGATIVE MG/DL
LEUKOCYTE ESTERASE UR QL STRIP: ABNORMAL
NITRITE UR QL STRIP: POSITIVE
NON-SQ EPI CELLS URNS QL MICRO: ABNORMAL /HPF
PH UR STRIP.AUTO: 8.5 [PH]
PROT UR STRIP-MCNC: ABNORMAL MG/DL
PROTHROMBIN TIME: 13.5 SECONDS (ref 11.6–14.5)
RBC #/AREA URNS AUTO: ABNORMAL /HPF
RH BLD: POSITIVE
SP GR UR STRIP.AUTO: 1.01 (ref 1–1.03)
SPECIMEN EXPIRATION DATE: NORMAL
UROBILINOGEN UR QL STRIP.AUTO: 0.2 E.U./DL
WBC #/AREA URNS AUTO: ABNORMAL /HPF

## 2020-02-13 PROCEDURE — 87186 SC STD MICRODIL/AGAR DIL: CPT

## 2020-02-13 PROCEDURE — 36415 COLL VENOUS BLD VENIPUNCTURE: CPT

## 2020-02-13 PROCEDURE — 86850 RBC ANTIBODY SCREEN: CPT

## 2020-02-13 PROCEDURE — 99213 OFFICE O/P EST LOW 20 MIN: CPT | Performed by: PHYSICIAN ASSISTANT

## 2020-02-13 PROCEDURE — 87086 URINE CULTURE/COLONY COUNT: CPT

## 2020-02-13 PROCEDURE — 87077 CULTURE AEROBIC IDENTIFY: CPT

## 2020-02-13 PROCEDURE — 85610 PROTHROMBIN TIME: CPT

## 2020-02-13 PROCEDURE — 86900 BLOOD TYPING SEROLOGIC ABO: CPT

## 2020-02-13 PROCEDURE — 86901 BLOOD TYPING SEROLOGIC RH(D): CPT

## 2020-02-13 PROCEDURE — 81001 URINALYSIS AUTO W/SCOPE: CPT

## 2020-02-13 PROCEDURE — 87081 CULTURE SCREEN ONLY: CPT

## 2020-02-13 RX ORDER — AMLODIPINE BESYLATE 5 MG/1
5 TABLET ORAL DAILY
COMMUNITY
Start: 2020-01-29 | End: 2020-02-20 | Stop reason: HOSPADM

## 2020-02-13 RX ORDER — CHLORHEXIDINE GLUCONATE 0.12 MG/ML
15 RINSE ORAL EVERY 12 HOURS SCHEDULED
Status: CANCELLED | OUTPATIENT
Start: 2020-02-13

## 2020-02-13 RX ORDER — UMECLIDINIUM 62.5 UG/1
1 AEROSOL, POWDER ORAL DAILY
COMMUNITY
Start: 2020-01-29 | End: 2020-06-25 | Stop reason: HOSPADM

## 2020-02-13 NOTE — LETTER
February 13, 2020     [address]    Patient: Luke Deleon   YOB: 1940   Date of Visit: 2/13/2020       Dear Dr Amber Scanlon:    Thank you for referring Luke Deleon to me for evaluation  Below are my notes for this consultation  If you have questions, please do not hesitate to call me  I look forward to following your patient along with you  Sincerely,        Sheron Watson MD        CC: DO Timmy Lewis PA-C  2/13/2020  9:56 AM  Attested  Consultation - Cardiothoracic Surgery   Luke Deleon 67 y o  female MRN: [id number]      Reason for Consult / Principal Problem: Aortic stenosis, Non-Rheumatic    History of Present Illness: Luke Deleon is a 67y o  year old female who was previously evaluated in our office by OVI Bermudez  for transcatheter aortic valve replacement  During this initial consultation, arrangements were made for the following studies to be completed: Gated CTA of the chest/abdomen/pelvis, left and right cardiac catheterization, dental clearance, pulmonary function tests with ABG, and carotid artery ultrasound  Luke Deleon now presents to review the results of these tests and confirm the suitability of proceeding with transcatheter aortic valve replacment  Since her initial evaluation she denies any changes to her past medical history  However, she does note confirmation of left internal carotid artery occlusion  She does complain of significant exertional dyspnea with minimal activity  She denies exertional angina  She denies any lightheadedness, dizziness, or syncope  She also denies any recent fevers, sweats, or chills      Past Medical History:  Past Medical History:   Diagnosis Date    Aortic valve disease     Atrial fibrillation ([address] )     Cardiac disease     Diabetes mellitus ([facility]     Heart murmur     Heart valve disease     Hypertension     [facility]roke [facility])        Past Surgical History:   Past Surgical History:   Procedure Laterality Date     SECTION      x 2    HYSTERECTOMY      TONSILLECTOMY         Family History:  Family History   Problem Relation Age of Onset    Diabetes Mother     Heart disease Mother     Diabetes Father     Heart disease Father        Social History:    Social History     Substance and Sexual Activity   Alcohol Use Not Currently     Social History     Substance and Sexual Activity   Drug Use Never     Social History     Tobacco Use   Smoking Status Never Smoker   Smokeless Tobacco Never Used       Home Medications:   Prior to Admission medications    Medication Sig Start Date End Date Taking?  Authorizing Provider   acetaminophen (TYLENOL) 325 mg tablet Take 650 mg by mouth every 6 (six) hours as needed for mild pain   Yes Historical Provider, MD   alendronate (FOSAMAX) 70 mg tablet Take 70 mg by mouth Once a week   Yes Historical Provider, MD   amLODIPine (NORVASC) 5 mg tablet Take 5 mg by mouth daily 20  Yes Historical Provider, MD   ascorbic acid (VITAMIN C) 500 mg tablet Take 500 mg by mouth 2 (two) times a day   Yes Historical Provider, MD   atorvastatin (LIPITOR) 10 mg tablet Take 10 mg by mouth daily   Yes Historical Provider, MD   BABY ASPIRIN PO Take 81 mg by mouth daily 18  Yes Historical Provider, MD   cholecalciferol (VITAMIN D3) 1,000 units tablet Take 1,000 Units by mouth daily   Yes Historical Provider, MD   CINNAMON PO Take by mouth   Yes Historical Provider, MD   cyanocobalamin (VITAMIN B-12) 100 mcg tablet Take by mouth daily   Yes Historical Provider, MD   diltiazem (DILACOR XR) 240 MG 24 hr capsule Take 240 mg by mouth daily 18  Yes Historical Provider, MD   INCRUSE ELLIPTA 62 5 MCG/INH AEPB inhaler Inhale 1 puff daily 20  Yes Historical Provider, MD   metFORMIN (GLUCOPHAGE) 500 mg tablet Take 500 mg by mouth 2 (two) times a day   Yes Historical Provider, MD   polyethylene glycol (MIRALAX) 17 g packet Take 17 g by mouth daily  Patient taking differently: Take 17 g by mouth as needed  10/29/19  Yes Miguel Weller PA-C   temazepam (RESTORIL) 30 mg capsule Take 30 mg by mouth daily at bedtime    Yes Historical Provider, MD   vitamin E 100 UNIT capsule Take 100 Units by mouth daily   Yes Historical Provider, MD   docusate sodium (COLACE) 100 mg capsule Take 1 capsule (100 mg total) by mouth 2 (two) times a day  Patient not taking: Reported on 2/13/2020 10/28/19 2/13/20  Miguel Weller PA-C       Allergies: Allergies   Allergen Reactions    Atorvastatin      dizziness and shaky    Rivaroxaban      Dizziness, and shaky  Review of Systems:  Review of Systems   Constitutional: Negative  HENT: Negative  Eyes: Negative  Respiratory: Positive for shortness of breath  Negative for chest tightness  Cardiovascular: Positive for chest pain and leg swelling  Endocrine: Negative  Genitourinary: Negative  Musculoskeletal: Positive for arthralgias and back pain  Neurological: Negative  Psychiatric/Behavioral: Negative  Vital Signs:     Vitals:    02/13/20 0900   BP: 106/72   BP Location: Left arm   Cuff Size: Adult   Pulse: 74   Resp: 14   Temp: 97 8 °F (36 6 °C)   TempSrc: Oral   SpO2: 93%   Weight: 80 7 kg (178 lb)   Height: 5' 4" (1 626 m)       Physical Exam:  Physical Exam   Constitutional: She is oriented to person, place, and time  She appears well-developed and well-nourished  HENT:   Head: Normocephalic and atraumatic  Eyes: Pupils are equal, round, and reactive to light  Conjunctivae are normal    Neck: Normal range of motion  Neck supple  Cardiovascular: Normal rate and regular rhythm  Murmur heard  Pulmonary/Chest: Effort normal and breath sounds normal    Abdominal: Soft  Bowel sounds are normal    Musculoskeletal: Normal range of motion  Neurological: She is alert and oriented to person, place, and time  Skin: Skin is warm and dry  Psychiatric: She has a normal mood and affect  Her behavior is normal        Lab Results:               Invalid input(s): LABGLOM      Lab Results   Component Value Date    HGBA1C 7 8 (H) 10/24/2019     Lab Results   Component Value Date    TROPONINI 0 02 03/28/2019       Imaging Studies:     Echocardiogram:     LEFT VENTRICLE:  Wall thickness was mildly increased  Doppler parameters were consistent with elevated ventricular end-diastolic filling pressure      RIGHT VENTRICLE:  The ventricle was mildly dilated      LEFT ATRIUM:  The atrium was mildly dilated      MITRAL VALVE:  There was mild annular calcification  There was moderate regurgitation      AORTIC VALVE:  There was critical stenosis  ROCHELLE 0 4 cm2  There was mild regurgitation      TRICUSPID VALVE:  There was mild regurgitation      AORTA:  The root exhibited normal size and mild fibrocalcific change      HISTORY: Dizziness  PRIOR HISTORY: Atrial fibrillation  Risk factors: hypertension and diabetes  Remote stroke      PROCEDURE: The procedure was performed at the bedside  This was a routine study  The transthoracic approach was used  The study included complete 2D imaging, M-mode, complete spectral Doppler, and color Doppler  The heart rate was 95 bpm,  at the start of the study  Images were obtained from the parasternal, apical, subcostal, and suprasternal notch acoustic windows  Image quality was adequate      LEFT VENTRICLE: Size was normal  Ejection fraction was estimated in the range of 50 % to 55 % to be 50 %  Wall thickness was mildly increased  DOPPLER: Left ventricular diastolic function parameters were abnormal  Doppler parameters were  consistent with elevated ventricular end-diastolic filling pressure      RIGHT VENTRICLE: The ventricle was mildly dilated  Systolic function was normal      LEFT ATRIUM: The atrium was mildly dilated      RIGHT ATRIUM: Size was normal      MITRAL VALVE: There was mild annular calcification   There was mild calcification  DOPPLER: There was moderate regurgitation      AORTIC VALVE: DOPPLER: There was critical stenosis  ROCHELLE 0 4 cm2 There was mild regurgitation      TRICUSPID VALVE: DOPPLER: There was mild regurgitation  Estimated peak PA pressure was 50 mmHg      PULMONIC VALVE: Not well visualized  DOPPLER: There was no regurgitation      PERICARDIUM: There was no pericardial effusion  The pericardium was normal in appearance      AORTA: The root exhibited normal size and mild fibrocalcific change      SYSTEMIC VEINS: IVC: The inferior vena cava was normal in size and course  Respirophasic changes were normal     Gated CTA of the chest/abdomen/pelvis:       VASCULAR STRUCTURES:       Annulus: diameter 25 x 22 mm      area: 426 sq mm    Annulus to LCA: 10 mm    Annulus to RCA: 16 mm    Minimal diameter right iliofemoral segment: 6 mm    Minimal diameter left iliofemoral segment: 6 mm    Left and right cardiac catheterization:     Coronary angiography demonstrated minor luminal irregularities  The coronary circulation is left dominant  Left main: The vessel was normal sized  Angiography showed no evidence of disease  LAD: The vessel was normal sized and moderately tortuous  Angiography showed minor luminal irregularities  There was one major diagonal branch  Circumflex: The vessel was large sized (dominant)  Angiography showed no evidence of disease  There were two major obtuse marginals  1st obtuse marginal: The vessel was normal sized  Angiography showed no evidence of disease  2nd obtuse marginal: The vessel was normal sized  Angiography showed no evidence of disease  RCA: The vessel was small (non-dominant)  Angiography showed no evidence of disease  Pulmonary function tests: FEV 1: 2 06 L (76 % predicted)    Arterial Blood gas: 7 42/40/76/95%    Carotid artery ultrasound: Right 50-69%; Left Occluded    CTA head/Neck: ELKIN 79% stenosis; LICA occluded    I have personally reviewed pertinent reports     and I have personally reviewed pertinent films in PACS    TAVR evaluation Assessment:     5 Meter Walk Test:      Attempt 1: 36   Attempt 2: 36   Attempt 3: 36    STS Risk Score: 2 5 %, mortality risk    Riccardo Doyle 122: III    KCCQ-12 was completed    Assessment:  Patient Active Problem List    Diagnosis Date Noted    Carotid occlusion, left 01/31/2020    Carotid stenosis, asymptomatic, right 01/31/2020    Severe aortic stenosis 10/26/2019    Tibial plateau fracture 47/94/8309    Atrial fibrillation ([facility] ) 10/23/2019    History of stroke     Hypertension     Diabetes mellitus ([facility] )      Severe aortic stenosis; Ongoing TAVR workup    Plan:    [facility] has severe symptomatic aortic stenosis  Based on their STS risk assessment, they have undergone testing for transcatheter aortic valve replacement  The results of these studies have been interpreted by ARI Bailey  who has determined the patient to be Intermediate risk for open aortic valve replacement  The risks, benefits, and alternatives to TAVR were discussed in detail with the patient today  They understand and wish to proceed with transfemoral transcatheter aortic valve replacement  Informed consent was obtained and a date for the intervention has been set  [facility] was comfortable with our recommendations, and their questions were answered to their satisfaction  The following preoperative instructions were provided at the conclusion of their consultation:     1  You will receive a phone call from the hospital between 2:00 PM and 8:00 PM the day prior to surgery to confirm arrival time and location  For surgery on Mondays, you will receive a call on Friday  2  Do not drink or eat anything after midnight the night before surgery  That includes no water, candy, gum, lozenges, Lifesavers, etc  We recommend you not eat any "junk" food, consume alcohol or smoke the night before surgery    3  Continue taking aspirin but only 81 mg daily   4  If you take Coumadin and/or Plavix, discontinue it 5 days before surgery  5  If you are diabetic, do not take any of your diabetic pills the morning of surgery  If you take Lantus insulin, you may take it at your regularly scheduled time the day before surgery  Do not take any other insulins the morning of surgery  6  The 2 nights before surgery, take a shower each night using the special antiseptic soap or soap sponges you received from the office or [facility] your hair with regular shampoo and rinse completely before using the antiseptic sponges  Use the sponge to wash from your neck down, with special attention to the armpits and groin area  Do not use any other soap or cleanser on your skin  Do not use lotions, powder, deodorant or perfume of any kind on your skin after you shower  Use clean bed linens and wear clean pajamas after your shower  7  You will be prescribed Mupirocin nasal ointment  Apply to both nostrils twice a day for 5 days prior to surgery  8  Do not take a shower the morning of her surgery; you'll be given a special" bath" at the hospital   9  Notify the CT surgery office if you develop a cold, sore throat, cough, fever or other health issues before your surgery  10  Other medication changes included the following: Metformin and all OTC supplments  SIGNATURE: Ирина Borges PA-C  DATE: February 13, 2020  TIME: 9:39 AM  Attestation signed by Joaquim Guillaume MD at 2/13/2020 10:40 AM:  Patient seen and evaluated with Sigifredo Godinez / FLORINA  I agree with the above assessment and plan with the following additions  The patient is a 76-year-old female with symptomatic severe aortic stenosis, she has completed TAVR preoperative testing and returns to schedule surgery  I have reviewed her diagnosis once again and indication for surgery  I explained the procedure, benefits, risks and possible complications  She understands and agrees to proceed with surgery

## 2020-02-13 NOTE — H&P (VIEW-ONLY)
Consultation - Cardiothoracic Surgery   [facility] 67 y o  female MRN: [id number]      Reason for Consult / Principal Problem: Aortic stenosis, Non-Rheumatic    History of Present Illness: [facility] is a 67y o  year old female who was previously evaluated in our office by OVI Olmos  for transcatheter aortic valve replacement  During this initial consultation, arrangements were made for the following studies to be completed: Gated CTA of the chest/abdomen/pelvis, left and right cardiac catheterization, dental clearance, pulmonary function tests with ABG, and carotid artery ultrasound  [facility] now presents to review the results of these tests and confirm the suitability of proceeding with transcatheter aortic valve replacment  Since her initial evaluation she denies any changes to her past medical history  However, she does note confirmation of left internal carotid artery occlusion  She does complain of significant exertional dyspnea with minimal activity  She denies exertional angina  She denies any lightheadedness, dizziness, or syncope  She also denies any recent fevers, sweats, or chills      Past Medical History:  Past Medical History:   Diagnosis Date    Aortic valve disease     Atrial fibrillation ([facility] [address] [facility]     Cardiac disease     Diabetes mellitus ([facility] [facility]     Heart murmur     Heart valve disease     Hypertension     Stroke [facility]        Past Surgical History:   Past Surgical History:   Procedure Laterality Date     SECTION      x 2    HYSTERECTOMY      TONSILLECTOMY         Family History:  Family History   Problem Relation Age of Onset    Diabetes Mother     Heart disease Mother     Diabetes Father     Heart disease Father        Social History:    Social History     Substance and Sexual Activity   Alcohol Use Not Currently     Social History     Substance and Sexual Activity   Drug Use Never     Social History     Tobacco Use   Smoking Status Never Smoker   Smokeless Tobacco Never Used       Home Medications:   Prior to Admission medications    Medication Sig Start Date End Date Taking?  Authorizing Provider   acetaminophen (TYLENOL) 325 mg tablet Take 650 mg by mouth every 6 (six) hours as needed for mild pain   Yes Historical Provider, MD   alendronate (FOSAMAX) 70 mg tablet Take 70 mg by mouth Once a week   Yes Historical Provider, MD   amLODIPine (NORVASC) 5 mg tablet Take 5 mg by mouth daily 1/29/20  Yes Historical Provider, MD   ascorbic acid (VITAMIN C) 500 mg tablet Take 500 mg by mouth 2 (two) times a day   Yes Historical Provider, MD   atorvastatin (LIPITOR) 10 mg tablet Take 10 mg by mouth daily   Yes Historical Provider, MD   BABY ASPIRIN PO Take 81 mg by mouth daily 2/12/18  Yes Historical Provider, MD   cholecalciferol (VITAMIN D3) 1,000 units tablet Take 1,000 Units by mouth daily   Yes Historical Provider, MD   CINNAMON PO Take by mouth   Yes Historical Provider, MD   cyanocobalamin (VITAMIN B-12) 100 mcg tablet Take by mouth daily   Yes Historical Provider, MD   diltiazem (DILACOR XR) 240 MG 24 hr capsule Take 240 mg by mouth daily 2/12/18  Yes Historical Provider, MD   INCRUSE ELLIPTA 62 5 MCG/INH AEPB inhaler Inhale 1 puff daily 1/29/20  Yes Historical Provider, MD   metFORMIN (GLUCOPHAGE) 500 mg tablet Take 500 mg by mouth 2 (two) times a day   Yes Historical Provider, MD   polyethylene glycol (MIRALAX) 17 g packet Take 17 g by mouth daily  Patient taking differently: Take 17 g by mouth as needed  10/29/19  Yes Carmen Fisher PA-C   temazepam (RESTORIL) 30 mg capsule Take 30 mg by mouth daily at bedtime    Yes Historical Provider, MD   vitamin E 100 UNIT capsule Take 100 Units by mouth daily   Yes Historical Provider, MD   docusate sodium (COLACE) 100 mg capsule Take 1 capsule (100 mg total) by mouth 2 (two) times a day  Patient not taking: Reported on 2/13/2020 10/28/19 2/13/20  Carmen Fisher PA-C Allergies: Allergies   Allergen Reactions    Atorvastatin      dizziness and shaky    Rivaroxaban      Dizziness, and shaky  Review of Systems:  Review of Systems   Constitutional: Negative  HENT: Negative  Eyes: Negative  Respiratory: Positive for shortness of breath  Negative for chest tightness  Cardiovascular: Positive for chest pain and leg swelling  Endocrine: Negative  Genitourinary: Negative  Musculoskeletal: Positive for arthralgias and back pain  Neurological: Negative  Psychiatric/Behavioral: Negative  Vital Signs:     Vitals:    02/13/20 0900   BP: 106/72   BP Location: Left arm   Cuff Size: Adult   Pulse: 74   Resp: 14   Temp: 97 8 °F (36 6 °C)   TempSrc: Oral   SpO2: 93%   Weight: 80 7 kg (178 lb)   Height: 5' 4" (1 626 m)       Physical Exam:  Physical Exam   Constitutional: She is oriented to person, place, and time  She appears well-developed and well-nourished  HENT:   Head: Normocephalic and atraumatic  Eyes: Pupils are equal, round, and reactive to light  Conjunctivae are normal    Neck: Normal range of motion  Neck supple  Cardiovascular: Normal rate and regular rhythm  Murmur heard  Pulmonary/Chest: Effort normal and breath sounds normal    Abdominal: Soft  Bowel sounds are normal    Musculoskeletal: Normal range of motion  Neurological: She is alert and oriented to person, place, and time  Skin: Skin is warm and dry  Psychiatric: She has a normal mood and affect  Her behavior is normal        Lab Results:               Invalid input(s): LABGLOM      Lab Results   Component Value Date    HGBA1C 7 8 (H) 10/24/2019     Lab Results   Component Value Date    TROPONINI 0 02 03/28/2019       Imaging Studies:     Echocardiogram:     LEFT VENTRICLE:  Wall thickness was mildly increased    Doppler parameters were consistent with elevated ventricular end-diastolic filling pressure      RIGHT VENTRICLE:  The ventricle was mildly dilated      LEFT ATRIUM:  The atrium was mildly dilated      MITRAL VALVE:  There was mild annular calcification  There was moderate regurgitation      AORTIC VALVE:  There was critical stenosis  ROCHELLE 0 4 cm2  There was mild regurgitation      TRICUSPID VALVE:  There was mild regurgitation      AORTA:  The root exhibited normal size and mild fibrocalcific change      HISTORY: Dizziness  PRIOR HISTORY: Atrial fibrillation  Risk factors: hypertension and diabetes  Remote stroke      PROCEDURE: The procedure was performed at the bedside  This was a routine study  The transthoracic approach was used  The study included complete 2D imaging, M-mode, complete spectral Doppler, and color Doppler  The heart rate was 95 bpm,  at the start of the study  Images were obtained from the parasternal, apical, subcostal, and suprasternal notch acoustic windows  Image quality was adequate      LEFT VENTRICLE: Size was normal  Ejection fraction was estimated in the range of 50 % to 55 % to be 50 %  Wall thickness was mildly increased  DOPPLER: Left ventricular diastolic function parameters were abnormal  Doppler parameters were  consistent with elevated ventricular end-diastolic filling pressure      RIGHT VENTRICLE: The ventricle was mildly dilated  Systolic function was normal      LEFT ATRIUM: The atrium was mildly dilated      RIGHT ATRIUM: Size was normal      MITRAL VALVE: There was mild annular calcification  There was mild calcification  DOPPLER: There was moderate regurgitation      AORTIC VALVE: DOPPLER: There was critical stenosis  ROCHELLE 0 4 cm2 There was mild regurgitation      TRICUSPID VALVE: DOPPLER: There was mild regurgitation  Estimated peak PA pressure was 50 mmHg      PULMONIC VALVE: Not well visualized  DOPPLER: There was no regurgitation      PERICARDIUM: There was no pericardial effusion   The pericardium was normal in appearance      AORTA: The root exhibited normal size and mild fibrocalcific change      SYSTEMIC VEINS: IVC: The inferior vena cava was normal in size and course  Respirophasic changes were normal     Gated CTA of the chest/abdomen/pelvis:       VASCULAR STRUCTURES:       Annulus: diameter 25 x 22 mm      area: 426 sq mm    Annulus to LCA: 10 mm    Annulus to RCA: 16 mm    Minimal diameter right iliofemoral segment: 6 mm    Minimal diameter left iliofemoral segment: 6 mm    Left and right cardiac catheterization:     Coronary angiography demonstrated minor luminal irregularities  The coronary circulation is left dominant  Left main: The vessel was normal sized  Angiography showed no evidence of disease  LAD: The vessel was normal sized and moderately tortuous  Angiography showed minor luminal irregularities  There was one major diagonal branch  Circumflex: The vessel was large sized (dominant)  Angiography showed no evidence of disease  There were two major obtuse marginals  1st obtuse marginal: The vessel was normal sized  Angiography showed no evidence of disease  2nd obtuse marginal: The vessel was normal sized  Angiography showed no evidence of disease  RCA: The vessel was small (non-dominant)  Angiography showed no evidence of disease  Pulmonary function tests: FEV 1: 2 06 L (76 % predicted)    Arterial Blood gas: 7 42/40/76/95%    Carotid artery ultrasound: Right 50-69%; Left Occluded    CTA head/Neck: ELKIN 64% stenosis; LICA occluded    I have personally reviewed pertinent reports     and I have personally reviewed pertinent films in PACS    TAVR evaluation Assessment:     5 Meter Walk Test:      Attempt 1: 36   Attempt 2: 36   Attempt 3: 36    STS Risk Score: 2 5 %, mortality risk    [address]: III    KCCQ-12 was completed    Assessment:  Patient Active Problem List    Diagnosis Date Noted    Carotid occlusion, left 01/31/2020    Carotid stenosis, asymptomatic, right 01/31/2020    Severe aortic stenosis 10/26/2019    Tibial plateau fracture 86/14/1063    [facility] [address] [facility] 10/23/2019    History of stroke     Hypertension     Diabetes mellitus ([facility] [address] )      Severe aortic stenosis; Ongoing TAVR workup    Plan:    Emma Vicente has severe symptomatic aortic stenosis  Based on their STS risk assessment, they have undergone testing for transcatheter aortic valve replacement  The results of these studies have been interpreted by ARI Hunt  who has determined the patient to be Intermediate risk for open aortic valve replacement  The risks, benefits, and alternatives to TAVR were discussed in detail with the patient today  They understand and wish to proceed with transfemoral transcatheter aortic valve replacement  Informed consent was obtained and a date for the intervention has been set  Emma Vicente was comfortable with our recommendations, and their questions were answered to their satisfaction  The following preoperative instructions were provided at the conclusion of their consultation:     1  You will receive a phone call from the hospital between 2:00 PM and 8:00 PM the day prior to surgery to confirm arrival time and location  For surgery on Mondays, you will receive a call on Friday  2  Do not drink or eat anything after midnight the night before surgery  That includes no water, candy, gum, lozenges, Lifesavers, etc  We recommend you not eat any "junk" food, consume alcohol or smoke the night before surgery  3  Continue taking aspirin but only 81 mg daily  4  If you take Coumadin and/or Plavix, discontinue it 5 days before surgery  5  If you are diabetic, do not take any of your diabetic pills the morning of surgery  If you take Lantus insulin, you may take it at your regularly scheduled time the day before surgery  Do not take any other insulins the morning of surgery    6  The 2 nights before surgery, take a shower each night using the special antiseptic soap or soap sponges you received from the office or hospital  Shampoo your hair with regular shampoo and rinse completely before using the antiseptic sponges  Use the sponge to wash from your neck down, with special attention to the armpits and groin area  Do not use any other soap or cleanser on your skin  Do not use lotions, powder, deodorant or perfume of any kind on your skin after you shower  Use clean bed linens and wear clean pajamas after your shower  7  You will be prescribed Mupirocin nasal ointment  Apply to both nostrils twice a day for 5 days prior to surgery  8  Do not take a shower the morning of her surgery; you'll be given a special" bath" at the hospital   9  Notify the CT surgery office if you develop a cold, sore throat, cough, fever or other health issues before your surgery  10  Other medication changes included the following: Metformin and all OTC supplments       SIGNATURE: Reshma Carrera PA-C  DATE: February 13, 2020  TIME: 9:39 AM

## 2020-02-13 NOTE — PROGRESS NOTES
Consultation - Cardiothoracic Surgery   [facility] 67 y o  female MRN: [id number]      Reason for Consult / Principal Problem: Aortic stenosis, Non-Rheumatic    History of Present Illness: [facility] is a 67y o  year old female who was previously evaluated in our office by [facility] [place]  for transcatheter aortic valve replacement  During this initial consultation, arrangements were made for the following studies to be completed: Gated CTA of the chest/abdomen/pelvis, left and right cardiac catheterization, dental clearance, pulmonary function tests with ABG, and carotid artery ultrasound  [facility] now presents to review the results of these tests and confirm the suitability of proceeding with transcatheter aortic valve replacment  Since her initial evaluation she denies any changes to her past medical history  However, she does note confirmation of left internal carotid artery occlusion  She does complain of significant exertional dyspnea with minimal activity  She denies exertional angina  She denies any lightheadedness, dizziness, or syncope  She also denies any recent fevers, sweats, or chills      Past Medical History:  Past Medical History:   Diagnosis Date    Aortic valve disease     Atrial fibrillation ([address]     Cardiac disease     Diabetes mellitus ([address] )     Heart murmur     Heart valve disease     Hypertension     Stroke [facility])        Past Surgical History:   Past Surgical History:   Procedure Laterality Date     SECTION      x 2    HYSTERECTOMY      TONSILLECTOMY         Family History:  Family History   Problem Relation Age of Onset    Diabetes Mother     Heart disease Mother     Diabetes Father     Heart disease Father        Social History:    Social History     Substance and Sexual Activity   Alcohol Use Not Currently     Social History     Substance and Sexual Activity   Drug Use Never     Social History     Tobacco Use   Smoking Status Never Smoker   Smokeless Tobacco Never Used       Home Medications:   Prior to Admission medications    Medication Sig Start Date End Date Taking?  Authorizing Provider   acetaminophen (TYLENOL) 325 mg tablet Take 650 mg by mouth every 6 (six) hours as needed for mild pain   Yes Historical Provider, MD   alendronate (FOSAMAX) 70 mg tablet Take 70 mg by mouth Once a week   Yes Historical Provider, MD   amLODIPine (NORVASC) 5 mg tablet Take 5 mg by mouth daily 1/29/20  Yes Historical Provider, MD   ascorbic acid (VITAMIN C) 500 mg tablet Take 500 mg by mouth 2 (two) times a day   Yes Historical Provider, MD   atorvastatin (LIPITOR) 10 mg tablet Take 10 mg by mouth daily   Yes Historical Provider, MD   BABY ASPIRIN PO Take 81 mg by mouth daily 2/12/18  Yes Historical Provider, MD   cholecalciferol (VITAMIN D3) 1,000 units tablet Take 1,000 Units by mouth daily   Yes Historical Provider, MD   CINNAMON PO Take by mouth   Yes Historical Provider, MD   cyanocobalamin (VITAMIN B-12) 100 mcg tablet Take by mouth daily   Yes Historical Provider, MD   diltiazem (DILACOR XR) 240 MG 24 hr capsule Take 240 mg by mouth daily 2/12/18  Yes Historical Provider, MD   INCRUSE ELLIPTA 62 5 MCG/INH AEPB inhaler Inhale 1 puff daily 1/29/20  Yes Historical Provider, MD   metFORMIN (GLUCOPHAGE) 500 mg tablet Take 500 mg by mouth 2 (two) times a day   Yes Historical Provider, MD   polyethylene glycol (MIRALAX) 17 g packet Take 17 g by mouth daily  Patient taking differently: Take 17 g by mouth as needed  10/29/19  Yes Pro Clark PA-C   temazepam (RESTORIL) 30 mg capsule Take 30 mg by mouth daily at bedtime    Yes Historical Provider, MD   vitamin E 100 UNIT capsule Take 100 Units by mouth daily   Yes Historical Provider, MD   docusate sodium (COLACE) 100 mg capsule Take 1 capsule (100 mg total) by mouth 2 (two) times a day  Patient not taking: Reported on 2/13/2020 10/28/19 2/13/20  Pro Clark PA-C Allergies: Allergies   Allergen Reactions    Atorvastatin      dizziness and shaky    Rivaroxaban      Dizziness, and shaky  Review of Systems:  Review of Systems   Constitutional: Negative  HENT: Negative  Eyes: Negative  Respiratory: Positive for shortness of breath  Negative for chest tightness  Cardiovascular: Positive for chest pain and leg swelling  Endocrine: Negative  Genitourinary: Negative  Musculoskeletal: Positive for arthralgias and back pain  Neurological: Negative  Psychiatric/Behavioral: Negative  Vital Signs:     Vitals:    02/13/20 0900   BP: 106/72   BP Location: Left arm   Cuff Size: Adult   Pulse: 74   Resp: 14   Temp: 97 8 °F (36 6 °C)   TempSrc: Oral   SpO2: 93%   Weight: 80 7 kg (178 lb)   Height: 5' 4" (1 626 m)       Physical Exam:  Physical Exam   Constitutional: She is oriented to person, place, and time  She appears well-developed and well-nourished  HENT:   Head: Normocephalic and atraumatic  Eyes: Pupils are equal, round, and reactive to light  Conjunctivae are normal    Neck: Normal range of motion  Neck supple  Cardiovascular: Normal rate and regular rhythm  Murmur heard  Pulmonary/Chest: Effort normal and breath sounds normal    Abdominal: Soft  Bowel sounds are normal    Musculoskeletal: Normal range of motion  Neurological: She is alert and oriented to person, place, and time  Skin: Skin is warm and dry  Psychiatric: She has a normal mood and affect  Her behavior is normal        Lab Results:               Invalid input(s): LABGLOM      Lab Results   Component Value Date    HGBA1C 7 8 (H) 10/24/2019     Lab Results   Component Value Date    TROPONINI 0 02 03/28/2019       Imaging Studies:     Echocardiogram:     LEFT VENTRICLE:  Wall thickness was mildly increased    Doppler parameters were consistent with elevated ventricular end-diastolic filling pressure      RIGHT VENTRICLE:  The ventricle was mildly dilated      LEFT ATRIUM:  The atrium was mildly dilated      MITRAL VALVE:  There was mild annular calcification  There was moderate regurgitation      AORTIC VALVE:  There was critical stenosis  ROCHELLE 0 4 cm2  There was mild regurgitation      TRICUSPID VALVE:  There was mild regurgitation      AORTA:  The root exhibited normal size and mild fibrocalcific change      HISTORY: Dizziness  PRIOR HISTORY: Atrial fibrillation  Risk factors: hypertension and diabetes  Remote stroke      PROCEDURE: The procedure was performed at the bedside  This was a routine study  The transthoracic approach was used  The study included complete 2D imaging, M-mode, complete spectral Doppler, and color Doppler  The heart rate was 95 bpm,  at the start of the study  Images were obtained from the parasternal, apical, subcostal, and suprasternal notch acoustic windows  Image quality was adequate      LEFT VENTRICLE: Size was normal  Ejection fraction was estimated in the range of 50 % to 55 % to be 50 %  Wall thickness was mildly increased  DOPPLER: Left ventricular diastolic function parameters were abnormal  Doppler parameters were  consistent with elevated ventricular end-diastolic filling pressure      RIGHT VENTRICLE: The ventricle was mildly dilated  Systolic function was normal      LEFT ATRIUM: The atrium was mildly dilated      RIGHT ATRIUM: Size was normal      MITRAL VALVE: There was mild annular calcification  There was mild calcification  DOPPLER: There was moderate regurgitation      AORTIC VALVE: DOPPLER: There was critical stenosis  ROCHELLE 0 4 cm2 There was mild regurgitation      TRICUSPID VALVE: DOPPLER: There was mild regurgitation  Estimated peak PA pressure was 50 mmHg      PULMONIC VALVE: Not well visualized  DOPPLER: There was no regurgitation      PERICARDIUM: There was no pericardial effusion   The pericardium was normal in appearance      AORTA: The root exhibited normal size and mild fibrocalcific change      SYSTEMIC VEINS: IVC: The inferior vena cava was normal in size and course  Respirophasic changes were normal     Gated CTA of the chest/abdomen/pelvis:       VASCULAR STRUCTURES:       Annulus: diameter 25 x 22 mm      area: 426 sq mm    Annulus to LCA: 10 mm    Annulus to RCA: 16 mm    Minimal diameter right iliofemoral segment: 6 mm    Minimal diameter left iliofemoral segment: 6 mm    Left and right cardiac catheterization:     Coronary angiography demonstrated minor luminal irregularities  The coronary circulation is left dominant  Left main: The vessel was normal sized  Angiography showed no evidence of disease  LAD: The vessel was normal sized and moderately tortuous  Angiography showed minor luminal irregularities  There was one major diagonal branch  Circumflex: The vessel was large sized (dominant)  Angiography showed no evidence of disease  There were two major obtuse marginals  1st obtuse marginal: The vessel was normal sized  Angiography showed no evidence of disease  2nd obtuse marginal: The vessel was normal sized  Angiography showed no evidence of disease  RCA: The vessel was small (non-dominant)  Angiography showed no evidence of disease  Pulmonary function tests: FEV 1: 2 06 L (76 % predicted)    Arterial Blood gas: 7 42/40/76/95%    Carotid artery ultrasound: Right 50-69%; Left Occluded    CTA head/Neck: ELKIN 09% stenosis; LICA occluded    I have personally reviewed pertinent reports     and I have personally reviewed pertinent films in PACS    TAVR evaluation Assessment:     5 Meter Walk Test:      Attempt 1: 36   Attempt 2: 36   Attempt 3: 36    STS Risk Score: 2 5 %, mortality risk    [address]: III    KCCQ-12 was completed    Assessment:  Patient Active Problem List    Diagnosis Date Noted    Carotid occlusion, left 01/31/2020    Carotid stenosis, asymptomatic, right 01/31/2020    Severe aortic stenosis 10/26/2019    Tibial plateau fracture 40/64/1066    Atrial fibrillation ([facility] [address] [facility] 10/23/2019    History of stroke     Hypertension     Diabetes mellitus [address] [address] )      Severe aortic stenosis; Ongoing TAVR workup    Plan:    Mckenzie Mendoza has severe symptomatic aortic stenosis  Based on their STS risk assessment, they have undergone testing for transcatheter aortic valve replacement  The results of these studies have been interpreted by ARI Messer  who has determined the patient to be Intermediate risk for open aortic valve replacement  The risks, benefits, and alternatives to TAVR were discussed in detail with the patient today  They understand and wish to proceed with transfemoral transcatheter aortic valve replacement  Informed consent was obtained and a date for the intervention has been set  Mckenzie Mendoza was comfortable with our recommendations, and their questions were answered to their satisfaction  The following preoperative instructions were provided at the conclusion of their consultation:     1  You will receive a phone call from the hospital between 2:00 PM and 8:00 PM the day prior to surgery to confirm arrival time and location  For surgery on Mondays, you will receive a call on Friday  2  Do not drink or eat anything after midnight the night before surgery  That includes no water, candy, gum, lozenges, Lifesavers, etc  We recommend you not eat any "junk" food, consume alcohol or smoke the night before surgery  3  Continue taking aspirin but only 81 mg daily  4  If you take Coumadin and/or Plavix, discontinue it 5 days before surgery  5  If you are diabetic, do not take any of your diabetic pills the morning of surgery  If you take Lantus insulin, you may take it at your regularly scheduled time the day before surgery  Do not take any other insulins the morning of surgery    6  The 2 nights before surgery, take a shower each night using the special antiseptic soap or soap sponges you received from the office or hospital  Shampoo your hair with regular shampoo and rinse completely before using the antiseptic sponges  Use the sponge to wash from your neck down, with special attention to the armpits and groin area  Do not use any other soap or cleanser on your skin  Do not use lotions, powder, deodorant or perfume of any kind on your skin after you shower  Use clean bed linens and wear clean pajamas after your shower  7  You will be prescribed Mupirocin nasal ointment  Apply to both nostrils twice a day for 5 days prior to surgery  8  Do not take a shower the morning of her surgery; you'll be given a special" bath" at the hospital   9  Notify the CT surgery office if you develop a cold, sore throat, cough, fever or other health issues before your surgery  10  Other medication changes included the following: Metformin and all OTC supplments       SIGNATURE: Zander Arora PA-C  DATE: February 13, 2020  TIME: 9:39 AM

## 2020-02-13 NOTE — H&P
History and Physical- Cardiothoracic Surgery   [facility] 67 y o  female MRN: [id number]      Reason for Consult / Principal Problem: Aortic stenosis, Non-Rheumatic    History of Present Illness: [facility] is a 67y o  year old female who was previously evaluated in our office by OVI Azar  for transcatheter aortic valve replacement  During this initial consultation, arrangements were made for the following studies to be completed: Gated CTA of the chest/abdomen/pelvis, left and right cardiac catheterization, dental clearance, pulmonary function tests with ABG, and carotid artery ultrasound  [facility] now presents to review the results of these tests and confirm the suitability of proceeding with transcatheter aortic valve replacment  Since her initial evaluation she denies any changes to her past medical history  However, she does note confirmation of left internal carotid artery occlusion  She does complain of significant exertional dyspnea with minimal activity  She denies exertional angina  She denies any lightheadedness, dizziness, or syncope  She also denies any recent fevers, sweats, or chills      Past Medical History:  Past Medical History:   Diagnosis Date    Aortic valve disease     Atrial fibrillation [facility] [address]     Cardiac disease     Diabetes mellitus ([facility] [address] [facility]     Heart murmur     Heart valve disease     Hypertension     Stroke [facility])        Past Surgical History:   Past Surgical History:   Procedure Laterality Date     SECTION      x 2    HYSTERECTOMY      TONSILLECTOMY         Family History:  Family History   Problem Relation Age of Onset    Diabetes Mother     Heart disease Mother     Diabetes Father     Heart disease Father        Social History:    Social History     Substance and Sexual Activity   Alcohol Use Not Currently     Social History     Substance and Sexual Activity   Drug Use Never     Social History     Tobacco Use Smoking Status Never Smoker   Smokeless Tobacco Never Used       Home Medications:   Prior to Admission medications    Medication Sig Start Date End Date Taking?  Authorizing Provider   acetaminophen (TYLENOL) 325 mg tablet Take 650 mg by mouth every 6 (six) hours as needed for mild pain   Yes Historical Provider, MD   alendronate (FOSAMAX) 70 mg tablet Take 70 mg by mouth Once a week   Yes Historical Provider, MD   amLODIPine (NORVASC) 5 mg tablet Take 5 mg by mouth daily 1/29/20  Yes Historical Provider, MD   ascorbic acid (VITAMIN C) 500 mg tablet Take 500 mg by mouth 2 (two) times a day   Yes Historical Provider, MD   atorvastatin (LIPITOR) 10 mg tablet Take 10 mg by mouth daily   Yes Historical Provider, MD   BABY ASPIRIN PO Take 81 mg by mouth daily 2/12/18  Yes Historical Provider, MD   cholecalciferol (VITAMIN D3) 1,000 units tablet Take 1,000 Units by mouth daily   Yes Historical Provider, MD   CINNAMON PO Take by mouth   Yes Historical Provider, MD   cyanocobalamin (VITAMIN B-12) 100 mcg tablet Take by mouth daily   Yes Historical Provider, MD   diltiazem (DILACOR XR) 240 MG 24 hr capsule Take 240 mg by mouth daily 2/12/18  Yes Historical Provider, MD   INCRUSE ELLIPTA 62 5 MCG/INH AEPB inhaler Inhale 1 puff daily 1/29/20  Yes Historical Provider, MD   metFORMIN (GLUCOPHAGE) 500 mg tablet Take 500 mg by mouth 2 (two) times a day   Yes Historical Provider, MD   polyethylene glycol (MIRALAX) 17 g packet Take 17 g by mouth daily  Patient taking differently: Take 17 g by mouth as needed  10/29/19  Yes Ignacia Cabot, PA-C   temazepam (RESTORIL) 30 mg capsule Take 30 mg by mouth daily at bedtime    Yes Historical Provider, MD   vitamin E 100 UNIT capsule Take 100 Units by mouth daily   Yes Historical Provider, MD   docusate sodium (COLACE) 100 mg capsule Take 1 capsule (100 mg total) by mouth 2 (two) times a day  Patient not taking: Reported on 2/13/2020 10/28/19 2/13/20  Ignacia Cabot, PA-C Allergies: Allergies   Allergen Reactions    Atorvastatin      dizziness and shaky    Rivaroxaban      Dizziness, and shaky  Review of Systems:  Review of Systems   Constitutional: Negative  HENT: Negative  Eyes: Negative  Respiratory: Positive for shortness of breath  Negative for chest tightness  Cardiovascular: Positive for chest pain and leg swelling  Endocrine: Negative  Genitourinary: Negative  Musculoskeletal: Positive for arthralgias and back pain  Neurological: Negative  Psychiatric/Behavioral: Negative  Vital Signs:     Vitals:    02/13/20 0900   BP: 106/72   BP Location: Left arm   Cuff Size: Adult   Pulse: 74   Resp: 14   Temp: 97 8 °F (36 6 °C)   TempSrc: Oral   SpO2: 93%   Weight: 80 7 kg (178 lb)   Height: 5' 4" (1 626 m)       Physical Exam:  Physical Exam   Constitutional: She is oriented to person, place, and time  She appears well-developed and well-nourished  HENT:   Head: Normocephalic and atraumatic  Eyes: Pupils are equal, round, and reactive to light  Conjunctivae are normal    Neck: Normal range of motion  Neck supple  Cardiovascular: Normal rate and regular rhythm  Murmur heard  Pulmonary/Chest: Effort normal and breath sounds normal    Abdominal: Soft  Bowel sounds are normal    Musculoskeletal: Normal range of motion  Neurological: She is alert and oriented to person, place, and time  Skin: Skin is warm and dry  Psychiatric: She has a normal mood and affect  Her behavior is normal        Lab Results:               Invalid input(s): LABGLOM      Lab Results   Component Value Date    HGBA1C 7 8 (H) 10/24/2019     Lab Results   Component Value Date    TROPONINI 0 02 03/28/2019       Imaging Studies:     Echocardiogram:     LEFT VENTRICLE:  Wall thickness was mildly increased    Doppler parameters were consistent with elevated ventricular end-diastolic filling pressure      RIGHT VENTRICLE:  The ventricle was mildly dilated      LEFT ATRIUM:  The atrium was mildly dilated      MITRAL VALVE:  There was mild annular calcification  There was moderate regurgitation      AORTIC VALVE:  There was critical stenosis  ROCHELLE 0 4 cm2  There was mild regurgitation      TRICUSPID VALVE:  There was mild regurgitation      AORTA:  The root exhibited normal size and mild fibrocalcific change      HISTORY: Dizziness  PRIOR HISTORY: Atrial fibrillation  Risk factors: hypertension and diabetes  Remote stroke      PROCEDURE: The procedure was performed at the bedside  This was a routine study  The transthoracic approach was used  The study included complete 2D imaging, M-mode, complete spectral Doppler, and color Doppler  The heart rate was 95 bpm,  at the start of the study  Images were obtained from the parasternal, apical, subcostal, and suprasternal notch acoustic windows  Image quality was adequate      LEFT VENTRICLE: Size was normal  Ejection fraction was estimated in the range of 50 % to 55 % to be 50 %  Wall thickness was mildly increased  DOPPLER: Left ventricular diastolic function parameters were abnormal  Doppler parameters were  consistent with elevated ventricular end-diastolic filling pressure      RIGHT VENTRICLE: The ventricle was mildly dilated  Systolic function was normal      LEFT ATRIUM: The atrium was mildly dilated      RIGHT ATRIUM: Size was normal      MITRAL VALVE: There was mild annular calcification  There was mild calcification  DOPPLER: There was moderate regurgitation      AORTIC VALVE: DOPPLER: There was critical stenosis  ROCHELLE 0 4 cm2 There was mild regurgitation      TRICUSPID VALVE: DOPPLER: There was mild regurgitation  Estimated peak PA pressure was 50 mmHg      PULMONIC VALVE: Not well visualized  DOPPLER: There was no regurgitation      PERICARDIUM: There was no pericardial effusion   The pericardium was normal in appearance      AORTA: The root exhibited normal size and mild fibrocalcific change      SYSTEMIC VEINS: IVC: The inferior vena cava was normal in size and course  Respirophasic changes were normal     Gated CTA of the chest/abdomen/pelvis:       VASCULAR STRUCTURES:       Annulus: diameter 25 x 22 mm      area: 426 sq mm    Annulus to LCA: 10 mm    Annulus to RCA: 16 mm    Minimal diameter right iliofemoral segment: 6 mm    Minimal diameter left iliofemoral segment: 6 mm    Left and right cardiac catheterization:     Coronary angiography demonstrated minor luminal irregularities  The coronary circulation is left dominant  Left main: The vessel was normal sized  Angiography showed no evidence of disease  LAD: The vessel was normal sized and moderately tortuous  Angiography showed minor luminal irregularities  There was one major diagonal branch  Circumflex: The vessel was large sized (dominant)  Angiography showed no evidence of disease  There were two major obtuse marginals  1st obtuse marginal: The vessel was normal sized  Angiography showed no evidence of disease  2nd obtuse marginal: The vessel was normal sized  Angiography showed no evidence of disease  RCA: The vessel was small (non-dominant)  Angiography showed no evidence of disease  Pulmonary function tests: FEV 1: 2 06 L (76 % predicted)    Arterial Blood gas: 7 42/40/76/95%    Carotid artery ultrasound: Right 50-69%; Left Occluded    CTA head/Neck: ELKIN 26% stenosis; LICA occluded    I have personally reviewed pertinent reports     and I have personally reviewed pertinent films in PACS    TAVR evaluation Assessment:     5 Meter Walk Test:      Attempt 1: 36   Attempt 2: 36   Attempt 3: 36    STS Risk Score: 2 5 %, mortality risk    [address]: III    KCCQ-12 was completed    Assessment:  Patient Active Problem List    Diagnosis Date Noted    Carotid occlusion, left 01/31/2020    Carotid stenosis, asymptomatic, right 01/31/2020    Severe aortic stenosis 10/26/2019    Tibial plateau fracture 09/63/6414    Atrial fibrillation ([facility] [address] ) 10/23/2019    History of stroke     Hypertension     Diabetes mellitus ([facility] [address] )      Severe aortic stenosis; Ongoing TAVR workup    Plan:    Kirk Chery has severe symptomatic aortic stenosis  Based on their STS risk assessment, they have undergone testing for transcatheter aortic valve replacement  The results of these studies have been interpreted by ARI Le  who has determined the patient to be Intermediate risk for open aortic valve replacement  The risks, benefits, and alternatives to TAVR were discussed in detail with the patient today  They understand and wish to proceed with transfemoral transcatheter aortic valve replacement  Informed consent was obtained and a date for the intervention has been set  Kirk Chery was comfortable with our recommendations, and their questions were answered to their satisfaction  The following preoperative instructions were provided at the conclusion of their consultation:     1  You will receive a phone call from the hospital between 2:00 PM and 8:00 PM the day prior to surgery to confirm arrival time and location  For surgery on Mondays, you will receive a call on Friday  2  Do not drink or eat anything after midnight the night before surgery  That includes no water, candy, gum, lozenges, Lifesavers, etc  We recommend you not eat any "junk" food, consume alcohol or smoke the night before surgery  3  Continue taking aspirin but only 81 mg daily  4  If you take Coumadin and/or Plavix, discontinue it 5 days before surgery  5  If you are diabetic, do not take any of your diabetic pills the morning of surgery  If you take Lantus insulin, you may take it at your regularly scheduled time the day before surgery  Do not take any other insulins the morning of surgery    6  The 2 nights before surgery, take a shower each night using the special antiseptic soap or soap sponges you received from the office or hospital  Shampoo your hair with regular shampoo and rinse completely before using the antiseptic sponges  Use the sponge to wash from your neck down, with special attention to the armpits and groin area  Do not use any other soap or cleanser on your skin  Do not use lotions, powder, deodorant or perfume of any kind on your skin after you shower  Use clean bed linens and wear clean pajamas after your shower  7  You will be prescribed Mupirocin nasal ointment  Apply to both nostrils twice a day for 5 days prior to surgery  8  Do not take a shower the morning of her surgery; you'll be given a special" bath" at the hospital   9  Notify the CT surgery office if you develop a cold, sore throat, cough, fever or other health issues before your surgery  10  Other medication changes included the following: Metformin and all OTC supplments       SIGNATURE: Monserrat Renteria PA-C  DATE: February 13, 2020  TIME: 9:39 AM

## 2020-02-13 NOTE — PATIENT INSTRUCTIONS
Preoperative instructions:  1  You will receive a phone call from the hospital between 2:00 PM and 8:00 PM the day prior to surgery to confirm arrival time and location  For surgery on Mondays, you will receive a call on Friday  2  Do not drink or eat anything after midnight the night before surgery  That includes no water, candy, gum, lozenges, Lifesavers, etc  We recommend you not eat any "junk" food, consume alcohol or smoke the night before surgery  3  Continue taking aspirin but only 81 mg daily  4  If you take Coumadin and/or Plavix, discontinue it 5 days before surgery  5  If you are diabetic, do not take any of your diabetic pills the morning of surgery  If you take Lantus insulin, you may take it at your regularly scheduled time the day before surgery  Do not take any other insulins the morning of surgery  6  The 2 nights before surgery, take a shower each night using the special antiseptic soap or soap sponges you received from the office or hospital  Doris Boop your hair with regular shampoo and rinse completely before using the antiseptic sponges  Use the sponge to wash from your neck down, with special attention to the armpits and groin area  Do not use any other soap or cleanser on your skin  Do not use lotions, powder, deodorant or perfume of any kind on your skin after you shower  Use clean bed linens and wear clean pajamas after your shower  7  You will be prescribed Mupirocin nasal ointment  Apply to both nostrils twice a day for 5 days prior to surgery  8  Do not take a shower the morning of her surgery; you'll be given a special" bath" at the hospital   9  Notify the CT surgery office if you develop a cold, sore throat, cough, fever or other health issues before your surgery  10  Other medication changes included the following:  Metformin and OTC supplements

## 2020-02-15 LAB — MRSA NOSE QL CULT: NORMAL

## 2020-02-17 ENCOUNTER — ANESTHESIA EVENT (OUTPATIENT)
Dept: PERIOP | Facility: HOSPITAL | Age: 80
DRG: 267 | End: 2020-02-17
Payer: MEDICARE

## 2020-02-17 DIAGNOSIS — I35.0 SEVERE AORTIC STENOSIS: Primary | ICD-10-CM

## 2020-02-17 RX ORDER — HEPARIN SODIUM 1000 [USP'U]/ML
400 INJECTION, SOLUTION INTRAVENOUS; SUBCUTANEOUS ONCE
Status: CANCELLED | OUTPATIENT
Start: 2020-02-18

## 2020-02-17 NOTE — PRE-PROCEDURE INSTRUCTIONS
Pre-Surgery Instructions:   Medication Instructions    acetaminophen (TYLENOL) 325 mg tablet Patient was instructed by Physician and understands   alendronate (FOSAMAX) 70 mg tablet Patient was instructed by Physician and understands   amLODIPine (NORVASC) 5 mg tablet Patient was instructed by Physician and understands   ascorbic acid (VITAMIN C) 500 mg tablet Patient was instructed by Physician and understands   atorvastatin (LIPITOR) 10 mg tablet Patient was instructed by Physician and understands   BABY ASPIRIN PO Patient was instructed by Physician and understands   cholecalciferol (VITAMIN D3) 1,000 units tablet Patient was instructed by Physician and understands   CINNAMON PO Patient was instructed by Physician and understands   cyanocobalamin (VITAMIN B-12) 100 mcg tablet Patient was instructed by Physician and understands   diltiazem (DILACOR XR) 240 MG 24 hr capsule Patient was instructed by Physician and understands   INCRUSE ELLIPTA 62 5 MCG/INH AEPB inhaler Patient was instructed by Physician and understands   metFORMIN (GLUCOPHAGE) 500 mg tablet Patient was instructed by Physician and understands   mupirocin (BACTROBAN) 2 % ointment Patient was instructed by Physician and understands   polyethylene glycol (MIRALAX) 17 g packet Patient was instructed by Physician and understands   temazepam (RESTORIL) 30 mg capsule Patient was instructed by Physician and understands   vitamin E 100 UNIT capsule Patient was instructed by Physician and understands

## 2020-02-17 NOTE — PRE-PROCEDURE INSTRUCTIONS
Pre-Surgery Instructions:   Medication Instructions    acetaminophen (TYLENOL) 325 mg tablet Patient was instructed by Physician and understands   alendronate (FOSAMAX) 70 mg tablet Patient was instructed by Physician and understands   amLODIPine (NORVASC) 5 mg tablet Patient was instructed by Physician and understands   ascorbic acid (VITAMIN C) 500 mg tablet Patient was instructed by Physician and understands   atorvastatin (LIPITOR) 10 mg tablet Patient was instructed by Physician and understands   BABY ASPIRIN PO Patient was instructed by Physician and understands   cholecalciferol (VITAMIN D3) 1,000 units tablet Patient was instructed by Physician and understands   CINNAMON PO Patient was instructed by Physician and understands   cyanocobalamin (VITAMIN B-12) 100 mcg tablet Patient was instructed by Physician and understands   diltiazem (DILACOR XR) 240 MG 24 hr capsule Patient was instructed by Physician and understands   INCRUSE ELLIPTA 62 5 MCG/INH AEPB inhaler Patient was instructed by Physician and understands   metFORMIN (GLUCOPHAGE) 500 mg tablet Patient was instructed by Physician and understands   mupirocin (BACTROBAN) 2 % ointment Patient was instructed by Physician and understands   polyethylene glycol (MIRALAX) 17 g packet Patient was instructed by Physician and understands   temazepam (RESTORIL) 30 mg capsule Patient was instructed by Physician and understands   vitamin E 100 UNIT capsule Patient was instructed by Physician and understands  Pt daughter verbalized an understanding of all shower and medication instructions

## 2020-02-18 ENCOUNTER — APPOINTMENT (OUTPATIENT)
Dept: NON INVASIVE DIAGNOSTICS | Facility: HOSPITAL | Age: 80
DRG: 267 | End: 2020-02-18
Payer: MEDICARE

## 2020-02-18 ENCOUNTER — ANESTHESIA (OUTPATIENT)
Dept: PERIOP | Facility: HOSPITAL | Age: 80
DRG: 267 | End: 2020-02-18
Payer: MEDICARE

## 2020-02-18 ENCOUNTER — APPOINTMENT (INPATIENT)
Dept: RADIOLOGY | Facility: HOSPITAL | Age: 80
DRG: 267 | End: 2020-02-18
Payer: MEDICARE

## 2020-02-18 ENCOUNTER — HOSPITAL ENCOUNTER (INPATIENT)
Facility: HOSPITAL | Age: 80
LOS: 2 days | Discharge: HOME HEALTH | DRG: 267 | End: 2020-02-20
Attending: THORACIC SURGERY (CARDIOTHORACIC VASCULAR SURGERY) | Admitting: THORACIC SURGERY (CARDIOTHORACIC VASCULAR SURGERY)
Payer: MEDICARE

## 2020-02-18 DIAGNOSIS — I35.9 NONRHEUMATIC AORTIC VALVE DISORDER: ICD-10-CM

## 2020-02-18 DIAGNOSIS — Z95.2 S/P TAVR (TRANSCATHETER AORTIC VALVE REPLACEMENT): Primary | ICD-10-CM

## 2020-02-18 DIAGNOSIS — I35.0 AORTIC STENOSIS, SEVERE: ICD-10-CM

## 2020-02-18 DIAGNOSIS — I35.0 CRITICAL AORTIC VALVE STENOSIS: ICD-10-CM

## 2020-02-18 LAB
ABO GROUP BLD: NORMAL
ANION GAP SERPL CALCULATED.3IONS-SCNC: 8 MMOL/L (ref 4–13)
BACTERIA UR CULT: ABNORMAL
BACTERIA UR CULT: ABNORMAL
BASE EXCESS BLDA CALC-SCNC: -2 MMOL/L (ref -2–3)
BASE EXCESS BLDA CALC-SCNC: -4 MMOL/L (ref -2–3)
BASE EXCESS BLDA CALC-SCNC: 1 MMOL/L (ref -2–3)
BUN SERPL-MCNC: 12 MG/DL (ref 5–25)
CA-I BLD-SCNC: 1.15 MMOL/L (ref 1.12–1.32)
CA-I BLD-SCNC: 1.2 MMOL/L (ref 1.12–1.32)
CA-I BLD-SCNC: 1.24 MMOL/L (ref 1.12–1.32)
CALCIUM SERPL-MCNC: 9 MG/DL (ref 8.3–10.1)
CHLORIDE SERPL-SCNC: 110 MMOL/L (ref 100–108)
CO2 SERPL-SCNC: 22 MMOL/L (ref 21–32)
CREAT SERPL-MCNC: 0.68 MG/DL (ref 0.6–1.3)
GFR SERPL CREATININE-BSD FRML MDRD: 84 ML/MIN/1.73SQ M
GLUCOSE SERPL-MCNC: 121 MG/DL (ref 65–140)
GLUCOSE SERPL-MCNC: 127 MG/DL (ref 65–140)
GLUCOSE SERPL-MCNC: 130 MG/DL (ref 65–140)
GLUCOSE SERPL-MCNC: 136 MG/DL (ref 65–140)
GLUCOSE SERPL-MCNC: 143 MG/DL (ref 65–140)
GLUCOSE SERPL-MCNC: 149 MG/DL (ref 65–140)
GLUCOSE SERPL-MCNC: 279 MG/DL (ref 65–140)
HCO3 BLDA-SCNC: 20.8 MMOL/L (ref 22–28)
HCO3 BLDA-SCNC: 23.3 MMOL/L (ref 22–28)
HCO3 BLDA-SCNC: 26.8 MMOL/L (ref 24–30)
HCT VFR BLD AUTO: 42.1 % (ref 34.8–46.1)
HCT VFR BLD CALC: 34 % (ref 34.8–46.1)
HCT VFR BLD CALC: 38 % (ref 34.8–46.1)
HCT VFR BLD CALC: 40 % (ref 34.8–46.1)
HGB BLD-MCNC: 13.5 G/DL (ref 11.5–15.4)
HGB BLDA-MCNC: 11.6 G/DL (ref 11.5–15.4)
HGB BLDA-MCNC: 12.9 G/DL (ref 11.5–15.4)
HGB BLDA-MCNC: 13.6 G/DL (ref 11.5–15.4)
KCT BLD-ACNC: 121 SEC (ref 89–137)
KCT BLD-ACNC: 138 SEC (ref 89–137)
KCT BLD-ACNC: 393 SEC (ref 89–137)
PCO2 BLD: 22 MMOL/L (ref 21–32)
PCO2 BLD: 24 MMOL/L (ref 21–32)
PCO2 BLD: 28 MMOL/L (ref 21–32)
PCO2 BLD: 37.1 MM HG (ref 36–44)
PCO2 BLD: 40.3 MM HG (ref 36–44)
PCO2 BLD: 48.2 MM HG (ref 42–50)
PH BLD: 7.35 [PH] (ref 7.3–7.4)
PH BLD: 7.36 [PH] (ref 7.35–7.45)
PH BLD: 7.37 [PH] (ref 7.35–7.45)
PLATELET # BLD AUTO: 268 THOUSANDS/UL (ref 149–390)
PMV BLD AUTO: 10.2 FL (ref 8.9–12.7)
PO2 BLD: 143 MM HG (ref 75–129)
PO2 BLD: 36 MM HG (ref 35–45)
PO2 BLD: 99 MM HG (ref 75–129)
POTASSIUM BLD-SCNC: 3.6 MMOL/L (ref 3.5–5.3)
POTASSIUM BLD-SCNC: 4.1 MMOL/L (ref 3.5–5.3)
POTASSIUM BLD-SCNC: 4.1 MMOL/L (ref 3.5–5.3)
POTASSIUM SERPL-SCNC: 3.7 MMOL/L (ref 3.5–5.3)
RH BLD: POSITIVE
SAO2 % BLD FROM PO2: 66 % (ref 60–85)
SAO2 % BLD FROM PO2: 97 % (ref 60–85)
SAO2 % BLD FROM PO2: 99 % (ref 60–85)
SODIUM BLD-SCNC: 138 MMOL/L (ref 136–145)
SODIUM BLD-SCNC: 139 MMOL/L (ref 136–145)
SODIUM BLD-SCNC: 141 MMOL/L (ref 136–145)
SODIUM SERPL-SCNC: 140 MMOL/L (ref 136–145)
SPECIMEN SOURCE: ABNORMAL
SPECIMEN SOURCE: NORMAL
SPECIMEN SOURCE: NORMAL

## 2020-02-18 PROCEDURE — 84295 ASSAY OF SERUM SODIUM: CPT

## 2020-02-18 PROCEDURE — 02HV33Z INSERTION OF INFUSION DEVICE INTO SUPERIOR VENA CAVA, PERCUTANEOUS APPROACH: ICD-10-PCS | Performed by: ANESTHESIOLOGY

## 2020-02-18 PROCEDURE — 85014 HEMATOCRIT: CPT | Performed by: PHYSICIAN ASSISTANT

## 2020-02-18 PROCEDURE — C1769 GUIDE WIRE: HCPCS | Performed by: THORACIC SURGERY (CARDIOTHORACIC VASCULAR SURGERY)

## 2020-02-18 PROCEDURE — 86900 BLOOD TYPING SEROLOGIC ABO: CPT | Performed by: THORACIC SURGERY (CARDIOTHORACIC VASCULAR SURGERY)

## 2020-02-18 PROCEDURE — 33361 REPLACE AORTIC VALVE PERQ: CPT | Performed by: THORACIC SURGERY (CARDIOTHORACIC VASCULAR SURGERY)

## 2020-02-18 PROCEDURE — 85014 HEMATOCRIT: CPT

## 2020-02-18 PROCEDURE — 82947 ASSAY GLUCOSE BLOOD QUANT: CPT

## 2020-02-18 PROCEDURE — 33361 REPLACE AORTIC VALVE PERQ: CPT | Performed by: INTERNAL MEDICINE

## 2020-02-18 PROCEDURE — 03HY32Z INSERTION OF MONITORING DEVICE INTO UPPER ARTERY, PERCUTANEOUS APPROACH: ICD-10-PCS | Performed by: ANESTHESIOLOGY

## 2020-02-18 PROCEDURE — 82330 ASSAY OF CALCIUM: CPT

## 2020-02-18 PROCEDURE — 85347 COAGULATION TIME ACTIVATED: CPT

## 2020-02-18 PROCEDURE — 82948 REAGENT STRIP/BLOOD GLUCOSE: CPT

## 2020-02-18 PROCEDURE — 80048 BASIC METABOLIC PNL TOTAL CA: CPT | Performed by: PHYSICIAN ASSISTANT

## 2020-02-18 PROCEDURE — 4A133J1 MONITORING OF ARTERIAL PULSE, PERIPHERAL, PERCUTANEOUS APPROACH: ICD-10-PCS | Performed by: ANESTHESIOLOGY

## 2020-02-18 PROCEDURE — C1894 INTRO/SHEATH, NON-LASER: HCPCS | Performed by: THORACIC SURGERY (CARDIOTHORACIC VASCULAR SURGERY)

## 2020-02-18 PROCEDURE — 76376 3D RENDER W/INTRP POSTPROCES: CPT

## 2020-02-18 PROCEDURE — 93312 ECHO TRANSESOPHAGEAL: CPT

## 2020-02-18 PROCEDURE — 84132 ASSAY OF SERUM POTASSIUM: CPT

## 2020-02-18 PROCEDURE — 71045 X-RAY EXAM CHEST 1 VIEW: CPT

## 2020-02-18 PROCEDURE — 85049 AUTOMATED PLATELET COUNT: CPT | Performed by: PHYSICIAN ASSISTANT

## 2020-02-18 PROCEDURE — 86901 BLOOD TYPING SEROLOGIC RH(D): CPT | Performed by: THORACIC SURGERY (CARDIOTHORACIC VASCULAR SURGERY)

## 2020-02-18 PROCEDURE — 85018 HEMOGLOBIN: CPT | Performed by: PHYSICIAN ASSISTANT

## 2020-02-18 PROCEDURE — C1760 CLOSURE DEV, VASC: HCPCS | Performed by: THORACIC SURGERY (CARDIOTHORACIC VASCULAR SURGERY)

## 2020-02-18 PROCEDURE — 82803 BLOOD GASES ANY COMBINATION: CPT

## 2020-02-18 PROCEDURE — 4A133B1 MONITORING OF ARTERIAL PRESSURE, PERIPHERAL, PERCUTANEOUS APPROACH: ICD-10-PCS | Performed by: ANESTHESIOLOGY

## 2020-02-18 PROCEDURE — 02RF38Z REPLACEMENT OF AORTIC VALVE WITH ZOOPLASTIC TISSUE, PERCUTANEOUS APPROACH: ICD-10-PCS | Performed by: THORACIC SURGERY (CARDIOTHORACIC VASCULAR SURGERY)

## 2020-02-18 PROCEDURE — NC001 PR NO CHARGE: Performed by: PHYSICIAN ASSISTANT

## 2020-02-18 PROCEDURE — 94760 N-INVAS EAR/PLS OXIMETRY 1: CPT

## 2020-02-18 PROCEDURE — 86920 COMPATIBILITY TEST SPIN: CPT

## 2020-02-18 PROCEDURE — 93005 ELECTROCARDIOGRAM TRACING: CPT

## 2020-02-18 DEVICE — PERCLOSE PROGLIDE™ SUTURE-MEDIATED CLOSURE SYSTEM
Type: IMPLANTABLE DEVICE | Site: GROIN | Status: FUNCTIONAL
Brand: PERCLOSE PROGLIDE™

## 2020-02-18 DEVICE — VALVE TAVR SAPIEN 3 ULTRA W/ CMNDR DLV SYS 26MM: Type: IMPLANTABLE DEVICE | Site: AORTA | Status: FUNCTIONAL

## 2020-02-18 RX ORDER — ONDANSETRON 2 MG/ML
4 INJECTION INTRAMUSCULAR; INTRAVENOUS ONCE AS NEEDED
Status: DISCONTINUED | OUTPATIENT
Start: 2020-02-18 | End: 2020-02-18 | Stop reason: HOSPADM

## 2020-02-18 RX ORDER — PROMETHAZINE HYDROCHLORIDE 25 MG/ML
12.5 INJECTION, SOLUTION INTRAMUSCULAR; INTRAVENOUS ONCE AS NEEDED
Status: DISCONTINUED | OUTPATIENT
Start: 2020-02-18 | End: 2020-02-18 | Stop reason: HOSPADM

## 2020-02-18 RX ORDER — SODIUM CHLORIDE, SODIUM LACTATE, POTASSIUM CHLORIDE, CALCIUM CHLORIDE 600; 310; 30; 20 MG/100ML; MG/100ML; MG/100ML; MG/100ML
INJECTION, SOLUTION INTRAVENOUS CONTINUOUS PRN
Status: DISCONTINUED | OUTPATIENT
Start: 2020-02-18 | End: 2020-02-18 | Stop reason: SURG

## 2020-02-18 RX ORDER — ONDANSETRON 2 MG/ML
4 INJECTION INTRAMUSCULAR; INTRAVENOUS EVERY 6 HOURS PRN
Status: DISCONTINUED | OUTPATIENT
Start: 2020-02-18 | End: 2020-02-20 | Stop reason: HOSPADM

## 2020-02-18 RX ORDER — BISACODYL 10 MG
10 SUPPOSITORY, RECTAL RECTAL DAILY PRN
Status: DISCONTINUED | OUTPATIENT
Start: 2020-02-18 | End: 2020-02-20 | Stop reason: HOSPADM

## 2020-02-18 RX ORDER — METOCLOPRAMIDE HYDROCHLORIDE 5 MG/ML
10 INJECTION INTRAMUSCULAR; INTRAVENOUS ONCE AS NEEDED
Status: DISCONTINUED | OUTPATIENT
Start: 2020-02-18 | End: 2020-02-18 | Stop reason: HOSPADM

## 2020-02-18 RX ORDER — CHLORHEXIDINE GLUCONATE 0.12 MG/ML
15 RINSE ORAL 2 TIMES DAILY
Status: DISCONTINUED | OUTPATIENT
Start: 2020-02-18 | End: 2020-02-20 | Stop reason: HOSPADM

## 2020-02-18 RX ORDER — PROPOFOL 10 MG/ML
INJECTION, EMULSION INTRAVENOUS AS NEEDED
Status: DISCONTINUED | OUTPATIENT
Start: 2020-02-18 | End: 2020-02-18 | Stop reason: SURG

## 2020-02-18 RX ORDER — HYDROMORPHONE HCL/PF 1 MG/ML
0.5 SYRINGE (ML) INJECTION
Status: DISCONTINUED | OUTPATIENT
Start: 2020-02-18 | End: 2020-02-18 | Stop reason: HOSPADM

## 2020-02-18 RX ORDER — TEMAZEPAM 15 MG/1
30 CAPSULE ORAL
Status: DISCONTINUED | OUTPATIENT
Start: 2020-02-18 | End: 2020-02-20 | Stop reason: HOSPADM

## 2020-02-18 RX ORDER — POLYETHYLENE GLYCOL 3350 17 G/17G
17 POWDER, FOR SOLUTION ORAL DAILY
Status: DISCONTINUED | OUTPATIENT
Start: 2020-02-19 | End: 2020-02-20 | Stop reason: HOSPADM

## 2020-02-18 RX ORDER — ALBUTEROL SULFATE 2.5 MG/3ML
2.5 SOLUTION RESPIRATORY (INHALATION) ONCE AS NEEDED
Status: DISCONTINUED | OUTPATIENT
Start: 2020-02-18 | End: 2020-02-18 | Stop reason: HOSPADM

## 2020-02-18 RX ORDER — FENTANYL CITRATE/PF 50 MCG/ML
25 SYRINGE (ML) INJECTION
Status: DISCONTINUED | OUTPATIENT
Start: 2020-02-18 | End: 2020-02-18 | Stop reason: HOSPADM

## 2020-02-18 RX ORDER — LABETALOL 20 MG/4 ML (5 MG/ML) INTRAVENOUS SYRINGE
10
Status: DISCONTINUED | OUTPATIENT
Start: 2020-02-18 | End: 2020-02-18 | Stop reason: HOSPADM

## 2020-02-18 RX ORDER — FENTANYL CITRATE 50 UG/ML
INJECTION, SOLUTION INTRAMUSCULAR; INTRAVENOUS AS NEEDED
Status: DISCONTINUED | OUTPATIENT
Start: 2020-02-18 | End: 2020-02-18 | Stop reason: SURG

## 2020-02-18 RX ORDER — METOPROLOL TARTRATE 5 MG/5ML
INJECTION INTRAVENOUS AS NEEDED
Status: DISCONTINUED | OUTPATIENT
Start: 2020-02-18 | End: 2020-02-18 | Stop reason: SURG

## 2020-02-18 RX ORDER — HYDROMORPHONE HCL/PF 1 MG/ML
0.2 SYRINGE (ML) INJECTION
Status: DISCONTINUED | OUTPATIENT
Start: 2020-02-18 | End: 2020-02-18 | Stop reason: HOSPADM

## 2020-02-18 RX ORDER — BISACODYL 10 MG
10 SUPPOSITORY, RECTAL RECTAL DAILY PRN
Status: DISCONTINUED | OUTPATIENT
Start: 2020-02-18 | End: 2020-02-18

## 2020-02-18 RX ORDER — CHLORHEXIDINE GLUCONATE 0.12 MG/ML
15 RINSE ORAL ONCE
Status: COMPLETED | OUTPATIENT
Start: 2020-02-18 | End: 2020-02-18

## 2020-02-18 RX ORDER — LIDOCAINE HYDROCHLORIDE 10 MG/ML
INJECTION, SOLUTION EPIDURAL; INFILTRATION; INTRACAUDAL; PERINEURAL AS NEEDED
Status: DISCONTINUED | OUTPATIENT
Start: 2020-02-18 | End: 2020-02-18 | Stop reason: SURG

## 2020-02-18 RX ORDER — GLYCOPYRROLATE 0.2 MG/ML
INJECTION INTRAMUSCULAR; INTRAVENOUS AS NEEDED
Status: DISCONTINUED | OUTPATIENT
Start: 2020-02-18 | End: 2020-02-18 | Stop reason: SURG

## 2020-02-18 RX ORDER — ASPIRIN 81 MG/1
81 TABLET ORAL DAILY
Status: DISCONTINUED | OUTPATIENT
Start: 2020-02-19 | End: 2020-02-20 | Stop reason: HOSPADM

## 2020-02-18 RX ORDER — ROCURONIUM BROMIDE 10 MG/ML
INJECTION, SOLUTION INTRAVENOUS AS NEEDED
Status: DISCONTINUED | OUTPATIENT
Start: 2020-02-18 | End: 2020-02-18 | Stop reason: SURG

## 2020-02-18 RX ORDER — LIDOCAINE HYDROCHLORIDE 10 MG/ML
INJECTION, SOLUTION EPIDURAL; INFILTRATION; INTRACAUDAL; PERINEURAL
Status: COMPLETED | OUTPATIENT
Start: 2020-02-18 | End: 2020-02-18

## 2020-02-18 RX ORDER — LIDOCAINE HYDROCHLORIDE 10 MG/ML
0.5 INJECTION, SOLUTION EPIDURAL; INFILTRATION; INTRACAUDAL; PERINEURAL ONCE AS NEEDED
Status: DISCONTINUED | OUTPATIENT
Start: 2020-02-18 | End: 2020-02-18 | Stop reason: HOSPADM

## 2020-02-18 RX ORDER — POLYETHYLENE GLYCOL 3350 17 G/17G
17 POWDER, FOR SOLUTION ORAL DAILY PRN
Status: DISCONTINUED | OUTPATIENT
Start: 2020-02-18 | End: 2020-02-20 | Stop reason: HOSPADM

## 2020-02-18 RX ORDER — ACETAMINOPHEN 325 MG/1
650 TABLET ORAL EVERY 4 HOURS PRN
Status: DISCONTINUED | OUTPATIENT
Start: 2020-02-18 | End: 2020-02-20 | Stop reason: HOSPADM

## 2020-02-18 RX ORDER — SODIUM CHLORIDE, SODIUM GLUCONATE, SODIUM ACETATE, POTASSIUM CHLORIDE, MAGNESIUM CHLORIDE, SODIUM PHOSPHATE, DIBASIC, AND POTASSIUM PHOSPHATE .53; .5; .37; .037; .03; .012; .00082 G/100ML; G/100ML; G/100ML; G/100ML; G/100ML; G/100ML; G/100ML
50 INJECTION, SOLUTION INTRAVENOUS CONTINUOUS
Status: DISCONTINUED | OUTPATIENT
Start: 2020-02-18 | End: 2020-02-19

## 2020-02-18 RX ORDER — ESMOLOL HYDROCHLORIDE 10 MG/ML
INJECTION INTRAVENOUS AS NEEDED
Status: DISCONTINUED | OUTPATIENT
Start: 2020-02-18 | End: 2020-02-18 | Stop reason: SURG

## 2020-02-18 RX ORDER — POTASSIUM CHLORIDE 29.8 MG/ML
40 INJECTION INTRAVENOUS ONCE
Status: COMPLETED | OUTPATIENT
Start: 2020-02-18 | End: 2020-02-18

## 2020-02-18 RX ORDER — HEPARIN SODIUM 1000 [USP'U]/ML
INJECTION, SOLUTION INTRAVENOUS; SUBCUTANEOUS AS NEEDED
Status: DISCONTINUED | OUTPATIENT
Start: 2020-02-18 | End: 2020-02-18 | Stop reason: SURG

## 2020-02-18 RX ORDER — ATORVASTATIN CALCIUM 10 MG/1
10 TABLET, FILM COATED ORAL DAILY
Status: DISCONTINUED | OUTPATIENT
Start: 2020-02-19 | End: 2020-02-19

## 2020-02-18 RX ORDER — PANTOPRAZOLE SODIUM 40 MG/1
40 TABLET, DELAYED RELEASE ORAL
Status: DISCONTINUED | OUTPATIENT
Start: 2020-02-19 | End: 2020-02-20 | Stop reason: HOSPADM

## 2020-02-18 RX ORDER — PROTAMINE SULFATE 10 MG/ML
INJECTION, SOLUTION INTRAVENOUS AS NEEDED
Status: DISCONTINUED | OUTPATIENT
Start: 2020-02-18 | End: 2020-02-18 | Stop reason: SURG

## 2020-02-18 RX ORDER — NEOSTIGMINE METHYLSULFATE 1 MG/ML
INJECTION INTRAVENOUS AS NEEDED
Status: DISCONTINUED | OUTPATIENT
Start: 2020-02-18 | End: 2020-02-18 | Stop reason: SURG

## 2020-02-18 RX ORDER — CLOPIDOGREL BISULFATE 75 MG/1
75 TABLET ORAL DAILY
Status: DISCONTINUED | OUTPATIENT
Start: 2020-02-19 | End: 2020-02-20 | Stop reason: HOSPADM

## 2020-02-18 RX ORDER — SODIUM CHLORIDE 9 MG/ML
INJECTION, SOLUTION INTRAVENOUS CONTINUOUS PRN
Status: DISCONTINUED | OUTPATIENT
Start: 2020-02-18 | End: 2020-02-18 | Stop reason: SURG

## 2020-02-18 RX ORDER — ACETAMINOPHEN 650 MG/1
650 SUPPOSITORY RECTAL EVERY 4 HOURS PRN
Status: DISCONTINUED | OUTPATIENT
Start: 2020-02-18 | End: 2020-02-19

## 2020-02-18 RX ORDER — HYDRALAZINE HYDROCHLORIDE 20 MG/ML
5 INJECTION INTRAMUSCULAR; INTRAVENOUS
Status: DISCONTINUED | OUTPATIENT
Start: 2020-02-18 | End: 2020-02-18 | Stop reason: HOSPADM

## 2020-02-18 RX ADMIN — SODIUM CHLORIDE, SODIUM LACTATE, POTASSIUM CHLORIDE, AND CALCIUM CHLORIDE: .6; .31; .03; .02 INJECTION, SOLUTION INTRAVENOUS at 15:25

## 2020-02-18 RX ADMIN — ACETAMINOPHEN 650 MG: 325 TABLET ORAL at 19:06

## 2020-02-18 RX ADMIN — PROPOFOL 60 MG: 10 INJECTION, EMULSION INTRAVENOUS at 15:44

## 2020-02-18 RX ADMIN — SUGAMMADEX 200 MG: 100 INJECTION, SOLUTION INTRAVENOUS at 16:56

## 2020-02-18 RX ADMIN — HEPARIN SODIUM 16000 UNITS: 1000 INJECTION INTRAVENOUS; SUBCUTANEOUS at 16:23

## 2020-02-18 RX ADMIN — SODIUM CHLORIDE: 0.9 INJECTION, SOLUTION INTRAVENOUS at 15:58

## 2020-02-18 RX ADMIN — PHENYLEPHRINE HYDROCHLORIDE 10 MCG/MIN: 10 INJECTION INTRAVENOUS at 16:36

## 2020-02-18 RX ADMIN — INSULIN LISPRO 4 UNITS: 100 INJECTION, SOLUTION INTRAVENOUS; SUBCUTANEOUS at 23:20

## 2020-02-18 RX ADMIN — FENTANYL CITRATE 50 MCG: 50 INJECTION, SOLUTION INTRAMUSCULAR; INTRAVENOUS at 15:44

## 2020-02-18 RX ADMIN — PROTAMINE SULFATE 60 MG: 10 INJECTION, SOLUTION INTRAVENOUS at 16:48

## 2020-02-18 RX ADMIN — METOPROLOL TARTRATE 1 MG: 5 INJECTION, SOLUTION INTRAVENOUS at 16:44

## 2020-02-18 RX ADMIN — Medication 2000 MG: at 15:41

## 2020-02-18 RX ADMIN — PHENYLEPHRINE HYDROCHLORIDE 200 MCG: 10 INJECTION INTRAVENOUS at 16:30

## 2020-02-18 RX ADMIN — TEMAZEPAM 30 MG: 15 CAPSULE ORAL at 23:20

## 2020-02-18 RX ADMIN — PROTAMINE SULFATE 20 MG: 10 INJECTION, SOLUTION INTRAVENOUS at 16:46

## 2020-02-18 RX ADMIN — IPRATROPIUM BROMIDE 1 PUFF: 17 AEROSOL, METERED RESPIRATORY (INHALATION) at 22:00

## 2020-02-18 RX ADMIN — GLYCOPYRROLATE 0.8 MG: 0.2 INJECTION, SOLUTION INTRAMUSCULAR; INTRAVENOUS at 16:53

## 2020-02-18 RX ADMIN — POTASSIUM CHLORIDE 40 MEQ: 29.8 INJECTION, SOLUTION INTRAVENOUS at 19:19

## 2020-02-18 RX ADMIN — IOHEXOL 30 ML: 350 INJECTION, SOLUTION INTRAVENOUS at 16:59

## 2020-02-18 RX ADMIN — FENTANYL CITRATE 50 MCG: 50 INJECTION, SOLUTION INTRAMUSCULAR; INTRAVENOUS at 15:41

## 2020-02-18 RX ADMIN — SODIUM CHLORIDE 10 MG/HR: 0.9 INJECTION, SOLUTION INTRAVENOUS at 16:56

## 2020-02-18 RX ADMIN — CEFAZOLIN SODIUM 2000 MG: 10 INJECTION, POWDER, FOR SOLUTION INTRAVENOUS at 19:47

## 2020-02-18 RX ADMIN — SODIUM CHLORIDE, SODIUM GLUCONATE, SODIUM ACETATE, POTASSIUM CHLORIDE, MAGNESIUM CHLORIDE, SODIUM PHOSPHATE, DIBASIC, AND POTASSIUM PHOSPHATE 50 ML/HR: .53; .5; .37; .037; .03; .012; .00082 INJECTION, SOLUTION INTRAVENOUS at 18:06

## 2020-02-18 RX ADMIN — ROCURONIUM BROMIDE 50 MG: 50 INJECTION, SOLUTION INTRAVENOUS at 15:44

## 2020-02-18 RX ADMIN — MUPIROCIN 1 APPLICATION: 20 OINTMENT TOPICAL at 11:02

## 2020-02-18 RX ADMIN — PROTAMINE SULFATE 20 MG: 10 INJECTION, SOLUTION INTRAVENOUS at 16:47

## 2020-02-18 RX ADMIN — ACETAMINOPHEN 650 MG: 325 TABLET ORAL at 23:20

## 2020-02-18 RX ADMIN — MUPIROCIN 1 APPLICATION: 20 OINTMENT TOPICAL at 21:52

## 2020-02-18 RX ADMIN — LIDOCAINE HYDROCHLORIDE 1 ML: 10 INJECTION, SOLUTION EPIDURAL; INFILTRATION; INTRACAUDAL; PERINEURAL at 16:03

## 2020-02-18 RX ADMIN — ESMOLOL HYDROCHLORIDE 30 MG: 100 INJECTION, SOLUTION INTRAVENOUS at 16:41

## 2020-02-18 RX ADMIN — PHENYLEPHRINE HYDROCHLORIDE 200 MCG: 10 INJECTION INTRAVENOUS at 16:48

## 2020-02-18 RX ADMIN — CHLORHEXIDINE GLUCONATE 0.12% ORAL RINSE 15 ML: 1.2 LIQUID ORAL at 11:02

## 2020-02-18 RX ADMIN — LIDOCAINE HYDROCHLORIDE 10 ML: 10 INJECTION, SOLUTION EPIDURAL; INFILTRATION; INTRACAUDAL; PERINEURAL at 15:44

## 2020-02-18 RX ADMIN — CHLORHEXIDINE GLUCONATE 0.12% ORAL RINSE 15 ML: 1.2 LIQUID ORAL at 19:06

## 2020-02-18 RX ADMIN — NEOSTIGMINE METHYLSULFATE 5 MG: 1 INJECTION INTRAVENOUS at 16:53

## 2020-02-18 NOTE — OP NOTE
OPERATIVE REPORT  PATIENT NAME: Orlin Buckley    :  1940  MRN: [id number]  Pt Location: [facility] OR ROOM 02    SURGERY DATE: 2020    SURGEON: Josh Rice MD    CO-SURGEON: Swapna Dahl DO    ASSISTANT: Sue Meléndez DO    ADDITIONAL ASSISTANT: Cecy Sibley PA-C    PREOPERATIVE DIAGNOSIS: Symptomatic severe aortic stenosis  POSTOPERATIVE DIAGNOSIS: Symptomatic severe aortic stenosis  NYHA Class: 3  CCS Class: 1    PROCEDURE:   Transcatheter aortic valve replacement with a 26 mm Farah ANIBAL 3 bioprosthetic valve via left transfemoral approach  CARDIOPULMONARY BYPASS TIME: 0 minutes  ANESTHESIA Dr Leah Ford, general endotracheal anesthesia with transesophageal echocardiogram guidance  INDICATIONS:  The patient is a 67y o  year-old female with clinical and echocardiographic findings consistent with severe aortic stenosis  The patient was evaluated in our heart valve center and was deemed high risk for a conventional aortic valve replacement therefore we recommended the procedure described previously  FINDINGS:  1  Intraoperative transesophageal echocardiogram revealed severe aortic stenosis  2  Final transesophageal echocardiogram demonstrated prosthetic valve with normal function no perivalvular leak  OPERATIVE TECHNIQUE:    The patient was taken to the operating room and placed supine on the operating table  Following the satisfactory induction of general anesthesia and placement of monitoring lines, the patient was prepped and draped in the usual sterile fashion  A time-out procedure was performed  The right common femoral vein was accessed percutaneously using Seldinger technique and fluoroscopy  A balloon-tip temporary pacing catheter was inserted and advanced into the right ventricle and its capture was confirmed  The left common femoral artery was accessed percutaneously using Seldinger technique and fluoroscopy   Two (2) Perclose sutures were deployed in the standard fashion  The patient was systemically heparinized  The left common femoral artery was then accessed, a RocketOn extra-stiff wire was positioned in the ascending aorta over an exchange catheter  The sheath for the delivery system was inserted through the left common femoral artery and advanced into the aorta  A pigtail catheter was inserted and advanced to the right coronary cusp, an aortogram was performed to determine proper angle and orientation for valve deployment   A 6 Syriac Zaida right 4 coronary catheter was advanced through the delivery sheath to the aortic valve  The aortic valve was crossed with a 0 035 straight-tip wire, the right coronary catheter was advanced into the left ventricular cavity, this was then exchanged for a curved tip extra stiff wire  A 26 mm ANIBAL 3 valve delivery system was advanced through the delivery sheath into the aorta, the delivery system was configured for deployment  The valve on the delivery system was then advanced and the aortic valve was crossed  At this point, the catheter was desheathed in the standard fashion  The valve was positioned appropriately using a combination of fluoroscopy and transesophageal echocardiogram guidance  During an episode of rapid pacing, balloon deployment of the valve was performed  Following deployment, the position of the valve was confirmed by fluoroscopy and echocardiography and its position appeared appropriate with no perivalvular leak  The valve delivery system was subsequently removed followed by removal of the sheath while the Perclose sutures were secured and direct pressure was held  Protamine was administered with normalization of the ACT  Pressure was released, without evidence of active bleeding  The right common femoral vein sheath was removed and pressure was held  COMPLICATIONS: none    PACKS/TUBES/DRAINS: none  EBL: 50 cc  TRANSFUSION: none       SPECIMENS: None      As the attending surgeon, I was present and scrubbed for all critical portions of this procedure  There was no qualified surgical resident available  Sponge, needle and instrument counts were reported as correct by the nursing staff       SIGNATURE: Damion Torres MD  DATE: February 18, 2020  TIME: 5:11 PM

## 2020-02-18 NOTE — OP NOTE
OPERATIVE REPORT  PATIENT NAME: Kenia Marley    :  1940  MRN: [id number]  Pt Location: [facility] OR ROOM 02    SURGERY DATE: 2020        Co-Surgeons: Nataliya Machado DO; Zenon Leal MD    ASSISTANT: Teri Werner DO      PREOPERATIVE DIAGNOSIS Symptomatic severe aortic stenosis  POSTOPERATIVE DIAGNOSIS Symptomatic severe aortic stenosis  PROCEDURE Transcatheter aortic valve replacement with a 26  mm Farah ANIBAL 3 ultra bioprosthetic valve via a percutaneous left transfemoral approach  ANESTHESIA Dr Sherwin Lugo, general endotracheal anesthesia with transesophageal echocardiogram guidance  After informed consent was obtained, patient brought to [facility] operating room in fasting state  Time out was performed  Using modified seldinger technique sheaths were placed in the left   femoral artery and vein respectively  The left   femoral artery was also used for placement of delivery sheath  The vessel was accessed using modified seldinger technique  Using fluoroscopy a temporary wire was advanced into RV apex through transfemoral sheath  The coplanar view was obtained using pigtail catheter placed in ascending aorta with subsequent ascending aortography  The TAVR delivery sheath was ulltimately advanced over a stiff wire  Next the 26 mm Farah ANIBAL 3 ultra valve was implanted using rapid pacing with fluoro and BOLIVAR guidance  There was no significant paravalvular leak appreciated post implant  The sheaths were removed and hemostasis obtained by manual compression of the venous sheath  The  arterial sheath used for  Pigtail catheter insertion and ascending aortography was closed with an Proglide system  The TAVR delivery sheath was removed and percutaneous closure was obtained using Preclose technique with two Proglide devices deployed pre sheath insertion  Patient left the hybrid operating room in stable condition  Impression    Successful 26 mm Farah ANIBAL 3 ULTA transcatheter aortic valve replacement         SIGNATURE: Yeimi Cevallos DO  DATE: February 18, 2020  TIME: 5:08 PM

## 2020-02-18 NOTE — ANESTHESIA POSTPROCEDURE EVALUATION
Post-Op Assessment Note    CV Status:  Stable    Pain management: adequate     Mental Status:  Alert and awake   Hydration Status:  Euvolemic   PONV Controlled:  Controlled   Airway Patency:  Patent   Post Op Vitals Reviewed: Yes      Staff: CRNA   Comments: cardizem gtt turned off          BP (!) 94/44 (02/18/20 1705)    Temp (!) 96 9 °F (36 1 °C) (02/18/20 1705)    Pulse (!) 121 (02/18/20 1705)   Resp (!) 9 (02/18/20 1705)    SpO2 99 % (02/18/20 1705)

## 2020-02-18 NOTE — ANESTHESIA PROCEDURE NOTES
Central Line Insertion  Performed by: Kashif De Guzman MD  Authorized by: Kashif De Guzman MD   Date/Time: 2/18/2020 4:03 PM  Catheter Type:  triple lumen  Consent: Verbal consent obtained  Written consent obtained  Risks and benefits: risks, benefits and alternatives were discussed  Consent given by: patient  Patient understanding: patient states understanding of the procedure being performed  Patient consent: the patient's understanding of the procedure matches consent given  Procedure consent: procedure consent matches procedure scheduled  Relevant documents: relevant documents present and verified  Test results: test results available and properly labeled  Site marked: the operative site was marked  Required items: required blood products, implants, devices, and special equipment available  Patient identity confirmed: verbally with patient, arm band, provided demographic data and hospital-assigned identification number  Time out: Immediately prior to procedure a "time out" was called to verify the correct patient, procedure, equipment, support staff and site/side marked as required    Indications: vascular access  Catheter size: 7 Fr  Patient position: Trendelenburg    Sedation:  Patient sedated: yes  Analgesia: see MAR for details    Assessment: blood return through all ports  Preparation: skin prepped with 2% chlorhexidine and Chloraprep  Skin prep agent dried: skin prep agent completely dried prior to procedure  Sterile barriers: all five maximum sterile barriers used - cap, mask, sterile gown, sterile gloves, and large sterile sheet  Hand hygiene: hand hygiene performed prior to central venous catheter insertion  Ultrasound guidance: yes  sterile gel and probe cover used in ultrasound-guided central venous catheter insertionNumber of attempts: 1  Successful placement: yes  Post-procedure: line sutured and dressing applied  Patient tolerance: Patient tolerated the procedure well with no immediate complications  Comments: placed by Dr Sarah Redd MD

## 2020-02-18 NOTE — ANESTHESIA PROCEDURE NOTES
Arterial Line Insertion  Performed by: Grant Sumner MD  Authorized by: Grant Sumner MD   Consent: Verbal consent obtained  Written consent obtained  Risks and benefits: risks, benefits and alternatives were discussed  Consent given by: patient  Patient understanding: patient states understanding of the procedure being performed  Patient consent: the patient's understanding of the procedure matches consent given  Procedure consent: procedure consent matches procedure scheduled  Relevant documents: relevant documents present and verified  Test results: test results available and properly labeled  Site marked: the operative site was marked  Radiology Images: Radiology Images displayed and confirmed  If images not available, report reviewed  Required items: required blood products, implants, devices, and special equipment available  Patient identity confirmed: verbally with patient, arm band and provided demographic data  Preparation: Patient was prepped and draped in the usual sterile fashion  Indications: hemodynamic monitoring  Orientation:  Left  Location: radial arterylidocaine (PF) (XYLOCAINE-MPF) 1 % infiltration, 1 mL  Sedation:  Patient sedated: no    Procedure Details:  Austin's test normal: yes  Needle gauge: 20  Seldinger technique: Seldinger technique used  Number of attempts: 1    Post-procedure:  Post-procedure: dressing applied  Patient tolerance: Patient tolerated the procedure well with no immediate complications  Comments:  Inserted  By Lance Neal CRNA

## 2020-02-18 NOTE — ANESTHESIA PREPROCEDURE EVALUATION
Review of Systems/Medical History  Patient summary reviewed  Chart reviewed  No history of anesthetic complications     Cardiovascular  EKG reviewed, Exercise tolerance (METS): <4,  Hypertension on > 1 medication, Valvular heart disease , aortic valve stenosis, Dysrhythmias , atrial fibrillation, No orthopnea, No PND, OZUNA,    Pulmonary  Negative pulmonary ROS        GI/Hepatic  Negative GI/hepatic ROS          Negative  ROS        Endo/Other  Diabetes poorly controlled type 2 Oral agent,      GYN  Negative gynecology ROS          Hematology  Negative hematology ROS      Musculoskeletal  Negative musculoskeletal ROS        Neurology    CVA , residual symptoms, Motor deficit , Right hemiplegia,    Psychology   Anxiety,              Physical Exam    Airway    Mallampati score: II  TM Distance: >3 FB  Neck ROM: full     Dental   upper dentures and lower dentures,     Cardiovascular  Rhythm: irregular, Rate: normal, Murmur, Cardiovascular exam normal    Pulmonary  Pulmonary exam normal Breath sounds clear to auscultation,     Other Findings        Anesthesia Plan  ASA Score- 4     Anesthesia Type- general with ASA Monitors  Additional Monitors: arterial line and central venous line  Airway Plan: ETT  Comment: BOLIVAR  Plan Factors-    Induction- intravenous  Postoperative Plan- Plan for postoperative opioid use  Planned trial extubation    Informed Consent- Anesthetic plan and risks discussed with patient  I personally reviewed this patient with the CRNA  Discussed and agreed on the Anesthesia Plan with the CRNA  Eloy Rodriguez

## 2020-02-18 NOTE — INTERVAL H&P NOTE
H&P reviewed  After examining the patient I find no changes in the patients condition since the H&P had been written  Vitals:    02/18/20 1048   BP: 124/84   Pulse: 80   Resp: 20   Temp: 97 8 °F (36 6 °C)   SpO2: 92%     Anticipated Length of Stay:  Patient will be admitted on an Inpatient basis with an anticipated length of stay of  greater than 2 midnights  Justification for Hospital Stay:  Post surgical recovery following open heart surgery

## 2020-02-18 NOTE — ANESTHESIA PROCEDURE NOTES
Procedure Performed: BOLIVAR Anesthesia  Start Time:  2/18/2020 4:11 PM        Preanesthesia Checklist    Patient identified, IV assessed, risks and benefits discussed, monitors and equipment assessed, procedure being performed at surgeon's request and anesthesia consent obtained  Procedure    Diagnostic Indications for BOLIVAR:  assessment of ascending aorta, assessment of surgical repair and defect repair evaluation  Type of BOLIVAR: interventional BOLIVAR with real time guidance of intracardiac procedure  Images Saved: ultrasound permanent image saved  Physician Requesting Echo: Teri Werner DO  Location performed: OR  Intubated  Heart visualized  Insertion of BOLIVAR Probe:  Easy  Probe Type:  Epiaortic and multiplane  Modalities:  Color flow mapping, 3D, continuous wave Doppler and pulse wave Doppler  Echocardiographic and Doppler Measurements    PREPROCEDURE    LEFT VENTRICLE:  Systolic Function: mildly impaired  Ejection Fraction: 45-50%  Cavity size: normal      RIGHT VENTRICLE:  Systolic Function: mildly impaired  Cavity size mildly dilated  AORTIC VALVE:  Leaflets: trileaflet  Leaflet motions restricted  Stenosis: severe  Mean Gradient: 38 mmHg  Peak Gradient: 65 mmHg  Area: 0 92 cm²  Regurgitation: mild  MITRAL VALVE:  Leaflets: calcified  Leaflet Motions: normal  Regurgitation: moderate  Stenosis: none  TRICUSPID VALVE:  Leaflets: normal  Leaflet Motions: normal  Stenosis: none  Regurgitation: mild  PULMONIC VALVE:  Leaflets: normal  Regurgitation: trace  Stenosis: none  ASCENDING AORTA:  Size:  normal  Dissection not present  AORTIC ARCH:  Size:  normal  dissection not present  Grade 3: atheroma protruding < 0 5 cm into lumen  DESCENDING AORTA:  Size: normal  Dissection not present  Grade 3: atheroma protruding < 0 5 cm into lumen  RIGHT ATRIUM:  Size:  dilated  No spontaneous echo contrast     LEFT ATRIUM:  Size: dilated   No spontaneous echo contrast     LEFT ATRIAL APPENDAGE:  Size: normal  No spontaneous echo contrast         ATRIAL SEPTUM:  Intra-atrial septal morphology: lipomatous hypertrophy  VENTRICULAR SEPTUM:  Intra-ventricular septum morphology: normal              OTHER FINDINGS:  Pericardium:  normal  Pleural Effusion:  none  POSTPROCEDURE    LEFT VENTRICLE: Unchanged   RIGHT VENTRICLE: Unchanged   AORTIC VALVE:   Leaflets: bioprosthetic  Stenosis: none  Mean Gradient: 2 mmHg  Regurgitation: none  Valve Size: 26 mm  MITRAL VALVE: Unchanged   TRICUSPID VALVE: Unchanged   PULMONIC VALVE: Unchanged           ATRIA: Unchanged   AORTA: Unchanged   REMOVAL:  Probe Removal: atraumatic

## 2020-02-19 ENCOUNTER — APPOINTMENT (INPATIENT)
Dept: RADIOLOGY | Facility: HOSPITAL | Age: 80
DRG: 267 | End: 2020-02-19
Payer: MEDICARE

## 2020-02-19 ENCOUNTER — APPOINTMENT (INPATIENT)
Dept: NON INVASIVE DIAGNOSTICS | Facility: HOSPITAL | Age: 80
DRG: 267 | End: 2020-02-19
Payer: MEDICARE

## 2020-02-19 PROBLEM — R47.01 EXPRESSIVE APHASIA: Status: ACTIVE | Noted: 2020-02-19

## 2020-02-19 PROBLEM — R53.1 RIGHT SIDED WEAKNESS: Status: ACTIVE | Noted: 2020-02-19

## 2020-02-19 LAB
ABO GROUP BLD BPU: NORMAL
ANION GAP SERPL CALCULATED.3IONS-SCNC: 7 MMOL/L (ref 4–13)
ATRIAL RATE: 108 BPM
BPU ID: NORMAL
BUN SERPL-MCNC: 13 MG/DL (ref 5–25)
CALCIUM SERPL-MCNC: 8.8 MG/DL (ref 8.3–10.1)
CHLORIDE SERPL-SCNC: 107 MMOL/L (ref 100–108)
CO2 SERPL-SCNC: 23 MMOL/L (ref 21–32)
CREAT SERPL-MCNC: 0.65 MG/DL (ref 0.6–1.3)
CROSSMATCH: NORMAL
ERYTHROCYTE [DISTWIDTH] IN BLOOD BY AUTOMATED COUNT: 13.5 % (ref 11.6–15.1)
GFR SERPL CREATININE-BSD FRML MDRD: 85 ML/MIN/1.73SQ M
GLUCOSE SERPL-MCNC: 120 MG/DL (ref 65–140)
GLUCOSE SERPL-MCNC: 138 MG/DL (ref 65–140)
GLUCOSE SERPL-MCNC: 182 MG/DL (ref 65–140)
GLUCOSE SERPL-MCNC: 194 MG/DL (ref 65–140)
GLUCOSE SERPL-MCNC: 249 MG/DL (ref 65–140)
HCT VFR BLD AUTO: 36.7 % (ref 34.8–46.1)
HGB BLD-MCNC: 11.8 G/DL (ref 11.5–15.4)
MAGNESIUM SERPL-MCNC: 1.9 MG/DL (ref 1.6–2.6)
MCH RBC QN AUTO: 30.1 PG (ref 26.8–34.3)
MCHC RBC AUTO-ENTMCNC: 32.2 G/DL (ref 31.4–37.4)
MCV RBC AUTO: 94 FL (ref 82–98)
PLATELET # BLD AUTO: 237 THOUSANDS/UL (ref 149–390)
PMV BLD AUTO: 10.8 FL (ref 8.9–12.7)
POTASSIUM SERPL-SCNC: 3.9 MMOL/L (ref 3.5–5.3)
QRS AXIS: -58 DEGREES
QRSD INTERVAL: 146 MS
QT INTERVAL: 396 MS
QTC INTERVAL: 531 MS
RBC # BLD AUTO: 3.92 MILLION/UL (ref 3.81–5.12)
SODIUM SERPL-SCNC: 137 MMOL/L (ref 136–145)
T WAVE AXIS: 106 DEGREES
UNIT DISPENSE STATUS: NORMAL
UNIT PRODUCT CODE: NORMAL
UNIT RH: NORMAL
VENTRICULAR RATE: 108 BPM
WBC # BLD AUTO: 10.26 THOUSAND/UL (ref 4.31–10.16)

## 2020-02-19 PROCEDURE — 99232 SBSQ HOSP IP/OBS MODERATE 35: CPT | Performed by: THORACIC SURGERY (CARDIOTHORACIC VASCULAR SURGERY)

## 2020-02-19 PROCEDURE — 93306 TTE W/DOPPLER COMPLETE: CPT | Performed by: INTERNAL MEDICINE

## 2020-02-19 PROCEDURE — 93010 ELECTROCARDIOGRAM REPORT: CPT | Performed by: INTERNAL MEDICINE

## 2020-02-19 PROCEDURE — 93306 TTE W/DOPPLER COMPLETE: CPT

## 2020-02-19 PROCEDURE — 71045 X-RAY EXAM CHEST 1 VIEW: CPT

## 2020-02-19 PROCEDURE — 97163 PT EVAL HIGH COMPLEX 45 MIN: CPT

## 2020-02-19 PROCEDURE — 85027 COMPLETE CBC AUTOMATED: CPT | Performed by: THORACIC SURGERY (CARDIOTHORACIC VASCULAR SURGERY)

## 2020-02-19 PROCEDURE — 82948 REAGENT STRIP/BLOOD GLUCOSE: CPT

## 2020-02-19 PROCEDURE — 83735 ASSAY OF MAGNESIUM: CPT | Performed by: THORACIC SURGERY (CARDIOTHORACIC VASCULAR SURGERY)

## 2020-02-19 PROCEDURE — 94760 N-INVAS EAR/PLS OXIMETRY 1: CPT

## 2020-02-19 PROCEDURE — 97167 OT EVAL HIGH COMPLEX 60 MIN: CPT

## 2020-02-19 PROCEDURE — 80048 BASIC METABOLIC PNL TOTAL CA: CPT | Performed by: THORACIC SURGERY (CARDIOTHORACIC VASCULAR SURGERY)

## 2020-02-19 PROCEDURE — 99222 1ST HOSP IP/OBS MODERATE 55: CPT | Performed by: INTERNAL MEDICINE

## 2020-02-19 PROCEDURE — 97535 SELF CARE MNGMENT TRAINING: CPT

## 2020-02-19 RX ORDER — ATORVASTATIN CALCIUM 40 MG/1
40 TABLET, FILM COATED ORAL DAILY
Status: DISCONTINUED | OUTPATIENT
Start: 2020-02-20 | End: 2020-02-20 | Stop reason: HOSPADM

## 2020-02-19 RX ORDER — ACETAMINOPHEN 325 MG/1
650 TABLET ORAL EVERY 6 HOURS PRN
Status: DISCONTINUED | OUTPATIENT
Start: 2020-02-19 | End: 2020-02-19

## 2020-02-19 RX ORDER — PANTOPRAZOLE SODIUM 40 MG/1
40 TABLET, DELAYED RELEASE ORAL DAILY
Status: DISCONTINUED | OUTPATIENT
Start: 2020-02-19 | End: 2020-02-19

## 2020-02-19 RX ORDER — DOCUSATE SODIUM 100 MG/1
100 CAPSULE, LIQUID FILLED ORAL 2 TIMES DAILY
Status: DISCONTINUED | OUTPATIENT
Start: 2020-02-19 | End: 2020-02-20 | Stop reason: HOSPADM

## 2020-02-19 RX ORDER — LIDOCAINE 50 MG/G
2 PATCH TOPICAL DAILY
Status: DISCONTINUED | OUTPATIENT
Start: 2020-02-19 | End: 2020-02-20 | Stop reason: HOSPADM

## 2020-02-19 RX ORDER — POTASSIUM CHLORIDE 750 MG/1
10 TABLET, EXTENDED RELEASE ORAL DAILY
Status: DISCONTINUED | OUTPATIENT
Start: 2020-02-19 | End: 2020-02-20 | Stop reason: HOSPADM

## 2020-02-19 RX ORDER — LANOLIN ALCOHOL/MO/W.PET/CERES
6 CREAM (GRAM) TOPICAL
Status: DISCONTINUED | OUTPATIENT
Start: 2020-02-19 | End: 2020-02-20 | Stop reason: HOSPADM

## 2020-02-19 RX ORDER — ONDANSETRON 2 MG/ML
4 INJECTION INTRAMUSCULAR; INTRAVENOUS EVERY 6 HOURS PRN
Status: DISCONTINUED | OUTPATIENT
Start: 2020-02-19 | End: 2020-02-19

## 2020-02-19 RX ORDER — OXYCODONE HYDROCHLORIDE 5 MG/1
2.5 TABLET ORAL EVERY 4 HOURS PRN
Status: COMPLETED | OUTPATIENT
Start: 2020-02-19 | End: 2020-02-19

## 2020-02-19 RX ORDER — HEPARIN SODIUM 5000 [USP'U]/ML
5000 INJECTION, SOLUTION INTRAVENOUS; SUBCUTANEOUS EVERY 8 HOURS SCHEDULED
Status: DISCONTINUED | OUTPATIENT
Start: 2020-02-19 | End: 2020-02-20 | Stop reason: HOSPADM

## 2020-02-19 RX ORDER — TORSEMIDE 20 MG/1
20 TABLET ORAL DAILY
Status: DISCONTINUED | OUTPATIENT
Start: 2020-02-19 | End: 2020-02-20

## 2020-02-19 RX ORDER — DILTIAZEM HYDROCHLORIDE 240 MG/1
240 CAPSULE, COATED, EXTENDED RELEASE ORAL DAILY
Status: DISCONTINUED | OUTPATIENT
Start: 2020-02-19 | End: 2020-02-20 | Stop reason: HOSPADM

## 2020-02-19 RX ADMIN — METFORMIN HYDROCHLORIDE 500 MG: 500 TABLET ORAL at 17:13

## 2020-02-19 RX ADMIN — DOCUSATE SODIUM 100 MG: 100 CAPSULE, LIQUID FILLED ORAL at 17:13

## 2020-02-19 RX ADMIN — CHLORHEXIDINE GLUCONATE 0.12% ORAL RINSE 15 ML: 1.2 LIQUID ORAL at 08:37

## 2020-02-19 RX ADMIN — OXYCODONE HYDROCHLORIDE 2.5 MG: 5 TABLET ORAL at 05:14

## 2020-02-19 RX ADMIN — TEMAZEPAM 30 MG: 15 CAPSULE ORAL at 21:53

## 2020-02-19 RX ADMIN — POLYETHYLENE GLYCOL 3350 17 G: 17 POWDER, FOR SOLUTION ORAL at 08:37

## 2020-02-19 RX ADMIN — ATORVASTATIN CALCIUM 10 MG: 10 TABLET, FILM COATED ORAL at 08:37

## 2020-02-19 RX ADMIN — INSULIN LISPRO 3 UNITS: 100 INJECTION, SOLUTION INTRAVENOUS; SUBCUTANEOUS at 11:56

## 2020-02-19 RX ADMIN — MUPIROCIN 1 APPLICATION: 20 OINTMENT TOPICAL at 21:54

## 2020-02-19 RX ADMIN — TORSEMIDE 20 MG: 20 TABLET ORAL at 11:55

## 2020-02-19 RX ADMIN — IPRATROPIUM BROMIDE 1 PUFF: 17 AEROSOL, METERED RESPIRATORY (INHALATION) at 08:38

## 2020-02-19 RX ADMIN — INSULIN LISPRO 1 UNITS: 100 INJECTION, SOLUTION INTRAVENOUS; SUBCUTANEOUS at 21:53

## 2020-02-19 RX ADMIN — HEPARIN SODIUM 5000 UNITS: 5000 INJECTION INTRAVENOUS; SUBCUTANEOUS at 14:49

## 2020-02-19 RX ADMIN — DILTIAZEM HYDROCHLORIDE 240 MG: 240 CAPSULE, COATED, EXTENDED RELEASE ORAL at 11:56

## 2020-02-19 RX ADMIN — CLOPIDOGREL BISULFATE 75 MG: 75 TABLET ORAL at 08:37

## 2020-02-19 RX ADMIN — IPRATROPIUM BROMIDE 1 PUFF: 17 AEROSOL, METERED RESPIRATORY (INHALATION) at 21:55

## 2020-02-19 RX ADMIN — METFORMIN HYDROCHLORIDE 500 MG: 500 TABLET ORAL at 08:37

## 2020-02-19 RX ADMIN — LIDOCAINE 2 PATCH: 50 PATCH TOPICAL at 01:59

## 2020-02-19 RX ADMIN — CHLORHEXIDINE GLUCONATE 0.12% ORAL RINSE 15 ML: 1.2 LIQUID ORAL at 17:13

## 2020-02-19 RX ADMIN — ACETAMINOPHEN 650 MG: 325 TABLET ORAL at 04:17

## 2020-02-19 RX ADMIN — PANTOPRAZOLE SODIUM 40 MG: 40 TABLET, DELAYED RELEASE ORAL at 05:14

## 2020-02-19 RX ADMIN — ASPIRIN 81 MG: 81 TABLET ORAL at 08:37

## 2020-02-19 RX ADMIN — CEFAZOLIN SODIUM 2000 MG: 10 INJECTION, POWDER, FOR SOLUTION INTRAVENOUS at 04:11

## 2020-02-19 RX ADMIN — HEPARIN SODIUM 5000 UNITS: 5000 INJECTION INTRAVENOUS; SUBCUTANEOUS at 21:53

## 2020-02-19 RX ADMIN — MUPIROCIN 1 APPLICATION: 20 OINTMENT TOPICAL at 08:37

## 2020-02-19 RX ADMIN — DOCUSATE SODIUM 100 MG: 100 CAPSULE, LIQUID FILLED ORAL at 11:55

## 2020-02-19 RX ADMIN — CEFAZOLIN SODIUM 2000 MG: 10 INJECTION, POWDER, FOR SOLUTION INTRAVENOUS at 11:59

## 2020-02-19 RX ADMIN — POTASSIUM CHLORIDE 10 MEQ: 750 TABLET, EXTENDED RELEASE ORAL at 11:55

## 2020-02-19 RX ADMIN — INSULIN LISPRO 2 UNITS: 100 INJECTION, SOLUTION INTRAVENOUS; SUBCUTANEOUS at 17:13

## 2020-02-19 NOTE — PLAN OF CARE
Problem: PHYSICAL THERAPY ADULT  Goal: Performs mobility at highest level of function for planned discharge setting  See evaluation for individualized goals  Description  Treatment/Interventions: Functional transfer training, LE strengthening/ROM, Therapeutic exercise, Endurance training, Cognitive reorientation, Patient/family training, Equipment eval/education, Bed mobility, Gait training, Spoke to nursing, Spoke to case management  Equipment Recommended: Cane(Large based quad cane)       See flowsheet documentation for full assessment, interventions and recommendations  2/19/2020 1446 by Joseph Sage PT  Note:   Prognosis: Good  Problem List: Decreased strength, Decreased range of motion, Decreased endurance, Impaired balance, Decreased mobility, Decreased coordination, Decreased cognition, Impaired judgement, Decreased safety awareness  Assessment: Pt  is a 68 yo F who presents s/p TAVR Dx: Severe Aortic Stenosis  order placed for PT eval and tx, w/ activity order of up as tolerated  pt presents w/ comorbidities of Hx of Stroke, Htn, DM, A-Fib, Carotid occlusion, Carotid stenosis, s/p TAVR, R sided weakness, expressive aphasia and personal factors of mobilizing w/ assistive device, inability to navigate community distances, inability to navigate level surfaces w/o external assistance, unable to perform dynamic tasks in community, unable to perform physical activity, inability to perform IADLs and inability to live alone  pt presents w/ weakness, decreased endurance, impaired balance, gait deviations, decreased safety awareness and fall risk  these impairments are evident in findings from physical examination (weakness, impaired coordination and impaired skin integrity), mobility assessment (need for Cristobal assist w/ all phases of mobility when usually mobilizing independently, tolerance to only 40 feet of ambulation and need for cueing for mobility technique)   pt needed input for task focus and mobility technique  pt is at risk for falls due to physical and safety awareness deficits  pt's clinical presentation is unstable/unpredictable (evident in need for assist w/ all phases of mobility when usually mobilizing independently, tolerance to only 40 feet of ambulation, need for input for mobility technique, need for input for task focus and mobility technique and need for input for mobility technique/safety)  pt needs inpatient PT tx to improve mobility deficits  discharge recommendation is for home PT and home w/ family support to reduce fall risk and maximize level of functional independence  Recommendation: Home with family support, Home PT     PT - OK to Discharge: Yes    See flowsheet documentation for full assessment  2/19/2020 1441 by Aaliyah Bravo PT  Outcome: Progressing  Note:   Prognosis: Good  Problem List: Decreased strength, Decreased range of motion, Decreased endurance, Impaired balance, Decreased mobility, Decreased coordination, Decreased cognition, Impaired judgement, Decreased safety awareness           Recommendation: Home with family support, Home PT     PT - OK to Discharge: Yes    See flowsheet documentation for full assessment

## 2020-02-19 NOTE — CONSULTS
Consultation - Cardiology Team One  Nancy Orozco 67 y o  female MRN: [id number]  Unit/Bed#: [facility] 422-01 Encounter: [id number]    Inpatient consult to Cardiology  Consult performed by: LINDA Lopez  Consult ordered by: Rebeca Lombard, PA-C      Physician Requesting Consult: Jerri Davidson, DO  Reason for Consult / Principal Problem: Post TAVR    Assessment/ Plan    1  Severe aortic stenosis s/p transfemoral 2/18/20  POD #1  Some hypotension this morning- BP 90s/50s  Weaned off O2  Final intra op BOLIVAR- LVEF 45-50%, normal bioprosthetic aortic valve function without PVL  Post op TTE pending  On ASA, Plavix, and statin  Outpatient follow up with Dr Kei Barreto  2  Permanent atrial fibrillation  No anticoagulation due to history of spontaneous retroperitoneal bleed in 2014  Tele reviewed- Afib, rates 90s-120s- home diltiazem dose resumed late this morning, monitor for improved rates  3  HTN- mildly hypotensive this morning but overall stable, 24 hour average /55     4  Type 2 DM- A1c 7 8%, management per primary team     5  Hx CVA- ASA, statin, Plavix  History of Present Illness   HPI: Nancy Orozco is a 67y o  year old female with critical aortic stenosis, atrial fibrillation, HTN, type 2 DM, and history of CVA  She follows with cardiologist Dr Kei Barreto and was last seen in the office on 11/6/19  At this time she was referred to CT surgery for evaluation for TAVR  Pre operative cardiac catheterization demonstrated normal coronaries  She underwent transfemoral TAVR with a 26 mm Farah ANIBAL 3 bioprosthetic valve on 2/18/20  Final intra op BOLIVAR demonstrated normal function of prosthetic valve without PVL, LVEF 45-50%  Post-operative TTE pending  She has been recovering without complications  Cardiology has been consulted as part of routine post operative management  EKG reviewed personally: N/A    Telemetry reviewed personally: Afib, rates 90s-120s       Review of Systems Constitution: Negative for diaphoresis and malaise/fatigue  Cardiovascular: Positive for leg swelling (RLE, chronic)  Negative for chest pain, orthopnea and palpitations  Respiratory: Negative for cough, shortness of breath, sputum production and wheezing  Gastrointestinal: Negative for bloating, abdominal pain and nausea  Neurological: Positive for light-headedness (earlier today after oxycodone)  Negative for dizziness and weakness  All other systems reviewed and are negative      Historical Information   Past Medical History:   Diagnosis Date    Aortic valve disease     Atrial fibrillation ([facility] )     Cardiac disease     [facility]     Heart murmur     Heart valve disease     Hypertension     Stroke [facility])      Past Surgical History:   Procedure Laterality Date     SECTION      x 2    HYSTERECTOMY      TONSILLECTOMY       Social History     Substance and Sexual Activity   Alcohol Use Not Currently     Social History     Substance and Sexual Activity   Drug Use Never     Social History     Tobacco Use   Smoking Status Never Smoker   Smokeless Tobacco Never Used     Family History:   Family History   Problem Relation Age of Onset    Diabetes Mother     Heart disease Mother     Diabetes Father     Heart disease Father      Meds/Allergies   all current active meds have been reviewed and current meds:   Current Facility-Administered Medications   Medication Dose Route Frequency    acetaminophen (TYLENOL) tablet 650 mg  650 mg Oral Q4H PRN    aspirin (ECOTRIN LOW STRENGTH) EC tablet 81 mg  81 mg Oral Daily    atorvastatin (LIPITOR) tablet 10 mg  10 mg Oral Daily    bisacodyl (DULCOLAX) rectal suppository 10 mg  10 mg Rectal Daily PRN    ceFAZolin (ANCEF) 2,000 mg in sodium chloride 0 9 % 50 mL IVPB  2,000 mg Intravenous Q8H    chlorhexidine (PERIDEX) 0 12 % oral rinse 15 mL  15 mL Mouth/Throat BID    clopidogrel (PLAVIX) tablet 75 mg  75 mg Oral Daily    diltiazem (CARDIZEM CD) 24 hr capsule 240 mg  240 mg Oral Daily    docusate sodium (COLACE) capsule 100 mg  100 mg Oral BID    heparin (porcine) subcutaneous injection 5,000 Units  5,000 Units Subcutaneous Q8H [facility]    insulin lispro (HumaLOG) 100 units/mL subcutaneous injection 1-6 Units  1-6 Units Subcutaneous TID AC    insulin lispro (HumaLOG) 100 units/mL subcutaneous injection 1-6 Units  1-6 Units Subcutaneous HS    ipratropium (ATROVENT HFA) inhaler 1 puff  1 puff Inhalation Q12H [facility]    lidocaine (LIDODERM) 5 % patch 2 patch  2 patch Topical Daily    melatonin tablet 6 mg  6 mg Oral HS    metFORMIN (GLUCOPHAGE) tablet 500 mg  500 mg Oral BID    mupirocin (BACTROBAN) 2 % nasal ointment 1 application  1 application Nasal B40T [facility]    ondansetron (ZOFRAN) injection 4 mg  4 mg Intravenous Q6H PRN    pantoprazole (PROTONIX) EC tablet 40 mg  40 mg Oral Early Morning    polyethylene glycol (MIRALAX) packet 17 g  17 g Oral Daily PRN    polyethylene glycol (MIRALAX) packet 17 g  17 g Oral Daily    potassium chloride (K-DUR,KLOR-CON) CR tablet 10 mEq  10 mEq Oral Daily    temazepam (RESTORIL) capsule 30 mg  30 mg Oral HS    torsemide (DEMADEX) tablet 20 mg  20 mg Oral Daily        Allergies   Allergen Reactions    Atorvastatin      dizziness and shaky    Rivaroxaban      Dizziness, and shaky  Objective   Vitals: Blood pressure 92/60, pulse 76, temperature 97 6 °F (36 4 °C), temperature source Oral, resp  rate 16, height 5' 4" (1 626 m), weight 81 5 kg (179 lb 10 8 oz), SpO2 93 %  ,     Body mass index is 30 84 kg/m²  ,     Systolic (25SGH), CEF:208 , Min:85 , OYN:995     Diastolic (95RNX), VZL:77, Min:44, Max:63    Intake/Output Summary (Last 24 hours) at 2/19/2020 1129  Last data filed at 2/19/2020 0842  Gross per 24 hour   Intake 2043 33 ml   Output 1290 ml   Net 753 33 ml     Weight (last 2 days)     Date/Time   Weight    02/19/20 0600   81 5 (179 68)    02/18/20 1133   79 4 (175)            Invasive Devices Central Venous Catheter Line            CVC Central Lines 02/18/20 Triple less than 1 day          Peripheral Intravenous Line            Peripheral IV 02/18/20 Left Forearm less than 1 day          Drain            Urethral Catheter Non-latex; Temperature probe 16 Fr  less than 1 day              Physical Exam   Constitutional: She is oriented to person, place, and time  No distress  Neck: Neck supple  No JVD present  R CVC   Cardiovascular: Normal heart sounds and intact distal pulses  An irregularly irregular rhythm present  Tachycardia present  Exam reveals no gallop and no friction rub  No murmur heard  Pulmonary/Chest: Effort normal  No respiratory distress  She has no rales  Room air  Abdominal: Soft  Bowel sounds are normal  She exhibits no distension  Musculoskeletal: Normal range of motion  She exhibits edema (mild NP edema RLE)  Neurological: She is alert and oriented to person, place, and time  Skin: Skin is warm and dry  She is not diaphoretic  L groin dressing C/D/I  Psychiatric: She has a normal mood and affect       LABORATORY RESULTS:      CBC with diff:   Results from last 7 days   Lab Units 02/19/20  0520 02/18/20 1713 02/18/20 1658 02/18/20  1631 02/18/20  1605   WBC Thousand/uL 10 26*  --   --   --   --    HEMOGLOBIN g/dL 11 8 13 5  --   --   --    I STAT HEMOGLOBIN g/dl  --   --  11 6 12 9 13 6   HEMATOCRIT % 36 7 42 1  --   --   --    HEMATOCRIT, ISTAT %  --   --  34* 38 40   MCV fL 94  --   --   --   --    PLATELETS Thousands/uL 237 268  --   --   --    MCH pg 30 1  --   --   --   --    MCHC g/dL 32 2  --   --   --   --    RDW % 13 5  --   --   --   --    MPV fL 10 8 10 2  --   --   --        CMP:  Results from last 7 days   Lab Units 02/19/20  0520 02/18/20  1713 02/18/20  1658 02/18/20  1631 02/18/20  1605   POTASSIUM mmol/L 3 9 3 7  --   --   --    CHLORIDE mmol/L 107 110*  --   --   --    CO2 mmol/L 23 22  --   --   --    CO2, I-STAT mmol/L  --   --  22 24 28 BUN mg/dL 13 12  --   --   --    CREATININE mg/dL 0 65 0 68  --   --   --    GLUCOSE, ISTAT mg/dl  --   --  127 130 121   CALCIUM mg/dL 8 8 9 0  --   --   --    EGFR ml/min/1 73sq m 85 84  --   --   --      BMP:  Results from last 7 days   Lab Units 20  0520 20  1713 20  1658 20  1631 20  1605   POTASSIUM mmol/L 3 9 3 7  --   --   --    CHLORIDE mmol/L 107 110*  --   --   --    CO2 mmol/L 23 22  --   --   --    CO2, I-STAT mmol/L  --   --  22 24 28   BUN mg/dL 13 12  --   --   --    CREATININE mg/dL 0 65 0 68  --   --   --    GLUCOSE, ISTAT mg/dl  --   --  127 130 121   CALCIUM mg/dL 8 8 9 0  --   --   --       Results from last 7 days   Lab Units 20  0520   MAGNESIUM mg/dL 1 9     Results from last 7 days   Lab Units 20  1121   INR  1 03     Cardiac testing:   Results for orders placed during the hospital encounter of 10/23/19   Echo complete with contrast if indicated    Narrative [address] [place] [address]  [phone]    Transthoracic Echocardiogram  2D, M-mode, Doppler, and Color Doppler    Study date:  24-Oct-2019    Patient: Lopez Azevedo  MR number: [id number]  Account number: [de-identified]  : 1940  Age: 67 years  Gender: Female  Status: Outpatient  Location: Bedside  Height: 64 in  Weight: 201 lb  BP: 111/ 73 mmHg    Indications: Syncope and collapse,    Diagnoses: R55  - Syncope and collapse    Sonographer:   [facility] RDCS  Referring Physician:  LINDA Cochran  Group:  [facility]  Interpreting Physician:  Samantha Sanchez MD    SUMMARY    LEFT VENTRICLE:  Wall thickness was mildly increased  Doppler parameters were consistent with elevated ventricular end-diastolic filling pressure  RIGHT VENTRICLE:  The ventricle was mildly dilated  LEFT ATRIUM:  The atrium was mildly dilated  MITRAL VALVE:  There was mild annular calcification    There was moderate regurgitation  AORTIC VALVE:  There was critical stenosis  ROCHELLE 0 4 cm2  There was mild regurgitation  TRICUSPID VALVE:  There was mild regurgitation  AORTA:  The root exhibited normal size and mild fibrocalcific change  HISTORY: Dizziness  PRIOR HISTORY: Atrial fibrillation  Risk factors: hypertension and diabetes  Remote stroke  PROCEDURE: The procedure was performed at the bedside  This was a routine study  The transthoracic approach was used  The study included complete 2D imaging, M-mode, complete spectral Doppler, and color Doppler  The heart rate was 95 bpm,  at the start of the study  Images were obtained from the parasternal, apical, subcostal, and suprasternal notch acoustic windows  Image quality was adequate  LEFT VENTRICLE: Size was normal  Ejection fraction was estimated in the range of 50 % to 55 % to be 50 %  Wall thickness was mildly increased  DOPPLER: Left ventricular diastolic function parameters were abnormal  Doppler parameters were  consistent with elevated ventricular end-diastolic filling pressure  RIGHT VENTRICLE: The ventricle was mildly dilated  Systolic function was normal     LEFT ATRIUM: The atrium was mildly dilated  RIGHT ATRIUM: Size was normal     MITRAL VALVE: There was mild annular calcification  There was mild calcification  DOPPLER: There was moderate regurgitation  AORTIC VALVE: DOPPLER: There was critical stenosis  ROCHELLE 0 4 cm2 There was mild regurgitation  TRICUSPID VALVE: DOPPLER: There was mild regurgitation  Estimated peak PA pressure was 50 mmHg  PULMONIC VALVE: Not well visualized  DOPPLER: There was no regurgitation  PERICARDIUM: There was no pericardial effusion  The pericardium was normal in appearance  AORTA: The root exhibited normal size and mild fibrocalcific change  SYSTEMIC VEINS: IVC: The inferior vena cava was normal in size and course   Respirophasic changes were normal     SYSTEM MEASUREMENT TABLES    2D  %FS: 35 5 %  Ao Diam: 3 cm  EDV(Teich): 71 4 ml  EF(Teich): 65 5 %  ESV(Teich): 24 7 ml  IVSd: 1 1 cm  LA Area: 24 2 cm2  LA Diam: 4 6 cm  LVEDV MOD A4C: 96 9 ml  LVEF MOD A4C: 67 3 %  LVESV MOD A4C: 31 7 ml  LVIDd: 4 cm  LVIDs: 2 6 cm  LVLd A4C: 8 3 cm  LVLs A4C: 6 8 cm  LVOT Diam: 2 cm  LVPWd: 1 1 cm  RA Area: 18 1 cm2  RVIDd: 3 9 cm  SV MOD A4C: 65 2 ml  SV(Teich): 46 8 ml    CW  AR Dec [place]: 2 9 m/s2  AR Dec Time: 1202 6 ms  AR PHT: 348 8 ms  AR Vmax: 3 4 m/s  AR maxP 1 mmHg  AV Env  Ti: 306 4 ms  AV VTI: 122 7 cm  AV Vmax: 5 m/s  AV Vmean: 4 m/s  AV maxP 7 mmHg  AV meanP 5 mmHg  TR Vmax: 3 4 m/s  TR maxP 3 mmHg    MM  TAPSE: 1 2 cm    PW  ROCHELLE (VTI): 0 4 cm2  ROCHELLE Vmax: 0 4 cm2  E': 0 1 m/s  E/E': 17 8  LVOT Env  Ti: 306 4 ms  LVOT VTI: 15 4 cm  LVOT Vmax: 0 6 m/s  LVOT Vmean: 0 5 m/s  LVOT maxP 6 mmHg  LVOT meanP 1 mmHg  LVSV Dopp: 48 2 ml  MV A [place]: 0 3 m/s  MV Dec [place]: 8 3 m/s2  MV DecT: 144 1 ms  MV E Geovani: 1 2 m/s  MV E/A Ratio: 4 5  MV PHT: 41 8 ms  MVA By PHT: 5 3 cm2    [employer] Accredited Echocardiography Laboratory    Prepared and electronically signed by    Samantha Sanchez MD  Signed 24-Oct-2019 10:44:52       Imaging: I have personally reviewed pertinent reports  and I have personally reviewed pertinent films in PACS  Cta Head And Neck W Wo Contrast    Result Date: 2020  Narrative: CTA NECK AND BRAIN WITH AND WITHOUT CONTRAST INDICATION: I65 23: Occlusion and stenosis of bilateral carotid arteries COMPARISON:   2019 TECHNIQUE:  Routine CT imaging of the Brain without contrast   Post contrast imaging was performed after administration of iodinated contrast through the neck and brain  Post contrast axial 0 625 mm images timed to opacify the arterial system  3D rendering was performed on an independent workstation  MIP reconstructions performed  Coronal reconstructions were performed of the noncontrast portion of the brain    Radiation dose length product (DLP) for this visit:  3242 mGy-cm   This examination, like all CT scans performed in the [facility], was performed utilizing techniques to minimize radiation dose exposure, including the use of iterative reconstruction and automated exposure control  IV Contrast:  100 mL of iohexol (OMNIPAQUE)  IMAGE QUALITY:   Diagnostic FINDINGS: NONCONTRAST BRAIN PARENCHYMA:  Encephalomalacia and volume loss left frontal lobe with ex vacuo dilatation of the frontal horn left lateral ventricle unchanged from the prior study, likely sequela of prior infarct  There is no mass, mass effect, midline shift  No hemorrhage identified  VENTRICLES AND EXTRA-AXIAL SPACES:  Normal for the patient's age  VISUALIZED ORBITS AND PARANASAL SINUSES:  Unremarkable  CERVICAL VASCULATURE AORTIC ARCH AND GREAT VESSELS:  Normal aortic arch and great vessel origins  Normal visualized subclavian vessels  RIGHT VERTEBRAL ARTERY CERVICAL SEGMENT:  Moderate stenosis at the origin  The vessel is normal in caliber throughout the neck  LEFT VERTEBRAL ARTERY CERVICAL SEGMENT:  Moderate stenosis at the origin  The vessel is normal in caliber throughout the neck  RIGHT EXTRACRANIAL CAROTID SEGMENT:  There is a combination of atheromatous and calcific plaque at the left carotid bifurcation with dense calcific plaque located approximately 1 2 cm from the carotid bifurcation resulting in approximately 75% stenosis by NASCET criteria  LEFT EXTRACRANIAL CAROTID SEGMENT:  Left common carotid artery origin is mildly stenosed  The left carotid artery is occluded at the carotid bifurcation and remains occluded throughout the neck  NASCET criteria was used to determine the degree of internal carotid artery diameter stenosis  INTRACRANIAL VASCULATURE INTERNAL CAROTID ARTERIES:  Left internal carotid artery reconstitutes in the distal cavernous portion with a 2nd stenosis which is moderate to severe in the ophthalmic segment   ANTERIOR CIRCULATION: Symmetric A1 segments and anterior cerebral arteries with normal enhancement  Normal anterior communicating artery  MIDDLE CEREBRAL ARTERY CIRCULATION:  M1 segment and middle cerebral artery branches demonstrate normal enhancement bilaterally  DISTAL VERTEBRAL ARTERIES:  Normal distal vertebral arteries  Posterior inferior cerebellar artery origins are normal  Normal vertebral basilar junction  BASILAR ARTERY:  Basilar artery is normal in caliber  Normal superior cerebellar arteries  POSTERIOR CEREBRAL ARTERIES: Both posterior cerebral arteries arises from the basilar tip  Both arteries demonstrate normal enhancement  Small but patent right posterior communicating artery  DURAL VENOUS SINUSES:  Normal  NON VASCULAR ANATOMY BONY STRUCTURES:  Degenerative disc disease mid cervical spine  SOFT TISSUES OF THE NECK:  Normal  THORACIC INLET:  Calcified pleural plaques  Impression: Approximately 75% right ICA stenosis proximally just beyond the carotid bifurcation  Left ICA occlusion at the level of the carotid bifurcation which remains occluded through the skull base  The vessel reconstitutes in the distal cavernous segment with a severe left paraophthalmic segment stenosis  Calcified pleural plaques in the upper chest bilaterally  Workstation performed: [id number]     Xr Chest Portable    Result Date: 2/19/2020  Narrative: CHEST INDICATION:   Post Open Heart surgery  COMPARISON:  Chest radiograph from 2/18/2020  Chest CT from 1/23/2020  EXAM PERFORMED/VIEWS:  XR CHEST PORTABLE FINDINGS:  Right jugular catheter at cavoatrial junction  Normal heart size  Post TAVR  Lungs clear  Extensive pleural plaque calcification  No effusion or pneumothorax  Osseous structures appear within normal limits for patient age  Impression: No acute cardiopulmonary disease  Post TAVR  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   Workstation performed: [id number]     Vas Carotid Complete Study    Result Date: 1/23/2020  Narrative:  THE [facility]ULAR [facility]ENTER REPORT CLINICAL: Indications: Patient presents for a general health evaluation secondary to future open heart surgery  Patient is asymptomatic at this time  Risk Factors The patient has history of HTN, CVA,(right side, 2008), A-fib, and Diabetes (Yes)  The patient current BMI is 30 89, Weight is 180 lb and height is 64 in  Clinical Right Pressure: Not used due to stroke, Left Pressure:  122/64 mm Hg  FINDINGS:  Right        Impression  PSV  EDV (cm/s)  Direction of Flow  Ratio  Dist  ICA                 43          16                      0 65  Mid  ICA                  74          29                      1 10  Prox  ICA    50 - 69%    133          58                      1 98  Dist CCA                  53          14                            Mid CCA                   67          19                      0 82  Prox CCA                  82          21                            Ext Carotid               63           8                      0 94  Prox Vert                 53          22  Antegrade                 Subclavian                72           7                             Left         Impression  PSV  EDV (cm/s)  Direction of Flow  Ratio  Dist  ICA    Occluded                                               Mid  ICA     Occluded                                               Prox  ICA    Occluded                                               Dist CCA                  38           7                            Mid CCA                   40           6                      1 39  Prox CCA                  29           6                            Ext Carotid               81          13                      2 01  Prox Vert                 13           6  Antegrade                 Subclavian                87           0                               CONCLUSION:  Impression RIGHT: There is 50-69% stenosis noted in the internal carotid artery   Plaque is heterogenous, calcified and irregular  Vertebral artery flow is antegrade  There is no significant subclavian artery disease  LEFT: Evidence suggestive of occlusion of the internal carotid artery  Recommend further vascular interrogation with CTA or MRA if clinicallly indicated  Vertebral artery flow is antegrade  There is no significant subclavian artery disease  Technical findings were give to Tim at [facility]  No prior study for comparison  Recommend repeat testing in 6month as per protocol unless otherwise indicated  Internal carotid artery stenosis determination by consensus criteria from: Janae Nino et al  Carotid Artery Stenosis: Gray-Scale and Doppler US Diagnosis - Society of Radiologists in [address] [place] [address] [facility], Radiology 2003; [phone]  SIGNATURE: Electronically Signed by: Eloise Patiño on 2020-01-23 06:13:42 PM    Xr Chest Portable Icu    Result Date: 2/19/2020  Narrative: CHEST INDICATION:   S/P Transcatheter aortic valve replacement  COMPARISON:  10/23/2019  EXAM PERFORMED/VIEWS:  XR CHEST PORTABLE ICU FINDINGS:  Cardiac valve prosthesis is present  Right jugular central venous catheter tip overlies the cavoatrial junction  Cardiomediastinal silhouette appears unremarkable  The lungs are clear  No pneumothorax or pleural effusion  Calcified pleural plaques are present  Osseous structures appear within normal limits for patient age  Impression: No acute cardiopulmonary disease  Asbestos-related pleural disease  Workstation performed: [id number]     Cta Chest Abdomen Pelvis W Wo Contrast Tavr    Result Date: 1/28/2020  Narrative: CT ANGIOGRAM OF THE CHEST, ABDOMEN AND PELVIS WITH AND WITHOUT IV CONTRAST INDICATION:  Preoperative evaluation for TAVR COMPARISON: None  TECHNIQUE:  CT angiogram examination of the chest, abdomen and pelvis was performed according to standard protocol with cardiac gating   Axial, sagittal, and coronal reformatted images were submitted for interpretation  3D reconstructions were performed an independent workstation, and are supplied for review  Radiation dose length product (DLP) for this visit:  2973 mGy-cm   This examination, like all CT scans performed in the [facility], was performed utilizing techniques to minimize radiation dose exposure, including the use of iterative reconstruction and automated exposure control  IV Contrast:  130 mL of iodixanol (VISIPAQUE) Enteric Contrast: Enteric contrast was not administered  FINDINGS: VASCULAR STRUCTURES:     Annulus: diameter 25 x 22 mm     area: 426 sq mm   Annulus to LCA: 10 mm   Annulus to RCA: 16 mm   Minimal diameter right iliofemoral segment: 6 mm   Minimal diameter left iliofemoral segment: 6 mm The ascending aorta is nonaneurysmal measuring 31 mm with no significant atherosclerosis  There is a type 1 aortic arch with classic branching anatomy and no stenosis in the visualized great vessels  The aortic arch is nonaneurysmal with mild  atherosclerosis  The descending thoracic aorta is nonaneurysmal with mild  atherosclerosis  The abdominal aorta mildly atherosclerotic with scattered calcified plaque  The celiac artery, superior mesenteric artery, and inferior mesenteric artery are patent  Single renal arteries are patent bilaterally  An mild left renal artery stenosis is present due to calcified plaque  The right and left iliofemoral segments and internal iliac arteries are patent    Cardiac findings: There is severe calcification of the aortic valve  The left ventricle is normal   The ventricular septum is normal   No prior valvular surgery  No prior bypass surgery  No pericardial effusion  No cardiac mass or thrombus  OTHER FINDINGS: CHEST LUNGS:  Mild interstitial lung disease  There is no tracheal or endobronchial lesion  PLEURA:  Bilateral calcified pleural plaques consistent with a stress dose exposure  MEDIASTINUM AND TONE:  Unremarkable   CHEST WALL AND LOWER NECK: Unremarkable  ABDOMEN LIVER/BILIARY TREE:  Unremarkable  GALLBLADDER:  There are gallstone(s) within the gallbladder, without pericholecystic inflammatory changes  SPLEEN:  Unremarkable  PANCREAS:  Unremarkable  ADRENAL GLANDS:  Unremarkable  KIDNEYS/URETERS:  Unremarkable  No hydronephrosis  STOMACH AND BOWEL:  There is colonic diverticulosis without evidence of acute diverticulitis  APPENDIX:  No findings to suggest appendicitis  ABDOMINOPELVIC CAVITY:  No ascites or free intraperitoneal air  No lymphadenopathy  PELVIS REPRODUCTIVE ORGANS:  Patient is status post hysterectomy  URINARY BLADDER:  Unremarkable  ABDOMINAL WALL/INGUINAL REGIONS:  Unremarkable  OSSEOUS STRUCTURES:  Age-indeterminate compression fracture of L4  Impression: TVAR measurements as above Bilateral calcified pleural plaques consistent with the specimens dose exposure Cholelithiasis Diverticulosis Age indeterminant L4 compression fracture Workstation performed: [id number]     Thank you for allowing us to participate in this patient's care  This pt will follow up with Dr Brenden Larson once discharged  Counseling / Coordination of Care  Total floor / unit time spent today 45 minutes  Greater than 50% of total time was spent with the patient and / or family counseling and / or coordination of care  A description of the counseling / coordination of care: Review of history, current assessment, development of a plan  Code Status: Level 1 - Full Code    ** Please Note: Dragon 360 Dictation voice to text software may have been used in the creation of this document   **

## 2020-02-19 NOTE — PLAN OF CARE
Problem: OCCUPATIONAL THERAPY ADULT  Goal: Performs self-care activities at highest level of function for planned discharge setting  See evaluation for individualized goals  Description  Treatment Interventions: Cardiac education, Energy conservation, Patient/family training  Equipment Recommended: Other (comment)(wide-base quad cane)       See flowsheet documentation for full assessment, interventions and recommendations  Outcome: Adequate for Discharge  Note:   Limitation: Decreased ADL status, Decreased UE strength, Decreased UE ROM, Decreased endurance, Decreased high-level ADLs, Decreased fine motor control  Prognosis: Good  Assessment: Pt is a 66 yo female seen for OT eval s/p adm to [facility] dx'd w/ severe aortic stenosis  Pt s/p TAVR 2/18/2020  Comorbidities include a h/o stroke, HTN, DM, A-fib, carotid occlusion, carotid stenosis, R sided weakness, expressive aphasia, tibial plateau fx  Pt with active OT orders and up as tolerated orders  Pt is retired and resides alone in detached in-law suite in [facility] backyard in 1-level apt w/ ramped entrance  Family lives in house and is home to provide A as needed  Pt was I w/  ADLS and IADLS, does not drive, & required use of DME PTA, including quad cane for mobility  Pt is currently demonstrating the following occupational deficits: S UB bathing/dressing, Min A LB bathing/dressing and toileting, S STS and Min A functional mobility w/ quad cane  These deficits that are impacting pt's baseline areas of occupation are a result of the following impairments: endurance, activity tolerance, forward functional reach, balance, functional standing tolerance, decreased I w/ ADLS/IADLS, R sided hemiplegia, impaired fine motor skills and coordination deficits  Based on the aforementioned OT evaluation, functional performance deficits, and assessments, pt has been identified as a high complexity evaluation  Recommend home with family support and home OT upon D/C   Pt to continue to benefit from acute immediate OT services to address the following goals for 1 additional treatment session:     OT Discharge Recommendation: Home OT(and increased family support)  OT - OK to Discharge: Yes(when medically cleared)

## 2020-02-19 NOTE — PHYSICAL THERAPY NOTE
PHYSICAL THERAPY Evaluation NOTE    Patient Name: Martha Waters  ESHWC'D Date: 2/19/2020     AGE:   67 y o  Mrn:   [id number]  ADMIT DX:  Severe aortic stenosis [I35 0]    Past Medical History:   Diagnosis Date    Aortic valve disease     [facility]     Cardiac disease     Diabetes mellitus [facility]     Heart murmur     Heart valve disease     Hypertension     Stroke [facility])      Length Of Stay: 1  PHYSICAL THERAPY EVALUATION :    02/19/20 1257   Note Type   Note type Eval only   Pain Assessment   Pain Assessment 0-10   Pain Score No Pain   Home Living   Type of Home Apartment  (ramped enterance)   Home Layout One level;Ramped entrance   Bathroom Shower/Tub Walk-in shower  (sponge bathes daily)   Bathroom Toilet Raised   Bathroom Equipment Grab bars in shower; Shower chair;Grab bars around toilet; Toilet raiser;Commode   Bathroom Accessibility Accessible   Home Equipment Cane  [facility])   Additional Comments Pt  lives in an inlaw apartment at [facility]    Prior Function   Level of [place] Independent with ADLs and functional mobility   Lives With Alone   Receives Help From Family   ADL Assistance Independent   IADLs Independent   Falls in the last 6 months 1 to 4   Comments PTA, Pt  INDEP with ADLs, IADLs and functional mobility with quad cane for short distance ambulation and WC for community access   Restrictions/Precautions   Weight Bearing Precautions Per Order No   Other Precautions Bed Alarm;Telemetry; Fall Risk;Cardiac/sternal   General   Additional Pertinent History Pt  is a 68 yo F who presents s/p TAVR Dx: Severe Aortic Stenosis      Family/Caregiver Present Yes   Cognition   Overall Cognitive Status WFL   Attention Within functional limits   Orientation Level Oriented X4   Memory Within functional limits   Following Commands Follows one step commands without difficulty   Comments Pt is pleasant, cooperative, and motivated to participate in therapy this day  Pt presents w/    RLE Assessment   RLE Assessment   (grossly 3-/5, limited from previous CVA)   LLE Assessment   LLE Assessment WFL   Coordination   Movements are Fluid and Coordinated 0   Coordination and Movement Description mild gait ataxia noted  Extremely bradykinetic   Sensation WFL   Light Touch   RLE Light Touch Grossly intact   LLE Light Touch Grossly intact   Bed Mobility   Additional Comments Pt  seated OOB prior to and following PT session   Transfers   Sit to Stand 5  Supervision   Additional items Assist x 1; Increased time required;Verbal cues;Armrests   Stand to Sit 5  Supervision   Additional items Assist x 1; Increased time required;Verbal cues;Armrests   Additional Comments Transfers w/ quad cane  Ambulation/Elevation   Gait pattern R Foot drag;R Knee Dimitry; Improper Weight shift;Narrow AXEL; Forward Flexion;Decreased foot clearance;Shuffling; Inconsistent karen; Redundant gait at times; Short stride; Step to   Gait Assistance 5  Supervision   Additional items Assist x 1;Verbal cues  (for posture and gait mechanics)   Assistive Device Large base quad cane   Distance 40'x1   Balance   Static Sitting Fair +   Dynamic Sitting Fair   Static Standing Fair -   Dynamic Standing Fair -   Ambulatory Fair -   Endurance Deficit   Endurance Deficit Yes   Endurance Deficit Description postural and gait degradation noted with fatigue   Activity Tolerance   Activity Tolerance Patient limited by fatigue   Medical Staff Made Aware PT Estee Montemayor for safe dispo planning   Nurse Made Aware RN cleared Pt for OT eval   Assessment   Prognosis Good   Problem List Decreased strength;Decreased range of motion;Decreased endurance; Impaired balance;Decreased mobility; Decreased coordination;Decreased cognition; Impaired judgement;Decreased safety awareness   Assessment Pt  is a 68 yo F who presents s/p TAVR Dx: Severe Aortic Stenosis   order placed for PT eval and tx, w/ activity order of up as tolerated  pt presents w/ comorbidities of Hx of Stroke, Htn, DM, A-Fib, Carotid occlusion, Carotid stenosis, s/p TAVR, R sided weakness, expressive aphasia and personal factors of mobilizing w/ assistive device, inability to navigate community distances, inability to navigate level surfaces w/o external assistance, unable to perform dynamic tasks in community, unable to perform physical activity, inability to perform IADLs and inability to live alone  pt presents w/ weakness, decreased endurance, impaired balance, gait deviations, decreased safety awareness and fall risk  these impairments are evident in findings from physical examination (weakness, impaired coordination and impaired skin integrity), mobility assessment (need for Cristobal assist w/ all phases of mobility when usually mobilizing independently, tolerance to only 40 feet of ambulation and need for cueing for mobility technique)  pt needed input for task focus and mobility technique  pt is at risk for falls due to physical and safety awareness deficits  pt's clinical presentation is unstable/unpredictable (evident in need for assist w/ all phases of mobility when usually mobilizing independently, tolerance to only 40 feet of ambulation, need for input for mobility technique, need for input for task focus and mobility technique and need for input for mobility technique/safety)  pt needs inpatient PT tx to improve mobility deficits  discharge recommendation is for home PT and home w/ family support to reduce fall risk and maximize level of functional independence  Goals   Patient Goals to get home   STG Expiration Date 02/29/20   Short Term Goal #1 pt will:  Increase bilateral LE strength 1/2 grade to facilitate independent mobility, Perform all bed mobility tasks modified independent to decrease fall risk factors, Perform all transfers w/ supervision to improve independence, Ambulate 80 ft  with least restrictive assistive device independently w/o LOB and Increase all balance 1/2 grade to decrease risk for falls   PT Treatment Day 0   Plan   Treatment/Interventions Functional transfer training;LE strengthening/ROM; Therapeutic exercise; Endurance training;Cognitive reorientation;Patient/family training;Equipment eval/education; Bed mobility;Gait training;Spoke to nursing;Spoke to case management   PT Frequency   (3-5x/wk)   Recommendation   Recommendation Home with family support;Home PT   Equipment Recommended Cane  (Large based quad cane)   PT - OK to Discharge Yes   Additional Comments when medically appropriate   Modified Christina Scale   Modified Christina Scale 3     Skilled PT recommended while in hospital and upon DC to progress pt toward treatment goals       María Elena Bardales, PT, DPT 2/19/2020

## 2020-02-19 NOTE — SOCIAL WORK
CM obtained all the following info about the pt  HOME: Pt resides in a 1-story detached in-law suite on the property of her daughter w/ no steps inside and a ramp to enter  LIVES W/: Daughter and son-in-law  : Pt's daughter Leighton Colbert / [phone]  INDEPENDENCE: Pt uses a quad-cane at baseline to ambulate, and is independent at baseline w/ performing her ADLS  DME: Quad-cane, shower seat, hand rails in bath  HHC: Pt is currently open to [facility]  I/P REAHB: Pt has hx of placement at [facility] in Nov 2019  MENTAL HEALTH ISSUES: No hx reported  D&A ISSUES: No hx reported  PCP: Dr Huang Nick through [address] [facility] located in [facility]  PHARMACY: [address] [place] [address] located in [facility]  INCOME SOURCE: Social Security benefits  INSURANCE: Medicare for primary coverage, no secondary coverage  MEDICAL POA: Pt has a Living Will which legally pre-designates her daughter Leighton Colbert as her medical POA appointed for her in emergencies  TRANSPORTATION AT D/C: Pt's daughter will provide transport  CM made Ecin referral to [facility] for them to follow pt's case while hospitalized  The pt and her daughter are both aware and agreeable to his referral     CM reviewed d/c planning process including the following: identifying help at home, patient preference for d/c planning needs, Discharge Lounge, [facility] Meds to Bed program, availability of treatment team to discuss questions or concerns patient and/or family may have regarding understanding medications and recognizing signs and symptoms once discharged  CM also encouraged patient to follow up with all recommended appointments after discharge  Patient advised of importance for patient and family to participate in managing patients medical well being  Patient/caregiver received discharge checklist  Content reviewed   Patient/caregiver encouraged to participate in discharge plan of care prior to discharge home

## 2020-02-19 NOTE — OCCUPATIONAL THERAPY NOTE
Occupational Therapy Evaluation      Flo Harris    2020    Principal Problem:    Severe aortic stenosis  Active Problems:    History of stroke    Hypertension    Diabetes mellitus ([facility] )    Atrial fibrillation ([facility] )    Carotid occlusion, left    Carotid stenosis, asymptomatic, right    S/P TAVR (transcatheter aortic valve replacement)    Right sided weakness    Expressive aphasia      Past Medical History:   Diagnosis Date    Aortic valve disease     Atrial fibrillation ([facility] )     Cardiac disease     [facility]     Heart murmur     Heart valve disease     Hypertension     Stroke [facility])        Past Surgical History:   Procedure Laterality Date     SECTION      x 2    HYSTERECTOMY      NE ECHO TRANSESOPHAG R-T 2D W/PRB IMG ACQUISJ I&R N/A 2020    Procedure: TRANSESOPHAGEAL ECHOCARDIOGRAM (BOLIVAR); Surgeon: Chelsy Laird DO;  [facility]: [facility];  Service: Cardiac Surgery    NE REPLACE AORTIC VALVE OPENFEMORAL ARTERY APPROACH Right 2020    Procedure: REPLACEMENT AORTIC VALVE TRANSCATHETER (TAVR) TRANSFEMORAL W/ 26 MM TODD ANIBAL S3 ULTRA VALVE (ACCESS ON LEFT); Surgeon: Chelsy Laird DO;  [facility];  Service: Cardiac Surgery    TONSILLECTOMY          20 1301   Note Type   Note type Eval/Treat   Restrictions/Precautions   Weight Bearing Precautions Per Order No   Other Precautions Cardiac/sternal;Multiple lines;Telemetry; Fall Risk  (old CVA w/ R UE hemiplegia)   Pain Assessment   Pain Assessment No/denies pain   Pain Score No Pain   Home Living   Type of Home Apartment   Home Layout One level;Performs ADLs on one level; Able to live on main level with bedroom/bathroom; Ramped entrance  (1st level apartment w/ ramped entrance)   Bathroom Shower/Tub Walk-in shower  (sponge bathes daily)   Bathroom Toilet Raised   Bathroom Equipment Grab bars in shower; Shower chair;Grab bars around toilet; Toilet raiser;Commode   Bathroom Accessibility Accessible   Home Equipment Quad cane; Hospital bed   Additional Comments Pt lives in "apartment house" in the backyard of [facility] house  Pt has a small kitchenette in her apartment  Pt sleeps in a hospital bed w/ BSC placed next to bed for noctural toileting  Pt reports sponge bathing daily, however plans on having HHA visit 1x/wk to A w/ shower transfers and showering  Prior Function   Level of [place] Independent with ADLs and functional mobility   Lives With Alone   Receives Help From Family;Home health   ADL Assistance Independent   IADLs Independent   Falls in the last 6 months 1 to 4  (1, per Pt)   Vocational Retired   Lifestyle   Autonomy Pt reports being IND w/ all ADLS and IADLS; (-) drives; ambulates w/ quad cane PTA   Reciprocal Relationships Pt lives alone in her apartment  Pt's dtr and ION live in house "10 steps away" from her apartment and family is home during the day to provide A as needed  Pt reports she is having HHA visit 1x/wk to A w/ showering and shower transfers  Service to Others Pt is retired   Intrinsic Gratification Pt reports enjoying staying independent and active  Psychosocial   Psychosocial (WDL) WDL   ADL   Where Assessed Chair   Eating Assistance 5  Supervision/Setup   Grooming Assistance 5  Supervision/Setup   UB Bathing Assistance 5  Supervision/Setup   LB Bathing Assistance 4  Minimal Assistance   UB Dressing Assistance 5  Supervision/Setup   LB Dressing Assistance 4  [address]  4  Minimal Assistance   Bed Mobility   Supine to Sit Unable to assess   Sit to Supine Unable to assess   Additional Comments Pt seated OOB in chair upon OT arrival  Pt seated OOB in chair w/ all needs in reach and family present s/p OT session  Transfers   Sit to Stand 5  Supervision   Additional items Assist x 1; Increased time required;Verbal cues;Armrests   Stand to Sit 5  Supervision   Additional items Assist x 1; Increased time required;Verbal cues;Armrests Additional Comments Transfers w/ quad cane  Functional Mobility   Functional Mobility 4  Minimal assistance   Additional Comments Pt demonstrated short distance household mobility in room and hallway w/ quad cane at Min A level  Dtr present during eval and stated "wow, Mom! Looks like you're walking like you were at home!"   Additional items   (quad cane)   Balance   Static Sitting Fair +   Dynamic Sitting Fair   Static Standing Fair -   Dynamic Standing Poor +   Ambulatory Poor +   Activity Tolerance   Activity Tolerance Patient tolerated treatment well   Medical Staff Made Aware PT Aman Lee; Brandon Marmolejo for safe dispo planning   Nurse Made Aware RN cleared Pt for OT eval   RUE Assessment   RUE Assessment X  (limited AROM and hand contracture from old CVA)   LUE Assessment   LUE Assessment WFL   Hand Function   Gross Motor Coordination Impaired  (R>L)   Fine Motor Coordination Impaired  (R>L)   Sensation   Light Touch No apparent deficits   Cognition   Overall Cognitive Status WFL   Arousal/Participation Alert; Cooperative   Attention Within functional limits   Orientation Level Oriented X4   Memory Within functional limits   Following Commands Follows one step commands without difficulty   Comments Pt is pleasant, cooperative, and motivated to participate in therapy this day  Pt presents w/ mild dysathria and difficulty w/ word-finding from old CVA  Pt presents w/ good carryover of all safety awareness and education  Assessment   Limitation Decreased ADL status; Decreased UE strength;Decreased UE ROM; Decreased endurance;Decreased high-level ADLs; Decreased fine motor control   Prognosis Good   Assessment Pt is a 68 yo female seen for OT eval s/p adm to [facility] dx'd w/ severe aortic stenosis  Pt s/p TAVR 2/18/2020  Comorbidities include a h/o stroke, HTN, DM, A-fib, carotid occlusion, carotid stenosis, R sided weakness, expressive aphasia, tibial plateau fx  Pt with active OT orders and up as tolerated orders   Pt is retired and resides alone in detached in-law suite in [facility]'s backyard in 1-level apt w/ ramped entrance  Family lives in house and is home to provide A as needed  Pt was I w/  ADLS and IADLS, does not drive, & required use of DME PTA, including quad cane for mobility  Pt is currently demonstrating the following occupational deficits: S UB bathing/dressing, Min A LB bathing/dressing and toileting, S STS and Min A functional mobility w/ quad cane  These deficits that are impacting pt's baseline areas of occupation are a result of the following impairments: endurance, activity tolerance, forward functional reach, balance, functional standing tolerance, decreased I w/ ADLS/IADLS, R sided hemiplegia, impaired fine motor skills and coordination deficits  Based on the aforementioned OT evaluation, functional performance deficits, and assessments, pt has been identified as a high complexity evaluation  Recommend home with family support and home OT upon D/C  Pt to continue to benefit from acute immediate OT services to address the following goals for 1 additional treatment session:   Goals   Patient Goals To return home   STG Time Frame 1-3  (1 additional treatment session)   Short Term Goal #1 Please refer to goals below   Plan   Treatment Interventions Cardiac education; Energy conservation;Patient/family training   Goal Expiration Date 02/19/20   OT Treatment Day 1   OT Frequency Other (comment)  (1 additional treatment session)   Additional Treatment Session   Start Time 1250   End Time 1301   Treatment Assessment Pt participated in additional OT session focusing on cardiac education  OT provided Pt w/ Recovering After Minimally Invasive Cardiac Surgery Packet and educated Pt regarding:  cardiac precautions, lifting restrictions, safe activity engagement, energy conservation, lifestyle modifications, stress management and cardiac rehabilitation programs  Pt's questions were addressed after discussion of the packet   Pt's dtr present for education and her questions were addressed as well  Provided Pt w/ contact information regarding OT department if questions arise  Pt appears to be functioning close to baseline levels  No further acute care OT needs at this time  Will D/C OT services  Please re-consult if appropriate  Recommendation   OT Discharge Recommendation Home OT  (and increased family support)   Equipment Recommended Other (comment)  (wide-base quad cane)   OT - OK to Discharge Yes  (when medically cleared)   Modified [place] Scale   Modified [place] Scale 3         GOALS      1) Pt will engage in cardiac education using the Recovering After Cardiac Rehabilitation packet w/ G participation and G carryover    2) Pt will demonstrate 100% carryover of precautions s/p review w/o cues w/ mod I w/ G tolerance/participation t/o functional ADL/IADL/leisure tasks            Tariq Irizarry MS, OTR/L

## 2020-02-19 NOTE — CONSULTS
Consultation - Geriatrics   Ale Troncoso 67 y o  female MRN: [id number]  Unit/Bed#: [facility] 422-01 Encounter: [id number]      Assessment/Plan  1  Aortic stenosis  Status post TAVR  CT surgery following    2  Normal age-related cognitive decline  Word finding noted on exam  Mini cog 5/5  History of CVA  Recommend check TSH, and vitamin B12  Recommend follow up as outpatient at [facility] for formal comprehensive assessment    3  Frailty  Mild to moderate  Risk factors: hospitalization, fall hx, limited mobility, polypharmacy  Albumin 3 6  Recommend fall prevention program    4  Insomnia  Do not recommend restoril  First line is behavioral therapy  Avoid sedative hypnotics such as benzodiazepines and benadryl  Encourage staying awake during the day  Encourage daytime activity, morning exercise  Decrease or eliminate day time naps  Avoid caffiene, alcohol especially during late afternoon and evening hours  Establish a night time routine  Recommend melatonin 6 mg po QHS    5  Ambulatory dysfunction  History of falls  Recommend fall prevention program matter of  balance   information on discharge instructions    6  Delirium precautions  Alert and oriented x3 at baseline  Provide frequent redirection, reorientation, distraction techniques  Avoid deliriogenic medications such as tramadol, benzodiazepines, anticholinergics,  Benadryl  Treat pain, See geriatric pain protocol  Monitor for constipation and urinary retention  Encourage early and frequent moblization, OOB  Encourage Hydration/ Nutrition  Implement sleep hygiene, limit night time interuptions, group activities  Qtc 385      History of Present Illness   Physician Requesting Consult: Jocelyne Anderson DO  Reason for Consult / Principal Problem: aortic stenosis  Hx and PE limited by: aortic stenosis  HPI: Ale Troncoso is a 67y o  year old female who presents with aortic stenosis    She presents for elective transcatheter aortic valve replacement  She has a past medical history of AFib, cardiac disease, diabetes mellitus, hypertension, stroke  Prior to arrival patient lives at home  She does not drive  She states she uses the short bus to go places  He daughter helps with IADLs  She is independent with  ADLs  She uses a quad cane to ambulate  She has a history of falls  She wears glasses for reading  She denies issues with hearing  She denies issues with dentition or weight loss  She wears pads when she leave the house  She denies issues with constipation  She denies issues with memory  Upon exam pt is oob in recliner chair  She is alert and oriented x3  She is complaining of feeling tired  She states she did not sleep well last night and she had an oxycodone this am          Inpatient consult to Gerontology  Consult performed by: LINDA Quinonez  Consult ordered by: Marilu Rodriguez PA-C          Review of Systems   Constitutional: Negative for unexpected weight change  HENT: Negative for hearing loss  Eyes: Negative for visual disturbance  Respiratory: Negative for cough  Cardiovascular: Negative for chest pain  Gastrointestinal: Negative for constipation  Genitourinary: Negative for difficulty urinating  Musculoskeletal: Positive for gait problem  Skin: Negative for color change  Neurological: Negative for dizziness  Psychiatric/Behavioral: Positive for sleep disturbance         Historical Information   Past Medical History:   Diagnosis Date    Aortic valve disease     [facility]     Cardiac disease     Diabetes mellitus [facility]     Heart murmur     Heart valve disease     Hypertension     Stroke [facility])      Past Surgical History:   Procedure Laterality Date     SECTION      x 2    HYSTERECTOMY      TONSILLECTOMY       Social History   Social History     Substance and Sexual Activity   Alcohol Use Not Currently     Social History     Substance and Sexual Activity   Drug Use Never     Social History     Tobacco Use   Smoking Status Never Smoker   Smokeless Tobacco Never Used         Family History:   Family History   Problem Relation Age of Onset    Diabetes Mother     Heart disease Mother     Diabetes Father     Heart disease Father        Meds/Allergies   Current meds:   Current Facility-Administered Medications   Medication Dose Route Frequency    acetaminophen (TYLENOL) rectal suppository 650 mg  650 mg Rectal Q4H PRN    acetaminophen (TYLENOL) tablet 650 mg  650 mg Oral Q4H PRN    aspirin (ECOTRIN LOW STRENGTH) EC tablet 81 mg  81 mg Oral Daily    atorvastatin (LIPITOR) tablet 10 mg  10 mg Oral Daily    bisacodyl (DULCOLAX) rectal suppository 10 mg  10 mg Rectal Daily PRN    ceFAZolin (ANCEF) 2,000 mg in sodium chloride 0 9 % 50 mL IVPB  2,000 mg Intravenous Q8H    chlorhexidine (PERIDEX) 0 12 % oral rinse 15 mL  15 mL Mouth/Throat BID    clopidogrel (PLAVIX) tablet 75 mg  75 mg Oral Daily    insulin lispro (HumaLOG) 100 units/mL subcutaneous injection 1-6 Units  1-6 Units Subcutaneous TID AC    insulin lispro (HumaLOG) 100 units/mL subcutaneous injection 1-6 Units  1-6 Units Subcutaneous HS    ipratropium (ATROVENT HFA) inhaler 1 puff  1 puff Inhalation Q12H JAELYN    lidocaine (LIDODERM) 5 % patch 2 patch  2 patch Topical Daily    metFORMIN (GLUCOPHAGE) tablet 500 mg  500 mg Oral BID    mupirocin (BACTROBAN) 2 % nasal ointment 1 application  1 application Nasal [id number] [facility]    niCARdipine (CARDENE) 25 mg (STANDARD CONCENTRATION) in sodium chloride 0 9% 250 mL  1-15 mg/hr Intravenous Titrated    ondansetron (ZOFRAN) injection 4 mg  4 mg Intravenous Q6H PRN    pantoprazole (PROTONIX) EC tablet 40 mg  40 mg Oral Early Morning    polyethylene glycol (MIRALAX) packet 17 g  17 g Oral Daily PRN    polyethylene glycol (MIRALAX) packet 17 g  17 g Oral Daily    temazepam (RESTORIL) capsule 30 mg  30 mg Oral HS      Current PTA meds:  Medications Prior to Admission Medication    alendronate (FOSAMAX) 70 mg tablet    amLODIPine (NORVASC) 5 mg tablet    ascorbic acid (VITAMIN C) 500 mg tablet    atorvastatin (LIPITOR) 10 mg tablet    BABY ASPIRIN PO    cholecalciferol (VITAMIN D3) 1,000 units tablet    CINNAMON PO    cyanocobalamin (VITAMIN B-12) 100 mcg tablet    diltiazem (DILACOR XR) 240 MG 24 hr capsule    INCRUSE ELLIPTA 62 5 MCG/INH AEPB inhaler    metFORMIN (GLUCOPHAGE) 500 mg tablet    mupirocin (BACTROBAN) 2 % ointment    polyethylene glycol (MIRALAX) 17 g packet    temazepam (RESTORIL) 30 mg capsule    vitamin E 100 UNIT capsule    acetaminophen (TYLENOL) 325 mg tablet        Allergies   Allergen Reactions    Atorvastatin      dizziness and shaky    Rivaroxaban      Dizziness, and shaky  Objective   Vitals: Blood pressure 93/54, pulse 84, temperature 97 5 °F (36 4 °C), temperature source Oral, resp  rate 16, height 5' 4" (1 626 m), weight 81 5 kg (179 lb 10 8 oz), SpO2 94 %  ,Body mass index is 30 84 kg/m²  Physical Exam   Constitutional: She is oriented to person, place, and time  She appears well-developed and well-nourished  No distress  Eyes: Right eye exhibits no discharge  Left eye exhibits no discharge  Neck: Normal range of motion  Cardiovascular: Normal heart sounds  Exam reveals no friction rub  No murmur heard  Pulmonary/Chest: Effort normal and breath sounds normal  No stridor  No respiratory distress  She has no wheezes  Abdominal: Soft  Bowel sounds are normal  She exhibits no distension  There is no tenderness  There is no guarding  Musculoskeletal: She exhibits no edema or deformity  Right arm spasticity   Neurological: She is alert and oriented to person, place, and time  Skin: Skin is warm and dry  She is not diaphoretic  Psychiatric: She has a normal mood and affect  Nursing note and vitals reviewed        Lab Results:   Results from last 7 days   Lab Units 02/19/20  0520   WBC Thousand/uL 10 26* HEMOGLOBIN g/dL 11 8   HEMATOCRIT % 36 7   PLATELETS Thousands/uL 237        Results from last 7 days   Lab Units 02/19/20  0520  02/18/20  1658   POTASSIUM mmol/L 3 9   < >  --    CHLORIDE mmol/L 107   < >  --    CO2 mmol/L 23   < >  --    CO2, I-STAT mmol/L  --   --  22   BUN mg/dL 13   < >  --    CREATININE mg/dL 0 65   < >  --    CALCIUM mg/dL 8 8   < >  --    GLUCOSE, ISTAT mg/dl  --   --  127    < > = values in this interval not displayed  Imaging Studies: I have personally reviewed pertinent reports  EKG, Pathology, and Other Studies: I have personally reviewed pertinent reports      VTE Prophylaxis: Sequential compression device (Venodyne)     Code Status: Level 1 - Full Code

## 2020-02-19 NOTE — PROGRESS NOTES
Progress Note - Cardiothoracic Surgery   [facility] 67 y o  female MRN: [id number]  Unit/Bed#: [facility] 422-01 Encounter: [id number]  Aortic stenosis, Non-Rheumatic  S/P transfemoral transcatheter aortic valve replacement; POD # 1    24 Hour Events: No events overnight  No complaints this morning  Denies any pain  Tolerating diet       Medications:   Scheduled Meds:  Current Facility-Administered Medications:  acetaminophen 650 mg Rectal Q4H PRN Padilla Ambrose PA-C    acetaminophen 650 mg Oral Q4H PRN Padilla Ambrose PA-C    aspirin 81 mg Oral Daily Padilla Ambrose PA-C    atorvastatin 10 mg Oral Daily Padilla Ambrose PA-C    bisacodyl 10 mg Rectal Daily PRN Padilla Ambrose PA-C    cefazolin 2,000 mg Intravenous Q8H Padilla Ambrose PA-C Last Rate: 2,000 mg (02/19/20 0411)   chlorhexidine 15 mL Mouth/Throat BID Padilla Ambrose PA-C    clopidogrel 75 mg Oral Daily Padilla Ambrose PA-C    insulin lispro 1-6 Units Subcutaneous TID AC Padilla Ambrose PA-C    insulin lispro 1-6 Units Subcutaneous HS Padilla Ambrose PA-C    ipratropium 1 puff Inhalation Q12H [address] Padilla Ambrose PA-C    lidocaine 2 patch Topical Daily LINDA Denis    metFORMIN 500 mg Oral BID Padilla Ambrose PA-C    mupirocin 1 application Nasal [id number] Padilla Ambrose PA-C    niCARdipine 1-15 mg/hr Intravenous Titrated Padilla Ambrose PA-C Last Rate: Stopped (02/18/20 1948)   ondansetron 4 mg Intravenous Q6H PRN Padilla Ambrose PA-C    pantoprazole 40 mg Oral Early Morning Padilla Ambrose PA-C    polyethylene glycol 17 g Oral Daily PRN Padilla Ambrose PA-C    polyethylene glycol 17 g Oral Daily Padilla Ambrose PA-C    temazepam 30 mg Oral HS Padilla Ambrose PA-C      Continuous Infusions:  niCARdipine 1-15 mg/hr Last Rate: Stopped (02/18/20 1948)     PRN Meds:   acetaminophen    acetaminophen    bisacodyl    ondansetron    polyethylene glycol    Vitals:   Vitals:    02/19/20 0523 02/19/20 0600 02/19/20 0617 02/19/20 0700   BP: 97/50 93/54 93/54   BP Location: Left arm   Left arm   Pulse: 97  90 84   Resp:    16   Temp:    97 5 °F (36 4 °C)   TempSrc:    Oral   SpO2:    94%   Weight:  81 5 kg (179 lb 10 8 oz)     Height:           Telemetry: NSR; Heart Rate: 84    espiratory:   SpO2: SpO2: 94 %; Room Air    Intake/Output:   I/O       02/17 0701 - 02/18 0700 02/18 0701 - 02/19 0700 02/19 0701 - 02/20 0700    I V  (mL/kg)  1753 3 (21 5)     IV Piggyback  50     Total Intake(mL/kg)  1803 3 (22 1)     Urine (mL/kg/hr)  915 275 (1 7)    Blood  100     Total Output  1015 275    Net  +788 3 -275                 Weights:   Weight (last 2 days)     Date/Time   Weight    02/19/20 0600   81 5 (179 68)    02/18/20 1133   79 4 (175)            Results:   Results from last 7 days   Lab Units 02/19/20  0520 02/18/20  1713 02/18/20  1658   WBC Thousand/uL 10 26*  --   --    HEMOGLOBIN g/dL 11 8 13 5  --    I STAT HEMOGLOBIN g/dl  --   --  11 6   HEMATOCRIT % 36 7 42 1  --    HEMATOCRIT, ISTAT %  --   --  34*   PLATELETS Thousands/uL 237 268  --      Results from last 7 days   Lab Units 02/19/20  0520 02/18/20  1713 02/18/20  1658   POTASSIUM mmol/L 3 9 3 7  --    CHLORIDE mmol/L 107 110*  --    CO2 mmol/L 23 22  --    CO2, I-STAT mmol/L  --   --  22   BUN mg/dL 13 12  --    CREATININE mg/dL 0 65 0 68  --    GLUCOSE, ISTAT mg/dl  --   --  127   CALCIUM mg/dL 8 8 9 0  --      Results from last 7 days   Lab Units 02/13/20  1121   INR  1 03     Point of care glucose: 120-279    Studies:  CXR: post TAVR, line in good position, unchanged calcified plaques  EKG: pending    I have personally reviewed pertinent reports  Invasive Lines/Tubes:  Invasive Devices     Central Venous Catheter Line            CVC Central Lines 02/18/20 Triple less than 1 day          Peripheral Intravenous Line            Peripheral IV 02/18/20 Left Forearm less than 1 day          Drain            Urethral Catheter Non-latex; Temperature probe 16 Fr  less than 1 day Physical Exam:    HEENT/NECK:  PERRLA  No jugular venous distention  Cardiac: Regular rate and rhythm  Pulmonary:  Breath sounds clear bilaterally  Abdomen:  Non-tender, Non-distended and Normal bowel sounds  Incisions: Groin puncture sites clean, dry, and intact without hematoma  Extremities: Extremities warm/dry, DP pulses doppler bilaterally and Trace edema B/L  Neuro: hx of CVA in the past has residual right sided weakness   Skin: Warm/Dry, without rashes or lesions  Assessment:  Principal Problem:    Severe aortic stenosis  Active Problems:    History of stroke    Hypertension    Diabetes mellitus (HCC)    Atrial fibrillation ([address] )    Carotid occlusion, left    Carotid stenosis, asymptomatic, right    S/P TAVR (transcatheter aortic valve replacement)       Aortic stenosis, Non-Rheumatic  S/P transfemoral transcatheter aortic valve replacement; POD # 1    Plan:    1  Cardiac:   NSR;   No beta blocker due to hypotension  Maintain central IV access today for today  ASA/Plavix  Consult cardiology for postoperative medical management  Follow up transthoracic echocardiogram today  Continue Statin therapy  Continue DVT prophylaxis    2  Pulmonary:   Extubated  CXR findings: no new findings, postop changes  Good Room air oxygen saturation; Continue incentive spirometry/Coughing/Deep breathing exercises    3  Renal:   Intake/Output net: +788 mL/24 hours  Add daily diuretics   Post op Creatinine stable; Follow up labs prn    4  Neuro:  Neurologically intact; No active issues  Incisional pain well-controlled; Continue prn Tylenol    5  GI:  Tolerating TLC 2 3 gm sodium diet  Maintain 1800 mL daily fluid restriction   Continue daily stool softeners and prn suppository  Continue GI prophylaxis    6  Endo:   History of diabetes; Continue pre-op regimen with additional sliding scale coverage    7  Hematology:    Post-operative blood count acceptable; Trend prn    8     Disposition:   Gerontology consultation ordered for routine assessment of TAVR patient over 72years old  Anticipate discharge to home tomorrow    VTE Pharmacologic Prophylaxis: Fondaparinux (Arixtra)  VTE Mechanical Prophylaxis: sequential compression device    Bedside rounds completed with OVI Aguayo    Plan of care discussed with bedside nurse    SIGNATURE: Gil Lazaro PA-C  DATE: February 19, 2020  TIME: 8:59 AM

## 2020-02-19 NOTE — PLAN OF CARE
Problem: Prexisting or High Potential for Compromised Skin Integrity  Goal: Skin integrity is maintained or improved  Description  INTERVENTIONS:  - Identify patients at risk for skin breakdown  - Assess and monitor skin integrity  - Assess and monitor nutrition and hydration status  - Monitor labs   - Assess for incontinence   - Turn and reposition patient  - Assist with mobility/ambulation  - Relieve pressure over bony prominences  - Avoid friction and shearing  - Provide appropriate hygiene as needed including keeping skin clean and dry  - Evaluate need for skin moisturizer/barrier cream  - Collaborate with interdisciplinary team   - Patient/family teaching  - Consider wound care consult   Outcome: Progressing     Problem: NEUROSENSORY - ADULT  Goal: Achieves stable or improved neurological status  Description  INTERVENTIONS  - Monitor and report changes in neurological status  - Monitor vital signs such as temperature, blood pressure, glucose, and any other labs ordered   - Initiate measures to prevent increased intracranial pressure  - Monitor for seizure activity and implement precautions if appropriate      Outcome: Progressing  Goal: Achieves maximal functionality and self care  Description  INTERVENTIONS  - Monitor swallowing and airway patency with patient fatigue and changes in neurological status  - Encourage and assist patient to increase activity and self care     - Encourage visually impaired, hearing impaired and aphasic patients to use assistive/communication devices  Outcome: Progressing     Problem: CARDIOVASCULAR - ADULT  Goal: Maintains optimal cardiac output and hemodynamic stability  Description  INTERVENTIONS:  - Monitor I/O, vital signs and rhythm  - Monitor for S/S and trends of decreased cardiac output  - Administer and titrate ordered vasoactive medications to optimize hemodynamic stability  - Assess quality of pulses, skin color and temperature  - Assess for signs of decreased coronary artery perfusion  - Instruct patient to report change in severity of symptoms  Outcome: Progressing  Goal: Absence of cardiac dysrhythmias or at baseline rhythm  Description  INTERVENTIONS:  - Continuous cardiac monitoring, vital signs, obtain 12 lead EKG if ordered  - Administer antiarrhythmic and heart rate control medications as ordered  - Monitor electrolytes and administer replacement therapy as ordered  Outcome: Progressing     Problem: RESPIRATORY - ADULT  Goal: Achieves optimal ventilation and oxygenation  Description  INTERVENTIONS:  - Assess for changes in respiratory status  - Assess for changes in mentation and behavior  - Position to facilitate oxygenation and minimize respiratory effort  - Oxygen administered by appropriate delivery if ordered  - Initiate smoking cessation education as indicated  - Encourage broncho-pulmonary hygiene including cough, deep breathe, Incentive Spirometry  - Assess the need for suctioning and aspirate as needed  - Assess and instruct to report SOB or any respiratory difficulty  - Respiratory Therapy support as indicated  Outcome: Progressing     Problem: GASTROINTESTINAL - ADULT  Goal: Minimal or absence of nausea and/or vomiting  Description  INTERVENTIONS:  - Administer IV fluids if ordered to ensure adequate hydration  - Maintain NPO status until nausea and vomiting are resolved  - Nasogastric tube if ordered  - Administer ordered antiemetic medications as needed  - Provide nonpharmacologic comfort measures as appropriate  - Advance diet as tolerated, if ordered  - Consider nutrition services referral to assist patient with adequate nutrition and appropriate food choices  Outcome: Progressing  Goal: Maintains or returns to baseline bowel function  Description  INTERVENTIONS:  - Assess bowel function  - Encourage oral fluids to ensure adequate hydration  - Administer IV fluids if ordered to ensure adequate hydration  - Administer ordered medications as needed  - Encourage mobilization and activity  - Consider nutritional services referral to assist patient with adequate nutrition and appropriate food choices  Outcome: Progressing  Goal: Maintains adequate nutritional intake  Description  INTERVENTIONS:  - Monitor percentage of each meal consumed  - Identify factors contributing to decreased intake, treat as appropriate  - Assist with meals as needed  - Monitor I&O, weight, and lab values if indicated  - Obtain nutrition services referral as needed  Outcome: Progressing     Problem: GENITOURINARY - ADULT  Goal: Maintains or returns to baseline urinary function  Description  INTERVENTIONS:  - Assess urinary function  - Encourage oral fluids to ensure adequate hydration if ordered  - Administer IV fluids as ordered to ensure adequate hydration  - Administer ordered medications as needed  - Offer frequent toileting  - Follow urinary retention protocol if ordered  Outcome: Progressing  Goal: Absence of urinary retention  Description  INTERVENTIONS:  - Assess patients ability to void and empty bladder  - Monitor I/O  - Bladder scan as needed  - Discuss with physician/AP medications to alleviate retention as needed  - Discuss catheterization for long term situations as appropriate  Outcome: Progressing  Goal: Urinary catheter remains patent  Description  INTERVENTIONS:  - Assess patency of urinary catheter  - If patient has a chronic casillas, consider changing catheter if non-functioning  - Follow guidelines for intermittent irrigation of non-functioning urinary catheter  Outcome: Progressing     Problem: METABOLIC, FLUID AND ELECTROLYTES - ADULT  Goal: Electrolytes maintained within normal limits  Description  INTERVENTIONS:  - Monitor labs and assess patient for signs and symptoms of electrolyte imbalances  - Administer electrolyte replacement as ordered  - Monitor response to electrolyte replacements, including repeat lab results as appropriate  - Instruct patient on fluid and nutrition as appropriate  Outcome: Progressing  Goal: Fluid balance maintained  Description  INTERVENTIONS:  - Monitor labs   - Monitor I/O and WT  - Instruct patient on fluid and nutrition as appropriate  - Assess for signs & symptoms of volume excess or deficit  Outcome: Progressing  Goal: Glucose maintained within target range  Description  INTERVENTIONS:  - Monitor Blood Glucose as ordered  - Assess for signs and symptoms of hyperglycemia and hypoglycemia  - Administer ordered medications to maintain glucose within target range  - Assess nutritional intake and initiate nutrition service referral as needed  Outcome: Progressing     Problem: SKIN/TISSUE INTEGRITY - ADULT  Goal: Skin integrity remains intact  Description  INTERVENTIONS  - Identify patients at risk for skin breakdown  - Assess and monitor skin integrity  - Assess and monitor nutrition and hydration status  - Monitor labs (i e  albumin)  - Assess for incontinence   - Turn and reposition patient  - Assist with mobility/ambulation  - Relieve pressure over bony prominences  - Avoid friction and shearing  - Provide appropriate hygiene as needed including keeping skin clean and dry  - Evaluate need for skin moisturizer/barrier cream  - Collaborate with interdisciplinary team (i e  Nutrition, Rehabilitation, etc )   - Patient/family teaching  Outcome: Progressing  Goal: Incision(s), wounds(s) or drain site(s) healing without S/S of infection  Description  INTERVENTIONS  - Assess and document risk factors for skin impairment   - Assess and document dressing, incision, wound bed, drain sites and surrounding tissue  - Consider nutrition services referral as needed  - Oral mucous membranes remain intact  - Provide patient/ family education  Outcome: Progressing  Goal: Oral mucous membranes remain intact  Description  INTERVENTIONS  - Assess oral mucosa and hygiene practices  - Implement preventative oral hygiene regimen  - Implement oral medicated treatments as ordered  - Initiate Nutrition services referral as needed  Outcome: Progressing     Problem: HEMATOLOGIC - ADULT  Goal: Maintains hematologic stability  Description  INTERVENTIONS  - Assess for signs and symptoms of bleeding or hemorrhage  - Monitor labs  - Administer supportive blood products/factors as ordered and appropriate  Outcome: Progressing     Problem: MUSCULOSKELETAL - ADULT  Goal: Maintain or return mobility to safest level of function  Description  INTERVENTIONS:  - Assess patient's ability to carry out ADLs; assess patient's baseline for ADL function and identify physical deficits which impact ability to perform ADLs (bathing, care of mouth/teeth, toileting, grooming, dressing, etc )  - Assess/evaluate cause of self-care deficits   - Assess range of motion  - Assess patient's mobility  - Assess patient's need for assistive devices and provide as appropriate  - Encourage maximum independence but intervene and supervise when necessary  - Involve family in performance of ADLs  - Assess for home care needs following discharge   - Consider OT consult to assist with ADL evaluation and planning for discharge  - Provide patient education as appropriate  Outcome: Progressing  Goal: Maintain proper alignment of affected body part  Description  INTERVENTIONS:  - Support, maintain and protect limb and body alignment  - Provide patient/ family with appropriate education  Outcome: Progressing     Problem: PAIN - ADULT  Goal: Verbalizes/displays adequate comfort level or baseline comfort level  Description  Interventions:  - Encourage patient to monitor pain and request assistance  - Assess pain using appropriate pain scale  - Administer analgesics based on type and severity of pain and evaluate response  - Implement non-pharmacological measures as appropriate and evaluate response  - Consider cultural and social influences on pain and pain management  - Notify physician/advanced practitioner if interventions unsuccessful or patient reports new pain  Outcome: Progressing     Problem: INFECTION - ADULT  Goal: Absence or prevention of progression during hospitalization  Description  INTERVENTIONS:  - Assess and monitor for signs and symptoms of infection  - Monitor lab/diagnostic results  - Monitor all insertion sites, i e  indwelling lines, tubes, and drains  - Monitor endotracheal if appropriate and nasal secretions for changes in amount and color  - [place] appropriate cooling/warming therapies per order  - Administer medications as ordered  - Instruct and encourage patient and family to use good hand hygiene technique  - Identify and instruct in appropriate isolation precautions for identified infection/condition  Outcome: Progressing  Goal: Absence of fever/infection during neutropenic period  Description  INTERVENTIONS:  - Monitor WBC    Outcome: Progressing     Problem: SAFETY ADULT  Goal: Patient will remain free of falls  Description  INTERVENTIONS:  - Assess patient frequently for physical needs  -  Identify cognitive and physical deficits and behaviors that affect risk of falls    -  [place] fall precautions as indicated by assessment   - Educate patient/family on patient safety including physical limitations  - Instruct patient to call for assistance with activity based on assessment  - Modify environment to reduce risk of injury  - Consider OT/PT consult to assist with strengthening/mobility  Outcome: Progressing  Goal: Maintain or return to baseline ADL function  Description  INTERVENTIONS:  -  Assess patient's ability to carry out ADLs; assess patient's baseline for ADL function and identify physical deficits which impact ability to perform ADLs (bathing, care of mouth/teeth, toileting, grooming, dressing, etc )  - Assess/evaluate cause of self-care deficits   - Assess range of motion  - Assess patient's mobility; develop plan if impaired  - Assess patient's need for assistive devices and provide as appropriate  - Encourage maximum independence but intervene and supervise when necessary  - Involve family in performance of ADLs  - Assess for home care needs following discharge   - Consider OT consult to assist with ADL evaluation and planning for discharge  - Provide patient education as appropriate  Outcome: Progressing  Goal: Maintain or return mobility status to optimal level  Description  INTERVENTIONS:  - Assess patient's baseline mobility status (ambulation, transfers, stairs, etc )    - Identify cognitive and physical deficits and behaviors that affect mobility  - Identify mobility aids required to assist with transfers and/or ambulation (gait belt, sit-to-stand, lift, walker, cane, etc )  - [place] fall precautions as indicated by assessment  - Record patient progress and toleration of activity level on Mobility SBAR; progress patient to next Phase/Stage  - Instruct patient to call for assistance with activity based on assessment  - Consider rehabilitation consult to assist with strengthening/weightbearing, etc   Outcome: Progressing     Problem: DISCHARGE PLANNING  Goal: Discharge to home or other facility with appropriate resources  Description  INTERVENTIONS:  - Identify barriers to discharge w/patient and caregiver  - Arrange for needed discharge resources and transportation as appropriate  - Identify discharge learning needs (meds, wound care, etc )  - Arrange for interpretive services to assist at discharge as needed  - Refer to Case Management Department for coordinating discharge planning if the patient needs post-hospital services based on physician/advanced practitioner order or complex needs related to functional status, cognitive ability, or social support system  Outcome: Progressing     Problem: Knowledge Deficit  Goal: Patient/family/caregiver demonstrates understanding of disease process, treatment plan, medications, and discharge instructions  Description  Complete learning assessment and assess knowledge base    Interventions:  - Provide teaching at level of understanding  - Provide teaching via preferred learning methods  Outcome: Progressing

## 2020-02-19 NOTE — PHYSICAL THERAPY NOTE
PHYSICAL THERAPY Evaluation  NOTE    Patient Name: Nancy Orozco  PGDXK'I Date: 2/19/2020     AGE:   67 y o  Mrn:   [id number]  ADMIT DX:  Severe aortic stenosis [I35 0]    Past Medical History:   Diagnosis Date    Aortic valve disease     [facility]rial fibrillation [facility] [address] )     Cardiac disease     Diabetes mellitus ([facility] [address] )     Heart murmur     Heart valve disease     Hypertension     Stroke [facility])      Length Of Stay: 1  PHYSICAL THERAPY EVALUATION :    02/19/20 1257   Note Type   Note type Eval only   Pain Assessment   Pain Assessment 0-10   Pain Score No Pain   Home Living   Type of Home Apartment  (ramped enterance)   Home Layout One level;Ramped entrance   Bathroom Shower/Tub Walk-in shower  (sponge bathes daily)   Bathroom Toilet Raised   Bathroom Equipment Grab bars in shower; Shower chair;Grab bars around toilet; Toilet raiser;Commode   Bathroom Accessibility Accessible   Home Equipment Cane  [facility])   Additional Comments Pt  lives in an inlaw apartment at [facility]    Prior Function   Level of [place] Independent with ADLs and functional mobility   Lives With Alone   Receives Help From Family   ADL Assistance Independent   IADLs Independent   Falls in the last 6 months 1 to 4   Comments PTA, Pt  INDEP with ADLs, IADLs and functional mobility with quad cane for short distance ambulation and WC for community access   Restrictions/Precautions   Weight Bearing Precautions Per Order No   Other Precautions Bed Alarm;Telemetry; Fall Risk;Cardiac/sternal   General   Additional Pertinent History Pt  is a 68 yo F who presents s/p TAVR Dx: Severe Aortic Stenosis      Family/Caregiver Present Yes   Cognition   Overall Cognitive Status WFL   Attention Within functional limits   Orientation Level Oriented X4   Memory Within functional limits   Following Commands Follows one step commands without difficulty   Comments Pt is pleasant, cooperative, and motivated to participate in therapy this day  Pt presents w/    RLE Assessment   RLE Assessment   (grossly 3-/5, limited from previous CVA)   LLE Assessment   LLE Assessment WFL   Coordination   Movements are Fluid and Coordinated 0   Coordination and Movement Description mild gait ataxia noted  Extremely bradykinetic   Sensation WFL   Light Touch   RLE Light Touch Grossly intact   LLE Light Touch Grossly intact   Bed Mobility   Additional Comments Pt  seated OOB prior to and following PT session   Transfers   Sit to Stand 5  Supervision   Additional items Assist x 1; Increased time required;Verbal cues;Armrests   Stand to Sit 5  Supervision   Additional items Assist x 1; Increased time required;Verbal cues;Armrests   Additional Comments Transfers w/ quad cane  Ambulation/Elevation   Gait pattern R Foot drag;R Knee Dimitry; Improper Weight shift;Narrow AXEL; Forward Flexion;Decreased foot clearance;Shuffling; Inconsistent karen; Redundant gait at times; Short stride; Step to   Gait Assistance 5  Supervision   Additional items Assist x 1;Verbal cues  (for posture and gait mechanics)   Assistive Device Large base quad cane   Distance 40'x1   Balance   Static Sitting Fair +   Dynamic Sitting Fair   Static Standing Fair -   Dynamic Standing Fair -   Ambulatory Fair -   Endurance Deficit   Endurance Deficit Yes   Endurance Deficit Description postural and gait degradation noted with fatigue   Activity Tolerance   Activity Tolerance Patient limited by fatigue   Medical Staff Made Aware PT Aman Marmolejo for safe dispo planning   Nurse Made Aware RN cleared Pt for OT eval   Assessment   Prognosis Good   Problem List Decreased strength;Decreased range of motion;Decreased endurance; Impaired balance;Decreased mobility; Decreased coordination;Decreased cognition; Impaired judgement;Decreased safety awareness   Goals   Patient Goals to get home   STG Expiration Date 02/29/20   PT Treatment Day 0   Plan Treatment/Interventions Functional transfer training;LE strengthening/ROM; Therapeutic exercise; Endurance training;Cognitive reorientation;Patient/family training;Equipment eval/education; Bed mobility;Gait training;Spoke to nursing;Spoke to case management   PT Frequency   (3-5x/wk)   Recommendation   Recommendation Home with family support;Home PT   Equipment Recommended Cane  (Large based quad cane)   PT - OK to Discharge Yes   Additional Comments when medically appropriate   Modified Whick Scale   Modified Whick Scale 3     Skilled PT recommended while in hospital and upon DC to progress pt toward treatment goals       Ada Arceo, PT, DPT 2/19/2020

## 2020-02-20 ENCOUNTER — TELEPHONE (OUTPATIENT)
Dept: CARDIOLOGY CLINIC | Facility: CLINIC | Age: 80
End: 2020-02-20

## 2020-02-20 VITALS
BODY MASS INDEX: 30.19 KG/M2 | TEMPERATURE: 98.5 F | OXYGEN SATURATION: 96 % | SYSTOLIC BLOOD PRESSURE: 126 MMHG | WEIGHT: 176.81 LBS | HEIGHT: 64 IN | HEART RATE: 100 BPM | RESPIRATION RATE: 20 BRPM | DIASTOLIC BLOOD PRESSURE: 90 MMHG

## 2020-02-20 LAB
ANION GAP SERPL CALCULATED.3IONS-SCNC: 6 MMOL/L (ref 4–13)
BUN SERPL-MCNC: 14 MG/DL (ref 5–25)
CALCIUM SERPL-MCNC: 9.3 MG/DL (ref 8.3–10.1)
CHLORIDE SERPL-SCNC: 100 MMOL/L (ref 100–108)
CO2 SERPL-SCNC: 30 MMOL/L (ref 21–32)
CREAT SERPL-MCNC: 0.75 MG/DL (ref 0.6–1.3)
ERYTHROCYTE [DISTWIDTH] IN BLOOD BY AUTOMATED COUNT: 13.5 % (ref 11.6–15.1)
GFR SERPL CREATININE-BSD FRML MDRD: 77 ML/MIN/1.73SQ M
GLUCOSE SERPL-MCNC: 138 MG/DL (ref 65–140)
GLUCOSE SERPL-MCNC: 161 MG/DL (ref 65–140)
GLUCOSE SERPL-MCNC: 221 MG/DL (ref 65–140)
HCT VFR BLD AUTO: 39.6 % (ref 34.8–46.1)
HGB BLD-MCNC: 12.6 G/DL (ref 11.5–15.4)
MCH RBC QN AUTO: 29.9 PG (ref 26.8–34.3)
MCHC RBC AUTO-ENTMCNC: 31.8 G/DL (ref 31.4–37.4)
MCV RBC AUTO: 94 FL (ref 82–98)
PLATELET # BLD AUTO: 219 THOUSANDS/UL (ref 149–390)
PMV BLD AUTO: 10.7 FL (ref 8.9–12.7)
POTASSIUM SERPL-SCNC: 3.7 MMOL/L (ref 3.5–5.3)
RBC # BLD AUTO: 4.21 MILLION/UL (ref 3.81–5.12)
SODIUM SERPL-SCNC: 136 MMOL/L (ref 136–145)
WBC # BLD AUTO: 10.32 THOUSAND/UL (ref 4.31–10.16)

## 2020-02-20 PROCEDURE — NC001 PR NO CHARGE: Performed by: PHYSICIAN ASSISTANT

## 2020-02-20 PROCEDURE — 85027 COMPLETE CBC AUTOMATED: CPT | Performed by: PHYSICIAN ASSISTANT

## 2020-02-20 PROCEDURE — 80048 BASIC METABOLIC PNL TOTAL CA: CPT | Performed by: PHYSICIAN ASSISTANT

## 2020-02-20 PROCEDURE — 94760 N-INVAS EAR/PLS OXIMETRY 1: CPT

## 2020-02-20 PROCEDURE — 82948 REAGENT STRIP/BLOOD GLUCOSE: CPT

## 2020-02-20 PROCEDURE — 99238 HOSP IP/OBS DSCHRG MGMT 30/<: CPT | Performed by: PHYSICIAN ASSISTANT

## 2020-02-20 RX ORDER — POTASSIUM CHLORIDE 750 MG/1
10 TABLET, EXTENDED RELEASE ORAL DAILY
Qty: 5 TABLET | Refills: 1 | Status: SHIPPED | OUTPATIENT
Start: 2020-02-21 | End: 2020-06-25 | Stop reason: HOSPADM

## 2020-02-20 RX ORDER — OMEPRAZOLE 20 MG/1
20 CAPSULE, DELAYED RELEASE ORAL DAILY
Qty: 30 CAPSULE | Refills: 0 | Status: SHIPPED | OUTPATIENT
Start: 2020-02-20 | End: 2020-03-21

## 2020-02-20 RX ORDER — TORSEMIDE 20 MG/1
10 TABLET ORAL DAILY
Status: DISCONTINUED | OUTPATIENT
Start: 2020-02-20 | End: 2020-02-20 | Stop reason: HOSPADM

## 2020-02-20 RX ORDER — CLOPIDOGREL BISULFATE 75 MG/1
75 TABLET ORAL DAILY
Qty: 30 TABLET | Refills: 2 | Status: SHIPPED | OUTPATIENT
Start: 2020-02-21 | End: 2020-06-25 | Stop reason: HOSPADM

## 2020-02-20 RX ORDER — TORSEMIDE 10 MG/1
10 TABLET ORAL DAILY
Qty: 5 TABLET | Refills: 1 | Status: SHIPPED | OUTPATIENT
Start: 2020-02-21 | End: 2020-06-25 | Stop reason: HOSPADM

## 2020-02-20 RX ORDER — DOCUSATE SODIUM 100 MG/1
100 CAPSULE, LIQUID FILLED ORAL 2 TIMES DAILY
Qty: 10 CAPSULE | Refills: 0 | Status: SHIPPED | OUTPATIENT
Start: 2020-02-20

## 2020-02-20 RX ADMIN — PANTOPRAZOLE SODIUM 40 MG: 40 TABLET, DELAYED RELEASE ORAL at 04:54

## 2020-02-20 RX ADMIN — POTASSIUM CHLORIDE 10 MEQ: 750 TABLET, EXTENDED RELEASE ORAL at 09:29

## 2020-02-20 RX ADMIN — HEPARIN SODIUM 5000 UNITS: 5000 INJECTION INTRAVENOUS; SUBCUTANEOUS at 04:54

## 2020-02-20 RX ADMIN — CHLORHEXIDINE GLUCONATE 0.12% ORAL RINSE 15 ML: 1.2 LIQUID ORAL at 09:26

## 2020-02-20 RX ADMIN — METFORMIN HYDROCHLORIDE 500 MG: 500 TABLET ORAL at 09:29

## 2020-02-20 RX ADMIN — INSULIN LISPRO 1 UNITS: 100 INJECTION, SOLUTION INTRAVENOUS; SUBCUTANEOUS at 06:23

## 2020-02-20 RX ADMIN — DILTIAZEM HYDROCHLORIDE 240 MG: 240 CAPSULE, COATED, EXTENDED RELEASE ORAL at 09:30

## 2020-02-20 RX ADMIN — TORSEMIDE 10 MG: 20 TABLET ORAL at 09:30

## 2020-02-20 RX ADMIN — IPRATROPIUM BROMIDE 1 PUFF: 17 AEROSOL, METERED RESPIRATORY (INHALATION) at 09:33

## 2020-02-20 RX ADMIN — ASPIRIN 81 MG: 81 TABLET ORAL at 09:29

## 2020-02-20 RX ADMIN — MUPIROCIN 1 APPLICATION: 20 OINTMENT TOPICAL at 09:26

## 2020-02-20 RX ADMIN — INSULIN LISPRO 2 UNITS: 100 INJECTION, SOLUTION INTRAVENOUS; SUBCUTANEOUS at 12:46

## 2020-02-20 RX ADMIN — CLOPIDOGREL BISULFATE 75 MG: 75 TABLET ORAL at 09:29

## 2020-02-20 NOTE — DISCHARGE SUMMARY
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), Mild Aphasia, Lt Carotid Stenosis (per  vascular no intervention at this time),Hyperlipidemia, Mild Restrictive Lung Disease, Recent Rt Tibial Fx (fall)    Attending: ARI Turner  Consulting Physician(s):   Cardiology  Geriatrics    Procedures Performed:   Procedure(s):  REPLACEMENT AORTIC VALVE TRANSCATHETER (TAVR) TRANSFEMORAL W/ 26 MM TODD ANIBAL S3 ULTRA VALVE (ACCESS ON LEFT)  TRANSESOPHAGEAL ECHOCARDIOGRAM (BOLIVAR)     Hospital Course:   2/18: Elective admission for Left-sided TF TAVR  Extubated and transferred to PACU on no inotropic or pressor support  No significant postoperative bleeding  Chronic fib with rapid ventricular response 110s, no previous BB  Lopressor 12 5 ordered for tonight  New LBBB on ECG    2/19: No events overnight  D/C OVI casillas/SABRINA ornelas  Resumed home dose of Cardizem, low dose diuretics  Doppler DP pulses bilaterally  Echo today  Home tomorrow  Transfer to telemetry  2/20: No events overnight  Stable for discharge to home  Diuretics x 5 days  No Lopressor due to hypotension  Condition at Discharge:   good     Discharge Physical Exam:    HEENT/NECK:  PERRLA  No jugular venous distention      Cardiac: Irregularly irregular rate and rhythm  Pulmonary:  Breath sounds clear bilaterally  Abdomen:  Non-tender, Non-distended and Normal bowel sounds  Incisions: Groin puncture sites clean, dry, and intact without hematoma  Extremities: Extremities warm/dry and Trace edema B/L Doppler DP pulses bilaterally  Neuro: residual right sided weakness from prior stroke with mild aphasia, alert and oriented x 3   Skin: Warm/Dry, without rashes or lesions  Discharge Data:  Results from last 7 days   Lab Units 02/20/20 0448 02/19/20  0520 02/18/20  1713   WBC Thousand/uL 10 32* 10 26*  --    HEMOGLOBIN g/dL 12 6 11 8 13 5   HEMATOCRIT % 39 6 36 7 42 1   PLATELETS Thousands/uL 219 237 268     Results from last 7 days   Lab Units 02/20/20 0448 02/19/20  0520 02/18/20  1713 02/18/20  1658   POTASSIUM mmol/L 3 7 3 9 3 7  --    CHLORIDE mmol/L 100 107 110*  --    CO2 mmol/L 30 23 22  --    CO2, I-STAT mmol/L  --   --   --  22   BUN mg/dL 14 13 12  --    CREATININE mg/dL 0 75 0 65 0 68  --    GLUCOSE, ISTAT mg/dl  --   --   --  127   CALCIUM mg/dL 9 3 8 8 9 0  --            Discharge instructions/Information to patient and family:   See after visit summary for information provided to patient and family  Abiola Sandoval was educated on restrictions regarding driving and lifting, and techniques of proper incisional care  They were specifically counselled on signs and symptoms of an incisional infection, and advised to contact our service immediately should they develop fevers, sweats, chill, redness or drainage at the site of any incisions  Provisions for Follow-Up Care:  See after visit summary for information related to follow-up care and any pertinent home health orders  Disposition:  Home    Planned Readmission:   No    Discharge Medications:  See after visit summary for reconciled discharge medications provided to patient and family  Abiola Sandoval was provided contact information and scheduled a follow up appointment with ARI Ring  Additionally, follow up appointments have been scheduled for their primary care physician and primary cardiologist   Contact information was provided      Upon admission, troponins were Not checked    [facility] was counseled on the importance of avoiding tobacco products  As with all patients whom have undergone open heart surgery, tobacco cessation medication was contraindicated at the time of discharge  ACE/ARB was Contraindicated secondary to hypotension   Lopressor was held due to hypotension      The patient was discharged on ongoing diuretic therapy with Torsemide 10 mg, PO QD and Potassium Chloride 10 mEq, PO QD  They were advised to continue these medications for 5 days, unless otherwise directed  The patient was informed that following their postoperative surgical evaluation, they will be referred to outpatient cardiac rehabilitation  They were counseled that this program is run by specialists who will help them safely strengthen their heart and prevent more heart disease  Cardiac rehabilitation will include exercise, relaxation, stress management, and heart-healthy nutrition  Caregivers will also check to make sure their medication regimen is working  I spent 30 minutes discharging the patient  This time was spent on the day of discharge  I had direct contact with the patient on the day of discharge  Additional documentation is required if more than 30 minutes were spent on discharge       SIGNATURE: Cleo Jeff PA-C  DATE: February 20, 2020  TIME: 11:58 AM

## 2020-02-20 NOTE — RESPIRATORY THERAPY NOTE
RT Protocol Note  Magnolia Tejeda 67 y o  female MRN: [id number]  Unit/Bed#: [facility] 422-01 Encounter: [id number]    Assessment    Principal Problem:    Severe aortic stenosis  Active Problems:    History of stroke    Hypertension    Diabetes mellitus ([facility] )    Atrial fibrillation ([facility] )    Carotid occlusion, left    Carotid stenosis, asymptomatic, right    S/P TAVR (transcatheter aortic valve replacement)    Right sided weakness    Expressive aphasia      Home Pulmonary Medications:         Past Medical History:   Diagnosis Date    Aortic valve disease     Atrial fibrillation (HCC)     Cardiac disease     Diabetes mellitus ([facility] )     Heart murmur     Heart valve disease     Hypertension     Stroke [facility])      Social History     Socioeconomic History    Marital status:       Spouse name: None    Number of children: None    Years of education: None    Highest education level: None   Occupational History    None   Social Needs    Financial resource strain: None    Food insecurity:     Worry: None     Inability: None    Transportation needs:     Medical: None     Non-medical: None   Tobacco Use    Smoking status: Never Smoker    Smokeless tobacco: Never Used   Substance and Sexual Activity    Alcohol use: Not Currently    Drug use: Never    Sexual activity: None   Lifestyle    Physical activity:     Days per week: None     Minutes per session: None    Stress: None   Relationships    Social connections:     Talks on phone: None     Gets together: None     Attends [employer]ical service: None     Active member of club or organization: None     Attends meetings of clubs or organizations: None     Relationship status: None    Intimate partner violence:     Fear of current or ex partner: None     Emotionally abused: None     Physically abused: None     Forced sexual activity: None   Other Topics Concern    None   Social History Narrative    None       Subjective         Objective    Physical Exam: Assessment Type: Assess only  General Appearance: Alert, Awake  Respiratory Pattern: Normal  Chest Assessment: Chest expansion symmetrical  Bilateral Breath Sounds: Clear    Vitals:  Blood pressure 91/68, pulse 104, temperature 97 5 °F (36 4 °C), temperature source Oral, resp  rate 20, height 5' 4" (1 626 m), weight 80 2 kg (176 lb 12 9 oz), SpO2 95 %  Imaging and other studies: I have personally reviewed pertinent reports  Plan    Respiratory Plan: (P) Discontinue Protocol  Airway Clearance Plan: Discontinue Protocol     Resp Comments: pt up in chair in no apparent resp distress  She gives good effort with IS and understands the use and frequency of device  ACP and RESp will be D/C at this time

## 2020-02-20 NOTE — SOCIAL WORK
Pt is cleared for d/c by Cardiothoracic Surgery PA-C Rebeca Lombard  RN bAy Prashanth was notified of pt's d/c order  Pt is accepted for resumption of services by [facility] for her aftercare plan  The pt and her daughter Avtar Otero were both informed of d/c  Daughter will transport pt home later this day, pickup time TBD  No IMM required due to pt's LOS < 3 days  No chart copy required  CM to follow

## 2020-02-20 NOTE — RESTORATIVE TECHNICIAN NOTE
Restorative Specialist Mobility Note       Activity: Ambulate in russo, Chair     Assistive Device: Four point cane

## 2020-02-20 NOTE — PROGRESS NOTES
Progress Note - Cardiothoracic Surgery   [facility] 67 y o  female MRN: [id number]  Unit/Bed#: [facility] 422-01 Encounter: [id number]  Aortic stenosis, Non-Rheumatic  S/P transfemoral transcatheter aortic valve replacement; POD # 2    24 Hour Events: no events overnight, no complaints this morning  Tolerating diet  Ambulating with assistance  Denies any pain       Medications:   Scheduled Meds:  Current Facility-Administered Medications:  acetaminophen 650 mg Oral Q4H PRN Keyana Barrientos PA-C   aspirin 81 mg Oral Daily Adele Mckeon PA-C   atorvastatin 40 mg Oral Daily LINDA Knight   bisacodyl 10 mg Rectal Daily PRN Adele Mckeon PA-C   chlorhexidine 15 mL Mouth/Throat BID Adele Mckeon PA-C   clopidogrel 75 mg Oral Daily Adele Mckeon PA-C   diltiazem 240 mg Oral Daily Adele Grant PA-C   docusate sodium 100 mg Oral BID Adele Mckeon PA-C   heparin (porcine) 5,000 Units Subcutaneous [facility] Adele Mckeon PA-C   insulin lispro 1-6 Units Subcutaneous TID AC Rachael Mckeon PA-C   insulin lispro 1-6 Units Subcutaneous HS Adele Grant PA-C   ipratropium 1 puff Inhalation Q12H [facility] Adele Mckeon PA-C   lidocaine 2 patch Topical Daily Adele Mckeon PA-C   melatonin 6 mg Oral HS Adele Mckeon PA-C   metFORMIN 500 mg Oral BID Adele Mckeon PA-C   mupirocin 1 application Nasal F63B [facility] Adele Mckeon PA-C   ondansetron 4 mg Intravenous Q6H PRN Adele Grant PA-C   pantoprazole 40 mg Oral Early Morning Adele Grant PA-C   polyethylene glycol 17 g Oral Daily PRN Adele Grant PA-C   polyethylene glycol 17 g Oral Daily Adele Grant PA-C   potassium chloride 10 mEq Oral Daily Adele Grant PA-C   temazepam 30 mg Oral HS Adele Grant PA-C   torsemide 20 mg Oral Daily Adele Grant PA-C     Continuous Infusions:   PRN Meds:   acetaminophen    bisacodyl    ondansetron    polyethylene glycol    Vitals:   Vitals:    02/20/20 0300 02/20/20 0600 02/20/20 0700 02/20/20 0802   BP: 109/59  91/68    BP Location: Left arm  Right arm    Pulse: 97  105 104   Resp: 16  20    Temp: 98 °F (36 7 °C)  97 5 °F (36 4 °C)    TempSrc: Oral  Oral    SpO2: 94%  95% 95%   Weight:  80 2 kg (176 lb 12 9 oz)     Height:           Telemetry: Atrial Fibrillation; Heart Rate: 90    Respiratory:   SpO2: SpO2: 95 %; Room Air    Intake/Output:   I/O       02/18 0701 - 02/19 0700 02/19 0701 - 02/20 0700 02/20 0701 - 02/21 0700    P  O   1202     I V  (mL/kg) 1753 3 (21 5)      IV Piggyback 50 50     Total Intake(mL/kg) 1803 3 (22 1) 1252 (15 6)     Urine (mL/kg/hr) 915 3925 (2) 175 (1 4)    Blood 100      Total Output 1015 3925 175    Net +788 3 -2673 -175                 Weights:   Weight (last 2 days)     Date/Time   Weight    02/20/20 0600   80 2 (176 81)    02/19/20 0600   81 5 (179 68)    02/18/20 1133   79 4 (175)            Results:   Results from last 7 days   Lab Units 02/20/20  0448 02/19/20  0520 02/18/20  1713   WBC Thousand/uL 10 32* 10 26*  --    HEMOGLOBIN g/dL 12 6 11 8 13 5   HEMATOCRIT % 39 6 36 7 42 1   PLATELETS Thousands/uL 219 237 268     Results from last 7 days   Lab Units 02/20/20  0448 02/19/20  0520 02/18/20  1713 02/18/20  1658   POTASSIUM mmol/L 3 7 3 9 3 7  --    CHLORIDE mmol/L 100 107 110*  --    CO2 mmol/L 30 23 22  --    CO2, I-STAT mmol/L  --   --   --  22   BUN mg/dL 14 13 12  --    CREATININE mg/dL 0 75 0 65 0 68  --    GLUCOSE, ISTAT mg/dl  --   --   --  127   CALCIUM mg/dL 9 3 8 8 9 0  --      Results from last 7 days   Lab Units 02/13/20  1121   INR  1 03     Point of care glucose: 161-279    Studies:  Echocardiogram:   SUMMARY     LEFT VENTRICLE:  Systolic function was normal  Ejection fraction was estimated to be 65 %  There were no regional wall motion abnormalities      LEFT ATRIUM:  The atrium was moderately dilated      MITRAL VALVE:  There was mild annular calcification    There was mild regurgitation      AORTIC VALVE:  A 26mm Farah Raheem #3 TAVR was present  It exhibited normal function  It was seated well without paravalvular leak  The peak valve velocity was 133 cm/s  The mean valve velocity was 76 8 cm/s  Valve peak gradient was 7 mmHg  Valve mean gradient was 3 mmHg  Estimated aortic valve area (by Vmax) was 2 04 cmï¾²  LVOT diameter was 20 mm      TRICUSPID VALVE:  There was mild regurgitation  The findings suggest mild pulmonary hypertension  Invasive Lines/Tubes:  Invasive Devices     Central Venous Catheter Line            CVC Central Lines 02/18/20 Triple 1 day          Peripheral Intravenous Line            Peripheral IV 02/18/20 Left Forearm 1 day                Physical Exam:    HEENT/NECK:  PERRLA  No jugular venous distention  Cardiac: Irregularly irregular rate and rhythm  Pulmonary:  Breath sounds clear bilaterally  Abdomen:  Non-tender, Non-distended and Normal bowel sounds  Incisions: Groin puncture sites clean, dry, and intact without hematoma  Extremities: Extremities warm/dry and Trace edema B/L  Neuro: Alert and oriented X 3 right sided weakness residual from previous CVA   Skin: Warm/Dry, without rashes or lesions  Assessment:  Principal Problem:    Severe aortic stenosis  Active Problems:    History of stroke    Hypertension    Diabetes mellitus (HCC)    Atrial fibrillation (HCC)    Carotid occlusion, left    Carotid stenosis, asymptomatic, right    S/P TAVR (transcatheter aortic valve replacement)    Right sided weakness    Expressive aphasia       Aortic stenosis, Non-Rheumatic  S/P transfemoral transcatheter aortic valve replacement; POD # 2    Plan:    1  Cardiac:   Atrial Fibrillation; HR/BP well-controlled  Resumed on home diltizaem 360 mg daily, no AC for atrial fibrillation due to previous spontaneous bleed    On Plavix and ASA for postoperative TAVE   Central IV access no longer required; Remove central venous catheter today  ASA/Plavix  Follow up transthoracic echocardiogram today  Continue Statin therapy  Continue DVT prophylaxis    2  Pulmonary:   Good Room air oxygen saturation; Continue incentive spirometry/Coughing/Deep breathing exercises    3  Renal:   Intake/Output net: -2673 mL/24 hours  Decrease torsemine to 10 mg daily  Post op Creatinine stable; Follow up labs prn    4  Neuro:  Neurologically intact; No active issues  Incisional pain well-controlled; Continue prn Tylenol    5  GI:  Tolerating TLC 2 3 gm sodium diet  Maintain 1800 mL daily fluid restriction   Continue stool softeners and prn suppository  Continue GI prophylaxis    6  Endo:   History of diabetes; Continue home DM regimen, with supplemental subcutaneous insulin therapy    7  Hematology:   Post-operative blood count acceptable; Trend prn    8  Disposition:  Gerontology consultation already completed for routine assessment of TAVR patient over 72years old  Anticipate discharge to home today     VTE Pharmacologic Prophylaxis: Fondaparinux (Arixtra)  VTE Mechanical Prophylaxis: sequential compression device    Collaborative rounds completed with OVI Olmos    Plan of care discussed with bedside nurse    SIGNATURE: Aldo Jacob PA-C  DATE: February 20, 2020  TIME: 8:37 AM

## 2020-02-20 NOTE — DISCHARGE INSTRUCTIONS
** Plavix will be taken for 90 days after your TAVR (transcatheter aortic valve replacement)**      A Matter of Balance is a national, evidence-based, injury prevention program sponsored by [address], [facility] and the [facility]  A Matter of Balance is an 8-week course - 2-hour sessions, once a week for 8 weeks  Classes include mild exercise, discussion, problem-solving, role-playing, idea-sharing and also a 10-minute break with a healthy snack  Participants will learn to:  view falls and fear of falling as controllable   set realistic goals for increasing activity   change their environment to reduce fall risk factors   promote exercise to increase strength and balance  This is a FREE course offered to the community! Participants should be ambulatory (with or without assistance) and should have the ability to problem-solve  Classes will be offered in Spring  For more information call [phone]  Transcatheter Aortic Valve Replacement   WHAT YOU NEED TO KNOW:   · A TAVR is a procedure to replace your aortic valve  It is done without removing your old valve  The aortic valve is between the left ventricle and the aorta  The left ventricle is the lower left chamber of your heart  The aorta is a blood vessel that pumps blood to your brain and body  The aortic valve opens and closes to let blood flow from your heart to the rest of your body  · TAVR may be used to replace your aortic valve if open heart surgery is not safe for you  Your valve will be replaced with a tissue valve  The tissue may be taken from an animal, such as a pig or cow    DISCHARGE INSTRUCTIONS:   Call 911 for any of the following:   · You have any of the following signs of a heart attack:      ¨ Squeezing, pressure, or pain in your chest that lasts longer than 5 minutes or returns    ¨ Discomfort or pain in your back, neck, jaw, stomach, or arm ¨ Trouble breathing    ¨ Nausea or vomiting    ¨ Lightheadedness or a sudden cold sweat, especially with chest pain or trouble breathing    · You have any of the following signs of a stroke:      ¨ Numbness or drooping on one side of your face     ¨ Weakness in an arm or leg    ¨ Confusion or difficulty speaking    ¨ Dizziness, a severe headache, or vision loss    · You feel lightheaded, short of breath, and have chest pain  · You cough up blood  · You have trouble breathing  Seek care immediately if:   · Blood soaks through your bandage  · Your stitches come apart  · Your wound gets swollen quickly  · Your heart is beating faster or slower than usual      · Your arm or leg feels numb, cool, or looks pale  · Your arm or leg feels warm, tender, and painful  It may look swollen and red  · You feel weak or dizzy  Contact your healthcare provider if:   · You have a fever or chills  · Your wound is red, swollen, or draining pus  · Your wound looks more bruised or there is new bruising near your wound  · You have nausea or are vomiting  · Your skin is itchy, swollen, or you have a rash  · You have questions or concerns about your condition or care  Medicines: You may need any of the following:  · Blood thinners    help prevent blood clots  Examples of blood thinners include heparin and warfarin  Clots can cause strokes, heart attacks, and death  The following are general safety guidelines to follow while you are taking a blood thinner:    ¨ Watch for bleeding and bruising while you take blood thinners  Watch for bleeding from your gums or nose  Watch for blood in your urine and bowel movements  Use a soft washcloth on your skin, and a soft toothbrush to brush your teeth  This can keep your skin and gums from bleeding  If you shave, use an electric shaver  Do not play contact sports  ¨ Tell your dentist and other healthcare providers that you take anticoagulants   Wear a bracelet or necklace that says you take this medicine  ¨ Do not start or stop any medicines unless your healthcare provider tells you to  Many medicines cannot be used with blood thinners  ¨ Tell your healthcare provider right away if you forget to take the medicine, or if you take too much  ¨ Warfarin  is a blood thinner that you may need to take  The following are things you should be aware of if you take warfarin  § Foods and medicines can affect the amount of warfarin in your blood  Do not make major changes to your diet while you take warfarin  Warfarin works best when you eat about the same amount of vitamin K every day  Vitamin K is found in green leafy vegetables and certain other foods  Ask for more information about what to eat when you are taking warfarin  § You will need to see your healthcare provider for follow-up visits when you are on warfarin  You will need regular blood tests  These tests are used to decide how much medicine you need  · Antiplatelets , such as aspirin, help prevent blood clots  Take your antiplatelet medicine exactly as directed  These medicines make it more likely for you to bleed or bruise  If you are told to take aspirin, do not take acetaminophen or ibuprofen instead  · Acetaminophen  helps decrease your pain  This medicine is available without a doctor's order  Ask how much medicine is safe to take, and how often to take it  Acetaminophen can cause liver damage if not taken correctly  · Take your medicine as directed  Contact your healthcare provider if you think your medicine is not helping or if you have side effects  Tell him or her if you are allergic to any medicine  Keep a list of the medicines, vitamins, and herbs you take  Include the amounts, and when and why you take them  Bring the list or the pill bottles to follow-up visits  Carry your medicine list with you in case of an emergency    Care for your wound as directed:  Ask your healthcare provider when your wound can get wet  Carefully wash around the wound with soap and water  Do not scrub your wound  You can let soap and water gently run over your wound  Gently pat dry the area and put on new, clean bandages as directed  Check your wound every day for signs of infection such as swelling, pus, or redness  Change your bandages when they get wet or dirty  Do not put lotions or powders on your wound  Do not take a bath or swim until your healthcare provider says it is okay  These activities can increase your risk for a wound infection  Activity:  Do not lift anything heavier than 5 pounds or do vigorous activities such as sports  These actions will put too much stress on your wound and heart  Take short walks around the house several times a day  This will help prevent blood clots  Ask your healthcare provider what other activities are safe for you to do  Also ask when you can return to your normal activities and work or school  Self-care:   · Eat heart healthy foods  You may need to eat foods that are low in salt, fat, or cholesterol  Healthy foods include fruits, vegetables, whole-grain breads, low-fat dairy products, beans, lean meats, and fish  Ask your healthcare provider for more information about a heart healthy diet  · Do not smoke  Nicotine and other chemicals in cigarettes and cigars can cause heart and lung damage  Nicotine can also slow healing  Ask your healthcare provider for information if you currently smoke and need help to quit  E-cigarettes or smokeless tobacco still contain nicotine  Talk to your healthcare provider before you use these products  · Do not drink alcohol  Ask your cardiologist if it is safe for you to drink alcohol  Alcohol can increase your risk for high blood pressure, diabetes, and coronary artery disease  · Maintain a healthy weight  Ask your healthcare provider how much you should weigh   Extra weight can increase the stress on your heart  Ask him to help you create a weight loss plan if you are overweight  · Exercise as directed after you recover  Your healthcare provider can help you create an exercise plan that is right for you  Exercise will help keep your heart healthy  Ask your healthcare provider if you need antibiotics before procedures:  Some procedures may allow bacteria to get into your blood and travel to your heart  This can cause an infection in your heart and prevent you from healing  You may need antibiotics before certain procedures that happen in the next 6 months to prevent infection  This may also include certain dental procedures  Ask your healthcare provider for more information  Follow up with your healthcare provider as directed: You may need to return for tests  These tests will make sure your valve is working correctly  Write down your questions so you remember to ask them during your visits  © 2017 [employer]  Information is for End User's use only and may not be sold, redistributed or otherwise used for commercial purposes  All illustrations and images included in CareNotes® are the copyrighted property of A D A M [employer] Inc  or [employer]  The above information is an  only  It is not intended as medical advice for individual conditions or treatments  Talk to your doctor, nurse or pharmacist before following any medical regimen to see if it is safe and effective for you  Clopidogrel (By mouth)   Clopidogrel ([id number])  Helps prevent stroke, heart attack, and other heart problems  This medicine is a platelet inhibitor  Brand Name(s): Plavix   There may be other brand names for this medicine  When This Medicine Should Not Be Used: This medicine is not right for everyone  Do not use it if you had an allergic reaction to clopidogrel  How to Use This Medicine:   Tablet  · Your doctor will tell you how much medicine to use   Do not use more than directed  · This medicine should come with a Medication Guide  Ask your pharmacist for a copy if you do not have one  · Missed dose: Take a dose as soon as you remember  If it is almost time for your next dose, wait until then and take a regular dose  Do not take extra medicine to make up for a missed dose  · Store the medicine in a closed container at room temperature, away from heat, moisture, and direct light  Drugs and Foods to Avoid:   Ask your doctor or pharmacist before using any other medicine, including over-the-counter medicines, vitamins, and herbal products  · Some medicines can affect how clopidogrel works  Tell your doctor if you are using any of the following:   ¨ Esomeprazole, omeprazole, repaglinide, or warfarin  ¨ Medicine to treat depression  ¨ NSAID pain or arthritis medicine (including celecoxib, diclofenac, ibuprofen, or naproxen)  Warnings While Using This Medicine:   · Tell your doctor if you are pregnant or breastfeeding, or if you have bleeding problems, stomach ulcer or bleeding, a recent stroke, or have a history of bleeding problems  · This medicine can cause you to bleed and bruise more easily  Take precautions to avoid injury  Brush and floss your teeth gently, do not play rough sports, and be careful with sharp objects  Severe bleeding can be life-threatening  · This medicine may cause a rare but serious blood clotting condition called thrombotic thrombocytopenic purpura  · Make sure any doctor or dentist who treats you knows that you are using this medicine  Tell your doctor if you plan to have surgery or a dental procedure  · Do not stop using this medicine suddenly  Your doctor will need to slowly decrease your dose before you stop it completely  · Your doctor will check your progress and the effects of this medicine at regular visits  Keep all appointments  · Keep all medicine out of the reach of children  Never share your medicine with anyone    Possible Side Effects While Using This Medicine:   Call your doctor right away if you notice any of these side effects:  · Allergic reaction: Itching or hives, swelling in your face or hands, swelling or tingling in your mouth or throat, chest tightness, trouble breathing  · Bloody or black, tarry stools  · Nosebleeds  · Pinpoint red or purple spots on your skin or in your mouth  · Problems with vision, speech, or walking  · Red or dark brown urine  · Seizures  · Severe stomach pain  · Trouble breathing, tiredness, fast heartbeat, yellow skin or eyes  · Unusual bleeding, bruising, or weakness  · Vomiting of blood or vomit that looks like coffee grounds  If you notice other side effects that you think are caused by this medicine, tell your doctor  Call your doctor for medical advice about side effects  You may report side effects to FDA at 5-783-FDA-1383  © 2017 [employer] Information is for End User's use only and may not be sold, redistributed or otherwise used for commercial purposes  The above information is an  only  It is not intended as medical advice for individual conditions or treatments  Talk to your doctor, nurse or pharmacist before following any medical regimen to see if it is safe and effective for you

## 2020-02-20 NOTE — TELEPHONE ENCOUNTER
Pt had a TAVR on 2/18 & pt needs a hospital fup  Please advise were pt can be seen?  Pt is a Dr Joce Orr Pt

## 2020-02-21 ENCOUNTER — TELEPHONE (OUTPATIENT)
Dept: CARDIOLOGY CLINIC | Facility: CLINIC | Age: 80
End: 2020-02-21

## 2020-02-26 ENCOUNTER — TELEPHONE (OUTPATIENT)
Dept: CARDIAC SURGERY | Facility: CLINIC | Age: 80
End: 2020-02-26

## 2020-02-26 NOTE — TELEPHONE ENCOUNTER
Call placed to patient to follow-up after discharge from hospital, post-op  Spoke to: Patient   Procedure & Date: TAVR: 02/18/20  Surgeon: Yovany Felder wt 175 lb   D/C wt 176 lb  Current wt    Fever/chills: No     Lightheadedness/dizziness: No     Angina: No     SOB: With activity      Pain: No    Edema: LE edema improved with diuretic    Incision: Clean/dry/intact, no s/s of infection     GI: Stable BM    Activity: Walking daily     Follow-up APPTs: PCP: 02/26/20  Cardiology: 02/27/20  CT surgery: 03/19/20    Comments: Patient states she is doing well at this time, has no questions/concerns  Education:  1  Daily AM weight, call for weight gain of 2 lbs or more in 24 hours  2  Take frequent short walks, increase activity as tolerated  3  Maintain sternal precautions: No strenuous activity; no lifting more than 10 lbs;  no driving  4  Continue to use Incentive Spirometer frequently throughout the day  5  Shower daily; Cleanse incisions with antibacterial soap and water  6  No ointments, powder or lotions on surgical incisions  7   Call [facility] office with questions or concerns

## 2020-02-27 ENCOUNTER — OFFICE VISIT (OUTPATIENT)
Dept: CARDIOLOGY CLINIC | Facility: CLINIC | Age: 80
End: 2020-02-27
Payer: MEDICARE

## 2020-02-27 ENCOUNTER — TELEPHONE (OUTPATIENT)
Dept: CARDIOLOGY CLINIC | Facility: CLINIC | Age: 80
End: 2020-02-27

## 2020-02-27 VITALS
OXYGEN SATURATION: 99 % | DIASTOLIC BLOOD PRESSURE: 78 MMHG | HEART RATE: 61 BPM | HEIGHT: 64 IN | WEIGHT: 173 LBS | BODY MASS INDEX: 29.53 KG/M2 | SYSTOLIC BLOOD PRESSURE: 116 MMHG

## 2020-02-27 DIAGNOSIS — Z95.2 S/P TAVR (TRANSCATHETER AORTIC VALVE REPLACEMENT): ICD-10-CM

## 2020-02-27 DIAGNOSIS — I35.0 SEVERE AORTIC STENOSIS: ICD-10-CM

## 2020-02-27 DIAGNOSIS — I48.91 ATRIAL FIBRILLATION, UNSPECIFIED TYPE (HCC): Primary | ICD-10-CM

## 2020-02-27 DIAGNOSIS — Z86.73 HISTORY OF STROKE: ICD-10-CM

## 2020-02-27 PROCEDURE — 99213 OFFICE O/P EST LOW 20 MIN: CPT | Performed by: INTERNAL MEDICINE

## 2020-02-27 NOTE — LETTER
February 27, 2020     Jeanette Buck DO  [address] [address]    Patient: Kendall Guerra   YOB: 1940   Date of Visit: 2/27/2020       Dear Dr Breanne Quezada: Thank you for referring Kendall Guerra to me for evaluation  Below are my notes for this consultation  If you have questions, please do not hesitate to call me  I look forward to following your patient along with you  Sincerely,        Marah Norris DO        CC: No Recipients  Marah Norris DO  2/27/2020 11:15 AM  Sign at close encounter                                             Cardiology Follow Up    Kendall Guerra  1940  [id number]  [facility] [address] [facility]  [address] [place] [address]  [phone]  [phone]    Dear Dr Breanne Quezada,  Kendall Guerra is a 28-year-old female who follows with the [facility] Department of Cardiology in [place] with the following issues:     1  Critical aortic valve stenosis, s/p TAVR (Farah ANIBAL S3)  2  Atrial fibrillation, not on anticoagulation secondary to retroperitoneal bleed in 2014  3  History of CVA  4  Hypertension  5  Diabetes      Interval History:  Since her last visit, Ms Ashu Carrera has undergone successful transcatheter heart valve implantation with a #26 valve  Her course was complicated by atrial fibrillation with rapid ventricular response  She was transitioned back to diltiazem 240 mg daily  Metoprolol was not given due to some relative hypotension  Since discharge, she has noted some episodes of dizziness as well as palpitations  She has not lost consciousness      Patient Active Problem List   Diagnosis    History of stroke    Hypertension    Diabetes mellitus ([address] )    Tibial plateau fracture    Atrial fibrillation (HCC)    Severe aortic stenosis    Carotid occlusion, left    Carotid stenosis, asymptomatic, right    S/P TAVR (transcatheter aortic valve replacement)    Right sided weakness    Expressive aphasia     Past Medical History:   Diagnosis Date    Aortic valve disease     Atrial fibrillation [facility] )     Cardiac disease     [facility] mellitus [facility]     Heart murmur     Heart valve disease     Hypertension     Stroke [facility])      Social History     Socioeconomic History    Marital status:       Spouse name: Not on file    Number of children: Not on file    Years of education: Not on file    Highest education level: Not on file   Occupational History    Not on file   Social Needs    Financial resource strain: Not on file    Food insecurity:     Worry: Not on file     Inability: Not on file    Transportation needs:     Medical: Not on file     Non-medical: Not on file   Tobacco Use    Smoking status: Never Smoker    Smokeless tobacco: Never Used   Substance and Sexual Activity    Alcohol use: Not Currently    Drug use: Never    Sexual activity: Not on file   Lifestyle    Physical activity:     Days per week: Not on file     Minutes per session: Not on file    Stress: Not on file   Relationships    Social connections:     Talks on phone: Not on file     Gets together: Not on file     Attends Confucianism service: Not on file     Active member of club or organization: Not on file     Attends meetings of clubs or organizations: Not on file     Relationship status: Not on file    Intimate partner violence:     Fear of current or ex partner: Not on file     Emotionally abused: Not on file     Physically abused: Not on file     Forced sexual activity: Not on file   Other Topics Concern    Not on file   Social History Narrative    Not on file      Family History   Problem Relation Age of Onset    Diabetes Mother     Heart disease Mother     Diabetes Father     Heart disease Father      Past Surgical History:   Procedure Laterality Date     SECTION      x 2    HYSTERECTOMY      DC ECHO TRANSESOPHAG R-T 2D W/PRB IMG ACQUISJ I&R N/A 2020    Procedure: packet, Take 17 g by mouth daily (Patient not taking: Reported on 2/27/2020), Disp: 14 each, Rfl: 0    potassium chloride (K-DUR,KLOR-CON) 10 mEq tablet, Take 1 tablet (10 mEq total) by mouth daily for 5 days (Patient not taking: Reported on 2/27/2020), Disp: 5 tablet, Rfl: 1    torsemide (DEMADEX) 10 mg tablet, Take 1 tablet (10 mg total) by mouth daily for 5 days (Patient not taking: Reported on 2/27/2020), Disp: 5 tablet, Rfl: 1       Allergies   Allergen Reactions    Atorvastatin      dizziness and shaky    Rivaroxaban      Dizziness, and shaky  Labs:  Results for Jeannie Soda (MRN [id number]) as of 2/27/2020 11:08   2/20/2020 04:48   Sodium 136   Potassium 3 7   Chloride 100   CO2 30   Anion Gap 6   BUN 14   Creatinine 0 75   Glucose, Random 138   Calcium 9 3   eGFR 77       Results for Jeannie Soda (MRN [id number]) as of 2/27/2020 11:08   2/20/2020 04:48   WBC 10 32 (H)   Red Blood Cell Count 4 21   Hemoglobin 12 6   HCT 39 6   MCV 94   MCH 29 9   MCHC 31 8   RDW 13 5   Platelet Count 060         Imaging:   TTE, 2/19/2020  SUMMARY  LEFT VENTRICLE:  Systolic function was normal  Ejection fraction was estimated to be 65 %  There were no regional wall motion abnormalities      LEFT ATRIUM:  The atrium was moderately dilated      MITRAL VALVE:  There was mild annular calcification  There was mild regurgitation      AORTIC VALVE:  A 26mm Farah Raheem #3 TAVR was present  It exhibited normal function  It was seated well without paravalvular leak  The peak valve velocity was 133 cm/s  The mean valve velocity was 76 8 cm/s  Valve peak gradient was 7 mmHg  Valve mean gradient was 3 mmHg  Estimated aortic valve area (by Vmax) was 2 04 cmï¾²  LVOT diameter was 20 mm      TRICUSPID VALVE:  There was mild regurgitation  The findings suggest mild pulmonary hypertension  Review of Systems:  Review of Systems   Constitutional: Negative  HENT: Negative      Respiratory: Positive for shortness of breath  Cardiovascular: Positive for palpitations  Gastrointestinal: Negative  Endocrine: Negative  Musculoskeletal: Negative  Neurological: Positive for dizziness  Hematological: Negative  Physical Exam:  Physical Exam   Constitutional: She appears well-developed and well-nourished  HENT:   Head: Normocephalic and atraumatic  Eyes: Conjunctivae and EOM are normal    Neck: No JVD present  Carotid bruit is not present  No tracheal deviation present  No thyromegaly present  Cardiovascular: S1 normal and S2 normal  An irregularly irregular rhythm present  No extrasystoles are present  Tachycardia present  PMI is not displaced  Exam reveals no gallop  No murmur heard  Pulses:       Carotid pulses are 2+ on the right side, and 2+ on the left side  Radial pulses are 2+ on the right side, and 2+ on the left side  Pulmonary/Chest: Breath sounds normal  No accessory muscle usage  No respiratory distress  Musculoskeletal: She exhibits no edema  Lymphadenopathy:        Head (right side): No submental, no submandibular, no tonsillar, no preauricular, no posterior auricular and no occipital adenopathy present  Head (left side): No submental, no submandibular, no tonsillar, no preauricular, no posterior auricular and no occipital adenopathy present  Right cervical: No superficial cervical adenopathy present  Left cervical: No superficial cervical adenopathy present  Skin: Skin is warm and dry  Discussion/Summary:  Atrial fibrillation with rapid ventricular response  Severe aortic stenosis, status post TAVR (26 Farah Raheem S3)  Hypertension  History of CVA  Non-insulin-dependent diabetes mellitus        On physical examination, the patient's heart rate is much higher than documented in the vitals  This is likely due to miss count from the rapid AFib    Her heart rate is irregular and is almost certainly why she is experiencing some episodes of dizziness and some palpitations  Start metoprolol 25 mg b i d  In addition to her diltiazem 240 mg as the metoprolol will have a significantly lower effect on her blood pressure  I would like her to return to our office on Monday to see what her heart rate is doing  If she were to develop recurrent palpitations or for heart rate continues to be severely elevated, she may require brief hospitalization for up titration of her medications  Continue aspirin and clopidogrel for her TAVR  I would like her to follow-up in 4 weeks, sooner if any acute issues arise  Thank you for the opportunity to consult on this patient  If you have any questions, please feel free to call my office

## 2020-02-27 NOTE — PROGRESS NOTES
Cardiology Follow Up    Rajeev Mesa  1940  [id number]  [address] PHYSICIAN  [facility] [address]  [phone]  [phone]    Dear Dr Domenic Stock is a 44-year-old female who follows with the [address] [place] [address] Place of Cardiology in [facility] with the following issues:     1  Critical aortic valve stenosis, s/p TAVR (Farah ANIBAL S3)  2  Atrial fibrillation, not on anticoagulation secondary to retroperitoneal bleed in 2014  3  History of CVA  4  Hypertension  5  Diabetes      Interval History:  Since her last visit, Ms Mary Pickett has undergone successful transcatheter heart valve implantation with a #26 valve  Her course was complicated by atrial fibrillation with rapid ventricular response  She was transitioned back to diltiazem 240 mg daily  Metoprolol was not given due to some relative hypotension  Since discharge, she has noted some episodes of dizziness as well as palpitations  She has not lost consciousness  Patient Active Problem List   Diagnosis    History of stroke    Hypertension    Diabetes mellitus ([address] )    Tibial plateau fracture    Atrial fibrillation (HCC)    Severe aortic stenosis    Carotid occlusion, left    Carotid stenosis, asymptomatic, right    S/P TAVR (transcatheter aortic valve replacement)    Right sided weakness    Expressive aphasia     Past Medical History:   Diagnosis Date    Aortic valve disease     Atrial fibrillation (HCC)     Cardiac disease     Diabetes mellitus ([address] )     Heart murmur     Heart valve disease     Hypertension     Stroke [facility])      Social History     Socioeconomic History    Marital status:       Spouse name: Not on file    Number of children: Not on file    Years of education: Not on file    Highest education level: Not on file   Occupational History    Not on file   Social Needs    Financial resource strain: Not on file   [address] insecurity:     Worry: Not on file     Inability: Not on file    Transportation needs:     Medical: Not on file     Non-medical: Not on file   Tobacco Use    Smoking status: Never Smoker    Smokeless tobacco: Never Used   Substance and Sexual Activity    Alcohol use: Not Currently    Drug use: Never    Sexual activity: Not on file   Lifestyle    Physical activity:     Days per week: Not on file     Minutes per session: Not on file    Stress: Not on file   Relationships    Social connections:     Talks on phone: Not on file     Gets together: Not on file     Attends Restorationist service: Not on file     Active member of club or organization: Not on file     Attends meetings of clubs or organizations: Not on file     Relationship status: Not on file    Intimate partner violence:     Fear of current or ex partner: Not on file     Emotionally abused: Not on file     Physically abused: Not on file     Forced sexual activity: Not on file   Other Topics Concern    Not on file   Social History Narrative    Not on file      Family History   Problem Relation Age of Onset    Diabetes Mother     Heart disease Mother     Diabetes Father     Heart disease Father      Past Surgical History:   Procedure Laterality Date     SECTION      x 2    HYSTERECTOMY      NE ECHO TRANSESOPHAG R-T 2D W/PRB IMG ACQUISJ I&R N/A 2020    Procedure: TRANSESOPHAGEAL ECHOCARDIOGRAM (BOLIVAR); Surgeon: Amirah Fox DO;  Location: [facility];  Service: Cardiac Surgery    NE REPLACE AORTIC VALVE OPENFEMORAL ARTERY APPROACH Right 2020    Procedure: REPLACEMENT AORTIC VALVE TRANSCATHETER (TAVR) TRANSFEMORAL W/ 26 MM TODD ANIBAL S3 ULTRA VALVE (ACCESS ON LEFT);   Surgeon: Amirah Fox DO;  [facility];  Service: Cardiac Surgery    TONSILLECTOMY         Current Outpatient Medications:     acetaminophen (TYLENOL) 325 mg tablet, Take 650 mg by mouth every 6 (six) hours as needed for mild pain, Disp: , Rfl:     alendronate (FOSAMAX) 70 mg tablet, Take 70 mg by mouth Once a week, Disp: , Rfl:     ascorbic acid (VITAMIN C) 500 mg tablet, Take 500 mg by mouth 2 (two) times a day, Disp: , Rfl:     atorvastatin (LIPITOR) 10 mg tablet, Take 10 mg by mouth daily, Disp: , Rfl:     BABY ASPIRIN PO, Take 81 mg by mouth daily, Disp: , Rfl:     cholecalciferol (VITAMIN D3) 1,000 units tablet, Take 1,000 Units by mouth daily, Disp: , Rfl:     clopidogrel (PLAVIX) 75 mg tablet, Take 1 tablet (75 mg total) by mouth daily, Disp: 30 tablet, Rfl: 2    cyanocobalamin (VITAMIN B-12) 100 mcg tablet, Take by mouth daily, Disp: , Rfl:     diltiazem (DILACOR XR) 240 MG 24 hr capsule, Take 240 mg by mouth daily, Disp: , Rfl:     INCRUSE ELLIPTA 62 5 MCG/INH AEPB inhaler, Inhale 1 puff daily, Disp: , Rfl:     metFORMIN (GLUCOPHAGE) 500 mg tablet, Take 500 mg by mouth 2 (two) times a day, Disp: , Rfl:     omeprazole (PriLOSEC) 20 mg delayed release capsule, Take 1 capsule (20 mg total) by mouth daily Take 30 minutes prior to breakfast, Disp: 30 capsule, Rfl: 0    temazepam (RESTORIL) 30 mg capsule, Take 30 mg by mouth daily at bedtime , Disp: , Rfl:     docusate sodium (COLACE) 100 mg capsule, Take 1 capsule (100 mg total) by mouth 2 (two) times a day (Patient not taking: Reported on 2/27/2020), Disp: 10 capsule, Rfl: 0    polyethylene glycol (MIRALAX) 17 g packet, Take 17 g by mouth daily (Patient not taking: Reported on 2/27/2020), Disp: 14 each, Rfl: 0    potassium chloride (K-DUR,KLOR-CON) 10 mEq tablet, Take 1 tablet (10 mEq total) by mouth daily for 5 days (Patient not taking: Reported on 2/27/2020), Disp: 5 tablet, Rfl: 1    torsemide (DEMADEX) 10 mg tablet, Take 1 tablet (10 mg total) by mouth daily for 5 days (Patient not taking: Reported on 2/27/2020), Disp: 5 tablet, Rfl: 1       Allergies   Allergen Reactions    Atorvastatin      dizziness and shaky    Rivaroxaban      Dizziness, and shaky         Labs:  Results for Eriberto Cota (MRN [id number]) as of 2/27/2020 11:08   2/20/2020 04:48   Sodium 136   Potassium 3 7   Chloride 100   CO2 30   Anion Gap 6   BUN 14   Creatinine 0 75   Glucose, Random 138   Calcium 9 3   eGFR 77       Results for Eriberto Cota (MRN [id number]) as of 2/27/2020 11:08   2/20/2020 04:48   WBC 10 32 (H)   Red Blood Cell Count 4 21   Hemoglobin 12 6   HCT 39 6   MCV 94   MCH 29 9   MCHC 31 8   RDW 13 5   Platelet Count 634         Imaging:   TTE, 2/19/2020  SUMMARY  LEFT VENTRICLE:  Systolic function was normal  Ejection fraction was estimated to be 65 %  There were no regional wall motion abnormalities      LEFT ATRIUM:  The atrium was moderately dilated      MITRAL VALVE:  There was mild annular calcification  There was mild regurgitation      AORTIC VALVE:  A 26mm Farah Raheem #3 TAVR was present  It exhibited normal function  It was seated well without paravalvular leak  The peak valve velocity was 133 cm/s  The mean valve velocity was 76 8 cm/s  Valve peak gradient was 7 mmHg  Valve mean gradient was 3 mmHg  Estimated aortic valve area (by Vmax) was 2 04 cmï¾²  LVOT diameter was 20 mm      TRICUSPID VALVE:  There was mild regurgitation  The findings suggest mild pulmonary hypertension  Review of Systems:  Review of Systems   Constitutional: Negative  HENT: Negative  Respiratory: Positive for shortness of breath  Cardiovascular: Positive for palpitations  Gastrointestinal: Negative  Endocrine: Negative  Musculoskeletal: Negative  Neurological: Positive for dizziness  Hematological: Negative  Physical Exam:  Physical Exam   Constitutional: She appears well-developed and well-nourished  HENT:   Head: Normocephalic and atraumatic  Eyes: Conjunctivae and EOM are normal    Neck: No JVD present  Carotid bruit is not present  No tracheal deviation present  No thyromegaly present     Cardiovascular: S1 normal and S2 normal  An irregularly irregular rhythm present  No extrasystoles are present  Tachycardia present  PMI is not displaced  Exam reveals no gallop  No murmur heard  Pulses:       Carotid pulses are 2+ on the right side, and 2+ on the left side  Radial pulses are 2+ on the right side, and 2+ on the left side  Pulmonary/Chest: Breath sounds normal  No accessory muscle usage  No respiratory distress  Musculoskeletal: She exhibits no edema  Lymphadenopathy:        Head (right side): No submental, no submandibular, no tonsillar, no preauricular, no posterior auricular and no occipital adenopathy present  Head (left side): No submental, no submandibular, no tonsillar, no preauricular, no posterior auricular and no occipital adenopathy present  Right cervical: No superficial cervical adenopathy present  Left cervical: No superficial cervical adenopathy present  Skin: Skin is warm and dry  Discussion/Summary:  Atrial fibrillation with rapid ventricular response  Severe aortic stenosis, status post TAVR (26 Farah Raheem S3)  Hypertension  History of CVA  Non-insulin-dependent diabetes mellitus        On physical examination, the patient's heart rate is much higher than documented in the vitals  This is likely due to miss count from the rapid AFib  Her heart rate is irregular and is almost certainly why she is experiencing some episodes of dizziness and some palpitations  Start metoprolol 25 mg b i d  In addition to her diltiazem 240 mg as the metoprolol will have a significantly lower effect on her blood pressure  I would like her to return to our office on Monday to see what her heart rate is doing  If she were to develop recurrent palpitations or for heart rate continues to be severely elevated, she may require brief hospitalization for up titration of her medications  Continue aspirin and clopidogrel for her TAVR      I would like her to follow-up in 4 weeks, sooner if any acute issues arise     Thank you for the opportunity to consult on this patient  If you have any questions, please feel free to call my office

## 2020-02-27 NOTE — TELEPHONE ENCOUNTER
Pt saw Dr Lawyer Duverney today & needs a 4 week follow up appt  Dr Lawyer Duverney has no available appts so he would like pt to see Sejal  Will call with appt once we have her schedule

## 2020-02-28 ENCOUNTER — TELEPHONE (OUTPATIENT)
Dept: OTHER | Facility: OTHER | Age: 80
End: 2020-02-28

## 2020-02-28 ENCOUNTER — TELEPHONE (OUTPATIENT)
Dept: CARDIOLOGY CLINIC | Facility: CLINIC | Age: 80
End: 2020-02-28

## 2020-02-28 NOTE — TELEPHONE ENCOUNTER
Patient has a new script for Metoprol 25mg BID since taking it she has been experiencing dizziness and confusion, pt took it last night her BP is 107/71 and she still feels it  Wants orders to to stop meds      Paged pt info to Dr Boone via TT

## 2020-02-28 NOTE — TELEPHONE ENCOUNTER
Spoke with the pt she stated since starting metoprolol yesterday pt has only taken one dose  Since that once dose pt is feeling dizzy, she is having trouble remembering and she is having slurred speech  Pt has not taking her metoprolol today  I spoke with Dr Jodi Anders he stated it was okay for the pt to stop  Pt is to call back if she is having any symptoms  Pt verbally understood

## 2020-02-28 NOTE — TELEPHONE ENCOUNTER
Pt called and stated that she saw Dr Zonia Espino yesterday and he prescribed Metoprolol  Pt stated that the medication is making her dizzy   Call transferred to [facility] [place]

## 2020-03-09 NOTE — TELEPHONE ENCOUNTER
Please call the patient and inform her that she needs to be seen by cardiology or her PCP  At her last visit, she had an irregular heart beat that was not controlled  New medications were started to control this, but someone needs to see if they are working      [address]

## 2020-03-10 NOTE — TELEPHONE ENCOUNTER
Spoke with pt  Pt states she is feeling fine  She said she will call us when she is not feeling well to make apt  She canceled her apt for Thursday with evelyn mccartney

## 2020-03-10 NOTE — TELEPHONE ENCOUNTER
She needs to be seen, even if she is feeling well  This can be a nursing visit for an EKG if she does not want to wait for a physician appointment  Please let her know that she may not feel symptoms with her heart rate, but it still has to be monitored      [address]

## 2020-03-12 ENCOUNTER — TELEPHONE (OUTPATIENT)
Dept: OTHER | Facility: HOSPITAL | Age: 80
End: 2020-03-12

## 2020-03-12 NOTE — TELEPHONE ENCOUNTER
Patient requested to cancel her appointment schedule for Thursday 3/19/2020 @ 1:15pm due to epidemic

## 2020-03-26 ENCOUNTER — TELEPHONE (OUTPATIENT)
Dept: CARDIAC SURGERY | Facility: CLINIC | Age: 80
End: 2020-03-26

## 2020-03-26 NOTE — TELEPHONE ENCOUNTER
Spoke with patient's daughter regarding 30 day S/P TAVR testing and assessment  Due to Jul[address] 24 daughter states she will not risk mother's health and take patient for any testing  Will reconsider in June  Reivewed patient current status, daughter states mother is doing well from a cardiac standpoint, getting around without any SOB or fatigue  Patient deemed [facility]  Reviewed current medications, same as documented on 3/12/20 note  Instructed daughter patient to complete Plavix after total of 90 days, around May 18, 2020  Instructed to follow with PCP and cardiologist  Daughter verbalized understanding of all

## 2020-06-11 ENCOUNTER — HOSPITAL ENCOUNTER (OUTPATIENT)
Dept: NON INVASIVE DIAGNOSTICS | Facility: CLINIC | Age: 80
Discharge: HOME | End: 2020-06-11
Payer: MEDICARE

## 2020-06-11 ENCOUNTER — APPOINTMENT (OUTPATIENT)
Dept: LAB | Facility: CLINIC | Age: 80
End: 2020-06-11
Payer: MEDICARE

## 2020-06-11 DIAGNOSIS — I35.0 SEVERE AORTIC STENOSIS: ICD-10-CM

## 2020-06-11 LAB
ANION GAP SERPL CALCULATED.3IONS-SCNC: 7 MMOL/L (ref 4–13)
ATRIAL RATE: 136 BPM
BUN SERPL-MCNC: 14 MG/DL (ref 5–25)
CALCIUM SERPL-MCNC: 9.3 MG/DL (ref 8.3–10.1)
CHLORIDE SERPL-SCNC: 107 MMOL/L (ref 100–108)
CO2 SERPL-SCNC: 24 MMOL/L (ref 21–32)
CREAT SERPL-MCNC: 0.95 MG/DL (ref 0.6–1.3)
ERYTHROCYTE [DISTWIDTH] IN BLOOD BY AUTOMATED COUNT: 15.7 % (ref 11.6–15.1)
GFR SERPL CREATININE-BSD FRML MDRD: 58 ML/MIN/1.73SQ M
GLUCOSE P FAST SERPL-MCNC: 128 MG/DL (ref 65–99)
HCT VFR BLD AUTO: 42.8 % (ref 34.8–46.1)
HGB BLD-MCNC: 13.5 G/DL (ref 11.5–15.4)
MCH RBC QN AUTO: 29.7 PG (ref 26.8–34.3)
MCHC RBC AUTO-ENTMCNC: 31.5 G/DL (ref 31.4–37.4)
MCV RBC AUTO: 94 FL (ref 82–98)
PLATELET # BLD AUTO: 256 THOUSANDS/UL (ref 149–390)
PMV BLD AUTO: 10.9 FL (ref 8.9–12.7)
POTASSIUM SERPL-SCNC: 3.6 MMOL/L (ref 3.5–5.3)
QRS AXIS: -50 DEGREES
QRSD INTERVAL: 148 MS
QT INTERVAL: 478 MS
QTC INTERVAL: 526 MS
RBC # BLD AUTO: 4.55 MILLION/UL (ref 3.81–5.12)
SODIUM SERPL-SCNC: 138 MMOL/L (ref 136–145)
T WAVE AXIS: 105 DEGREES
VENTRICULAR RATE: 73 BPM
WBC # BLD AUTO: 6.68 THOUSAND/UL (ref 4.31–10.16)

## 2020-06-11 PROCEDURE — 93005 ELECTROCARDIOGRAM TRACING: CPT | Performed by: THORACIC SURGERY (CARDIOTHORACIC VASCULAR SURGERY)

## 2020-06-11 PROCEDURE — 93306 TTE W/DOPPLER COMPLETE: CPT | Performed by: INTERNAL MEDICINE

## 2020-06-11 PROCEDURE — 36415 COLL VENOUS BLD VENIPUNCTURE: CPT

## 2020-06-11 PROCEDURE — 80048 BASIC METABOLIC PNL TOTAL CA: CPT

## 2020-06-11 PROCEDURE — 93010 ELECTROCARDIOGRAM REPORT: CPT | Performed by: INTERNAL MEDICINE

## 2020-06-11 PROCEDURE — 85027 COMPLETE CBC AUTOMATED: CPT

## 2020-06-11 PROCEDURE — 93306 TTE W/DOPPLER COMPLETE: CPT

## 2020-06-17 ENCOUNTER — APPOINTMENT (EMERGENCY)
Dept: CT IMAGING | Facility: HOSPITAL | Age: 80
DRG: 175 | End: 2020-06-17
Payer: MEDICARE

## 2020-06-17 ENCOUNTER — HOSPITAL ENCOUNTER (INPATIENT)
Facility: HOSPITAL | Age: 80
LOS: 5 days | DRG: 175 | End: 2020-06-22
Attending: EMERGENCY MEDICINE | Admitting: STUDENT IN AN ORGANIZED HEALTH CARE EDUCATION/TRAINING PROGRAM
Payer: MEDICARE

## 2020-06-17 ENCOUNTER — APPOINTMENT (EMERGENCY)
Dept: ULTRASOUND IMAGING | Facility: HOSPITAL | Age: 80
DRG: 175 | End: 2020-06-17
Payer: MEDICARE

## 2020-06-17 ENCOUNTER — TELEPHONE (OUTPATIENT)
Dept: CARDIOLOGY CLINIC | Facility: CLINIC | Age: 80
End: 2020-06-17

## 2020-06-17 ENCOUNTER — APPOINTMENT (EMERGENCY)
Dept: RADIOLOGY | Facility: HOSPITAL | Age: 80
DRG: 175 | End: 2020-06-17
Payer: MEDICARE

## 2020-06-17 DIAGNOSIS — I26.99 PULMONARY EMBOLISM (HCC): Primary | ICD-10-CM

## 2020-06-17 DIAGNOSIS — I48.19 OTHER PERSISTENT ATRIAL FIBRILLATION (HCC): ICD-10-CM

## 2020-06-17 DIAGNOSIS — Z95.2 S/P TAVR (TRANSCATHETER AORTIC VALVE REPLACEMENT): ICD-10-CM

## 2020-06-17 PROBLEM — R60.0 BILATERAL LEG EDEMA: Status: ACTIVE | Noted: 2020-06-17

## 2020-06-17 PROBLEM — R59.9 ADENOPATHY: Status: ACTIVE | Noted: 2020-06-17

## 2020-06-17 LAB
ALBUMIN SERPL BCP-MCNC: 3.9 G/DL (ref 3.5–5)
ALP SERPL-CCNC: 116 U/L (ref 46–116)
ALT SERPL W P-5'-P-CCNC: 29 U/L (ref 12–78)
ANION GAP SERPL CALCULATED.3IONS-SCNC: 10 MMOL/L (ref 4–13)
AST SERPL W P-5'-P-CCNC: 27 U/L (ref 5–45)
ATRIAL RATE: 127 BPM
BASOPHILS # BLD AUTO: 0.05 THOUSANDS/ΜL (ref 0–0.1)
BASOPHILS NFR BLD AUTO: 1 % (ref 0–1)
BILIRUB DIRECT SERPL-MCNC: 0.4 MG/DL (ref 0–0.2)
BILIRUB SERPL-MCNC: 1.3 MG/DL (ref 0.2–1)
BUN SERPL-MCNC: 15 MG/DL (ref 5–25)
CALCIUM SERPL-MCNC: 9.7 MG/DL (ref 8.3–10.1)
CHLORIDE SERPL-SCNC: 102 MMOL/L (ref 100–108)
CO2 SERPL-SCNC: 26 MMOL/L (ref 21–32)
CREAT SERPL-MCNC: 0.94 MG/DL (ref 0.6–1.3)
D DIMER PPP FEU-MCNC: 0.92 UG/ML FEU
EOSINOPHIL # BLD AUTO: 0.21 THOUSAND/ΜL (ref 0–0.61)
EOSINOPHIL NFR BLD AUTO: 3 % (ref 0–6)
ERYTHROCYTE [DISTWIDTH] IN BLOOD BY AUTOMATED COUNT: 15.4 % (ref 11.6–15.1)
GFR SERPL CREATININE-BSD FRML MDRD: 58 ML/MIN/1.73SQ M
GLUCOSE SERPL-MCNC: 137 MG/DL (ref 65–140)
GLUCOSE SERPL-MCNC: 160 MG/DL (ref 65–140)
HCT VFR BLD AUTO: 45.1 % (ref 34.8–46.1)
HGB BLD-MCNC: 14.1 G/DL (ref 11.5–15.4)
IMM GRANULOCYTES # BLD AUTO: 0.04 THOUSAND/UL (ref 0–0.2)
IMM GRANULOCYTES NFR BLD AUTO: 1 % (ref 0–2)
INR PPP: 1.21 (ref 0.84–1.19)
LYMPHOCYTES # BLD AUTO: 1.8 THOUSANDS/ΜL (ref 0.6–4.47)
LYMPHOCYTES NFR BLD AUTO: 21 % (ref 14–44)
MCH RBC QN AUTO: 28.9 PG (ref 26.8–34.3)
MCHC RBC AUTO-ENTMCNC: 31.3 G/DL (ref 31.4–37.4)
MCV RBC AUTO: 92 FL (ref 82–98)
MONOCYTES # BLD AUTO: 0.63 THOUSAND/ΜL (ref 0.17–1.22)
MONOCYTES NFR BLD AUTO: 7 % (ref 4–12)
NEUTROPHILS # BLD AUTO: 5.81 THOUSANDS/ΜL (ref 1.85–7.62)
NEUTS SEG NFR BLD AUTO: 67 % (ref 43–75)
NRBC BLD AUTO-RTO: 0 /100 WBCS
NT-PROBNP SERPL-MCNC: 2795 PG/ML
PLATELET # BLD AUTO: 278 THOUSANDS/UL (ref 149–390)
PMV BLD AUTO: 10.3 FL (ref 8.9–12.7)
POTASSIUM SERPL-SCNC: 4.3 MMOL/L (ref 3.5–5.3)
PROT SERPL-MCNC: 7.3 G/DL (ref 6.4–8.2)
PROTHROMBIN TIME: 15.4 SECONDS (ref 11.6–14.5)
QRS AXIS: -74 DEGREES
QRSD INTERVAL: 134 MS
QT INTERVAL: 304 MS
QTC INTERVAL: 414 MS
RBC # BLD AUTO: 4.88 MILLION/UL (ref 3.81–5.12)
SARS-COV-2 RNA RESP QL NAA+PROBE: NEGATIVE
SODIUM SERPL-SCNC: 138 MMOL/L (ref 136–145)
T WAVE AXIS: 98 DEGREES
TROPONIN I SERPL-MCNC: <0.02 NG/ML
TSH SERPL DL<=0.05 MIU/L-ACNC: 1.41 UIU/ML (ref 0.36–3.74)
VENTRICULAR RATE: 112 BPM
WBC # BLD AUTO: 8.54 THOUSAND/UL (ref 4.31–10.16)

## 2020-06-17 PROCEDURE — 87635 SARS-COV-2 COVID-19 AMP PRB: CPT | Performed by: EMERGENCY MEDICINE

## 2020-06-17 PROCEDURE — 36415 COLL VENOUS BLD VENIPUNCTURE: CPT | Performed by: EMERGENCY MEDICINE

## 2020-06-17 PROCEDURE — 85025 COMPLETE CBC W/AUTO DIFF WBC: CPT | Performed by: EMERGENCY MEDICINE

## 2020-06-17 PROCEDURE — 93005 ELECTROCARDIOGRAM TRACING: CPT

## 2020-06-17 PROCEDURE — 83880 ASSAY OF NATRIURETIC PEPTIDE: CPT | Performed by: EMERGENCY MEDICINE

## 2020-06-17 PROCEDURE — 71275 CT ANGIOGRAPHY CHEST: CPT

## 2020-06-17 PROCEDURE — 93010 ELECTROCARDIOGRAM REPORT: CPT | Performed by: INTERNAL MEDICINE

## 2020-06-17 PROCEDURE — 85379 FIBRIN DEGRADATION QUANT: CPT | Performed by: EMERGENCY MEDICINE

## 2020-06-17 PROCEDURE — 84443 ASSAY THYROID STIM HORMONE: CPT | Performed by: EMERGENCY MEDICINE

## 2020-06-17 PROCEDURE — 99285 EMERGENCY DEPT VISIT HI MDM: CPT

## 2020-06-17 PROCEDURE — 80048 BASIC METABOLIC PNL TOTAL CA: CPT | Performed by: EMERGENCY MEDICINE

## 2020-06-17 PROCEDURE — 85610 PROTHROMBIN TIME: CPT | Performed by: EMERGENCY MEDICINE

## 2020-06-17 PROCEDURE — 84484 ASSAY OF TROPONIN QUANT: CPT | Performed by: EMERGENCY MEDICINE

## 2020-06-17 PROCEDURE — 93971 EXTREMITY STUDY: CPT

## 2020-06-17 PROCEDURE — 93971 EXTREMITY STUDY: CPT | Performed by: SURGERY

## 2020-06-17 PROCEDURE — 82948 REAGENT STRIP/BLOOD GLUCOSE: CPT

## 2020-06-17 PROCEDURE — 80076 HEPATIC FUNCTION PANEL: CPT | Performed by: EMERGENCY MEDICINE

## 2020-06-17 PROCEDURE — 85730 THROMBOPLASTIN TIME PARTIAL: CPT | Performed by: NURSE PRACTITIONER

## 2020-06-17 PROCEDURE — 71045 X-RAY EXAM CHEST 1 VIEW: CPT

## 2020-06-17 PROCEDURE — 99222 1ST HOSP IP/OBS MODERATE 55: CPT | Performed by: NURSE PRACTITIONER

## 2020-06-17 PROCEDURE — 99285 EMERGENCY DEPT VISIT HI MDM: CPT | Performed by: EMERGENCY MEDICINE

## 2020-06-17 RX ORDER — ONDANSETRON 2 MG/ML
4 INJECTION INTRAMUSCULAR; INTRAVENOUS EVERY 6 HOURS PRN
Status: DISCONTINUED | OUTPATIENT
Start: 2020-06-17 | End: 2020-06-17 | Stop reason: SDUPTHER

## 2020-06-17 RX ORDER — HEPARIN SODIUM 1000 [USP'U]/ML
6800 INJECTION, SOLUTION INTRAVENOUS; SUBCUTANEOUS
Status: DISCONTINUED | OUTPATIENT
Start: 2020-06-17 | End: 2020-06-20

## 2020-06-17 RX ORDER — HEPARIN SODIUM 10000 [USP'U]/100ML
3-30 INJECTION, SOLUTION INTRAVENOUS
Status: DISCONTINUED | OUTPATIENT
Start: 2020-06-17 | End: 2020-06-20

## 2020-06-17 RX ORDER — ASPIRIN 81 MG/1
81 TABLET ORAL DAILY
Status: DISCONTINUED | OUTPATIENT
Start: 2020-06-18 | End: 2020-06-22 | Stop reason: HOSPADM

## 2020-06-17 RX ORDER — TEMAZEPAM 15 MG/1
30 CAPSULE ORAL
Status: DISCONTINUED | OUTPATIENT
Start: 2020-06-17 | End: 2020-06-22 | Stop reason: HOSPADM

## 2020-06-17 RX ORDER — FUROSEMIDE 10 MG/ML
20 INJECTION INTRAMUSCULAR; INTRAVENOUS DAILY
Status: DISCONTINUED | OUTPATIENT
Start: 2020-06-17 | End: 2020-06-18

## 2020-06-17 RX ORDER — ACETAMINOPHEN 325 MG/1
650 TABLET ORAL EVERY 6 HOURS PRN
Status: DISCONTINUED | OUTPATIENT
Start: 2020-06-17 | End: 2020-06-17 | Stop reason: SDUPTHER

## 2020-06-17 RX ORDER — HEPARIN SODIUM 1000 [USP'U]/ML
3400 INJECTION, SOLUTION INTRAVENOUS; SUBCUTANEOUS
Status: DISCONTINUED | OUTPATIENT
Start: 2020-06-17 | End: 2020-06-20

## 2020-06-17 RX ORDER — ACETAMINOPHEN 325 MG/1
650 TABLET ORAL EVERY 6 HOURS PRN
Status: DISCONTINUED | OUTPATIENT
Start: 2020-06-17 | End: 2020-06-22 | Stop reason: HOSPADM

## 2020-06-17 RX ORDER — ONDANSETRON 2 MG/ML
4 INJECTION INTRAMUSCULAR; INTRAVENOUS EVERY 6 HOURS PRN
Status: DISCONTINUED | OUTPATIENT
Start: 2020-06-17 | End: 2020-06-22 | Stop reason: HOSPADM

## 2020-06-17 RX ORDER — DILTIAZEM HYDROCHLORIDE 240 MG/1
240 CAPSULE, COATED, EXTENDED RELEASE ORAL DAILY
Status: DISCONTINUED | OUTPATIENT
Start: 2020-06-17 | End: 2020-06-18

## 2020-06-17 RX ORDER — ATORVASTATIN CALCIUM 40 MG/1
40 TABLET, FILM COATED ORAL
Status: DISCONTINUED | OUTPATIENT
Start: 2020-06-17 | End: 2020-06-22 | Stop reason: HOSPADM

## 2020-06-17 RX ORDER — HEPARIN SODIUM 1000 [USP'U]/ML
6800 INJECTION, SOLUTION INTRAVENOUS; SUBCUTANEOUS ONCE
Status: COMPLETED | OUTPATIENT
Start: 2020-06-17 | End: 2020-06-17

## 2020-06-17 RX ORDER — CLOPIDOGREL BISULFATE 75 MG/1
75 TABLET ORAL DAILY
Status: DISCONTINUED | OUTPATIENT
Start: 2020-06-18 | End: 2020-06-20

## 2020-06-17 RX ORDER — MELATONIN
1000 DAILY
Status: DISCONTINUED | OUTPATIENT
Start: 2020-06-18 | End: 2020-06-22 | Stop reason: HOSPADM

## 2020-06-17 RX ADMIN — HEPARIN SODIUM 6800 UNITS: 1000 INJECTION INTRAVENOUS; SUBCUTANEOUS at 17:21

## 2020-06-17 RX ADMIN — IOHEXOL 100 ML: 350 INJECTION, SOLUTION INTRAVENOUS at 16:31

## 2020-06-17 RX ADMIN — INSULIN LISPRO 1 UNITS: 100 INJECTION, SOLUTION INTRAVENOUS; SUBCUTANEOUS at 21:25

## 2020-06-17 RX ADMIN — TEMAZEPAM 30 MG: 15 CAPSULE ORAL at 23:31

## 2020-06-17 RX ADMIN — DILTIAZEM HYDROCHLORIDE 240 MG: 240 CAPSULE, COATED, EXTENDED RELEASE ORAL at 19:53

## 2020-06-17 RX ADMIN — HEPARIN SODIUM AND DEXTROSE 18 UNITS/KG/HR: 10000; 5 INJECTION INTRAVENOUS at 17:21

## 2020-06-17 RX ADMIN — ATORVASTATIN CALCIUM 40 MG: 40 TABLET, FILM COATED ORAL at 19:53

## 2020-06-17 RX ADMIN — FUROSEMIDE 20 MG: 10 INJECTION, SOLUTION INTRAMUSCULAR; INTRAVENOUS at 21:25

## 2020-06-17 NOTE — TELEPHONE ENCOUNTER
Spoke with fawad   Pt has severe edema in her right leg with a bruise that is 10 cm by 7 cm in the lateral part of her calve  Pt denies s o b and chest pain  bp is 140/80 heart rate is 60  fawad stated pt right leg is twice as big as her left leg  Pt cannot put any weight on leg due to recent stroke  After speaking with DR Jodi Mcguire  Pt is advised to go to the ED to rule out blood clot  fawad verbally understood  And would call back with an update on pts condition

## 2020-06-17 NOTE — TELEPHONE ENCOUNTER
The nurse called & said that pt's right leg is a 2+ edema & is 2x bigger of the left leg & has a big bruise; spoke to [place] & she said to transfer the call to her

## 2020-06-18 ENCOUNTER — APPOINTMENT (INPATIENT)
Dept: NON INVASIVE DIAGNOSTICS | Facility: HOSPITAL | Age: 80
DRG: 175 | End: 2020-06-18
Payer: MEDICARE

## 2020-06-18 LAB
ANION GAP SERPL CALCULATED.3IONS-SCNC: 12 MMOL/L (ref 4–13)
ANION GAP SERPL CALCULATED.3IONS-SCNC: 5 MMOL/L (ref 4–13)
APTT PPP: 135 SECONDS (ref 23–37)
APTT PPP: 161 SECONDS (ref 23–37)
APTT PPP: 50 SECONDS (ref 23–37)
APTT PPP: >210 SECONDS (ref 23–37)
BUN SERPL-MCNC: 14 MG/DL (ref 5–25)
BUN SERPL-MCNC: 15 MG/DL (ref 5–25)
CALCIUM SERPL-MCNC: 8.9 MG/DL (ref 8.3–10.1)
CALCIUM SERPL-MCNC: 9.2 MG/DL (ref 8.3–10.1)
CHLORIDE SERPL-SCNC: 100 MMOL/L (ref 100–108)
CHLORIDE SERPL-SCNC: 102 MMOL/L (ref 100–108)
CO2 SERPL-SCNC: 22 MMOL/L (ref 21–32)
CO2 SERPL-SCNC: 26 MMOL/L (ref 21–32)
CREAT SERPL-MCNC: 0.88 MG/DL (ref 0.6–1.3)
CREAT SERPL-MCNC: 0.92 MG/DL (ref 0.6–1.3)
ERYTHROCYTE [DISTWIDTH] IN BLOOD BY AUTOMATED COUNT: 15.4 % (ref 11.6–15.1)
EST. AVERAGE GLUCOSE BLD GHB EST-MCNC: 148 MG/DL
GFR SERPL CREATININE-BSD FRML MDRD: 60 ML/MIN/1.73SQ M
GFR SERPL CREATININE-BSD FRML MDRD: 63 ML/MIN/1.73SQ M
GLUCOSE SERPL-MCNC: 137 MG/DL (ref 65–140)
GLUCOSE SERPL-MCNC: 146 MG/DL (ref 65–140)
GLUCOSE SERPL-MCNC: 151 MG/DL (ref 65–140)
GLUCOSE SERPL-MCNC: 188 MG/DL (ref 65–140)
GLUCOSE SERPL-MCNC: 192 MG/DL (ref 65–140)
GLUCOSE SERPL-MCNC: 255 MG/DL (ref 65–140)
HBA1C MFR BLD: 6.8 %
HCT VFR BLD AUTO: 40 % (ref 34.8–46.1)
HGB BLD-MCNC: 12.6 G/DL (ref 11.5–15.4)
LDH SERPL-CCNC: 183 U/L (ref 81–234)
MAGNESIUM SERPL-MCNC: 1.6 MG/DL (ref 1.6–2.6)
MAGNESIUM SERPL-MCNC: 1.7 MG/DL (ref 1.6–2.6)
MCH RBC QN AUTO: 28.8 PG (ref 26.8–34.3)
MCHC RBC AUTO-ENTMCNC: 31.5 G/DL (ref 31.4–37.4)
MCV RBC AUTO: 92 FL (ref 82–98)
PHOSPHATE SERPL-MCNC: 4.1 MG/DL (ref 2.3–4.1)
PLATELET # BLD AUTO: 244 THOUSANDS/UL (ref 149–390)
PMV BLD AUTO: 10.3 FL (ref 8.9–12.7)
POTASSIUM SERPL-SCNC: 3.6 MMOL/L (ref 3.5–5.3)
POTASSIUM SERPL-SCNC: 3.7 MMOL/L (ref 3.5–5.3)
RBC # BLD AUTO: 4.37 MILLION/UL (ref 3.81–5.12)
SODIUM SERPL-SCNC: 133 MMOL/L (ref 136–145)
SODIUM SERPL-SCNC: 134 MMOL/L (ref 136–145)
WBC # BLD AUTO: 10.38 THOUSAND/UL (ref 4.31–10.16)

## 2020-06-18 PROCEDURE — 80048 BASIC METABOLIC PNL TOTAL CA: CPT | Performed by: NURSE PRACTITIONER

## 2020-06-18 PROCEDURE — 99222 1ST HOSP IP/OBS MODERATE 55: CPT | Performed by: INTERNAL MEDICINE

## 2020-06-18 PROCEDURE — 99233 SBSQ HOSP IP/OBS HIGH 50: CPT | Performed by: FAMILY MEDICINE

## 2020-06-18 PROCEDURE — 84100 ASSAY OF PHOSPHORUS: CPT | Performed by: PHYSICIAN ASSISTANT

## 2020-06-18 PROCEDURE — 85730 THROMBOPLASTIN TIME PARTIAL: CPT | Performed by: FAMILY MEDICINE

## 2020-06-18 PROCEDURE — 82948 REAGENT STRIP/BLOOD GLUCOSE: CPT

## 2020-06-18 PROCEDURE — 83036 HEMOGLOBIN GLYCOSYLATED A1C: CPT | Performed by: NURSE PRACTITIONER

## 2020-06-18 PROCEDURE — 85027 COMPLETE CBC AUTOMATED: CPT | Performed by: NURSE PRACTITIONER

## 2020-06-18 PROCEDURE — 83615 LACTATE (LD) (LDH) ENZYME: CPT | Performed by: FAMILY MEDICINE

## 2020-06-18 PROCEDURE — 83735 ASSAY OF MAGNESIUM: CPT | Performed by: PHYSICIAN ASSISTANT

## 2020-06-18 PROCEDURE — 94640 AIRWAY INHALATION TREATMENT: CPT

## 2020-06-18 PROCEDURE — 80048 BASIC METABOLIC PNL TOTAL CA: CPT | Performed by: PHYSICIAN ASSISTANT

## 2020-06-18 PROCEDURE — 83735 ASSAY OF MAGNESIUM: CPT | Performed by: NURSE PRACTITIONER

## 2020-06-18 PROCEDURE — 94760 N-INVAS EAR/PLS OXIMETRY 1: CPT

## 2020-06-18 PROCEDURE — 85730 THROMBOPLASTIN TIME PARTIAL: CPT | Performed by: STUDENT IN AN ORGANIZED HEALTH CARE EDUCATION/TRAINING PROGRAM

## 2020-06-18 RX ORDER — FUROSEMIDE 10 MG/ML
40 INJECTION INTRAMUSCULAR; INTRAVENOUS DAILY
Status: DISCONTINUED | OUTPATIENT
Start: 2020-06-19 | End: 2020-06-22 | Stop reason: HOSPADM

## 2020-06-18 RX ORDER — POTASSIUM CHLORIDE 20 MEQ/1
40 TABLET, EXTENDED RELEASE ORAL ONCE
Status: COMPLETED | OUTPATIENT
Start: 2020-06-18 | End: 2020-06-18

## 2020-06-18 RX ORDER — BISACODYL 10 MG
10 SUPPOSITORY, RECTAL RECTAL DAILY PRN
Status: DISCONTINUED | OUTPATIENT
Start: 2020-06-18 | End: 2020-06-22 | Stop reason: HOSPADM

## 2020-06-18 RX ORDER — LEVALBUTEROL 1.25 MG/.5ML
1.25 SOLUTION, CONCENTRATE RESPIRATORY (INHALATION)
Status: DISCONTINUED | OUTPATIENT
Start: 2020-06-18 | End: 2020-06-20

## 2020-06-18 RX ORDER — MAGNESIUM SULFATE HEPTAHYDRATE 40 MG/ML
2 INJECTION, SOLUTION INTRAVENOUS ONCE
Status: COMPLETED | OUTPATIENT
Start: 2020-06-18 | End: 2020-06-18

## 2020-06-18 RX ORDER — METHYLPREDNISOLONE SODIUM SUCCINATE 40 MG/ML
40 INJECTION, POWDER, LYOPHILIZED, FOR SOLUTION INTRAMUSCULAR; INTRAVENOUS DAILY
Status: DISCONTINUED | OUTPATIENT
Start: 2020-06-18 | End: 2020-06-22 | Stop reason: HOSPADM

## 2020-06-18 RX ORDER — DOCUSATE SODIUM 100 MG/1
100 CAPSULE, LIQUID FILLED ORAL 2 TIMES DAILY
Status: DISCONTINUED | OUTPATIENT
Start: 2020-06-18 | End: 2020-06-22 | Stop reason: HOSPADM

## 2020-06-18 RX ORDER — SODIUM CHLORIDE FOR INHALATION 0.9 %
3 VIAL, NEBULIZER (ML) INHALATION
Status: DISCONTINUED | OUTPATIENT
Start: 2020-06-18 | End: 2020-06-20

## 2020-06-18 RX ADMIN — HEPARIN SODIUM AND DEXTROSE 12 UNITS/KG/HR: 10000; 5 INJECTION INTRAVENOUS at 13:20

## 2020-06-18 RX ADMIN — METHYLPREDNISOLONE SODIUM SUCCINATE 40 MG: 40 INJECTION, POWDER, FOR SOLUTION INTRAMUSCULAR; INTRAVENOUS at 17:39

## 2020-06-18 RX ADMIN — FUROSEMIDE 20 MG: 10 INJECTION, SOLUTION INTRAMUSCULAR; INTRAVENOUS at 09:23

## 2020-06-18 RX ADMIN — POTASSIUM CHLORIDE 40 MEQ: 1500 TABLET, EXTENDED RELEASE ORAL at 13:09

## 2020-06-18 RX ADMIN — TEMAZEPAM 30 MG: 15 CAPSULE ORAL at 23:32

## 2020-06-18 RX ADMIN — LEVALBUTEROL HYDROCHLORIDE 1.25 MG: 1.25 SOLUTION, CONCENTRATE RESPIRATORY (INHALATION) at 20:15

## 2020-06-18 RX ADMIN — DOCUSATE SODIUM 100 MG: 100 CAPSULE, LIQUID FILLED ORAL at 17:36

## 2020-06-18 RX ADMIN — MAGNESIUM SULFATE HEPTAHYDRATE 2 G: 40 INJECTION, SOLUTION INTRAVENOUS at 13:30

## 2020-06-18 RX ADMIN — INSULIN LISPRO 1 UNITS: 100 INJECTION, SOLUTION INTRAVENOUS; SUBCUTANEOUS at 09:37

## 2020-06-18 RX ADMIN — Medication 100 MCG: at 09:24

## 2020-06-18 RX ADMIN — UMECLIDINIUM 1 PUFF: 62.5 AEROSOL, POWDER ORAL at 09:24

## 2020-06-18 RX ADMIN — ASPIRIN 81 MG: 81 TABLET, COATED ORAL at 09:24

## 2020-06-18 RX ADMIN — BISACODYL 10 MG: 10 SUPPOSITORY RECTAL at 19:40

## 2020-06-18 RX ADMIN — ATORVASTATIN CALCIUM 40 MG: 40 TABLET, FILM COATED ORAL at 17:36

## 2020-06-18 RX ADMIN — CLOPIDOGREL BISULFATE 75 MG: 75 TABLET ORAL at 09:24

## 2020-06-18 RX ADMIN — HEPARIN SODIUM 3400 UNITS: 1000 INJECTION INTRAVENOUS; SUBCUTANEOUS at 21:11

## 2020-06-18 RX ADMIN — INSULIN LISPRO 2 UNITS: 100 INJECTION, SOLUTION INTRAVENOUS; SUBCUTANEOUS at 21:18

## 2020-06-18 RX ADMIN — ISODIUM CHLORIDE 3 ML: 0.03 SOLUTION RESPIRATORY (INHALATION) at 20:15

## 2020-06-18 RX ADMIN — MELATONIN 1000 UNITS: at 09:24

## 2020-06-18 RX ADMIN — INSULIN LISPRO 1 UNITS: 100 INJECTION, SOLUTION INTRAVENOUS; SUBCUTANEOUS at 17:37

## 2020-06-19 ENCOUNTER — APPOINTMENT (INPATIENT)
Dept: INTERVENTIONAL RADIOLOGY/VASCULAR | Facility: HOSPITAL | Age: 80
DRG: 175 | End: 2020-06-19
Payer: MEDICARE

## 2020-06-19 PROBLEM — I50.33 ACUTE ON CHRONIC DIASTOLIC CONGESTIVE HEART FAILURE (HCC): Status: ACTIVE | Noted: 2020-06-19

## 2020-06-19 PROBLEM — I50.31 ACUTE DIASTOLIC CONGESTIVE HEART FAILURE (HCC): Status: ACTIVE | Noted: 2020-06-19

## 2020-06-19 LAB
ANION GAP SERPL CALCULATED.3IONS-SCNC: 10 MMOL/L (ref 4–13)
APTT PPP: 119 SECONDS (ref 23–37)
APTT PPP: 52 SECONDS (ref 23–37)
APTT PPP: 75 SECONDS (ref 23–37)
ATRIAL RATE: 125 BPM
ATRIAL RATE: 197 BPM
ATRIAL RATE: 267 BPM
BASOPHILS # BLD MANUAL: 0 THOUSAND/UL (ref 0–0.1)
BASOPHILS NFR MAR MANUAL: 0 % (ref 0–1)
BUN SERPL-MCNC: 16 MG/DL (ref 5–25)
CALCIUM SERPL-MCNC: 8.8 MG/DL (ref 8.3–10.1)
CHLORIDE SERPL-SCNC: 101 MMOL/L (ref 100–108)
CO2 SERPL-SCNC: 23 MMOL/L (ref 21–32)
CREAT SERPL-MCNC: 0.92 MG/DL (ref 0.6–1.3)
EOSINOPHIL # BLD MANUAL: 0.06 THOUSAND/UL (ref 0–0.4)
EOSINOPHIL NFR BLD MANUAL: 1 % (ref 0–6)
ERYTHROCYTE [DISTWIDTH] IN BLOOD BY AUTOMATED COUNT: 15.1 % (ref 11.6–15.1)
GFR SERPL CREATININE-BSD FRML MDRD: 60 ML/MIN/1.73SQ M
GLUCOSE SERPL-MCNC: 194 MG/DL (ref 65–140)
GLUCOSE SERPL-MCNC: 210 MG/DL (ref 65–140)
GLUCOSE SERPL-MCNC: 211 MG/DL (ref 65–140)
GLUCOSE SERPL-MCNC: 246 MG/DL (ref 65–140)
GLUCOSE SERPL-MCNC: 277 MG/DL (ref 65–140)
HCT VFR BLD AUTO: 39.8 % (ref 34.8–46.1)
HGB BLD-MCNC: 12.7 G/DL (ref 11.5–15.4)
LYMPHOCYTES # BLD AUTO: 0.45 THOUSAND/UL (ref 0.6–4.47)
LYMPHOCYTES # BLD AUTO: 7 % (ref 14–44)
MAGNESIUM SERPL-MCNC: 2.2 MG/DL (ref 1.6–2.6)
MCH RBC QN AUTO: 28.9 PG (ref 26.8–34.3)
MCHC RBC AUTO-ENTMCNC: 31.9 G/DL (ref 31.4–37.4)
MCV RBC AUTO: 91 FL (ref 82–98)
MONOCYTES # BLD AUTO: 0.06 THOUSAND/UL (ref 0–1.22)
MONOCYTES NFR BLD: 1 % (ref 4–12)
NEUTROPHILS # BLD MANUAL: 5.84 THOUSAND/UL (ref 1.85–7.62)
NEUTS SEG NFR BLD AUTO: 91 % (ref 43–75)
NRBC BLD AUTO-RTO: 0 /100 WBCS
PHOSPHATE SERPL-MCNC: 3.8 MG/DL (ref 2.3–4.1)
PLATELET # BLD AUTO: 231 THOUSANDS/UL (ref 149–390)
PLATELET BLD QL SMEAR: ADEQUATE
PMV BLD AUTO: 10.4 FL (ref 8.9–12.7)
POTASSIUM SERPL-SCNC: 4.2 MMOL/L (ref 3.5–5.3)
QRS AXIS: -59 DEGREES
QRS AXIS: -63 DEGREES
QRS AXIS: -68 DEGREES
QRSD INTERVAL: 150 MS
QRSD INTERVAL: 152 MS
QRSD INTERVAL: 156 MS
QT INTERVAL: 528 MS
QT INTERVAL: 556 MS
QT INTERVAL: 572 MS
QTC INTERVAL: 429 MS
QTC INTERVAL: 435 MS
QTC INTERVAL: 506 MS
RBC # BLD AUTO: 4.4 MILLION/UL (ref 3.81–5.12)
SODIUM SERPL-SCNC: 134 MMOL/L (ref 136–145)
T WAVE AXIS: 92 DEGREES
T WAVE AXIS: 95 DEGREES
T WAVE AXIS: 96 DEGREES
TOTAL CELLS COUNTED SPEC: 100
VENTRICULAR RATE: 34 BPM
VENTRICULAR RATE: 41 BPM
VENTRICULAR RATE: 50 BPM
WBC # BLD AUTO: 6.42 THOUSAND/UL (ref 4.31–10.16)

## 2020-06-19 PROCEDURE — 82948 REAGENT STRIP/BLOOD GLUCOSE: CPT

## 2020-06-19 PROCEDURE — 93325 DOPPLER ECHO COLOR FLOW MAPG: CPT | Performed by: INTERNAL MEDICINE

## 2020-06-19 PROCEDURE — 99232 SBSQ HOSP IP/OBS MODERATE 35: CPT | Performed by: INTERNAL MEDICINE

## 2020-06-19 PROCEDURE — 85730 THROMBOPLASTIN TIME PARTIAL: CPT | Performed by: FAMILY MEDICINE

## 2020-06-19 PROCEDURE — 80048 BASIC METABOLIC PNL TOTAL CA: CPT | Performed by: FAMILY MEDICINE

## 2020-06-19 PROCEDURE — 83735 ASSAY OF MAGNESIUM: CPT | Performed by: PHYSICIAN ASSISTANT

## 2020-06-19 PROCEDURE — 94760 N-INVAS EAR/PLS OXIMETRY 1: CPT

## 2020-06-19 PROCEDURE — 84100 ASSAY OF PHOSPHORUS: CPT | Performed by: PHYSICIAN ASSISTANT

## 2020-06-19 PROCEDURE — 99233 SBSQ HOSP IP/OBS HIGH 50: CPT | Performed by: FAMILY MEDICINE

## 2020-06-19 PROCEDURE — 93010 ELECTROCARDIOGRAM REPORT: CPT | Performed by: INTERNAL MEDICINE

## 2020-06-19 PROCEDURE — 99223 1ST HOSP IP/OBS HIGH 75: CPT | Performed by: PHYSICIAN ASSISTANT

## 2020-06-19 PROCEDURE — 97163 PT EVAL HIGH COMPLEX 45 MIN: CPT

## 2020-06-19 PROCEDURE — 93312 ECHO TRANSESOPHAGEAL: CPT | Performed by: INTERNAL MEDICINE

## 2020-06-19 PROCEDURE — 97167 OT EVAL HIGH COMPLEX 60 MIN: CPT

## 2020-06-19 PROCEDURE — 85027 COMPLETE CBC AUTOMATED: CPT | Performed by: FAMILY MEDICINE

## 2020-06-19 PROCEDURE — 93312 ECHO TRANSESOPHAGEAL: CPT

## 2020-06-19 PROCEDURE — 94640 AIRWAY INHALATION TREATMENT: CPT

## 2020-06-19 PROCEDURE — 85007 BL SMEAR W/DIFF WBC COUNT: CPT | Performed by: FAMILY MEDICINE

## 2020-06-19 PROCEDURE — B246ZZ4 ULTRASONOGRAPHY OF RIGHT AND LEFT HEART, TRANSESOPHAGEAL: ICD-10-PCS | Performed by: INTERNAL MEDICINE

## 2020-06-19 PROCEDURE — 93320 DOPPLER ECHO COMPLETE: CPT | Performed by: INTERNAL MEDICINE

## 2020-06-19 PROCEDURE — 93005 ELECTROCARDIOGRAM TRACING: CPT

## 2020-06-19 RX ORDER — SODIUM CHLORIDE 9 MG/ML
INJECTION, SOLUTION INTRAVENOUS CONTINUOUS PRN
Status: DISCONTINUED | OUTPATIENT
Start: 2020-06-19 | End: 2020-06-19 | Stop reason: SURG

## 2020-06-19 RX ORDER — LIDOCAINE HYDROCHLORIDE 10 MG/ML
INJECTION, SOLUTION EPIDURAL; INFILTRATION; INTRACAUDAL; PERINEURAL AS NEEDED
Status: DISCONTINUED | OUTPATIENT
Start: 2020-06-19 | End: 2020-06-19 | Stop reason: SURG

## 2020-06-19 RX ORDER — PROPOFOL 10 MG/ML
INJECTION, EMULSION INTRAVENOUS AS NEEDED
Status: DISCONTINUED | OUTPATIENT
Start: 2020-06-19 | End: 2020-06-19 | Stop reason: SURG

## 2020-06-19 RX ORDER — KETAMINE HYDROCHLORIDE 50 MG/ML
INJECTION, SOLUTION, CONCENTRATE INTRAMUSCULAR; INTRAVENOUS AS NEEDED
Status: DISCONTINUED | OUTPATIENT
Start: 2020-06-19 | End: 2020-06-19 | Stop reason: SURG

## 2020-06-19 RX ADMIN — SODIUM CHLORIDE: 0.9 INJECTION, SOLUTION INTRAVENOUS at 11:04

## 2020-06-19 RX ADMIN — HEPARIN SODIUM 3400 UNITS: 1000 INJECTION INTRAVENOUS; SUBCUTANEOUS at 13:41

## 2020-06-19 RX ADMIN — METHYLPREDNISOLONE SODIUM SUCCINATE 40 MG: 40 INJECTION, POWDER, FOR SOLUTION INTRAMUSCULAR; INTRAVENOUS at 09:36

## 2020-06-19 RX ADMIN — INSULIN LISPRO 1 UNITS: 100 INJECTION, SOLUTION INTRAVENOUS; SUBCUTANEOUS at 17:15

## 2020-06-19 RX ADMIN — PROPOFOL 20 MG: 10 INJECTION, EMULSION INTRAVENOUS at 11:11

## 2020-06-19 RX ADMIN — PROPOFOL 10 MG: 10 INJECTION, EMULSION INTRAVENOUS at 11:27

## 2020-06-19 RX ADMIN — ISODIUM CHLORIDE 3 ML: 0.03 SOLUTION RESPIRATORY (INHALATION) at 07:18

## 2020-06-19 RX ADMIN — PROPOFOL 20 MG: 10 INJECTION, EMULSION INTRAVENOUS at 11:16

## 2020-06-19 RX ADMIN — PROPOFOL 20 MG: 10 INJECTION, EMULSION INTRAVENOUS at 11:13

## 2020-06-19 RX ADMIN — LEVALBUTEROL HYDROCHLORIDE 1.25 MG: 1.25 SOLUTION, CONCENTRATE RESPIRATORY (INHALATION) at 14:12

## 2020-06-19 RX ADMIN — PROPOFOL 20 MG: 10 INJECTION, EMULSION INTRAVENOUS at 11:14

## 2020-06-19 RX ADMIN — PROPOFOL 20 MG: 10 INJECTION, EMULSION INTRAVENOUS at 11:15

## 2020-06-19 RX ADMIN — CLOPIDOGREL BISULFATE 75 MG: 75 TABLET ORAL at 09:34

## 2020-06-19 RX ADMIN — LEVALBUTEROL HYDROCHLORIDE 1.25 MG: 1.25 SOLUTION, CONCENTRATE RESPIRATORY (INHALATION) at 07:18

## 2020-06-19 RX ADMIN — UMECLIDINIUM 1 PUFF: 62.5 AEROSOL, POWDER ORAL at 13:47

## 2020-06-19 RX ADMIN — HEPARIN SODIUM AND DEXTROSE 10 UNITS/KG/HR: 10000; 5 INJECTION INTRAVENOUS at 22:29

## 2020-06-19 RX ADMIN — ASPIRIN 81 MG: 81 TABLET, COATED ORAL at 09:34

## 2020-06-19 RX ADMIN — KETAMINE HYDROCHLORIDE 20 MG: 50 INJECTION, SOLUTION INTRAMUSCULAR; INTRAVENOUS at 11:10

## 2020-06-19 RX ADMIN — INSULIN LISPRO 2 UNITS: 100 INJECTION, SOLUTION INTRAVENOUS; SUBCUTANEOUS at 22:25

## 2020-06-19 RX ADMIN — DOCUSATE SODIUM 100 MG: 100 CAPSULE, LIQUID FILLED ORAL at 17:14

## 2020-06-19 RX ADMIN — ATORVASTATIN CALCIUM 40 MG: 40 TABLET, FILM COATED ORAL at 17:14

## 2020-06-19 RX ADMIN — PROPOFOL 20 MG: 10 INJECTION, EMULSION INTRAVENOUS at 11:12

## 2020-06-19 RX ADMIN — LEVALBUTEROL HYDROCHLORIDE 1.25 MG: 1.25 SOLUTION, CONCENTRATE RESPIRATORY (INHALATION) at 20:28

## 2020-06-19 RX ADMIN — ISODIUM CHLORIDE 3 ML: 0.03 SOLUTION RESPIRATORY (INHALATION) at 14:12

## 2020-06-19 RX ADMIN — LIDOCAINE HYDROCHLORIDE 50 MG: 10 INJECTION, SOLUTION EPIDURAL; INFILTRATION; INTRACAUDAL; PERINEURAL at 11:11

## 2020-06-19 RX ADMIN — ISODIUM CHLORIDE 3 ML: 0.03 SOLUTION RESPIRATORY (INHALATION) at 20:28

## 2020-06-19 RX ADMIN — FUROSEMIDE 40 MG: 10 INJECTION, SOLUTION INTRAMUSCULAR; INTRAVENOUS at 09:36

## 2020-06-20 PROBLEM — I49.5 TACHY-BRADY SYNDROME (HCC): Status: ACTIVE | Noted: 2020-06-20

## 2020-06-20 LAB
ANION GAP SERPL CALCULATED.3IONS-SCNC: 8 MMOL/L (ref 4–13)
APTT PPP: 60 SECONDS (ref 23–37)
BASOPHILS # BLD AUTO: 0.01 THOUSANDS/ΜL (ref 0–0.1)
BASOPHILS NFR BLD AUTO: 0 % (ref 0–1)
BUN SERPL-MCNC: 17 MG/DL (ref 5–25)
CALCIUM SERPL-MCNC: 8.8 MG/DL (ref 8.3–10.1)
CHLORIDE SERPL-SCNC: 101 MMOL/L (ref 100–108)
CO2 SERPL-SCNC: 26 MMOL/L (ref 21–32)
CREAT SERPL-MCNC: 0.89 MG/DL (ref 0.6–1.3)
EOSINOPHIL # BLD AUTO: 0.01 THOUSAND/ΜL (ref 0–0.61)
EOSINOPHIL NFR BLD AUTO: 0 % (ref 0–6)
ERYTHROCYTE [DISTWIDTH] IN BLOOD BY AUTOMATED COUNT: 15.5 % (ref 11.6–15.1)
GFR SERPL CREATININE-BSD FRML MDRD: 62 ML/MIN/1.73SQ M
GLUCOSE SERPL-MCNC: 173 MG/DL (ref 65–140)
GLUCOSE SERPL-MCNC: 239 MG/DL (ref 65–140)
GLUCOSE SERPL-MCNC: 252 MG/DL (ref 65–140)
GLUCOSE SERPL-MCNC: 290 MG/DL (ref 65–140)
HCT VFR BLD AUTO: 40.2 % (ref 34.8–46.1)
HGB BLD-MCNC: 12.9 G/DL (ref 11.5–15.4)
IMM GRANULOCYTES # BLD AUTO: 0.08 THOUSAND/UL (ref 0–0.2)
IMM GRANULOCYTES NFR BLD AUTO: 1 % (ref 0–2)
LYMPHOCYTES # BLD AUTO: 1.51 THOUSANDS/ΜL (ref 0.6–4.47)
LYMPHOCYTES NFR BLD AUTO: 13 % (ref 14–44)
MCH RBC QN AUTO: 29 PG (ref 26.8–34.3)
MCHC RBC AUTO-ENTMCNC: 32.1 G/DL (ref 31.4–37.4)
MCV RBC AUTO: 90 FL (ref 82–98)
MONOCYTES # BLD AUTO: 0.73 THOUSAND/ΜL (ref 0.17–1.22)
MONOCYTES NFR BLD AUTO: 6 % (ref 4–12)
NEUTROPHILS # BLD AUTO: 9.47 THOUSANDS/ΜL (ref 1.85–7.62)
NEUTS SEG NFR BLD AUTO: 80 % (ref 43–75)
NRBC BLD AUTO-RTO: 0 /100 WBCS
PLATELET # BLD AUTO: 254 THOUSANDS/UL (ref 149–390)
PMV BLD AUTO: 10.6 FL (ref 8.9–12.7)
POTASSIUM SERPL-SCNC: 4.4 MMOL/L (ref 3.5–5.3)
RBC # BLD AUTO: 4.45 MILLION/UL (ref 3.81–5.12)
SODIUM SERPL-SCNC: 135 MMOL/L (ref 136–145)
WBC # BLD AUTO: 11.81 THOUSAND/UL (ref 4.31–10.16)

## 2020-06-20 PROCEDURE — 85025 COMPLETE CBC W/AUTO DIFF WBC: CPT | Performed by: FAMILY MEDICINE

## 2020-06-20 PROCEDURE — 94640 AIRWAY INHALATION TREATMENT: CPT

## 2020-06-20 PROCEDURE — 99233 SBSQ HOSP IP/OBS HIGH 50: CPT | Performed by: FAMILY MEDICINE

## 2020-06-20 PROCEDURE — 94760 N-INVAS EAR/PLS OXIMETRY 1: CPT

## 2020-06-20 PROCEDURE — 99232 SBSQ HOSP IP/OBS MODERATE 35: CPT | Performed by: INTERNAL MEDICINE

## 2020-06-20 PROCEDURE — 82948 REAGENT STRIP/BLOOD GLUCOSE: CPT

## 2020-06-20 PROCEDURE — 80048 BASIC METABOLIC PNL TOTAL CA: CPT | Performed by: FAMILY MEDICINE

## 2020-06-20 PROCEDURE — 85730 THROMBOPLASTIN TIME PARTIAL: CPT | Performed by: FAMILY MEDICINE

## 2020-06-20 RX ORDER — ALBUTEROL SULFATE 2.5 MG/3ML
2.5 SOLUTION RESPIRATORY (INHALATION) EVERY 6 HOURS PRN
Status: DISCONTINUED | OUTPATIENT
Start: 2020-06-21 | End: 2020-06-22 | Stop reason: HOSPADM

## 2020-06-20 RX ADMIN — TEMAZEPAM 30 MG: 15 CAPSULE ORAL at 00:16

## 2020-06-20 RX ADMIN — DOCUSATE SODIUM 100 MG: 100 CAPSULE, LIQUID FILLED ORAL at 09:19

## 2020-06-20 RX ADMIN — LEVALBUTEROL HYDROCHLORIDE 1.25 MG: 1.25 SOLUTION, CONCENTRATE RESPIRATORY (INHALATION) at 13:53

## 2020-06-20 RX ADMIN — INSULIN LISPRO 2 UNITS: 100 INJECTION, SOLUTION INTRAVENOUS; SUBCUTANEOUS at 16:57

## 2020-06-20 RX ADMIN — INSULIN LISPRO 2 UNITS: 100 INJECTION, SOLUTION INTRAVENOUS; SUBCUTANEOUS at 12:16

## 2020-06-20 RX ADMIN — LEVALBUTEROL HYDROCHLORIDE 1.25 MG: 1.25 SOLUTION, CONCENTRATE RESPIRATORY (INHALATION) at 07:53

## 2020-06-20 RX ADMIN — LEVALBUTEROL HYDROCHLORIDE 1.25 MG: 1.25 SOLUTION, CONCENTRATE RESPIRATORY (INHALATION) at 19:37

## 2020-06-20 RX ADMIN — ISODIUM CHLORIDE 3 ML: 0.03 SOLUTION RESPIRATORY (INHALATION) at 19:37

## 2020-06-20 RX ADMIN — INSULIN LISPRO 3 UNITS: 100 INJECTION, SOLUTION INTRAVENOUS; SUBCUTANEOUS at 22:14

## 2020-06-20 RX ADMIN — ISODIUM CHLORIDE 3 ML: 0.03 SOLUTION RESPIRATORY (INHALATION) at 13:53

## 2020-06-20 RX ADMIN — METHYLPREDNISOLONE SODIUM SUCCINATE 40 MG: 40 INJECTION, POWDER, FOR SOLUTION INTRAMUSCULAR; INTRAVENOUS at 09:18

## 2020-06-20 RX ADMIN — Medication 100 MCG: at 09:19

## 2020-06-20 RX ADMIN — ATORVASTATIN CALCIUM 40 MG: 40 TABLET, FILM COATED ORAL at 17:05

## 2020-06-20 RX ADMIN — BISACODYL 10 MG: 10 SUPPOSITORY RECTAL at 09:20

## 2020-06-20 RX ADMIN — FUROSEMIDE 40 MG: 10 INJECTION, SOLUTION INTRAMUSCULAR; INTRAVENOUS at 09:20

## 2020-06-20 RX ADMIN — MELATONIN 1000 UNITS: at 09:19

## 2020-06-20 RX ADMIN — ASPIRIN 81 MG: 81 TABLET, COATED ORAL at 09:19

## 2020-06-20 RX ADMIN — DOCUSATE SODIUM 100 MG: 100 CAPSULE, LIQUID FILLED ORAL at 17:06

## 2020-06-20 RX ADMIN — UMECLIDINIUM 1 PUFF: 62.5 AEROSOL, POWDER ORAL at 09:19

## 2020-06-20 RX ADMIN — ISODIUM CHLORIDE 3 ML: 0.03 SOLUTION RESPIRATORY (INHALATION) at 07:53

## 2020-06-21 LAB
ANION GAP SERPL CALCULATED.3IONS-SCNC: 6 MMOL/L (ref 4–13)
APTT PPP: 28 SECONDS (ref 23–37)
BASOPHILS # BLD AUTO: 0.02 THOUSANDS/ΜL (ref 0–0.1)
BASOPHILS NFR BLD AUTO: 0 % (ref 0–1)
BUN SERPL-MCNC: 20 MG/DL (ref 5–25)
CALCIUM SERPL-MCNC: 8.6 MG/DL (ref 8.3–10.1)
CHLORIDE SERPL-SCNC: 104 MMOL/L (ref 100–108)
CO2 SERPL-SCNC: 28 MMOL/L (ref 21–32)
CREAT SERPL-MCNC: 0.76 MG/DL (ref 0.6–1.3)
EOSINOPHIL # BLD AUTO: 0.03 THOUSAND/ΜL (ref 0–0.61)
EOSINOPHIL NFR BLD AUTO: 0 % (ref 0–6)
ERYTHROCYTE [DISTWIDTH] IN BLOOD BY AUTOMATED COUNT: 15.5 % (ref 11.6–15.1)
GFR SERPL CREATININE-BSD FRML MDRD: 75 ML/MIN/1.73SQ M
GLUCOSE SERPL-MCNC: 153 MG/DL (ref 65–140)
GLUCOSE SERPL-MCNC: 158 MG/DL (ref 65–140)
GLUCOSE SERPL-MCNC: 159 MG/DL (ref 65–140)
GLUCOSE SERPL-MCNC: 251 MG/DL (ref 65–140)
GLUCOSE SERPL-MCNC: 310 MG/DL (ref 65–140)
HCT VFR BLD AUTO: 41 % (ref 34.8–46.1)
HGB BLD-MCNC: 12.9 G/DL (ref 11.5–15.4)
IMM GRANULOCYTES # BLD AUTO: 0.06 THOUSAND/UL (ref 0–0.2)
IMM GRANULOCYTES NFR BLD AUTO: 1 % (ref 0–2)
LYMPHOCYTES # BLD AUTO: 1.52 THOUSANDS/ΜL (ref 0.6–4.47)
LYMPHOCYTES NFR BLD AUTO: 16 % (ref 14–44)
MCH RBC QN AUTO: 28.8 PG (ref 26.8–34.3)
MCHC RBC AUTO-ENTMCNC: 31.5 G/DL (ref 31.4–37.4)
MCV RBC AUTO: 92 FL (ref 82–98)
MONOCYTES # BLD AUTO: 0.66 THOUSAND/ΜL (ref 0.17–1.22)
MONOCYTES NFR BLD AUTO: 7 % (ref 4–12)
NEUTROPHILS # BLD AUTO: 7.31 THOUSANDS/ΜL (ref 1.85–7.62)
NEUTS SEG NFR BLD AUTO: 76 % (ref 43–75)
NRBC BLD AUTO-RTO: 0 /100 WBCS
PLATELET # BLD AUTO: 255 THOUSANDS/UL (ref 149–390)
PMV BLD AUTO: 10.7 FL (ref 8.9–12.7)
POTASSIUM SERPL-SCNC: 4.3 MMOL/L (ref 3.5–5.3)
RBC # BLD AUTO: 4.48 MILLION/UL (ref 3.81–5.12)
SODIUM SERPL-SCNC: 138 MMOL/L (ref 136–145)
WBC # BLD AUTO: 9.6 THOUSAND/UL (ref 4.31–10.16)

## 2020-06-21 PROCEDURE — 80048 BASIC METABOLIC PNL TOTAL CA: CPT | Performed by: FAMILY MEDICINE

## 2020-06-21 PROCEDURE — 85025 COMPLETE CBC W/AUTO DIFF WBC: CPT | Performed by: FAMILY MEDICINE

## 2020-06-21 PROCEDURE — 94640 AIRWAY INHALATION TREATMENT: CPT

## 2020-06-21 PROCEDURE — 99232 SBSQ HOSP IP/OBS MODERATE 35: CPT | Performed by: INTERNAL MEDICINE

## 2020-06-21 PROCEDURE — 85730 THROMBOPLASTIN TIME PARTIAL: CPT | Performed by: FAMILY MEDICINE

## 2020-06-21 PROCEDURE — 82948 REAGENT STRIP/BLOOD GLUCOSE: CPT

## 2020-06-21 PROCEDURE — 94760 N-INVAS EAR/PLS OXIMETRY 1: CPT

## 2020-06-21 PROCEDURE — 99233 SBSQ HOSP IP/OBS HIGH 50: CPT | Performed by: FAMILY MEDICINE

## 2020-06-21 RX ORDER — SODIUM CHLORIDE FOR INHALATION 0.9 %
3 VIAL, NEBULIZER (ML) INHALATION
Status: DISCONTINUED | OUTPATIENT
Start: 2020-06-21 | End: 2020-06-22 | Stop reason: HOSPADM

## 2020-06-21 RX ORDER — LEVALBUTEROL 1.25 MG/.5ML
1.25 SOLUTION, CONCENTRATE RESPIRATORY (INHALATION)
Status: DISCONTINUED | OUTPATIENT
Start: 2020-06-21 | End: 2020-06-22 | Stop reason: HOSPADM

## 2020-06-21 RX ADMIN — INSULIN LISPRO 2 UNITS: 100 INJECTION, SOLUTION INTRAVENOUS; SUBCUTANEOUS at 17:16

## 2020-06-21 RX ADMIN — LEVALBUTEROL HYDROCHLORIDE 1.25 MG: 1.25 SOLUTION, CONCENTRATE RESPIRATORY (INHALATION) at 13:26

## 2020-06-21 RX ADMIN — TEMAZEPAM 30 MG: 15 CAPSULE ORAL at 23:37

## 2020-06-21 RX ADMIN — FUROSEMIDE 40 MG: 10 INJECTION, SOLUTION INTRAMUSCULAR; INTRAVENOUS at 08:54

## 2020-06-21 RX ADMIN — ATORVASTATIN CALCIUM 40 MG: 40 TABLET, FILM COATED ORAL at 17:16

## 2020-06-21 RX ADMIN — DOCUSATE SODIUM 100 MG: 100 CAPSULE, LIQUID FILLED ORAL at 08:48

## 2020-06-21 RX ADMIN — UMECLIDINIUM 1 PUFF: 62.5 AEROSOL, POWDER ORAL at 08:51

## 2020-06-21 RX ADMIN — TEMAZEPAM 30 MG: 15 CAPSULE ORAL at 00:31

## 2020-06-21 RX ADMIN — METHYLPREDNISOLONE SODIUM SUCCINATE 40 MG: 40 INJECTION, POWDER, FOR SOLUTION INTRAMUSCULAR; INTRAVENOUS at 08:48

## 2020-06-21 RX ADMIN — INSULIN LISPRO 1 UNITS: 100 INJECTION, SOLUTION INTRAVENOUS; SUBCUTANEOUS at 12:34

## 2020-06-21 RX ADMIN — ISODIUM CHLORIDE 3 ML: 0.03 SOLUTION RESPIRATORY (INHALATION) at 13:26

## 2020-06-21 RX ADMIN — INSULIN LISPRO 3 UNITS: 100 INJECTION, SOLUTION INTRAVENOUS; SUBCUTANEOUS at 21:38

## 2020-06-21 RX ADMIN — ASPIRIN 81 MG: 81 TABLET, COATED ORAL at 08:48

## 2020-06-21 RX ADMIN — MELATONIN 1000 UNITS: at 08:48

## 2020-06-21 RX ADMIN — ISODIUM CHLORIDE 3 ML: 0.03 SOLUTION RESPIRATORY (INHALATION) at 19:37

## 2020-06-21 RX ADMIN — LEVALBUTEROL HYDROCHLORIDE 1.25 MG: 1.25 SOLUTION, CONCENTRATE RESPIRATORY (INHALATION) at 19:37

## 2020-06-21 RX ADMIN — DOCUSATE SODIUM 100 MG: 100 CAPSULE, LIQUID FILLED ORAL at 17:16

## 2020-06-21 RX ADMIN — Medication 100 MCG: at 08:48

## 2020-06-21 RX ADMIN — INSULIN LISPRO 1 UNITS: 100 INJECTION, SOLUTION INTRAVENOUS; SUBCUTANEOUS at 08:49

## 2020-06-22 ENCOUNTER — HOSPITAL ENCOUNTER (INPATIENT)
Facility: HOSPITAL | Age: 80
LOS: 3 days | Discharge: HOME HEALTH | DRG: 242 | End: 2020-06-25
Attending: GENERAL PRACTICE | Admitting: GENERAL PRACTICE
Payer: MEDICARE

## 2020-06-22 VITALS
HEART RATE: 41 BPM | SYSTOLIC BLOOD PRESSURE: 129 MMHG | HEIGHT: 64 IN | DIASTOLIC BLOOD PRESSURE: 64 MMHG | TEMPERATURE: 97.5 F | BODY MASS INDEX: 32.93 KG/M2 | OXYGEN SATURATION: 97 % | RESPIRATION RATE: 20 BRPM | WEIGHT: 192.9 LBS

## 2020-06-22 DIAGNOSIS — J45.20 MILD INTERMITTENT ASTHMA WITHOUT COMPLICATION: ICD-10-CM

## 2020-06-22 DIAGNOSIS — I48.91 ATRIAL FIBRILLATION, UNSPECIFIED TYPE (HCC): ICD-10-CM

## 2020-06-22 DIAGNOSIS — I50.31 ACUTE DIASTOLIC CONGESTIVE HEART FAILURE (HCC): ICD-10-CM

## 2020-06-22 DIAGNOSIS — I49.5 TACHY-BRADY SYNDROME (HCC): Primary | ICD-10-CM

## 2020-06-22 DIAGNOSIS — I26.99 ACUTE PULMONARY EMBOLISM (HCC): ICD-10-CM

## 2020-06-22 LAB
ATRIAL RATE: 39 BPM
GLUCOSE SERPL-MCNC: 117 MG/DL (ref 65–140)
GLUCOSE SERPL-MCNC: 132 MG/DL (ref 65–140)
GLUCOSE SERPL-MCNC: 151 MG/DL (ref 65–140)
GLUCOSE SERPL-MCNC: 245 MG/DL (ref 65–140)
GLUCOSE SERPL-MCNC: 282 MG/DL (ref 65–140)
P AXIS: 87 DEGREES
PR INTERVAL: 128 MS
QRS AXIS: -58 DEGREES
QRSD INTERVAL: 150 MS
QT INTERVAL: 516 MS
QTC INTERVAL: 415 MS
T WAVE AXIS: 85 DEGREES
VENTRICULAR RATE: 39 BPM

## 2020-06-22 PROCEDURE — 82948 REAGENT STRIP/BLOOD GLUCOSE: CPT

## 2020-06-22 PROCEDURE — 99223 1ST HOSP IP/OBS HIGH 75: CPT | Performed by: GENERAL PRACTICE

## 2020-06-22 PROCEDURE — 99232 SBSQ HOSP IP/OBS MODERATE 35: CPT | Performed by: INTERNAL MEDICINE

## 2020-06-22 PROCEDURE — 99239 HOSP IP/OBS DSCHRG MGMT >30: CPT | Performed by: FAMILY MEDICINE

## 2020-06-22 PROCEDURE — 94760 N-INVAS EAR/PLS OXIMETRY 1: CPT

## 2020-06-22 PROCEDURE — 93005 ELECTROCARDIOGRAM TRACING: CPT

## 2020-06-22 PROCEDURE — U0003 INFECTIOUS AGENT DETECTION BY NUCLEIC ACID (DNA OR RNA); SEVERE ACUTE RESPIRATORY SYNDROME CORONAVIRUS 2 (SARS-COV-2) (CORONAVIRUS DISEASE [COVID-19]), AMPLIFIED PROBE TECHNIQUE, MAKING USE OF HIGH THROUGHPUT TECHNOLOGIES AS DESCRIBED BY CMS-2020-01-R: HCPCS | Performed by: PHYSICIAN ASSISTANT

## 2020-06-22 PROCEDURE — 93010 ELECTROCARDIOGRAM REPORT: CPT | Performed by: INTERNAL MEDICINE

## 2020-06-22 PROCEDURE — 94640 AIRWAY INHALATION TREATMENT: CPT

## 2020-06-22 RX ORDER — ATORVASTATIN CALCIUM 40 MG/1
40 TABLET, FILM COATED ORAL
Status: CANCELLED | OUTPATIENT
Start: 2020-06-22

## 2020-06-22 RX ORDER — TEMAZEPAM 15 MG/1
30 CAPSULE ORAL
Status: DISCONTINUED | OUTPATIENT
Start: 2020-06-22 | End: 2020-06-25 | Stop reason: HOSPADM

## 2020-06-22 RX ORDER — SODIUM CHLORIDE FOR INHALATION 0.9 %
3 VIAL, NEBULIZER (ML) INHALATION
Status: CANCELLED | OUTPATIENT
Start: 2020-06-22

## 2020-06-22 RX ORDER — METHYLPREDNISOLONE SODIUM SUCCINATE 40 MG/ML
40 INJECTION, POWDER, LYOPHILIZED, FOR SOLUTION INTRAMUSCULAR; INTRAVENOUS DAILY
Status: DISCONTINUED | OUTPATIENT
Start: 2020-06-23 | End: 2020-06-23

## 2020-06-22 RX ORDER — ACETAMINOPHEN 325 MG/1
650 TABLET ORAL EVERY 6 HOURS PRN
Status: CANCELLED | OUTPATIENT
Start: 2020-06-22

## 2020-06-22 RX ORDER — LEVALBUTEROL 1.25 MG/.5ML
1.25 SOLUTION, CONCENTRATE RESPIRATORY (INHALATION)
Status: DISCONTINUED | OUTPATIENT
Start: 2020-06-22 | End: 2020-06-22

## 2020-06-22 RX ORDER — CEFAZOLIN SODIUM 2 G/50ML
2000 SOLUTION INTRAVENOUS
Status: DISCONTINUED | OUTPATIENT
Start: 2020-06-23 | End: 2020-06-23

## 2020-06-22 RX ORDER — BISACODYL 10 MG
10 SUPPOSITORY, RECTAL RECTAL DAILY PRN
Status: DISCONTINUED | OUTPATIENT
Start: 2020-06-22 | End: 2020-06-25 | Stop reason: HOSPADM

## 2020-06-22 RX ORDER — MELATONIN
1000 DAILY
Status: DISCONTINUED | OUTPATIENT
Start: 2020-06-23 | End: 2020-06-25 | Stop reason: HOSPADM

## 2020-06-22 RX ORDER — ATORVASTATIN CALCIUM 40 MG/1
40 TABLET, FILM COATED ORAL
Status: DISCONTINUED | OUTPATIENT
Start: 2020-06-23 | End: 2020-06-25 | Stop reason: HOSPADM

## 2020-06-22 RX ORDER — FUROSEMIDE 10 MG/ML
40 INJECTION INTRAMUSCULAR; INTRAVENOUS DAILY
Status: CANCELLED | OUTPATIENT
Start: 2020-06-23

## 2020-06-22 RX ORDER — SODIUM CHLORIDE FOR INHALATION 0.9 %
3 VIAL, NEBULIZER (ML) INHALATION
Status: DISCONTINUED | OUTPATIENT
Start: 2020-06-22 | End: 2020-06-22

## 2020-06-22 RX ORDER — ASPIRIN 81 MG/1
81 TABLET ORAL DAILY
Status: CANCELLED | OUTPATIENT
Start: 2020-06-23

## 2020-06-22 RX ORDER — FUROSEMIDE 10 MG/ML
40 INJECTION INTRAMUSCULAR; INTRAVENOUS DAILY
Status: DISCONTINUED | OUTPATIENT
Start: 2020-06-23 | End: 2020-06-24

## 2020-06-22 RX ORDER — ONDANSETRON 2 MG/ML
4 INJECTION INTRAMUSCULAR; INTRAVENOUS EVERY 6 HOURS PRN
Status: CANCELLED | OUTPATIENT
Start: 2020-06-22

## 2020-06-22 RX ORDER — TEMAZEPAM 15 MG/1
30 CAPSULE ORAL
Status: CANCELLED | OUTPATIENT
Start: 2020-06-22

## 2020-06-22 RX ORDER — ALBUTEROL SULFATE 2.5 MG/3ML
2.5 SOLUTION RESPIRATORY (INHALATION) EVERY 6 HOURS PRN
Status: DISCONTINUED | OUTPATIENT
Start: 2020-06-22 | End: 2020-06-25 | Stop reason: HOSPADM

## 2020-06-22 RX ORDER — METHYLPREDNISOLONE SODIUM SUCCINATE 40 MG/ML
40 INJECTION, POWDER, LYOPHILIZED, FOR SOLUTION INTRAMUSCULAR; INTRAVENOUS DAILY
Status: CANCELLED | OUTPATIENT
Start: 2020-06-23

## 2020-06-22 RX ORDER — ASPIRIN 81 MG/1
81 TABLET ORAL DAILY
Status: DISCONTINUED | OUTPATIENT
Start: 2020-06-23 | End: 2020-06-25 | Stop reason: HOSPADM

## 2020-06-22 RX ORDER — LEVALBUTEROL 1.25 MG/.5ML
1.25 SOLUTION, CONCENTRATE RESPIRATORY (INHALATION)
Status: CANCELLED | OUTPATIENT
Start: 2020-06-22

## 2020-06-22 RX ORDER — ACETAMINOPHEN 325 MG/1
650 TABLET ORAL EVERY 6 HOURS PRN
Status: DISCONTINUED | OUTPATIENT
Start: 2020-06-22 | End: 2020-06-25 | Stop reason: HOSPADM

## 2020-06-22 RX ORDER — DOCUSATE SODIUM 100 MG/1
100 CAPSULE, LIQUID FILLED ORAL 2 TIMES DAILY
Status: CANCELLED | OUTPATIENT
Start: 2020-06-22

## 2020-06-22 RX ORDER — BISACODYL 10 MG
10 SUPPOSITORY, RECTAL RECTAL DAILY PRN
Status: CANCELLED | OUTPATIENT
Start: 2020-06-22

## 2020-06-22 RX ORDER — ONDANSETRON 2 MG/ML
4 INJECTION INTRAMUSCULAR; INTRAVENOUS EVERY 6 HOURS PRN
Status: DISCONTINUED | OUTPATIENT
Start: 2020-06-22 | End: 2020-06-25 | Stop reason: HOSPADM

## 2020-06-22 RX ORDER — DOCUSATE SODIUM 100 MG/1
100 CAPSULE, LIQUID FILLED ORAL 2 TIMES DAILY
Status: DISCONTINUED | OUTPATIENT
Start: 2020-06-22 | End: 2020-06-25 | Stop reason: HOSPADM

## 2020-06-22 RX ORDER — ALBUTEROL SULFATE 2.5 MG/3ML
2.5 SOLUTION RESPIRATORY (INHALATION) EVERY 6 HOURS PRN
Status: CANCELLED | OUTPATIENT
Start: 2020-06-22

## 2020-06-22 RX ORDER — MELATONIN
1000 DAILY
Status: CANCELLED | OUTPATIENT
Start: 2020-06-23

## 2020-06-22 RX ADMIN — TEMAZEPAM 30 MG: 15 CAPSULE ORAL at 22:48

## 2020-06-22 RX ADMIN — ISODIUM CHLORIDE 3 ML: 0.03 SOLUTION RESPIRATORY (INHALATION) at 13:30

## 2020-06-22 RX ADMIN — ISODIUM CHLORIDE 3 ML: 0.03 SOLUTION RESPIRATORY (INHALATION) at 07:38

## 2020-06-22 RX ADMIN — LEVALBUTEROL HYDROCHLORIDE 1.25 MG: 1.25 SOLUTION, CONCENTRATE RESPIRATORY (INHALATION) at 13:30

## 2020-06-22 RX ADMIN — LEVALBUTEROL HYDROCHLORIDE 1.25 MG: 1.25 SOLUTION, CONCENTRATE RESPIRATORY (INHALATION) at 07:38

## 2020-06-22 RX ADMIN — BISACODYL 10 MG: 10 SUPPOSITORY RECTAL at 13:57

## 2020-06-22 RX ADMIN — INSULIN LISPRO 3 UNITS: 100 INJECTION, SOLUTION INTRAVENOUS; SUBCUTANEOUS at 23:16

## 2020-06-23 ENCOUNTER — DOCUMENTATION (OUTPATIENT)
Dept: OTHER | Facility: HOSPITAL | Age: 80
End: 2020-06-23

## 2020-06-23 ENCOUNTER — APPOINTMENT (INPATIENT)
Dept: RADIOLOGY | Facility: HOSPITAL | Age: 80
DRG: 242 | End: 2020-06-23
Payer: MEDICARE

## 2020-06-23 ENCOUNTER — ANESTHESIA (INPATIENT)
Dept: NON INVASIVE DIAGNOSTICS | Facility: HOSPITAL | Age: 80
DRG: 242 | End: 2020-06-23
Payer: MEDICARE

## 2020-06-23 LAB
ABO GROUP BLD: NORMAL
ALBUMIN SERPL BCP-MCNC: 3.1 G/DL (ref 3.5–5)
ALP SERPL-CCNC: 97 U/L (ref 46–116)
ALT SERPL W P-5'-P-CCNC: 43 U/L (ref 12–78)
ANION GAP SERPL CALCULATED.3IONS-SCNC: 8 MMOL/L (ref 4–13)
APTT PPP: 29 SECONDS (ref 23–37)
AST SERPL W P-5'-P-CCNC: 25 U/L (ref 5–45)
ATRIAL RATE: 59 BPM
BASOPHILS # BLD AUTO: 0.04 THOUSANDS/ΜL (ref 0–0.1)
BASOPHILS NFR BLD AUTO: 0 % (ref 0–1)
BILIRUB SERPL-MCNC: 1.44 MG/DL (ref 0.2–1)
BLD GP AB SCN SERPL QL: NEGATIVE
BUN SERPL-MCNC: 22 MG/DL (ref 5–25)
CALCIUM SERPL-MCNC: 8.7 MG/DL (ref 8.3–10.1)
CHLORIDE SERPL-SCNC: 103 MMOL/L (ref 100–108)
CO2 SERPL-SCNC: 23 MMOL/L (ref 21–32)
CREAT SERPL-MCNC: 0.8 MG/DL (ref 0.6–1.3)
EOSINOPHIL # BLD AUTO: 0.45 THOUSAND/ΜL (ref 0–0.61)
EOSINOPHIL NFR BLD AUTO: 5 % (ref 0–6)
ERYTHROCYTE [DISTWIDTH] IN BLOOD BY AUTOMATED COUNT: 15.4 % (ref 11.6–15.1)
GFR SERPL CREATININE-BSD FRML MDRD: 71 ML/MIN/1.73SQ M
GLUCOSE SERPL-MCNC: 108 MG/DL (ref 65–140)
GLUCOSE SERPL-MCNC: 126 MG/DL (ref 65–140)
GLUCOSE SERPL-MCNC: 137 MG/DL (ref 65–140)
GLUCOSE SERPL-MCNC: 137 MG/DL (ref 65–140)
GLUCOSE SERPL-MCNC: 167 MG/DL (ref 65–140)
HCT VFR BLD AUTO: 43.8 % (ref 34.8–46.1)
HGB BLD-MCNC: 13.5 G/DL (ref 11.5–15.4)
IMM GRANULOCYTES # BLD AUTO: 0.07 THOUSAND/UL (ref 0–0.2)
IMM GRANULOCYTES NFR BLD AUTO: 1 % (ref 0–2)
INR PPP: 1.11 (ref 0.84–1.19)
LYMPHOCYTES # BLD AUTO: 1.42 THOUSANDS/ΜL (ref 0.6–4.47)
LYMPHOCYTES NFR BLD AUTO: 15 % (ref 14–44)
MAGNESIUM SERPL-MCNC: 2.1 MG/DL (ref 1.6–2.6)
MCH RBC QN AUTO: 28.7 PG (ref 26.8–34.3)
MCHC RBC AUTO-ENTMCNC: 30.8 G/DL (ref 31.4–37.4)
MCV RBC AUTO: 93 FL (ref 82–98)
MONOCYTES # BLD AUTO: 0.68 THOUSAND/ΜL (ref 0.17–1.22)
MONOCYTES NFR BLD AUTO: 7 % (ref 4–12)
NEUTROPHILS # BLD AUTO: 6.86 THOUSANDS/ΜL (ref 1.85–7.62)
NEUTS SEG NFR BLD AUTO: 72 % (ref 43–75)
NRBC BLD AUTO-RTO: 0 /100 WBCS
PLATELET # BLD AUTO: 246 THOUSANDS/UL (ref 149–390)
PMV BLD AUTO: 10.6 FL (ref 8.9–12.7)
POTASSIUM SERPL-SCNC: 4.2 MMOL/L (ref 3.5–5.3)
PROT SERPL-MCNC: 6.3 G/DL (ref 6.4–8.2)
PROTHROMBIN TIME: 13.9 SECONDS (ref 11.6–14.5)
QRS AXIS: 56 DEGREES
QRSD INTERVAL: 146 MS
QT INTERVAL: 488 MS
QTC INTERVAL: 488 MS
RBC # BLD AUTO: 4.7 MILLION/UL (ref 3.81–5.12)
RH BLD: POSITIVE
SARS-COV-2 RNA RESP QL NAA+PROBE: NEGATIVE
SODIUM SERPL-SCNC: 134 MMOL/L (ref 136–145)
SPECIMEN EXPIRATION DATE: NORMAL
T WAVE AXIS: -56 DEGREES
VENTRICULAR RATE: 60 BPM
WBC # BLD AUTO: 9.52 THOUSAND/UL (ref 4.31–10.16)

## 2020-06-23 PROCEDURE — 82948 REAGENT STRIP/BLOOD GLUCOSE: CPT

## 2020-06-23 PROCEDURE — 85610 PROTHROMBIN TIME: CPT | Performed by: PHYSICIAN ASSISTANT

## 2020-06-23 PROCEDURE — 86850 RBC ANTIBODY SCREEN: CPT | Performed by: PHYSICIAN ASSISTANT

## 2020-06-23 PROCEDURE — C1769 GUIDE WIRE: HCPCS | Performed by: STUDENT IN AN ORGANIZED HEALTH CARE EDUCATION/TRAINING PROGRAM

## 2020-06-23 PROCEDURE — 97163 PT EVAL HIGH COMPLEX 45 MIN: CPT

## 2020-06-23 PROCEDURE — 99223 1ST HOSP IP/OBS HIGH 75: CPT | Performed by: INTERNAL MEDICINE

## 2020-06-23 PROCEDURE — 71045 X-RAY EXAM CHEST 1 VIEW: CPT

## 2020-06-23 PROCEDURE — 99222 1ST HOSP IP/OBS MODERATE 55: CPT | Performed by: INTERNAL MEDICINE

## 2020-06-23 PROCEDURE — C1892 INTRO/SHEATH,FIXED,PEEL-AWAY: HCPCS | Performed by: STUDENT IN AN ORGANIZED HEALTH CARE EDUCATION/TRAINING PROGRAM

## 2020-06-23 PROCEDURE — 86901 BLOOD TYPING SEROLOGIC RH(D): CPT | Performed by: PHYSICIAN ASSISTANT

## 2020-06-23 PROCEDURE — 99232 SBSQ HOSP IP/OBS MODERATE 35: CPT | Performed by: INTERNAL MEDICINE

## 2020-06-23 PROCEDURE — 93005 ELECTROCARDIOGRAM TRACING: CPT

## 2020-06-23 PROCEDURE — 02HK3JZ INSERTION OF PACEMAKER LEAD INTO RIGHT VENTRICLE, PERCUTANEOUS APPROACH: ICD-10-PCS | Performed by: INTERNAL MEDICINE

## 2020-06-23 PROCEDURE — 33207 INSERT HEART PM VENTRICULAR: CPT | Performed by: INTERNAL MEDICINE

## 2020-06-23 PROCEDURE — C1786 PMKR, SINGLE, RATE-RESP: HCPCS

## 2020-06-23 PROCEDURE — C1894 INTRO/SHEATH, NON-LASER: HCPCS | Performed by: STUDENT IN AN ORGANIZED HEALTH CARE EDUCATION/TRAINING PROGRAM

## 2020-06-23 PROCEDURE — 33207 INSERT HEART PM VENTRICULAR: CPT | Performed by: STUDENT IN AN ORGANIZED HEALTH CARE EDUCATION/TRAINING PROGRAM

## 2020-06-23 PROCEDURE — C1898 LEAD, PMKR, OTHER THAN TRANS: HCPCS

## 2020-06-23 PROCEDURE — 85730 THROMBOPLASTIN TIME PARTIAL: CPT | Performed by: PHYSICIAN ASSISTANT

## 2020-06-23 PROCEDURE — 85025 COMPLETE CBC W/AUTO DIFF WBC: CPT | Performed by: PHYSICIAN ASSISTANT

## 2020-06-23 PROCEDURE — 86900 BLOOD TYPING SEROLOGIC ABO: CPT | Performed by: PHYSICIAN ASSISTANT

## 2020-06-23 PROCEDURE — 99232 SBSQ HOSP IP/OBS MODERATE 35: CPT | Performed by: PHYSICIAN ASSISTANT

## 2020-06-23 PROCEDURE — 93010 ELECTROCARDIOGRAM REPORT: CPT | Performed by: INTERNAL MEDICINE

## 2020-06-23 PROCEDURE — 83735 ASSAY OF MAGNESIUM: CPT | Performed by: PHYSICIAN ASSISTANT

## 2020-06-23 PROCEDURE — 80053 COMPREHEN METABOLIC PANEL: CPT | Performed by: PHYSICIAN ASSISTANT

## 2020-06-23 PROCEDURE — 0JH604Z INSERTION OF PACEMAKER, SINGLE CHAMBER INTO CHEST SUBCUTANEOUS TISSUE AND FASCIA, OPEN APPROACH: ICD-10-PCS | Performed by: INTERNAL MEDICINE

## 2020-06-23 RX ORDER — MIDAZOLAM HYDROCHLORIDE 2 MG/2ML
INJECTION, SOLUTION INTRAMUSCULAR; INTRAVENOUS AS NEEDED
Status: DISCONTINUED | OUTPATIENT
Start: 2020-06-23 | End: 2020-06-23 | Stop reason: SURG

## 2020-06-23 RX ORDER — FENTANYL CITRATE 50 UG/ML
INJECTION, SOLUTION INTRAMUSCULAR; INTRAVENOUS AS NEEDED
Status: DISCONTINUED | OUTPATIENT
Start: 2020-06-23 | End: 2020-06-23 | Stop reason: SURG

## 2020-06-23 RX ORDER — SODIUM CHLORIDE 9 MG/ML
INJECTION, SOLUTION INTRAVENOUS CONTINUOUS PRN
Status: DISCONTINUED | OUTPATIENT
Start: 2020-06-23 | End: 2020-06-23 | Stop reason: SURG

## 2020-06-23 RX ORDER — DILTIAZEM HYDROCHLORIDE 240 MG/1
240 CAPSULE, COATED, EXTENDED RELEASE ORAL DAILY
Status: DISCONTINUED | OUTPATIENT
Start: 2020-06-24 | End: 2020-06-25 | Stop reason: HOSPADM

## 2020-06-23 RX ORDER — FLUTICASONE FUROATE AND VILANTEROL 100; 25 UG/1; UG/1
1 POWDER RESPIRATORY (INHALATION) DAILY
Status: DISCONTINUED | OUTPATIENT
Start: 2020-06-23 | End: 2020-06-25 | Stop reason: HOSPADM

## 2020-06-23 RX ORDER — EPHEDRINE SULFATE 50 MG/ML
INJECTION INTRAVENOUS AS NEEDED
Status: DISCONTINUED | OUTPATIENT
Start: 2020-06-23 | End: 2020-06-23 | Stop reason: SURG

## 2020-06-23 RX ORDER — GENTAMICIN SULFATE 40 MG/ML
INJECTION, SOLUTION INTRAMUSCULAR; INTRAVENOUS CODE/TRAUMA/SEDATION MEDICATION
Status: COMPLETED | OUTPATIENT
Start: 2020-06-23 | End: 2020-06-23

## 2020-06-23 RX ORDER — LIDOCAINE HYDROCHLORIDE 10 MG/ML
INJECTION, SOLUTION EPIDURAL; INFILTRATION; INTRACAUDAL; PERINEURAL CODE/TRAUMA/SEDATION MEDICATION
Status: COMPLETED | OUTPATIENT
Start: 2020-06-23 | End: 2020-06-23

## 2020-06-23 RX ORDER — LANOLIN ALCOHOL/MO/W.PET/CERES
3 CREAM (GRAM) TOPICAL
Status: DISCONTINUED | OUTPATIENT
Start: 2020-06-23 | End: 2020-06-23

## 2020-06-23 RX ORDER — VANCOMYCIN HYDROCHLORIDE 1 G/200ML
1000 INJECTION, SOLUTION INTRAVENOUS ONCE
Status: COMPLETED | OUTPATIENT
Start: 2020-06-23 | End: 2020-06-23

## 2020-06-23 RX ORDER — PROPOFOL 10 MG/ML
INJECTION, EMULSION INTRAVENOUS CONTINUOUS PRN
Status: DISCONTINUED | OUTPATIENT
Start: 2020-06-23 | End: 2020-06-23 | Stop reason: SURG

## 2020-06-23 RX ADMIN — MIDAZOLAM 1 MG: 1 INJECTION INTRAMUSCULAR; INTRAVENOUS at 12:46

## 2020-06-23 RX ADMIN — FENTANYL CITRATE 50 MCG: 50 INJECTION, SOLUTION INTRAMUSCULAR; INTRAVENOUS at 12:46

## 2020-06-23 RX ADMIN — MELATONIN 1000 UNITS: at 10:01

## 2020-06-23 RX ADMIN — EPHEDRINE SULFATE 5 MG: 50 INJECTION, SOLUTION INTRAVENOUS at 13:25

## 2020-06-23 RX ADMIN — VITAM B12 100 MCG: 100 TAB at 10:01

## 2020-06-23 RX ADMIN — ASPIRIN 81 MG: 81 TABLET, COATED ORAL at 10:01

## 2020-06-23 RX ADMIN — METOPROLOL TARTRATE 25 MG: 25 TABLET, FILM COATED ORAL at 16:10

## 2020-06-23 RX ADMIN — LIDOCAINE HYDROCHLORIDE 20 ML: 10 INJECTION, SOLUTION EPIDURAL; INFILTRATION; INTRACAUDAL; PERINEURAL at 13:03

## 2020-06-23 RX ADMIN — INSULIN LISPRO 1 UNITS: 100 INJECTION, SOLUTION INTRAVENOUS; SUBCUTANEOUS at 22:22

## 2020-06-23 RX ADMIN — PROPOFOL 20 MCG/KG/MIN: 10 INJECTION, EMULSION INTRAVENOUS at 12:47

## 2020-06-23 RX ADMIN — GENTAMICIN SULFATE 80 MG: 40 INJECTION, SOLUTION INTRAMUSCULAR; INTRAVENOUS at 13:37

## 2020-06-23 RX ADMIN — DOCUSATE SODIUM 100 MG: 100 CAPSULE, LIQUID FILLED ORAL at 10:00

## 2020-06-23 RX ADMIN — TEMAZEPAM 30 MG: 15 CAPSULE ORAL at 22:17

## 2020-06-23 RX ADMIN — SODIUM CHLORIDE: 0.9 INJECTION, SOLUTION INTRAVENOUS at 12:42

## 2020-06-23 RX ADMIN — METOPROLOL TARTRATE 25 MG: 25 TABLET, FILM COATED ORAL at 22:17

## 2020-06-23 RX ADMIN — IOHEXOL 20 ML: 350 INJECTION, SOLUTION INTRAVENOUS at 13:12

## 2020-06-23 RX ADMIN — LIDOCAINE HYDROCHLORIDE 10 ML: 10 INJECTION, SOLUTION EPIDURAL; INFILTRATION; INTRACAUDAL; PERINEURAL at 13:08

## 2020-06-23 RX ADMIN — FUROSEMIDE 40 MG: 10 INJECTION, SOLUTION INTRAMUSCULAR; INTRAVENOUS at 10:00

## 2020-06-23 RX ADMIN — ATORVASTATIN CALCIUM 40 MG: 40 TABLET, FILM COATED ORAL at 16:11

## 2020-06-23 RX ADMIN — DOCUSATE SODIUM 100 MG: 100 CAPSULE, LIQUID FILLED ORAL at 17:08

## 2020-06-23 RX ADMIN — VANCOMYCIN HYDROCHLORIDE 1000 MG: 1 INJECTION, SOLUTION INTRAVENOUS at 12:09

## 2020-06-24 ENCOUNTER — APPOINTMENT (INPATIENT)
Dept: RADIOLOGY | Facility: HOSPITAL | Age: 80
DRG: 242 | End: 2020-06-24
Payer: MEDICARE

## 2020-06-24 PROBLEM — J45.20 MILD INTERMITTENT ASTHMA: Status: ACTIVE | Noted: 2020-06-24

## 2020-06-24 LAB
ANION GAP SERPL CALCULATED.3IONS-SCNC: 8 MMOL/L (ref 4–13)
BUN SERPL-MCNC: 22 MG/DL (ref 5–25)
CALCIUM SERPL-MCNC: 8.5 MG/DL (ref 8.3–10.1)
CHLORIDE SERPL-SCNC: 102 MMOL/L (ref 100–108)
CO2 SERPL-SCNC: 24 MMOL/L (ref 21–32)
CREAT SERPL-MCNC: 0.6 MG/DL (ref 0.6–1.3)
ERYTHROCYTE [DISTWIDTH] IN BLOOD BY AUTOMATED COUNT: 15.1 % (ref 11.6–15.1)
GFR SERPL CREATININE-BSD FRML MDRD: 88 ML/MIN/1.73SQ M
GLUCOSE SERPL-MCNC: 116 MG/DL (ref 65–140)
GLUCOSE SERPL-MCNC: 118 MG/DL (ref 65–140)
GLUCOSE SERPL-MCNC: 131 MG/DL (ref 65–140)
GLUCOSE SERPL-MCNC: 156 MG/DL (ref 65–140)
GLUCOSE SERPL-MCNC: 173 MG/DL (ref 65–140)
HCT VFR BLD AUTO: 44.6 % (ref 34.8–46.1)
HGB BLD-MCNC: 14.3 G/DL (ref 11.5–15.4)
MCH RBC QN AUTO: 29.1 PG (ref 26.8–34.3)
MCHC RBC AUTO-ENTMCNC: 32.1 G/DL (ref 31.4–37.4)
MCV RBC AUTO: 91 FL (ref 82–98)
PLATELET # BLD AUTO: 244 THOUSANDS/UL (ref 149–390)
PMV BLD AUTO: 10.4 FL (ref 8.9–12.7)
POTASSIUM SERPL-SCNC: 3.9 MMOL/L (ref 3.5–5.3)
RBC # BLD AUTO: 4.91 MILLION/UL (ref 3.81–5.12)
SODIUM SERPL-SCNC: 134 MMOL/L (ref 136–145)
WBC # BLD AUTO: 10.39 THOUSAND/UL (ref 4.31–10.16)

## 2020-06-24 PROCEDURE — 99024 POSTOP FOLLOW-UP VISIT: CPT | Performed by: INTERNAL MEDICINE

## 2020-06-24 PROCEDURE — 99232 SBSQ HOSP IP/OBS MODERATE 35: CPT | Performed by: PHYSICIAN ASSISTANT

## 2020-06-24 PROCEDURE — 71046 X-RAY EXAM CHEST 2 VIEWS: CPT

## 2020-06-24 PROCEDURE — 97116 GAIT TRAINING THERAPY: CPT

## 2020-06-24 PROCEDURE — 97530 THERAPEUTIC ACTIVITIES: CPT

## 2020-06-24 PROCEDURE — 82948 REAGENT STRIP/BLOOD GLUCOSE: CPT

## 2020-06-24 PROCEDURE — 85027 COMPLETE CBC AUTOMATED: CPT | Performed by: PHYSICIAN ASSISTANT

## 2020-06-24 PROCEDURE — 99232 SBSQ HOSP IP/OBS MODERATE 35: CPT | Performed by: INTERNAL MEDICINE

## 2020-06-24 PROCEDURE — 80048 BASIC METABOLIC PNL TOTAL CA: CPT | Performed by: PHYSICIAN ASSISTANT

## 2020-06-24 RX ORDER — FUROSEMIDE 20 MG/1
20 TABLET ORAL DAILY
Status: DISCONTINUED | OUTPATIENT
Start: 2020-06-28 | End: 2020-06-25 | Stop reason: HOSPADM

## 2020-06-24 RX ORDER — FUROSEMIDE 40 MG/1
40 TABLET ORAL DAILY
Status: DISCONTINUED | OUTPATIENT
Start: 2020-06-24 | End: 2020-06-24

## 2020-06-24 RX ORDER — FUROSEMIDE 20 MG/1
20 TABLET ORAL DAILY
Status: DISCONTINUED | OUTPATIENT
Start: 2020-06-27 | End: 2020-06-24

## 2020-06-24 RX ORDER — FUROSEMIDE 40 MG/1
40 TABLET ORAL DAILY
Status: DISCONTINUED | OUTPATIENT
Start: 2020-06-25 | End: 2020-06-25 | Stop reason: HOSPADM

## 2020-06-24 RX ADMIN — TEMAZEPAM 30 MG: 15 CAPSULE ORAL at 23:02

## 2020-06-24 RX ADMIN — INSULIN LISPRO 1 UNITS: 100 INJECTION, SOLUTION INTRAVENOUS; SUBCUTANEOUS at 23:02

## 2020-06-24 RX ADMIN — METOPROLOL TARTRATE 25 MG: 25 TABLET, FILM COATED ORAL at 23:02

## 2020-06-24 RX ADMIN — DILTIAZEM HYDROCHLORIDE 240 MG: 240 CAPSULE, COATED, EXTENDED RELEASE ORAL at 09:45

## 2020-06-24 RX ADMIN — INSULIN LISPRO 1 UNITS: 100 INJECTION, SOLUTION INTRAVENOUS; SUBCUTANEOUS at 11:43

## 2020-06-24 RX ADMIN — ATORVASTATIN CALCIUM 40 MG: 40 TABLET, FILM COATED ORAL at 17:32

## 2020-06-24 RX ADMIN — DOCUSATE SODIUM 100 MG: 100 CAPSULE, LIQUID FILLED ORAL at 09:42

## 2020-06-24 RX ADMIN — ASPIRIN 81 MG: 81 TABLET, COATED ORAL at 09:43

## 2020-06-24 RX ADMIN — MELATONIN 1000 UNITS: at 09:42

## 2020-06-24 RX ADMIN — DOCUSATE SODIUM 100 MG: 100 CAPSULE, LIQUID FILLED ORAL at 17:32

## 2020-06-24 RX ADMIN — FLUTICASONE FUROATE AND VILANTEROL TRIFENATATE 1 PUFF: 100; 25 POWDER RESPIRATORY (INHALATION) at 09:46

## 2020-06-24 RX ADMIN — FUROSEMIDE 40 MG: 10 INJECTION, SOLUTION INTRAMUSCULAR; INTRAVENOUS at 09:44

## 2020-06-24 RX ADMIN — VITAM B12 100 MCG: 100 TAB at 09:42

## 2020-06-25 VITALS
OXYGEN SATURATION: 92 % | TEMPERATURE: 97.9 F | HEART RATE: 60 BPM | DIASTOLIC BLOOD PRESSURE: 60 MMHG | BODY MASS INDEX: 27.85 KG/M2 | SYSTOLIC BLOOD PRESSURE: 136 MMHG | WEIGHT: 162.26 LBS | RESPIRATION RATE: 18 BRPM

## 2020-06-25 LAB
ANION GAP SERPL CALCULATED.3IONS-SCNC: 7 MMOL/L (ref 4–13)
BASOPHILS # BLD AUTO: 0.06 THOUSANDS/ΜL (ref 0–0.1)
BASOPHILS NFR BLD AUTO: 1 % (ref 0–1)
BUN SERPL-MCNC: 20 MG/DL (ref 5–25)
CALCIUM SERPL-MCNC: 9.4 MG/DL (ref 8.3–10.1)
CHLORIDE SERPL-SCNC: 103 MMOL/L (ref 100–108)
CO2 SERPL-SCNC: 25 MMOL/L (ref 21–32)
CREAT SERPL-MCNC: 0.7 MG/DL (ref 0.6–1.3)
EOSINOPHIL # BLD AUTO: 0.39 THOUSAND/ΜL (ref 0–0.61)
EOSINOPHIL NFR BLD AUTO: 4 % (ref 0–6)
ERYTHROCYTE [DISTWIDTH] IN BLOOD BY AUTOMATED COUNT: 14.6 % (ref 11.6–15.1)
GFR SERPL CREATININE-BSD FRML MDRD: 83 ML/MIN/1.73SQ M
GLUCOSE SERPL-MCNC: 128 MG/DL (ref 65–140)
GLUCOSE SERPL-MCNC: 140 MG/DL (ref 65–140)
GLUCOSE SERPL-MCNC: 290 MG/DL (ref 65–140)
HCT VFR BLD AUTO: 46.2 % (ref 34.8–46.1)
HGB BLD-MCNC: 14.7 G/DL (ref 11.5–15.4)
IMM GRANULOCYTES # BLD AUTO: 0.12 THOUSAND/UL (ref 0–0.2)
IMM GRANULOCYTES NFR BLD AUTO: 1 % (ref 0–2)
LYMPHOCYTES # BLD AUTO: 1.84 THOUSANDS/ΜL (ref 0.6–4.47)
LYMPHOCYTES NFR BLD AUTO: 19 % (ref 14–44)
MCH RBC QN AUTO: 28.7 PG (ref 26.8–34.3)
MCHC RBC AUTO-ENTMCNC: 31.8 G/DL (ref 31.4–37.4)
MCV RBC AUTO: 90 FL (ref 82–98)
MONOCYTES # BLD AUTO: 0.75 THOUSAND/ΜL (ref 0.17–1.22)
MONOCYTES NFR BLD AUTO: 8 % (ref 4–12)
NEUTROPHILS # BLD AUTO: 6.76 THOUSANDS/ΜL (ref 1.85–7.62)
NEUTS SEG NFR BLD AUTO: 67 % (ref 43–75)
NRBC BLD AUTO-RTO: 0 /100 WBCS
PLATELET # BLD AUTO: 269 THOUSANDS/UL (ref 149–390)
PMV BLD AUTO: 10.1 FL (ref 8.9–12.7)
POTASSIUM SERPL-SCNC: 3.8 MMOL/L (ref 3.5–5.3)
RBC # BLD AUTO: 5.13 MILLION/UL (ref 3.81–5.12)
SODIUM SERPL-SCNC: 135 MMOL/L (ref 136–145)
WBC # BLD AUTO: 9.92 THOUSAND/UL (ref 4.31–10.16)

## 2020-06-25 PROCEDURE — 99239 HOSP IP/OBS DSCHRG MGMT >30: CPT | Performed by: INTERNAL MEDICINE

## 2020-06-25 PROCEDURE — 99232 SBSQ HOSP IP/OBS MODERATE 35: CPT | Performed by: INTERNAL MEDICINE

## 2020-06-25 PROCEDURE — 82948 REAGENT STRIP/BLOOD GLUCOSE: CPT

## 2020-06-25 PROCEDURE — 80048 BASIC METABOLIC PNL TOTAL CA: CPT | Performed by: PHYSICIAN ASSISTANT

## 2020-06-25 PROCEDURE — 85025 COMPLETE CBC W/AUTO DIFF WBC: CPT | Performed by: PHYSICIAN ASSISTANT

## 2020-06-25 RX ORDER — FLUTICASONE FUROATE AND VILANTEROL 100; 25 UG/1; UG/1
1 POWDER RESPIRATORY (INHALATION) DAILY
Qty: 1 INHALER | Refills: 1 | Status: SHIPPED | OUTPATIENT
Start: 2020-06-26 | End: 2020-07-22 | Stop reason: SDUPTHER

## 2020-06-25 RX ORDER — FUROSEMIDE 20 MG/1
20 TABLET ORAL DAILY
Qty: 30 TABLET | Refills: 0 | Status: SHIPPED | OUTPATIENT
Start: 2020-06-28

## 2020-06-25 RX ORDER — DILTIAZEM HYDROCHLORIDE 240 MG/1
240 CAPSULE, COATED, EXTENDED RELEASE ORAL DAILY
Qty: 30 CAPSULE | Refills: 0 | Status: SHIPPED | OUTPATIENT
Start: 2020-06-26

## 2020-06-25 RX ORDER — FUROSEMIDE 40 MG/1
40 TABLET ORAL DAILY
Qty: 3 TABLET | Refills: 0 | Status: SHIPPED | OUTPATIENT
Start: 2020-06-25 | End: 2020-06-28

## 2020-06-25 RX ORDER — POTASSIUM CHLORIDE 750 MG/1
10 TABLET, EXTENDED RELEASE ORAL DAILY
Qty: 30 TABLET | Refills: 0 | Status: SHIPPED | OUTPATIENT
Start: 2020-06-25

## 2020-06-25 RX ADMIN — FLUTICASONE FUROATE AND VILANTEROL TRIFENATATE 1 PUFF: 100; 25 POWDER RESPIRATORY (INHALATION) at 08:28

## 2020-06-25 RX ADMIN — METOPROLOL TARTRATE 25 MG: 25 TABLET, FILM COATED ORAL at 08:27

## 2020-06-25 RX ADMIN — VITAM B12 100 MCG: 100 TAB at 08:27

## 2020-06-25 RX ADMIN — ASPIRIN 81 MG: 81 TABLET, COATED ORAL at 08:26

## 2020-06-25 RX ADMIN — DOCUSATE SODIUM 100 MG: 100 CAPSULE, LIQUID FILLED ORAL at 08:26

## 2020-06-25 RX ADMIN — DILTIAZEM HYDROCHLORIDE 240 MG: 240 CAPSULE, COATED, EXTENDED RELEASE ORAL at 08:25

## 2020-06-25 RX ADMIN — INSULIN LISPRO 3 UNITS: 100 INJECTION, SOLUTION INTRAVENOUS; SUBCUTANEOUS at 11:52

## 2020-06-25 RX ADMIN — MELATONIN 1000 UNITS: at 08:28

## 2020-06-29 ENCOUNTER — TELEPHONE (OUTPATIENT)
Dept: PULMONOLOGY | Facility: CLINIC | Age: 80
End: 2020-06-29

## 2020-07-10 ENCOUNTER — IN-CLINIC DEVICE VISIT (OUTPATIENT)
Dept: CARDIOLOGY CLINIC | Facility: CLINIC | Age: 80
End: 2020-07-10

## 2020-07-10 DIAGNOSIS — Z95.0 PRESENCE OF PERMANENT CARDIAC PACEMAKER: Primary | ICD-10-CM

## 2020-07-10 PROCEDURE — 99024 POSTOP FOLLOW-UP VISIT: CPT | Performed by: INTERNAL MEDICINE

## 2020-07-21 ENCOUNTER — TELEPHONE (OUTPATIENT)
Dept: PULMONOLOGY | Facility: CLINIC | Age: 80
End: 2020-07-21

## 2020-07-22 ENCOUNTER — OFFICE VISIT (OUTPATIENT)
Dept: PULMONOLOGY | Facility: CLINIC | Age: 80
End: 2020-07-22
Payer: MEDICARE

## 2020-07-22 VITALS
BODY MASS INDEX: 27.85 KG/M2 | DIASTOLIC BLOOD PRESSURE: 80 MMHG | TEMPERATURE: 96.6 F | HEIGHT: 64 IN | HEART RATE: 65 BPM | SYSTOLIC BLOOD PRESSURE: 120 MMHG | OXYGEN SATURATION: 94 %

## 2020-07-22 DIAGNOSIS — I26.99 OTHER ACUTE PULMONARY EMBOLISM WITHOUT ACUTE COR PULMONALE (HCC): ICD-10-CM

## 2020-07-22 DIAGNOSIS — J45.20 MILD INTERMITTENT ASTHMA WITHOUT COMPLICATION: ICD-10-CM

## 2020-07-22 DIAGNOSIS — R59.0 MEDIASTINAL LYMPHADENOPATHY: ICD-10-CM

## 2020-07-22 DIAGNOSIS — R93.89 ABNORMAL CHEST CT: Primary | ICD-10-CM

## 2020-07-22 PROCEDURE — 99214 OFFICE O/P EST MOD 30 MIN: CPT | Performed by: PHYSICIAN ASSISTANT

## 2020-07-22 RX ORDER — FLUTICASONE FUROATE AND VILANTEROL 100; 25 UG/1; UG/1
1 POWDER RESPIRATORY (INHALATION) DAILY
Qty: 3 INHALER | Refills: 3 | Status: SHIPPED | OUTPATIENT
Start: 2020-07-22

## 2020-07-22 RX ORDER — FLUTICASONE FUROATE AND VILANTEROL 100; 25 UG/1; UG/1
1 POWDER RESPIRATORY (INHALATION) DAILY
Qty: 3 INHALER | Refills: 3 | Status: SHIPPED | OUTPATIENT
Start: 2020-07-22 | End: 2020-07-22

## 2020-07-22 NOTE — PROGRESS NOTES
Assessment:    1  Abnormal chest CT  CT chest without contrast   2  Mediastinal lymphadenopathy  CT chest without contrast   3  Other acute pulmonary embolism without acute cor pulmonale ([address] )     4  Mild intermittent asthma without complication  fluticasone-vilanterol (BREO ELLIPTA) 100-25 mcg/inh inhaler    DISCONTINUED: fluticasone-vilanterol (BREO ELLIPTA) 100-25 mcg/inh inhaler         Plan:   Patient presents for hospital follow-up in appears to be doing well with her breathing  Reviewed CT of the chest with patient and her daughter including the very small PE in the right lower lobe pulmonary artery and mediastinal lymphadenopathy  She is being monitored off anticoagulation given history of bleeding in the small size of PE  Will have her go for a repeat chest CT for short-term follow-up of the lymphadenopathy  If no improvement or increase in size, would likely need EBUS for sampling lymph nodes  Asthma remains well-controlled on Breo Ellipta  She does not want any short-acting bronchodilators  All of the patient's and her daughter's questions were answered to their satisfaction and understanding, which was verbalized  Patient was advised to call the office prior to next appointment should they have any questions or concerns prior  Patient made aware of worrisome symptoms that should prompt a visit to the ER or call to 911 such as chest pain, severe shortness of breath, cyanosis, throat closing, syncope, etc     Subjective:     Patient ID: Wing Blankenship is a 67 y o  female  Chief Complaint:  Arminda Claros is a pleasant 78-year-old female nonsmoker with past medical history including but not limited to PE, asthma, aortic stenosis status post TAVR, AFib not on anticoagulation, retroperitoneal bleed, hypertension, diabetes, CVA presenting for possible follow-up  She was admitted last month with lower extremity edema and dyspnea    A CTA done on admission did show a small PE in the right lower lobe pulmonary artery and new mediastinal adenopathy that was not present on a CT scan in January as well as possible thrombus/agitation on the pulmonic valve  It was felt that her PE was possibly provoked by surgery she had in February for TAVR however the symptoms started about 3 months following  She had initially been started on the heparin drip, however, given the small size of PE and her prior history of bleeding on anticoagulation it was decided that it would be safer to monitor the patient off anticoagulation and that the dyspnea she initially presented with was likely secondary to acute CHF exacerbation as she did improve with diuresis  Today, she states that her breathing is doing very well  She denies any shortness of breath at rest or with exertion  She does not have any chest discomfort  She is a retired nurse  The following portions of the patient's history were reviewed in this encounter and updated as appropriate: Past medical, social, surgical, family, allergies    Review of Systems   Constitutional: Negative for chills and fever  HENT: Positive for congestion and postnasal drip  Respiratory: Negative for cough, shortness of breath and wheezing  Cardiovascular: Negative for chest pain and leg swelling  Neurological: Positive for weakness  All other systems reviewed and are negative  Objective:  Vitals:    07/22/20 1128   BP: 120/80   Pulse: 65   Temp: (!) 96 6 °F (35 9 °C)   SpO2: 94%   Height: 5' 4" (1 626 m)       Physical Exam   Constitutional: She is oriented to person, place, and time  She appears well-developed and well-nourished  No distress  HENT:   Head: Normocephalic and atraumatic  Right Ear: External ear normal    Left Ear: External ear normal    Eyes: Conjunctivae and EOM are normal  Right eye exhibits no discharge  Left eye exhibits no discharge  No scleral icterus  Neck: Normal range of motion  Neck supple  No tracheal deviation present     Cardiovascular: Normal rate, regular rhythm and normal heart sounds  Exam reveals no gallop and no friction rub  No murmur heard  Pulmonary/Chest: Effort normal and breath sounds normal  No stridor  No respiratory distress  She has no wheezes  She has no rales  Abdominal: She exhibits no distension  There is no guarding  Musculoskeletal: She exhibits no edema, tenderness or deformity  Patient is in a wheelchair   Neurological: She is alert and oriented to person, place, and time  No cranial nerve deficit  Skin: Skin is warm and dry  No rash noted  She is not diaphoretic  No erythema  No pallor  Psychiatric: She has a normal mood and affect  Her behavior is normal  Judgment and thought content normal    Nursing note and vitals reviewed        Lab Review:   Admission on 06/22/2020, Discharged on 06/25/2020   Component Date Value    SARS-CoV-2 06/22/2020 Negative     POC Glucose 06/22/2020 245*    POC Glucose 06/22/2020 282*    Ventricular Rate 06/22/2020 39     Atrial Rate 06/22/2020 39     IL Interval 06/22/2020 128     QRSD Interval 06/22/2020 150     QT Interval 06/22/2020 516     QTC Interval 06/22/2020 415     P Axis 06/22/2020 87     QRS Axis 06/22/2020 -62     T Wave Axis 06/22/2020 85     Sodium 06/23/2020 134*    Potassium 06/23/2020 4 2     Chloride 06/23/2020 103     CO2 06/23/2020 23     ANION GAP 06/23/2020 8     BUN 06/23/2020 22     Creatinine 06/23/2020 0 80     Glucose 06/23/2020 126     Calcium 06/23/2020 8 7     AST 06/23/2020 25     ALT 06/23/2020 43     Alkaline Phosphatase 06/23/2020 97     Total Protein 06/23/2020 6 3*    Albumin 06/23/2020 3 1*    Total Bilirubin 06/23/2020 1 44*    eGFR 06/23/2020 71     Magnesium 06/23/2020 2 1     WBC 06/23/2020 9 52     RBC 06/23/2020 4 70     Hemoglobin 06/23/2020 13 5     Hematocrit 06/23/2020 43 8     MCV 06/23/2020 93     MCH 06/23/2020 28 7     MCHC 06/23/2020 30 8*    RDW 06/23/2020 15 4*    MPV 06/23/2020 10 6     Platelets 25/86/9887 246     nRBC 06/23/2020 0     Neutrophils Relative 06/23/2020 72     Immat GRANS % 06/23/2020 1     Lymphocytes Relative 06/23/2020 15     Monocytes Relative 06/23/2020 7     Eosinophils Relative 06/23/2020 5     Basophils Relative 06/23/2020 0     Neutrophils Absolute 06/23/2020 6 86     Immature Grans Absolute 06/23/2020 0 07     Lymphocytes Absolute 06/23/2020 1 42     Monocytes Absolute 06/23/2020 0 68     Eosinophils Absolute 06/23/2020 0 45     Basophils Absolute 06/23/2020 0 04     PTT 06/23/2020 29     Protime 06/23/2020 13 9     INR 06/23/2020 1 11     ABO Grouping 06/23/2020 A     Rh Factor 06/23/2020 Positive     Antibody Screen 06/23/2020 Negative     Specimen Expiration Date 06/23/2020 [id number]     POC Glucose 06/23/2020 137     POC Glucose 06/23/2020 137     Ventricular Rate 06/23/2020 60     Atrial Rate 06/23/2020 59     QRSD Interval 06/23/2020 146     QT Interval 06/23/2020 488     QTC Interval 06/23/2020 488     QRS Axis 06/23/2020 56     T Wave Axis 06/23/2020 -64     POC Glucose 06/23/2020 108     POC Glucose 06/23/2020 167*    Sodium 06/24/2020 134*    Potassium 06/24/2020 3 9     Chloride 06/24/2020 102     CO2 06/24/2020 24     ANION GAP 06/24/2020 8     BUN 06/24/2020 22     Creatinine 06/24/2020 0 60     Glucose 06/24/2020 118     Calcium 06/24/2020 8 5     eGFR 06/24/2020 88     WBC 06/24/2020 10 39*    RBC 06/24/2020 4 91     Hemoglobin 06/24/2020 14 3     Hematocrit 06/24/2020 44 6     MCV 06/24/2020 91     MCH 06/24/2020 29 1     MCHC 06/24/2020 32 1     RDW 06/24/2020 15 1     Platelets 09/86/8900 244     MPV 06/24/2020 10 4     POC Glucose 06/24/2020 131     POC Glucose 06/24/2020 173*    POC Glucose 06/24/2020 116     POC Glucose 06/24/2020 156*    WBC 06/25/2020 9 92     RBC 06/25/2020 5 13*    Hemoglobin 06/25/2020 14 7     Hematocrit 06/25/2020 46 2*    MCV 06/25/2020 90     MCH 06/25/2020 28 7     MCHC 06/25/2020 31 8     RDW 06/25/2020 14 6     MPV 06/25/2020 10 1     Platelets 27/08/6449 269     nRBC 06/25/2020 0     Neutrophils Relative 06/25/2020 67     Immat GRANS % 06/25/2020 1     Lymphocytes Relative 06/25/2020 19     Monocytes Relative 06/25/2020 8     Eosinophils Relative 06/25/2020 4     Basophils Relative 06/25/2020 1     Neutrophils Absolute 06/25/2020 6 76     Immature Grans Absolute 06/25/2020 0 12     Lymphocytes Absolute 06/25/2020 1 84     Monocytes Absolute 06/25/2020 0 75     Eosinophils Absolute 06/25/2020 0 39     Basophils Absolute 06/25/2020 0 06     Sodium 06/25/2020 135*    Potassium 06/25/2020 3 8     Chloride 06/25/2020 103     CO2 06/25/2020 25     ANION GAP 06/25/2020 7     BUN 06/25/2020 20     Creatinine 06/25/2020 0 70     Glucose 06/25/2020 128     Calcium 06/25/2020 9 4     eGFR 06/25/2020 83     POC Glucose 06/25/2020 140     POC Glucose 06/25/2020 290*   No results displayed because visit has over 200 results        Hospital Outpatient Visit on 06/11/2020   Component Date Value    Ventricular Rate 06/11/2020 73     Atrial Rate 06/11/2020 136     QRSD Interval 06/11/2020 148     QT Interval 06/11/2020 478     QTC Interval 06/11/2020 526     QRS Axis 06/11/2020 -48     T Wave Axis 06/11/2020 105    Appointment on 06/11/2020   Component Date Value    Sodium 06/11/2020 138     Potassium 06/11/2020 3 6     Chloride 06/11/2020 107     CO2 06/11/2020 24     ANION GAP 06/11/2020 7     BUN 06/11/2020 14     Creatinine 06/11/2020 0 95     Glucose, Fasting 06/11/2020 128*    Calcium 06/11/2020 9 3     eGFR 06/11/2020 58     WBC 06/11/2020 6 68     RBC 06/11/2020 4 55     Hemoglobin 06/11/2020 13 5     Hematocrit 06/11/2020 42 8     MCV 06/11/2020 94     MCH 06/11/2020 29 7     MCHC 06/11/2020 31 5     RDW 06/11/2020 15 7*    Platelets 41/55/7455 256     MPV 06/11/2020 10 9

## 2020-09-29 ENCOUNTER — TELEPHONE (OUTPATIENT)
Dept: VASCULAR SURGERY | Facility: CLINIC | Age: 80
End: 2020-09-29

## 2020-09-29 NOTE — TELEPHONE ENCOUNTER
Called pt to schedule CV and OV  She did not want to come in because of COVID  Pt stated that she would call us to schedule her testing and office visit when she felt it was safe enough for her to leave her house

## 2020-11-13 NOTE — TELEPHONE ENCOUNTER
Pt is still uncomfortable coming in for CV and OV due to COVID  Letter will be sent as a reminder for what will need to be scheduled when she is comfortable to be out of the house

## 2021-01-06 ENCOUNTER — REMOTE DEVICE CLINIC VISIT (OUTPATIENT)
Dept: CARDIOLOGY CLINIC | Facility: CLINIC | Age: 81
End: 2021-01-06
Payer: MEDICARE

## 2021-01-06 DIAGNOSIS — Z95.0 PRESENCE OF PERMANENT CARDIAC PACEMAKER: Primary | ICD-10-CM

## 2021-01-06 PROCEDURE — 93296 REM INTERROG EVL PM/IDS: CPT | Performed by: INTERNAL MEDICINE

## 2021-01-06 PROCEDURE — 93294 REM INTERROG EVL PM/LDLS PM: CPT | Performed by: INTERNAL MEDICINE

## 2021-01-06 NOTE — PROGRESS NOTES
Results for orders placed or performed in visit on 01/06/21   Cardiac EP device report    Narrative    MDT SINGLE CHAMBER PM (HIS)/ACTIVE SYSTEM IS MRI CONDITIONAL  CARELINK TRANSMISSION: BATTERY VOLTAGE ADEQUATE  (13 9 YRS)  92%  ALL AVAILABLE LEAD PARAMETERS WITHIN NORMAL LIMITS  [address], EGMS SHOW NSVT MAX RATE 200 BPM  ( LAST EPISODE 10-12-20)  PATIENT IS ON DILTIAZEM AND METOPROLOL TART; EF 60%(2020)  NORMAL DEVICE FUNCTION  ---BLACK

## 2021-02-13 ENCOUNTER — IMMUNIZATIONS (OUTPATIENT)
Dept: FAMILY MEDICINE CLINIC | Facility: HOSPITAL | Age: 81
End: 2021-02-13

## 2021-02-13 DIAGNOSIS — Z23 ENCOUNTER FOR IMMUNIZATION: Primary | ICD-10-CM

## 2021-02-13 PROCEDURE — 0011A SARS-COV-2 / COVID-19 MRNA VACCINE (MODERNA) 100 MCG: CPT

## 2021-02-13 PROCEDURE — 91301 SARS-COV-2 / COVID-19 MRNA VACCINE (MODERNA) 100 MCG: CPT

## 2021-03-13 ENCOUNTER — IMMUNIZATIONS (OUTPATIENT)
Dept: FAMILY MEDICINE CLINIC | Facility: HOSPITAL | Age: 81
End: 2021-03-13

## 2021-03-13 DIAGNOSIS — Z23 ENCOUNTER FOR IMMUNIZATION: Primary | ICD-10-CM

## 2021-03-13 PROCEDURE — 91301 SARS-COV-2 / COVID-19 MRNA VACCINE (MODERNA) 100 MCG: CPT

## 2021-03-13 PROCEDURE — 0012A SARS-COV-2 / COVID-19 MRNA VACCINE (MODERNA) 100 MCG: CPT

## 2021-04-14 ENCOUNTER — REMOTE DEVICE CLINIC VISIT (OUTPATIENT)
Dept: CARDIOLOGY CLINIC | Facility: CLINIC | Age: 81
End: 2021-04-14
Payer: MEDICARE

## 2021-04-14 DIAGNOSIS — Z95.0 CARDIAC PACEMAKER IN SITU: Primary | ICD-10-CM

## 2021-04-14 PROCEDURE — 93296 REM INTERROG EVL PM/IDS: CPT | Performed by: INTERNAL MEDICINE

## 2021-04-14 PROCEDURE — 93294 REM INTERROG EVL PM/LDLS PM: CPT | Performed by: INTERNAL MEDICINE

## 2021-04-14 NOTE — PROGRESS NOTES
Results for orders placed or performed in visit on 04/14/21   Cardiac EP device report    Narrative    MDT SINGLE CHAMBER PM (HIS)/ACTIVE SYSTEM IS MRI CONDITIONAL  CARELINK TRANSMISSION: BATTERY STATUS "13 YRS"   99%  ALL AVAILABLE LEAD PARAMETERS WITHIN NORMAL LIMITS  NO SIGNIFICANT HIGH RATE EPISODES  NORMAL DEVICE FUNCTION   NC       Current Outpatient Medications:     acetaminophen (TYLENOL) 325 mg tablet, Take 650 mg by mouth every 6 (six) hours as needed for mild pain, Disp: , Rfl:     alendronate (FOSAMAX) 70 mg tablet, Take 70 mg by mouth Once a week, Disp: , Rfl:     ascorbic acid (VITAMIN C) 500 mg tablet, Take 500 mg by mouth 2 (two) times a day, Disp: , Rfl:     atorvastatin (LIPITOR) 10 mg tablet, Take 10 mg by mouth daily, Disp: , Rfl:     BABY ASPIRIN PO, Take 81 mg by mouth daily, Disp: , Rfl:     cholecalciferol (VITAMIN D3) 1,000 units tablet, Take 1,000 Units by mouth daily, Disp: , Rfl:     cyanocobalamin (VITAMIN B-12) 100 mcg tablet, Take by mouth daily, Disp: , Rfl:     diltiazem (CARDIZEM CD) 240 mg 24 hr capsule, Take 1 capsule (240 mg total) by mouth daily, Disp: 30 capsule, Rfl: 0    diltiazem (DILACOR XR) 240 MG 24 hr capsule, Take 240 mg by mouth daily, Disp: , Rfl:     docusate sodium (COLACE) 100 mg capsule, Take 1 capsule (100 mg total) by mouth 2 (two) times a day (Patient not taking: Reported on 2/27/2020), Disp: 10 capsule, Rfl: 0    fluticasone-vilanterol (BREO ELLIPTA) 100-25 mcg/inh inhaler, Inhale 1 puff daily Rinse mouth after use , Disp: 3 Inhaler, Rfl: 3    furosemide (LASIX) 20 mg tablet, Take 1 tablet (20 mg total) by mouth daily, Disp: 30 tablet, Rfl: 0    furosemide (LASIX) 40 mg tablet, Take 1 tablet (40 mg total) by mouth daily for 3 doses, Disp: 3 tablet, Rfl: 0    metFORMIN (GLUCOPHAGE) 500 mg tablet, Take 500 mg by mouth daily with breakfast , Disp: , Rfl:     metoprolol tartrate (LOPRESSOR) 25 mg tablet, Take 1 tablet (25 mg total) by mouth every 12 (twelve) hours, Disp: 60 tablet, Rfl: 0    omeprazole (PriLOSEC) 20 mg delayed release capsule, Take 1 capsule (20 mg total) by mouth daily Take 30 minutes prior to breakfast, Disp: 30 capsule, Rfl: 0    polyethylene glycol (MIRALAX) 17 g packet, Take 17 g by mouth daily (Patient not taking: Reported on 2/27/2020), Disp: 14 each, Rfl: 0    potassium chloride (K-DUR,KLOR-CON) 10 mEq tablet, Take 1 tablet (10 mEq total) by mouth daily, Disp: 30 tablet, Rfl: 0    temazepam (RESTORIL) 30 mg capsule, Take 30 mg by mouth daily at bedtime , Disp: , Rfl:

## 2021-11-17 ENCOUNTER — TELEPHONE (OUTPATIENT)
Dept: CARDIOLOGY CLINIC | Facility: CLINIC | Age: 81
End: 2021-11-17

## 2021-11-17 ENCOUNTER — IMMUNIZATIONS (OUTPATIENT)
Dept: FAMILY MEDICINE CLINIC | Facility: HOSPITAL | Age: 81
End: 2021-11-17

## 2021-11-17 DIAGNOSIS — Z23 ENCOUNTER FOR IMMUNIZATION: Primary | ICD-10-CM

## 2021-11-17 PROCEDURE — 91306 COVID-19 MODERNA VACC 0.25 ML BOOSTER: CPT

## 2021-11-17 PROCEDURE — 0064A COVID-19 MODERNA VACC 0.25 ML BOOSTER: CPT

## 2021-12-24 NOTE — PROGRESS NOTES
MDT SINGLE CHAMBER PM (HIS)/ACTIVE SYSTEM IS MRI CONDITIONAL  DEVICE INTERROGATED IN THE [place] OFFICE:  BATTERY VOLTAGE ADEQUATE (13 3 YR)    92 3% (>40%/VVIR 60 PPM)    ALL LEAD PARAMETERS WITHIN NORMAL LIMITS   NO HIGH RATE EPISODES   NO PROGRAMMING CHANGES MADE TO DEVICE PARAMETERS   INCISION CLEAN AND DRY WITH EDGES APPROXIMATED   STERI-STRIPS REMOVED   WOUND CARE AND RESTRICTIONS REVIEWED WITH PATIENT  Kathya Arthur
Dana Cartwright)

## 2022-07-03 ENCOUNTER — HOSPITAL ENCOUNTER (INPATIENT)
Facility: HOSPITAL | Age: 82
LOS: 3 days | Discharge: NURSING, REHAB OR LONG-TERM CARE | DRG: 563 | End: 2022-07-07
Attending: EMERGENCY MEDICINE | Admitting: STUDENT IN AN ORGANIZED HEALTH CARE EDUCATION/TRAINING PROGRAM
Payer: MEDICARE

## 2022-07-03 DIAGNOSIS — I10 ESSENTIAL HYPERTENSION: ICD-10-CM

## 2022-07-03 DIAGNOSIS — Z86.73 HISTORY OF STROKE: ICD-10-CM

## 2022-07-03 DIAGNOSIS — S42.209A PROXIMAL HUMERUS FRACTURE: Primary | ICD-10-CM
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
## 2022-07-03 DIAGNOSIS — R53.1 ASTHENIA: ICD-10-CM

## 2022-07-03 PROCEDURE — 1123F ACP DISCUSS/DSCN MKR DOCD: CPT | Performed by: INTERNAL MEDICINE

## 2022-07-03 PROCEDURE — 93005 ELECTROCARDIOGRAM TRACING: CPT

## 2022-07-03 PROCEDURE — 99285 EMERGENCY DEPT VISIT HI MDM: CPT

## 2022-07-04 ENCOUNTER — APPOINTMENT (EMERGENCY)
Dept: RADIOLOGY | Facility: HOSPITAL | Age: 82
DRG: 563 | End: 2022-07-04
Payer: MEDICARE

## 2022-07-04 ENCOUNTER — APPOINTMENT (INPATIENT)
Dept: RADIOLOGY | Facility: HOSPITAL | Age: 82
DRG: 563 | End: 2022-07-04
Payer: MEDICARE

## 2022-07-04 ENCOUNTER — APPOINTMENT (EMERGENCY)
Dept: CT IMAGING | Facility: HOSPITAL | Age: 82
DRG: 563 | End: 2022-07-04
Payer: MEDICARE

## 2022-07-04 PROBLEM — D72.829 LEUKOCYTOSIS: Status: ACTIVE | Noted: 2022-07-04

## 2022-07-04 PROBLEM — R17 ELEVATED BILIRUBIN: Status: ACTIVE | Noted: 2022-07-04

## 2022-07-04 PROBLEM — S42.209A CLOSED FRACTURE OF PROXIMAL END OF HUMERUS: Status: ACTIVE | Noted: 2022-07-04

## 2022-07-04 PROBLEM — R26.2 AMBULATORY DYSFUNCTION: Status: ACTIVE | Noted: 2022-07-04

## 2022-07-04 LAB
2HR DELTA HS TROPONIN: 1 NG/L
4HR DELTA HS TROPONIN: 2 NG/L
ALBUMIN SERPL BCP-MCNC: 3.2 G/DL (ref 3.5–5)
ALP SERPL-CCNC: 70 U/L (ref 46–116)
ALT SERPL W P-5'-P-CCNC: 44 U/L (ref 12–78)
ANION GAP SERPL CALCULATED.3IONS-SCNC: 11 MMOL/L (ref 4–13)
AST SERPL W P-5'-P-CCNC: 36 U/L (ref 5–45)
ATRIAL RATE: 288 BPM
BACTERIA UR QL AUTO: ABNORMAL /HPF
BASOPHILS # BLD AUTO: 0.05 THOUSANDS/ΜL (ref 0–0.1)
BASOPHILS NFR BLD AUTO: 0 % (ref 0–1)
BILIRUB SERPL-MCNC: 2.06 MG/DL (ref 0.2–1)
BILIRUB UR QL STRIP: NEGATIVE
BUN SERPL-MCNC: 16 MG/DL (ref 5–25)
CALCIUM ALBUM COR SERPL-MCNC: 9.1 MG/DL (ref 8.3–10.1)
CALCIUM SERPL-MCNC: 8.5 MG/DL (ref 8.3–10.1)
CARDIAC TROPONIN I PNL SERPL HS: 12 NG/L
CARDIAC TROPONIN I PNL SERPL HS: 13 NG/L
CARDIAC TROPONIN I PNL SERPL HS: 14 NG/L
CHLORIDE SERPL-SCNC: 99 MMOL/L (ref 100–108)
CLARITY UR: ABNORMAL
CO2 SERPL-SCNC: 26 MMOL/L (ref 21–32)
COLOR UR: YELLOW
CREAT SERPL-MCNC: 0.89 MG/DL (ref 0.6–1.3)
EOSINOPHIL # BLD AUTO: 0.02 THOUSAND/ΜL (ref 0–0.61)
EOSINOPHIL NFR BLD AUTO: 0 % (ref 0–6)
ERYTHROCYTE [DISTWIDTH] IN BLOOD BY AUTOMATED COUNT: 13.3 % (ref 11.6–15.1)
FLUAV RNA RESP QL NAA+PROBE: NEGATIVE
FLUBV RNA RESP QL NAA+PROBE: NEGATIVE
GFR SERPL CREATININE-BSD FRML MDRD: 61 ML/MIN/1.73SQ M
GLUCOSE SERPL-MCNC: 187 MG/DL (ref 65–140)
GLUCOSE SERPL-MCNC: 191 MG/DL (ref 65–140)
GLUCOSE SERPL-MCNC: 199 MG/DL (ref 65–140)
GLUCOSE SERPL-MCNC: 215 MG/DL (ref 65–140)
GLUCOSE SERPL-MCNC: 231 MG/DL (ref 65–140)
GLUCOSE SERPL-MCNC: 242 MG/DL (ref 65–140)
GLUCOSE SERPL-MCNC: 244 MG/DL (ref 65–140)
GLUCOSE UR STRIP-MCNC: ABNORMAL MG/DL
HCT VFR BLD AUTO: 41.9 % (ref 34.8–46.1)
HGB BLD-MCNC: 13.8 G/DL (ref 11.5–15.4)
HGB UR QL STRIP.AUTO: NEGATIVE
IMM GRANULOCYTES # BLD AUTO: 0.1 THOUSAND/UL (ref 0–0.2)
IMM GRANULOCYTES NFR BLD AUTO: 1 % (ref 0–2)
KETONES UR STRIP-MCNC: ABNORMAL MG/DL
LEUKOCYTE ESTERASE UR QL STRIP: ABNORMAL
LYMPHOCYTES # BLD AUTO: 1.2 THOUSANDS/ΜL (ref 0.6–4.47)
LYMPHOCYTES NFR BLD AUTO: 8 % (ref 14–44)
MCH RBC QN AUTO: 31.4 PG (ref 26.8–34.3)
MCHC RBC AUTO-ENTMCNC: 32.9 G/DL (ref 31.4–37.4)
MCV RBC AUTO: 95 FL (ref 82–98)
MONOCYTES # BLD AUTO: 1.04 THOUSAND/ΜL (ref 0.17–1.22)
MONOCYTES NFR BLD AUTO: 7 % (ref 4–12)
NEUTROPHILS # BLD AUTO: 13.53 THOUSANDS/ΜL (ref 1.85–7.62)
NEUTS SEG NFR BLD AUTO: 84 % (ref 43–75)
NITRITE UR QL STRIP: NEGATIVE
NON-SQ EPI CELLS URNS QL MICRO: ABNORMAL /HPF
NRBC BLD AUTO-RTO: 0 /100 WBCS
PH UR STRIP.AUTO: 6 [PH]
PLATELET # BLD AUTO: 229 THOUSANDS/UL (ref 149–390)
PMV BLD AUTO: 10.6 FL (ref 8.9–12.7)
POTASSIUM SERPL-SCNC: 3.8 MMOL/L (ref 3.5–5.3)
PROCALCITONIN SERPL-MCNC: 0.15 NG/ML
PROT SERPL-MCNC: 6.5 G/DL (ref 6.4–8.2)
PROT UR STRIP-MCNC: ABNORMAL MG/DL
QRS AXIS: 68 DEGREES
QRSD INTERVAL: 150 MS
QT INTERVAL: 454 MS
QTC INTERVAL: 475 MS
RBC # BLD AUTO: 4.4 MILLION/UL (ref 3.81–5.12)
RBC #/AREA URNS AUTO: ABNORMAL /HPF
RSV RNA RESP QL NAA+PROBE: NEGATIVE
SARS-COV-2 RNA RESP QL NAA+PROBE: NEGATIVE
SODIUM SERPL-SCNC: 136 MMOL/L (ref 136–145)
SP GR UR STRIP.AUTO: >=1.03 (ref 1–1.03)
T WAVE AXIS: 0 DEGREES
UROBILINOGEN UR QL STRIP.AUTO: 1 E.U./DL
VENTRICULAR RATE: 66 BPM
WBC # BLD AUTO: 15.94 THOUSAND/UL (ref 4.31–10.16)
WBC #/AREA URNS AUTO: ABNORMAL /HPF

## 2022-07-04 PROCEDURE — 99223 1ST HOSP IP/OBS HIGH 75: CPT | Performed by: ORTHOPAEDIC SURGERY

## 2022-07-04 PROCEDURE — 99285 EMERGENCY DEPT VISIT HI MDM: CPT | Performed by: EMERGENCY MEDICINE

## 2022-07-04 PROCEDURE — 97167 OT EVAL HIGH COMPLEX 60 MIN: CPT

## 2022-07-04 PROCEDURE — 87086 URINE CULTURE/COLONY COUNT: CPT | Performed by: EMERGENCY MEDICINE

## 2022-07-04 PROCEDURE — 73030 X-RAY EXAM OF SHOULDER: CPT

## 2022-07-04 PROCEDURE — 73562 X-RAY EXAM OF KNEE 3: CPT

## 2022-07-04 PROCEDURE — 82948 REAGENT STRIP/BLOOD GLUCOSE: CPT

## 2022-07-04 PROCEDURE — 81001 URINALYSIS AUTO W/SCOPE: CPT | Performed by: EMERGENCY MEDICINE

## 2022-07-04 PROCEDURE — 93010 ELECTROCARDIOGRAM REPORT: CPT | Performed by: INTERNAL MEDICINE

## 2022-07-04 PROCEDURE — 97163 PT EVAL HIGH COMPLEX 45 MIN: CPT

## 2022-07-04 PROCEDURE — 85025 COMPLETE CBC W/AUTO DIFF WBC: CPT | Performed by: EMERGENCY MEDICINE

## 2022-07-04 PROCEDURE — 36415 COLL VENOUS BLD VENIPUNCTURE: CPT | Performed by: EMERGENCY MEDICINE

## 2022-07-04 PROCEDURE — 23600 CLTX PROX HUMRL FX W/O MNPJ: CPT | Performed by: ORTHOPAEDIC SURGERY

## 2022-07-04 PROCEDURE — 0241U HB NFCT DS VIR RESP RNA 4 TRGT: CPT | Performed by: EMERGENCY MEDICINE

## 2022-07-04 PROCEDURE — 99223 1ST HOSP IP/OBS HIGH 75: CPT | Performed by: PHYSICIAN ASSISTANT

## 2022-07-04 PROCEDURE — 80053 COMPREHEN METABOLIC PANEL: CPT | Performed by: EMERGENCY MEDICINE

## 2022-07-04 PROCEDURE — 84484 ASSAY OF TROPONIN QUANT: CPT | Performed by: EMERGENCY MEDICINE

## 2022-07-04 PROCEDURE — 84145 PROCALCITONIN (PCT): CPT | Performed by: PHYSICIAN ASSISTANT

## 2022-07-04 PROCEDURE — 70450 CT HEAD/BRAIN W/O DYE: CPT

## 2022-07-04 RX ORDER — FUROSEMIDE 20 MG/1
20 TABLET ORAL DAILY
Status: DISCONTINUED | OUTPATIENT
Start: 2022-07-04 | End: 2022-07-07 | Stop reason: HOSPADM

## 2022-07-04 RX ORDER — OXYCODONE HYDROCHLORIDE AND ACETAMINOPHEN 5; 325 MG/1; MG/1
1 TABLET ORAL EVERY 6 HOURS PRN
Status: DISCONTINUED | OUTPATIENT
Start: 2022-07-04 | End: 2022-07-07 | Stop reason: HOSPADM

## 2022-07-04 RX ORDER — IBUPROFEN 400 MG/1
400 TABLET ORAL EVERY 6 HOURS PRN
Status: DISCONTINUED | OUTPATIENT
Start: 2022-07-04 | End: 2022-07-07

## 2022-07-04 RX ORDER — INSULIN LISPRO 100 [IU]/ML
1-5 INJECTION, SOLUTION INTRAVENOUS; SUBCUTANEOUS
Status: DISCONTINUED | OUTPATIENT
Start: 2022-07-04 | End: 2022-07-07 | Stop reason: HOSPADM

## 2022-07-04 RX ORDER — MELATONIN
1000 DAILY
Status: DISCONTINUED | OUTPATIENT
Start: 2022-07-04 | End: 2022-07-07 | Stop reason: HOSPADM

## 2022-07-04 RX ORDER — MAGNESIUM HYDROXIDE/ALUMINUM HYDROXICE/SIMETHICONE 120; 1200; 1200 MG/30ML; MG/30ML; MG/30ML
30 SUSPENSION ORAL EVERY 6 HOURS PRN
Status: DISCONTINUED | OUTPATIENT
Start: 2022-07-04 | End: 2022-07-06

## 2022-07-04 RX ORDER — ENOXAPARIN SODIUM 100 MG/ML
40 INJECTION SUBCUTANEOUS DAILY
Status: DISCONTINUED | OUTPATIENT
Start: 2022-07-04 | End: 2022-07-07 | Stop reason: HOSPADM

## 2022-07-04 RX ORDER — TEMAZEPAM 15 MG/1
30 CAPSULE ORAL
Status: DISCONTINUED | OUTPATIENT
Start: 2022-07-04 | End: 2022-07-07 | Stop reason: HOSPADM

## 2022-07-04 RX ORDER — HYDROMORPHONE HCL/PF 1 MG/ML
0.5 SYRINGE (ML) INJECTION EVERY 6 HOURS PRN
Status: DISCONTINUED | OUTPATIENT
Start: 2022-07-04 | End: 2022-07-07 | Stop reason: HOSPADM

## 2022-07-04 RX ORDER — LIDOCAINE 50 MG/G
1 PATCH TOPICAL DAILY PRN
Status: DISCONTINUED | OUTPATIENT
Start: 2022-07-04 | End: 2022-07-07 | Stop reason: HOSPADM

## 2022-07-04 RX ORDER — DOCUSATE SODIUM 100 MG/1
100 CAPSULE, LIQUID FILLED ORAL 2 TIMES DAILY PRN
Status: DISCONTINUED | OUTPATIENT
Start: 2022-07-04 | End: 2022-07-06

## 2022-07-04 RX ORDER — PANTOPRAZOLE SODIUM 40 MG/1
40 TABLET, DELAYED RELEASE ORAL DAILY
Status: DISCONTINUED | OUTPATIENT
Start: 2022-07-04 | End: 2022-07-07 | Stop reason: HOSPADM

## 2022-07-04 RX ORDER — ATORVASTATIN CALCIUM 10 MG/1
10 TABLET, FILM COATED ORAL DAILY
Status: DISCONTINUED | OUTPATIENT
Start: 2022-07-04 | End: 2022-07-04

## 2022-07-04 RX ORDER — ASPIRIN 81 MG/1
81 TABLET ORAL DAILY
Status: DISCONTINUED | OUTPATIENT
Start: 2022-07-04 | End: 2022-07-07 | Stop reason: HOSPADM

## 2022-07-04 RX ORDER — DILTIAZEM HYDROCHLORIDE 240 MG/1
240 CAPSULE, COATED, EXTENDED RELEASE ORAL DAILY
Status: DISCONTINUED | OUTPATIENT
Start: 2022-07-04 | End: 2022-07-07 | Stop reason: HOSPADM

## 2022-07-04 RX ADMIN — ENOXAPARIN SODIUM 40 MG: 40 INJECTION SUBCUTANEOUS at 08:45

## 2022-07-04 RX ADMIN — ASPIRIN 81 MG: 81 TABLET, COATED ORAL at 08:43

## 2022-07-04 RX ADMIN — INSULIN LISPRO 1 UNITS: 100 INJECTION, SOLUTION INTRAVENOUS; SUBCUTANEOUS at 21:25

## 2022-07-04 RX ADMIN — TEMAZEPAM 30 MG: 15 CAPSULE ORAL at 21:25

## 2022-07-04 RX ADMIN — INSULIN LISPRO 1 UNITS: 100 INJECTION, SOLUTION INTRAVENOUS; SUBCUTANEOUS at 12:32

## 2022-07-04 RX ADMIN — INSULIN LISPRO 2 UNITS: 100 INJECTION, SOLUTION INTRAVENOUS; SUBCUTANEOUS at 15:41

## 2022-07-04 RX ADMIN — LIDOCAINE 5% 1 PATCH: 700 PATCH TOPICAL at 08:46

## 2022-07-04 RX ADMIN — INSULIN LISPRO 2 UNITS: 100 INJECTION, SOLUTION INTRAVENOUS; SUBCUTANEOUS at 08:39

## 2022-07-04 RX ADMIN — FUROSEMIDE 20 MG: 20 TABLET ORAL at 08:43

## 2022-07-04 RX ADMIN — OXYCODONE HYDROCHLORIDE AND ACETAMINOPHEN 1 TABLET: 5; 325 TABLET ORAL at 08:42

## 2022-07-04 RX ADMIN — CEFTRIAXONE SODIUM 1000 MG: 10 INJECTION, POWDER, FOR SOLUTION INTRAVENOUS at 10:40

## 2022-07-04 RX ADMIN — LUBIPROSTONE 24 MCG: 24 CAPSULE, GELATIN COATED ORAL at 16:09

## 2022-07-04 RX ADMIN — OXYCODONE HYDROCHLORIDE AND ACETAMINOPHEN 1 TABLET: 5; 325 TABLET ORAL at 14:57

## 2022-07-04 RX ADMIN — OXYCODONE HYDROCHLORIDE AND ACETAMINOPHEN 1 TABLET: 5; 325 TABLET ORAL at 21:25

## 2022-07-04 RX ADMIN — PANTOPRAZOLE SODIUM 40 MG: 40 TABLET, DELAYED RELEASE ORAL at 08:44

## 2022-07-04 RX ADMIN — Medication 1000 UNITS: at 08:44

## 2022-07-04 NOTE — ASSESSMENT & PLAN NOTE
· Bilirubin elevated at 2 06 which appears to be new per review of chart  · Patient denies any abdominal pain  · Discussed finding with patient follow-up outpatient with PCP and GI

## 2022-07-04 NOTE — ASSESSMENT & PLAN NOTE
Lab Results   Component Value Date    HGBA1C 7 1 (H) 10/12/2021       No results for input(s): POCGLU in the last 72 hours      Blood Sugar Average: Last 72 hrs:  ·  blood glucose checks q i d , hypoglycemia protocol  · Hold home metformin  · Sliding scale insulin

## 2022-07-04 NOTE — PHYSICAL THERAPY NOTE
Physical Therapy Evaluation     Patient's Name: Rl Desouza    Admitting Diagnosis  No admission diagnoses are documented for this encounter  Problem List  Patient Active Problem List   Diagnosis    History of stroke    Essential hypertension    Type 2 diabetes mellitus without complication, without long-term current use of insulin (HCC)    Tibial plateau fracture    Other persistent atrial fibrillation (HCC)    Severe aortic stenosis    Carotid occlusion, left    Carotid stenosis, asymptomatic, right    S/P TAVR (transcatheter aortic valve replacement)    Right sided weakness    Expressive aphasia    Acute pulmonary embolism (HCC)    Bilateral leg edema    Adenopathy    Acute diastolic congestive heart failure (HCC)    [facility]-chana [facility] [address] [facility]    Mild intermittent asthma    Ambulatory dysfunction    Closed fracture of proximal end of humerus    Elevated bilirubin    Leukocytosis     Past Medical History  Past Medical History:   Diagnosis Date    Aortic valve disease     Atrial fibrillation ([facility] [address] )     Cardiac disease     Diabetes mellitus ([facility] [address] )     Heart murmur     Heart valve disease     Hypertension     Stroke [facility])      Past Surgical History  Past Surgical History:   Procedure Laterality Date     SECTION      x 2    HYSTERECTOMY      NV ECHO TRANSESOPHAG R-T 2D W/PRB IMG ACQUISJ I&R N/A 2020    Procedure: TRANSESOPHAGEAL ECHOCARDIOGRAM (BOLIVAR); Surgeon: Mike Long DO;  [facility];  Service: Cardiac Surgery    NV REPLACE AORTIC VALVE OPENFEMORAL ARTERY APPROACH Right 2020    Procedure: REPLACEMENT AORTIC VALVE TRANSCATHETER (TAVR) TRANSFEMORAL W/ 26 MM TODD ANIBAL S3 ULTRA VALVE (ACCESS ON LEFT);   Surgeon: Mike Long DO;  [facility];  Service: Cardiac Surgery    TONSILLECTOMY        22 0955   PT Last Visit   PT Visit Date 22   Note Type   Note type Evaluation   Pain Assessment   Pain Assessment Tool 0-10   Pain Score No Pain   Restrictions/Precautions   Weight Bearing Precautions Per Order Yes   RUE Weight Bearing Per Order NWB   Braces or Orthoses Sling  (R UE)   Other Precautions Cognitive; Chair Alarm; Bed Alarm;WBS;Multiple lines;Telemetry; Fall Risk   Home Living   Type of [address] One level;Ramped entrance   Bathroom Shower/Tub   (sponges bathes at sink)   [place] [address];Life alert;Quad cane   Additional Comments Pt ambulates with a quad cane  Prior Function   Level of [place] Independent with ADLs and functional mobility  (needs assist with ADL's)   Lives With Alone   Receives Help From Family  (daughter lives next door)   ADL Assistance Needs assistance  (assist for showering)   IADLs Needs assistance  (cleaning lady; meals on wheels)   Falls in the last 6 months 1 to 4   Vocational Retired   General   Family/Caregiver Present No   Cognition   Overall Cognitive Status Impaired   Arousal/Participation Alert   Attention Attends with cues to redirect   Orientation Level Oriented X4   Memory Decreased recall of recent events   Following Commands Follows one step commands with increased time or repetition   Comments Pt agreeable to PT    RUE Assessment   RUE Assessment   (defer to OT assessment)   LUE Assessment   LUE Assessment   (defer to OT assessment)   RLE Assessment   RLE Assessment X  (positioned in ER when supine in bed)   Strength RLE   R Hip Flexion 3-/5   R Knee Extension 3-/5   R Ankle Dorsiflexion 3/5   LLE Assessment   LLE Assessment X   Strength LLE   LLE Overall Strength 3+/5   Light Touch   RLE Light Touch Grossly intact   LLE Light Touch Grossly intact   Bed Mobility   Supine to Sit 3  Moderate assistance   Additional items Assist x 2;HOB elevated; Increased time required;Verbal cues;LE management   Sit to Supine 3  Moderate assistance   Additional items Assist x 2;Leg ;Verbal cues;LE management   Transfers   Sit to Stand 2  Maximal assistance   Additional items Assist x 2; Increased time required;Verbal cues   Stand to Sit 2  Maximal assistance   Additional items Assist x 2; Increased time required;Verbal cues   Ambulation/Elevation   Gait pattern Decreased toe off;Decreased heel strike;Decreased hip extension; Excessively slow; Step to;Short stride; Shuffling;Decreased R stance;Decreased foot clearance; Improper Weight shift   Gait Assistance 2  Maximal assist   Additional items Assist x 2;Verbal cues   Assistive Device Rolling walker  (R UE maintained NWB)   Distance 2 side steps at EOB   Stair Management Assistance Not tested   Ambulation/Elevation Additional Comments Pt requires assistance to advance R LE   Balance   Static Sitting Fair   Dynamic Sitting Fair -   Static Standing Poor   Dynamic Standing Poor -   Ambulatory Poor -   Endurance Deficit   Endurance Deficit Yes   Endurance Deficit Description decreased activity tolerance   Activity Tolerance   Activity Tolerance Patient limited by fatigue;Patient limited by pain   Medical Staff Made Aware KYLIE Thao   Nurse Made Aware Discussed case with DARYL Metz   Assessment   Prognosis Good   Problem List Decreased strength;Decreased endurance; Impaired balance;Decreased mobility; Decreased cognition;Orthopedic restrictions   Assessment Pt is [de-identified]year old female seen for PT evaluation s/p admit to [facility] on 7/3/2022 with Ambulatory dysfunction  PT consulted to assess pt's functional mobility and d/c needs  Order placed for PT eval and tx, with up and OOB as tolerated and NWB R UE orders  Comorbidities affecting pt's physical performance at time of assessment include type 2 DM, closed fracture of proximal end of humerus, elevated bilirubin, leukocytosis, and history of stroke with right sided weakness  PTA, pt was independent with all functional mobility with a quad cane   Pt resides alone in a one level house with a ramped entrance  Pt requires assist with ADL's and IADL's  Personal factors affecting pt at time of IE include inability to ambulate household distances, inability to navigate level surfaces without external assistance, unable to perform dynamic tasks in community, decreased cognition, limited home support, positive fall history, inability to perform IADLs, inability to perform ADLs, and inability to live alone  Please find objective findings from PT assessment regarding body systems outlined above with impairments and limitations including weakness, impaired balance, decreased endurance, gait deviations, decreased activity tolerance, decreased functional mobility tolerance, fall risk, and decreased cognition  The following objective measures performed on IE also reveal limitations: Barthel Index: 30/100, Modified Christina: 4 (moderate/severe disability) and AM-PAC 6-Clicks: 6/54  Pt's clinical presentation is currently unstable/unpredictable seen in pt's presentation of need for ongoing medical management/monitoring, pt is a fall risk, pt has orthopedic restrictions limiting functional mobility, and pt requires cues and assist of two for safety with functional mobility  Pt to benefit from continued PT tx to address deficits as defined above and maximize level of functional independent mobility and consistency  From PT/mobility standpoint, recommendation at time of d/c would be STR pending progress in order to facilitate return to PLOF     Barriers to Discharge Inaccessible home environment;Decreased caregiver support   Goals   STG Expiration Date 07/14/22   Short Term Goal #1 In 10 days: Increase bilateral LE strength 1/2 grade to facilitate independent mobility, Perform all bed mobility tasks with min A of 1 to decrease caregiver burden, Perform all transfers with min A of 1 to improve independence, Ambulate > 50 ft  with least restrictive assistive device with mod A of 1 w/o LOB and w/ normalized gait pattern 100% of the time and Increase all balance 1/2 grade to decrease risk for falls   Plan   Treatment/Interventions Functional transfer training;LE strengthening/ROM; Therapeutic exercise; Endurance training;Cognitive reorientation;Patient/family training;Bed mobility;Gait training;Spoke to nursing;OT   PT Frequency 3-5x/wk   Recommendation   PT Discharge Recommendation Post acute rehabilitation services   AM-PAC Basic Mobility Inpatient   Turning in Bed Without Bedrails 1   Lying on Back to Sitting on Edge of Flat Bed 1   Moving Bed to Chair 1   Standing Up From Chair 1   Walk in Room 1   Climb 3-5 Stairs 1   Basic Mobility Inpatient Raw Score 6   Turning Head Towards Sound 4   Follow Simple Instructions 4   Low Function Basic Mobility Raw Score 14   Low Function Basic Mobility Standardized Score 22 01   Highest Level Of Mobility   -[facility] Goal 2: Bed activities/Dependent transfer   -[facility] Achieved 3: Sit at edge of bed   Modified Christina Scale   Modified Christina Scale 4   Barthel Index   Feeding 5   Bathing 0   Grooming Score 0   Dressing Score 0   Bladder Score 5   Bowels Score 10   Toilet Use Score 5   Transfers (Bed/Chair) Score 5   Mobility (Level Surface) Score 0   Stairs Score 0   Barthel Index Score 30     PT Evaluation Time: 1179-0747    Nick Barragan, PT, DPT

## 2022-07-04 NOTE — ED PROVIDER NOTES
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 and unchanged  Patient is normally able to self transfer and lives independently  Patient states she laid in her bed all day as she was fatigued and did not eat anything  Patient pushed her medic alert button and patient's daughter, who lives in the house next to her arrived along with EMS  Patient's daughter states she was sitting the edge of her bed and upon EMS attempting to place the patient in her bed, they heard a "pop" with pain in her right shoulder  Patient currently complains of right shoulder pain  ROS: Patient denies any headache, abdominal pain, chest pain, other extremity pain, fever/chils, nausea/vomiting, visual changes, diarrhea, dyspnea, cough, arthralgia, dysuria  All other systems reviewed and are negative  PHYSICAL EXAM:   Primary Exam   A: Patent   B: Bilateral equal breath sounds   C: Pulses intact in all extremities, no active bleeding   D: No signs of gross motor or cognitive neurologic impairment     Secondary Exam   Constitutional: No acute distress  HENT: Normocephalic and atraumatic  Normal pharyngeal exam  No hemotympanum, raccoon eyes or Mckinnon sign  Eyes: No hyphema  EOMI  PERRL  Neck: No midline tenderness, supple  No midline tenderness  CV: Regular rate and rhythm, no murmur  Peripheral pulses intact  Respiratory: No traumatic findings  Lungs clear to auscultation bilaterally  Chest nontender  Abdomen: No traumatic findings  Soft, Non-tender, non-distended     Back: No vertebral tenderness, step-offs or crepitus  Skin: Normal color, warm and dry   Extremities other than right shoulder: Non-tender, no deformities  Right shoulder:  Tenderness along the anterior portion of the glenohumeral joint with further tenderness along the anterior portion the upper arm with an area concerning for bicipital injury  There is no large hematoma or abrasions  Range of motion is normal with passive range of motion; patient has no movement at baseline secondary her history of prior CVA  Chronic deformities of the hand secondary to prior CVA  2+ radial pulse with appropriate capillary refill  Neuro: Awake, alert, no gross sensory or motor deficits that are acute; chronic right-sided hemiparesis that is at baseline per the patient  Patient has some dysarthria though per the patient's daughter, this is her baseline  Medical Decision Making   Fall of unclear etiology  Regarding patient's traumatic injuries, obtain CT imaging of patient's head though she denies striking her head, patient is on aspirin  Patient is on anticoagulation  Will obtain plain film imaging of patient's shoulder though history is concerning for potential bicipital injury  Discussed with the patient and discussed symptomatic management with a sling and the need for follow-up with Orthopedics  Discussed range-of-motion exercises with the patient and her family until able to be seen by than to avoid frozen shoulder  Unclear etiology of patient's fall if she denies any clear symptoms prior to the onset of the symptoms excluding fatigue  Patient does note she a less but denies any nausea or vomiting  Will obtain cardiac evaluation considering patient's age though she denies any chest pain  Patient's EKG demonstrating a paced rhythm, limiting diagnostic evaluation  Will obtain urinalysis though again patient denies any urinary symptoms or any infectious symptoms including no cough    Patient is afebrile and denies any known contact with others  Discussed potential of diagnostic uncertainty with the patient and offered placement considering concerns for patient's safety, which patient and her family declined  Will monitor patient, reassess, and re-evaluate  History provided by:  Patient  Fall  Mechanism of injury: fall    Incident location:  Home  Fall:     Fall occurred:  From a bed  Associated symptoms: no abdominal pain, no back pain, no blindness, no chest pain, no difficulty breathing, no headaches, no hearing loss, no loss of consciousness, no nausea, no neck pain, no seizures and no vomiting        Prior to Admission Medications   Prescriptions Last Dose Informant Patient Reported? Taking?    BABY ASPIRIN PO Past Week at Unknown time Self Yes Yes   Sig: Take 81 mg by mouth daily   acetaminophen (TYLENOL) 325 mg tablet Not Taking at Unknown time Self Yes No   Sig: Take 650 mg by mouth every 6 (six) hours as needed for mild pain   Patient not taking: Reported on 7/5/2022   alendronate (FOSAMAX) 70 mg tablet Past Week at Unknown time Self Yes Yes   Sig: Take 70 mg by mouth Once a week   ascorbic acid (VITAMIN C) 500 mg tablet Past Week at Unknown time Self Yes Yes   Sig: Take 500 mg by mouth 2 (two) times a day   atorvastatin (LIPITOR) 10 mg tablet Not Taking at Unknown time Self Yes No   Sig: Take 10 mg by mouth daily   Patient not taking: Reported on 7/5/2022   cholecalciferol (VITAMIN D3) 1,000 units tablet Not Taking at Unknown time Self Yes No   Sig: Take 1,000 Units by mouth daily   Patient not taking: Reported on 7/5/2022   cyanocobalamin (VITAMIN B-12) 100 mcg tablet Past Week at Unknown time Self Yes Yes   Sig: Take by mouth daily   diltiazem (CARDIZEM CD) 240 mg 24 hr capsule Past Week at Unknown time  No Yes   Sig: Take 1 capsule (240 mg total) by mouth daily   docusate sodium (COLACE) 100 mg capsule Past Week at Unknown time  No Yes   Sig: Take 1 capsule (100 mg total) by mouth 2 (two) times a day   fluticasone-vilanterol (BREO ELLIPTA) 100-25 mcg/inh inhaler Past Week at Unknown time  No Yes   Sig: Inhale 1 puff daily Rinse mouth after use  furosemide (LASIX) 20 mg tablet Past Week at Unknown time  No Yes   Sig: Take 1 tablet (20 mg total) by mouth daily   lubiprostone (AMITIZA) 24 mcg capsule Not Taking at Unknown time  Yes No   Sig: Take 24 mcg by mouth 2 (two) times a day with meals   Patient not taking: Reported on 2022   metFORMIN (GLUCOPHAGE) 500 mg tablet Past Week at Unknown time Self Yes Yes   Sig: Take 500 mg by mouth daily with breakfast    metoprolol tartrate (LOPRESSOR) 25 mg tablet Not Taking at Unknown time  No No   Sig: Take 1 tablet (25 mg total) by mouth every 12 (twelve) hours   Patient not taking: Reported on 2022   omeprazole (PriLOSEC) 20 mg delayed release capsule   No No   Sig: Take 1 capsule (20 mg total) by mouth daily Take 30 minutes prior to breakfast   polyethylene glycol (MIRALAX) 17 g packet Not Taking at Unknown time Self No No   Sig: Take 17 g by mouth daily   Patient not taking: No sig reported   potassium chloride (K-DUR,KLOR-CON) 10 mEq tablet   No No   Sig: Take 1 tablet (10 mEq total) by mouth daily   temazepam (RESTORIL) 30 mg capsule Past Week at Unknown time Self Yes Yes   Sig: Take 30 mg by mouth daily at bedtime       Facility-Administered Medications: None       Past Medical History:   Diagnosis Date    Aortic valve disease     Atrial fibrillation (HCC)     Cardiac disease     Diabetes mellitus ([address] )     Heart murmur     Heart valve disease     Hypertension     Stroke [facility])        Past Surgical History:   Procedure Laterality Date     SECTION      x 2    HYSTERECTOMY      OK ECHO TRANSESOPHAG R-T 2D W/PRB IMG ACQUISJ I&R N/A 2020    Procedure: TRANSESOPHAGEAL ECHOCARDIOGRAM (BOLIVAR);   Surgeon: Karen Nunez DO;  [facility];  Service: Cardiac Surgery    OK REPLACE AORTIC VALVE OPENFEMORAL ARTERY APPROACH Right 2/18/2020    Procedure: REPLACEMENT AORTIC VALVE TRANSCATHETER (TAVR) TRANSFEMORAL W/ 26 MM TODD ANIBAL S3 ULTRA VALVE (ACCESS ON LEFT); Surgeon: Belinda Aldrich DO;  Location[facility];  Service: Cardiac Surgery    TONSILLECTOMY         Family History   Problem Relation Age of Onset    Diabetes Mother     Heart disease Mother     Diabetes Father     Heart disease Father      I have reviewed and agree with the history as documented  E-Cigarette/Vaping    E-Cigarette Use Never User      E-Cigarette/Vaping Substances     Social History     Tobacco Use    Smoking status: Never Smoker    Smokeless tobacco: Never Used   Vaping Use    Vaping Use: Never used   Substance Use Topics    Alcohol use: Not Currently    Drug use: Never       Review of Systems   Constitutional: Positive for fatigue  HENT: Negative for hearing loss  Eyes: Negative for blindness  Cardiovascular: Negative for chest pain  Gastrointestinal: Negative for abdominal pain, nausea and vomiting  Musculoskeletal: Negative for back pain and neck pain  Neurological: Negative for seizures, loss of consciousness and headaches  All other systems reviewed and are negative  Physical Exam  Physical Exam  Vitals reviewed  HENT:      Head: Atraumatic  Eyes:      Pupils: Pupils are equal, round, and reactive to light  Cardiovascular:      Rate and Rhythm: Normal rate  Pulmonary:      Effort: Pulmonary effort is normal    Abdominal:      General: There is no distension  Tenderness: There is no abdominal tenderness  There is no guarding or rebound  Musculoskeletal:         General: Tenderness present  No deformity  Cervical back: Neck supple  Skin:     General: Skin is warm and dry  Neurological:      General: No focal deficit present  Mental Status: She is alert     Psychiatric:         Mood and Affect: Mood normal          Vital Signs  ED Triage Vitals   Temperature Pulse Respirations Blood Pressure SpO2   07/03/22 2352 07/03/22 2352 07/03/22 2352 07/03/22 2352 07/03/22 2352   98 1 °F (36 7 °C) 63 (!) 30 133/60 93 %      Temp Source Heart Rate Source Patient Position - Orthostatic VS BP Location FiO2 (%)   07/03/22 2352 07/03/22 2355 07/03/22 2352 07/03/22 2352 --   Oral Monitor Sitting Left arm       Pain Score       07/03/22 2352       10 - Worst Possible Pain           Vitals:    07/05/22 1623 07/05/22 1746 07/05/22 1917 07/05/22 2201   BP: 150/65 141/54 142/57 140/86   Pulse: 64 60 60 60   Patient Position - Orthostatic VS:             Visual Acuity  Visual Acuity    Flowsheet Row Most Recent Value   L Pupil Size (mm) 3   R Pupil Size (mm) 3          ED Medications  Medications   aspirin (ECOTRIN LOW STRENGTH) EC tablet 81 mg (81 mg Oral Given 7/5/22 0919)   cholecalciferol (VITAMIN D3) tablet 1,000 Units (1,000 Units Oral Given 7/5/22 0919)   diltiazem (CARDIZEM CD) 24 hr capsule 240 mg (240 mg Oral Given 7/5/22 1001)   docusate sodium (COLACE) capsule 100 mg (has no administration in time range)   fluticasone-vilanterol (BREO ELLIPTA) 100-25 mcg/inh inhaler 1 puff (1 puff Inhalation Given 7/5/22 1044)   furosemide (LASIX) tablet 20 mg (20 mg Oral Given 7/5/22 0919)   pantoprazole (PROTONIX) EC tablet 40 mg (40 mg Oral Given 7/5/22 0920)   temazepam (RESTORIL) capsule 30 mg (30 mg Oral Given 7/5/22 2201)   aluminum-magnesium hydroxide-simethicone (MYLANTA) oral suspension 30 mL (has no administration in time range)   ibuprofen (MOTRIN) tablet 400 mg (has no administration in time range)   lidocaine (LIDODERM) 5 % patch 1 patch (1 patch Topical Patch Removed 7/4/22 1952)   enoxaparin (LOVENOX) subcutaneous injection 40 mg (40 mg Subcutaneous Given 7/5/22 0920)   insulin lispro (HumaLOG) 100 units/mL subcutaneous injection 1-5 Units (2 Units Subcutaneous Given 7/5/22 1807)   insulin lispro (HumaLOG) 100 units/mL subcutaneous injection 1-5 Units (2 Units Subcutaneous Given 7/5/22 2202)   lubiprostone (AMITIZA) capsule 24 mcg (24 mcg Oral Given 7/5/22 1627)   oxyCODONE-acetaminophen (PERCOCET) 5-325 mg per tablet 1 tablet (1 tablet Oral Given 7/5/22 2221)     Or   HYDROmorphone (DILAUDID) injection 0 5 mg ( Intravenous See Alternative 7/5/22 2221)   ceftriaxone (ROCEPHIN) 1 g/50 mL in dextrose IVPB (0 mg Intravenous Stopped 7/5/22 0951)   predniSONE tablet 40 mg (40 mg Oral Given 7/5/22 1308)       Diagnostic Studies  Results Reviewed     Procedure Component Value Units Date/Time    Fingerstick Glucose (POCT) [[id number]]  (Abnormal) Collected: 07/05/22 1617    Lab Status: Final result Updated: 07/05/22 1619     POC Glucose 240 mg/dl     Fingerstick Glucose (POCT) [[id number]]  (Abnormal) Collected: 07/05/22 1140    Lab Status: Final result Updated: 07/05/22 1144     POC Glucose 218 mg/dl     Fingerstick Glucose (POCT) [[id number]]  (Abnormal) Collected: 07/05/22 0728    Lab Status: Final result Updated: 07/05/22 0729     POC Glucose 170 mg/dl     Urine culture [[id number]] Collected: 07/04/22 0853    Lab Status: Preliminary result Specimen: Urine, Other Updated: 07/05/22 0721     Urine Culture Culture too young- will reincubate    Comprehensive metabolic panel [[id number]]  (Abnormal) Collected: 07/05/22 0604    Lab Status: Final result Specimen: Blood from Arm, Left Updated: 07/05/22 8180     Sodium 139 mmol/L      Potassium 4 0 mmol/L      Chloride 102 mmol/L      CO2 29 mmol/L      ANION GAP 8 mmol/L      BUN 19 mg/dL      Creatinine 0 80 mg/dL      Glucose 147 mg/dL      Calcium 8 3 mg/dL      Corrected Calcium 9 3 mg/dL      AST 35 U/L      ALT 54 U/L      Alkaline Phosphatase 77 U/L      Total Protein 6 4 g/dL      Albumin 2 8 g/dL      Total Bilirubin 1 23 mg/dL      eGFR 69 ml/min/1 73sq m     Narrative:      [place] guidelines for Chronic Kidney Disease (CKD):     Stage 1 with normal or high GFR (GFR > 90 mL/min/1 73 square meters)    Stage 2 Mild CKD (GFR = 60-89 mL/min/1 73 square meters)    Stage 3A Moderate CKD (GFR = 45-59 mL/min/1 73 square meters)    Stage 3B Moderate CKD (GFR = 30-44 mL/min/1 73 square meters)    Stage 4 Severe CKD (GFR = 15-29 mL/min/1 73 square meters)    Stage 5 End Stage CKD (GFR <15 mL/min/1 73 square meters)  Note: GFR calculation is accurate only with a steady state creatinine    CBC and differential [[id number]]  (Abnormal) Collected: 07/05/22 0604    Lab Status: Final result Specimen: Blood from Arm, Left Updated: 07/05/22 0627     WBC 12 47 Thousand/uL      RBC 3 95 Million/uL      Hemoglobin 12 4 g/dL      Hematocrit 38 0 %      MCV 96 fL      MCH 31 4 pg      MCHC 32 6 g/dL      RDW 13 2 %      MPV 10 8 fL      Platelets 740 Thousands/uL      nRBC 0 /100 WBCs      Neutrophils Relative 73 %      Immat GRANS % 1 %      Lymphocytes Relative 17 %      Monocytes Relative 8 %      Eosinophils Relative 1 %      Basophils Relative 0 %      Neutrophils Absolute 9 02 Thousands/µL      Immature Grans Absolute 0 07 Thousand/uL      Lymphocytes Absolute 2 17 Thousands/µL      Monocytes Absolute 1 03 Thousand/µL      Eosinophils Absolute 0 15 Thousand/µL      Basophils Absolute 0 03 Thousands/µL     Fingerstick Glucose (POCT) [[id number]]  (Abnormal) Collected: 07/04/22 2116    Lab Status: Final result Updated: 07/04/22 2117     POC Glucose 187 mg/dl     Fingerstick Glucose (POCT) [[id number]]  (Abnormal) Collected: 07/04/22 1816    Lab Status: Final result Updated: 07/04/22 1823     POC Glucose 191 mg/dl     Fingerstick Glucose (POCT) [[id number]]  (Abnormal) Collected: 07/04/22 1540    Lab Status: Final result Updated: 07/04/22 1548     POC Glucose 244 mg/dl     Fingerstick Glucose (POCT) [[id number]]  (Abnormal) Collected: 07/04/22 1233    Lab Status: Final result Updated: 07/04/22 1247     POC Glucose 215 mg/dl     Fingerstick Glucose (POCT) [[id number]]  (Abnormal) Collected: 07/04/22 1045    Lab Status: Final result Updated: 07/04/22 1246     POC Glucose 231 mg/dl     Procalcitonin [[id number]]  (Normal) Collected: 07/04/22 1039    Lab Status: Final result Specimen: Blood from Arm, Left Updated: 07/04/22 1142     Procalcitonin 0 15 ng/ml     Urine Microscopic [[id number]]  (Abnormal) Collected: 07/04/22 0853    Lab Status: Final result Specimen: Urine, Other Updated: 07/04/22 0928     RBC, UA None Seen /hpf      WBC, UA 10-20 /hpf      Epithelial Cells Occasional /hpf      Bacteria, UA Innumerable /hpf     UA w Reflex to Microscopic w Reflex to Culture [[id number]]  (Abnormal) Collected: 07/04/22 0853    Lab Status: Final result Specimen: Urine, Other Updated: 07/04/22 0907     Color, UA Yellow     Clarity, UA Slightly Cloudy     Specific Gravity, UA >=1 030     pH, UA 6 0     Leukocytes, UA Trace     Nitrite, UA Negative     Protein, UA Trace mg/dl      Glucose,  (1/4%) mg/dl      Ketones, UA 15 (1+) mg/dl      Urobilinogen, UA 1 0 E U /dl      Bilirubin, UA Negative     Occult Blood, UA Negative    Fingerstick Glucose (POCT) [[id number]]  (Abnormal) Collected: 07/04/22 0715    Lab Status: Final result Updated: 07/04/22 0717     POC Glucose 242 mg/dl     HS Troponin I 4hr [[id number]]  (Normal) Collected: 07/04/22 0536    Lab Status: Final result Specimen: Blood from Arm, Left Updated: 07/04/22 0619     hs TnI 4hr 14 ng/L      Delta 4hr hsTnI 2 ng/L     HS Troponin I 2hr [[id number]]  (Normal) Collected: 07/04/22 0308    Lab Status: Final result Specimen: Blood from Arm, Left Updated: 07/04/22 0338     hs TnI 2hr 13 ng/L      Delta 2hr hsTnI 1 ng/L     COVID/FLU/RSV - 2 hour TAT [[id number]]  (Normal) Collected: 07/04/22 0054    Lab Status: Final result Specimen: Nares from Nose Updated: 07/04/22 0144     SARS-CoV-2 Negative     INFLUENZA A PCR Negative     INFLUENZA B PCR Negative     RSV PCR Negative    Narrative:      FOR PEDIATRIC PATIENTS - copy/paste COVID Guidelines URL to browser: [email or web address]    SARS-CoV-2 assay is a Nucleic Acid Amplification assay intended for the  qualitative detection of nucleic acid from SARS-CoV-2 in nasopharyngeal  swabs  Results are for the presumptive identification of SARS-CoV-2 RNA  Positive results are indicative of infection with SARS-CoV-2, the virus  causing COVID-19, but do not rule out bacterial infection or co-infection  with other viruses  Laboratories within the [place] and its  territories are required to report all positive results to the appropriate  public health authorities  Negative results do not preclude SARS-CoV-2  infection and should not be used as the sole basis for treatment or other  patient management decisions  Negative results must be combined with  clinical observations, patient history, and epidemiological information  This test has not been FDA cleared or approved  This test has been authorized by FDA under an Emergency Use Authorization  (EUA)  This test is only authorized for the duration of time the  declaration that circumstances exist justifying the authorization of the  emergency use of an in vitro diagnostic tests for detection of SARS-CoV-2  virus and/or diagnosis of COVID-19 infection under section 564(b)(1) of  the Act, 21 U  S C  747CMZ-2(H)(3), unless the authorization is terminated  or revoked sooner  The test has been validated but independent review by FDA  and CLIA is pending  Test performed using [employer] GeneXpert: This RT-PCR assay targets N2,  a region unique to SARS-CoV-2  A conserved region in the E-gene was chosen  for pan-Sarbecovirus detection which includes SARS-CoV-2      HS Troponin 0hr (reflex protocol) [[id number]]  (Normal) Collected: 07/04/22 0054    Lab Status: Final result Specimen: Blood from Arm, Left Updated: 07/04/22 0129     hs TnI 0hr 12 ng/L     Comprehensive metabolic panel [[id number]]  (Abnormal) Collected: 07/04/22 0054 Lab Status: Final result Specimen: Blood from Arm, Left Updated: 07/04/22 0121     Sodium 136 mmol/L      Potassium 3 8 mmol/L      Chloride 99 mmol/L      CO2 26 mmol/L      ANION GAP 11 mmol/L      BUN 16 mg/dL      Creatinine 0 89 mg/dL      Glucose 199 mg/dL      Calcium 8 5 mg/dL      Corrected Calcium 9 1 mg/dL      AST 36 U/L      ALT 44 U/L      Alkaline Phosphatase 70 U/L      Total Protein 6 5 g/dL      Albumin 3 2 g/dL      Total Bilirubin 2 06 mg/dL      eGFR 61 ml/min/1 73sq m     Narrative:      [place] guidelines for Chronic Kidney Disease (CKD):     Stage 1 with normal or high GFR (GFR > 90 mL/min/1 73 square meters)    Stage 2 Mild CKD (GFR = 60-89 mL/min/1 73 square meters)    Stage 3A Moderate CKD (GFR = 45-59 mL/min/1 73 square meters)    Stage 3B Moderate CKD (GFR = 30-44 mL/min/1 73 square meters)    Stage 4 Severe CKD (GFR = 15-29 mL/min/1 73 square meters)    Stage 5 End Stage CKD (GFR <15 mL/min/1 73 square meters)  Note: GFR calculation is accurate only with a steady state creatinine    CBC and differential [[id number]]  (Abnormal) Collected: 07/04/22 0054    Lab Status: Final result Specimen: Blood from Arm, Left Updated: 07/04/22 0106     WBC 15 94 Thousand/uL      RBC 4 40 Million/uL      Hemoglobin 13 8 g/dL      Hematocrit 41 9 %      MCV 95 fL      MCH 31 4 pg      MCHC 32 9 g/dL      RDW 13 3 %      MPV 10 6 fL      Platelets 348 Thousands/uL      nRBC 0 /100 WBCs      Neutrophils Relative 84 %      Immat GRANS % 1 %      Lymphocytes Relative 8 %      Monocytes Relative 7 %      Eosinophils Relative 0 %      Basophils Relative 0 %      Neutrophils Absolute 13 53 Thousands/µL      Immature Grans Absolute 0 10 Thousand/uL      Lymphocytes Absolute 1 20 Thousands/µL      Monocytes Absolute 1 04 Thousand/µL      Eosinophils Absolute 0 02 Thousand/µL      Basophils Absolute 0 05 Thousands/µL                  XR chest portable   Final Result by [facility] Matthias Frazier MD (07/05 1416)      Retrocardiac opacity  Acute right humerus fracture  Workstation performed: [id number]         XR knee 3 vw right non injury   Final Result by Timothy Valerio DO (07/05 1056)   1  No evidence of acute osseous abnormality  2   Severe tricompartmental osteoarthritis with small joint effusion  Workstation performed: [id number]         XR knee 3 vw left non injury   Final Result by Timothy Valerio DO (07/05 1044)   1  No evidence of acute fracture  2   Severe osteoarthritic changes within the medial compartment  3   Moderate osteoarthritic changes within the lateral and patellofemoral compartments  Workstation performed: [id number]         CT head without contrast   Final Result by Troy Herman MD (07/04 0732)      Remote left MCA ischemic insult without acute intracranial abnormality noted  The findings concur with the preliminary report provided by Dr Ethan Howard of [address] [place] [address]  Workstation performed: [id number]         XR shoulder 2+ views RIGHT   ED Interpretation by Dalia Cooper MD (07/04 3050)   Abnormal   Prox humeral fx      Final Result by Rachid Arrieta MD (07/04 1120)      Impacted fracture of the surgical neck of the humerus with associated greater tuberosity fracture   No dislocation   Mild deformity of the tip of the scapula suspect nondisplaced scapular fracture   The study was marked in EPIC for immediate notification  Workstation performed: [id number]                    Procedures  Procedures         ED Course  ED Course as of 07/06/22 0107   Mon Jul 04, 2022   2969 VRAD IMPRESSION:  No acute intracranial abnormality  0727 Patient's laboratory evaluation unremarkable other than mild elevation in bilirubin but patient denies any abdominal pain  This was elevated previously though not to this extent    Considering no abdominal pain or tenderness, unclear clinical significance of this   Patient without clear infectious symptoms and is afebrile  Urinalysis ordered but unavailable on this evaluation though she denies any urinary symptoms  Patient does have a proximal humeral fracture that is likely the source of her shoulder pain  Patient with difficulty with movement at baseline secondary to her prior stroke so considering these findings, will admit patient for continued evaluation by Physical and Occupational therapy  SBIRT 20yo+    Flowsheet Row Most Recent Value   SBIRT (25 yo +)    In order to provide better care to our patients, we are screening all of our patients for alcohol and drug use  Would it be okay to ask you these screening questions? Yes Filed at: 07/03/2022 2357   Initial Alcohol Screen: US AUDIT-C     1  How often do you have a drink containing alcohol? 0 Filed at: 07/03/2022 2357   2  How many drinks containing alcohol do you have on a typical day you are drinking? 0 Filed at: 07/03/2022 2357   3a  Male UNDER 65: How often do you have five or more drinks on one occasion? 0 Filed at: 07/03/2022 2357   3b  FEMALE Any Age, or MALE 65+: How often do you have 4 or more drinks on one occassion? 0 Filed at: 07/03/2022 2357   Audit-C Score 0 Filed at: 07/03/2022 2357   DIANELYS: How many times in the past year have you    Used an illegal drug or used a prescription medication for non-medical reasons? Never Filed at: 07/03/2022 2357                    MDM    Disposition  Final diagnoses:   Proximal humerus fracture   Fall   Asthenia   History of stroke     Time reflects when diagnosis was documented in both MDM as applicable and the Disposition within this note     Time User Action Codes Description Comment    7/4/2022  5:48 AM Oneda Hoop Add [S42 209A] Proximal humerus fracture     7/4/2022  5:48 AM Oneda Hoop Add [D26  XXXA] Fall     7/4/2022  5:48 AM Oneda Hoop Add [R53 1] Asthenia     7/4/2022  5:48 AM Oneda Hoop Add [Z86 73] History of stroke       ED Disposition     ED Disposition   Admit    Condition   Stable    Date/Time   Mon Jul 4, 2022  5:48 AM    Comment   Case was discussed with CHRISTAL and the patient's admission status was agreed to be Admission Status: inpatient status to the service of Dr Armando Carballo   Follow-up Information    None         Current Discharge Medication List      CONTINUE these medications which have NOT CHANGED    Details   alendronate (FOSAMAX) 70 mg tablet Take 70 mg by mouth Once a week      ascorbic acid (VITAMIN C) 500 mg tablet Take 500 mg by mouth 2 (two) times a day      BABY ASPIRIN PO Take 81 mg by mouth daily      cyanocobalamin (VITAMIN B-12) 100 mcg tablet Take by mouth daily      diltiazem (CARDIZEM CD) 240 mg 24 hr capsule Take 1 capsule (240 mg total) by mouth daily  Qty: 30 capsule, Refills: 0    Associated Diagnoses: Tachy-chana syndrome (HCC)      docusate sodium (COLACE) 100 mg capsule Take 1 capsule (100 mg total) by mouth 2 (two) times a day  Qty: 10 capsule, Refills: 0    Associated Diagnoses: S/P TAVR (transcatheter aortic valve replacement)      fluticasone-vilanterol (BREO ELLIPTA) 100-25 mcg/inh inhaler Inhale 1 puff daily Rinse mouth after use    Qty: 3 Inhaler, Refills: 3    Comments: Please send by [employer]  Associated Diagnoses: Mild intermittent asthma without complication      furosemide (LASIX) 20 mg tablet Take 1 tablet (20 mg total) by mouth daily  Qty: 30 tablet, Refills: 0    Associated Diagnoses: Acute diastolic congestive heart failure (HCC)      metFORMIN (GLUCOPHAGE) 500 mg tablet Take 500 mg by mouth daily with breakfast       temazepam (RESTORIL) 30 mg capsule Take 30 mg by mouth daily at bedtime       acetaminophen (TYLENOL) 325 mg tablet Take 650 mg by mouth every 6 (six) hours as needed for mild pain      atorvastatin (LIPITOR) 10 mg tablet Take 10 mg by mouth daily      cholecalciferol (VITAMIN D3) 1,000 units tablet Take 1,000 Units by mouth daily lubiprostone (AMITIZA) 24 mcg capsule Take 24 mcg by mouth 2 (two) times a day with meals      metoprolol tartrate (LOPRESSOR) 25 mg tablet Take 1 tablet (25 mg total) by mouth every 12 (twelve) hours  Qty: 60 tablet, Refills: 0    Associated Diagnoses: Atrial fibrillation, unspecified type (HCC)      omeprazole (PriLOSEC) 20 mg delayed release capsule Take 1 capsule (20 mg total) by mouth daily Take 30 minutes prior to breakfast  Qty: 30 capsule, Refills: 0    Associated Diagnoses: S/P TAVR (transcatheter aortic valve replacement)      polyethylene glycol (MIRALAX) 17 g packet Take 17 g by mouth daily  Qty: 14 each, Refills: 0    Associated Diagnoses: Constipation      potassium chloride (K-DUR,KLOR-CON) 10 mEq tablet Take 1 tablet (10 mEq total) by mouth daily  Qty: 30 tablet, Refills: 0    Associated Diagnoses: Acute diastolic congestive heart failure (HCC)             No discharge procedures on file      PDMP Review       Value Time User    PDMP Reviewed  Yes 7/4/2022  6:31 AM Sandhya Magallon PA-C          ED Provider  Electronically Signed by           Di Alanis MD  07/06/22 9619

## 2022-07-04 NOTE — PLAN OF CARE
Problem: PHYSICAL THERAPY ADULT  Goal: Performs mobility at highest level of function for planned discharge setting  See evaluation for individualized goals  Description: Treatment/Interventions: Functional transfer training, LE strengthening/ROM, Therapeutic exercise, Endurance training, Cognitive reorientation, Patient/family training, Bed mobility, Gait training, Spoke to nursing, OT          See flowsheet documentation for full assessment, interventions and recommendations  Note: Prognosis: Good  Problem List: Decreased strength, Decreased endurance, Impaired balance, Decreased mobility, Decreased cognition, Orthopedic restrictions  Assessment: Pt is [de-identified]year old female seen for PT evaluation s/p admit to [facility] on 7/3/2022 with Ambulatory dysfunction  PT consulted to assess pt's functional mobility and d/c needs  Order placed for PT eval and tx, with up and OOB as tolerated and NWB R UE orders  Comorbidities affecting pt's physical performance at time of assessment include type 2 DM, closed fracture of proximal end of humerus, elevated bilirubin, leukocytosis, and history of stroke with right sided weakness  PTA, pt was independent with all functional mobility with a quad cane  Pt resides alone in a one level house with a ramped entrance  Pt requires assist with ADL's and IADL's  Personal factors affecting pt at time of IE include inability to ambulate household distances, inability to navigate level surfaces without external assistance, unable to perform dynamic tasks in community, decreased cognition, limited home support, positive fall history, inability to perform IADLs, inability to perform ADLs, and inability to live alone   Please find objective findings from PT assessment regarding body systems outlined above with impairments and limitations including weakness, impaired balance, decreased endurance, gait deviations, decreased activity tolerance, decreased functional mobility tolerance, fall risk, and decreased cognition  The following objective measures performed on IE also reveal limitations: Barthel Index: 30/100, Modified Whitewood: 4 (moderate/severe disability) and AM-PAC 6-Clicks: 1/28  Pt's clinical presentation is currently unstable/unpredictable seen in pt's presentation of need for ongoing medical management/monitoring, pt is a fall risk, pt has orthopedic restrictions limiting functional mobility, and pt requires cues and assist of two for safety with functional mobility  Pt to benefit from continued PT tx to address deficits as defined above and maximize level of functional independent mobility and consistency  From PT/mobility standpoint, recommendation at time of d/c would be STR pending progress in order to facilitate return to PLOF  Barriers to Discharge: Inaccessible home environment, Decreased caregiver support     PT Discharge Recommendation: Post acute rehabilitation services     See flowsheet documentation for full assessment

## 2022-07-04 NOTE — ASSESSMENT & PLAN NOTE
· Patient reports noting a pop when EMS attempted to help patient back into bed at home prior to arrival  · Right shoulder x-ray revealing fracture proximal humerus head  · Currently in sling  · PT/OT eval  · Nonweightbearing right upper extremity, patient with hemiparesis of 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  · Ortho consult  · P r n   Lidocaine patch, Motrin for pain

## 2022-07-04 NOTE — QUICK NOTE
Patient seen and examined at bedside, reports pain is controlled    No acute events overnight    /56 (BP Location: Left arm)   Pulse 60   Temp 98 1 °F (36 7 °C) (Oral)   Resp (!) 25   Wt 90 7 kg (200 lb)   SpO2 94%   BMI 34 33 kg/m²     Lab Results   Component Value Date    WBC 15 94 (H) 07/04/2022    HGB 13 8 07/04/2022    HCT 41 9 07/04/2022    MCV 95 07/04/2022     07/04/2022     Lab Results   Component Value Date    SODIUM 136 07/04/2022    K 3 8 07/04/2022    CL 99 (L) 07/04/2022    CO2 26 07/04/2022    AGAP 11 07/04/2022    BUN 16 07/04/2022    CREATININE 0 89 07/04/2022    GLUC 199 (H) 07/04/2022    GLUF 128 (H) 06/11/2020    CALCIUM 8 5 07/04/2022    AST 36 07/04/2022    ALT 44 07/04/2022    ALKPHOS 70 07/04/2022    TP 6 5 07/04/2022    TBILI 2 06 (H) 07/04/2022    EGFR 61 07/04/2022         Closed fracture proximal end of right humerus:  Suspect that this may be treated nonsurgically, but will wait orthopedic input  For now will conservatively manage with pain control, use of sling, and PT/OT eval    UTI:  Start Rocephin and follow-up urine culture    Chronic diastolic congestive heart failure:  Right lower extremity edema is chronic per patient and is at baseline  Continue Lasix 20 mg p o   Daily, and 2 g sodium restriction    Type 2 diabetes mellitus:  Holding oral antihyperglycemics, for by diabetic diet, Accu-Cheks AC and HS with ISS coverage    Elevated bilirubin:  Unclear etiology, patient without abdominal pain, consider right upper quadrant ultrasound, repeat LFTs tomorrow

## 2022-07-04 NOTE — H&P
[address] [facility] 1940, [de-identified] y o  female MRN: [id number]  Unit/Bed#: ED 20 Encounter: [id number]  Primary Care Provider: Trisha Grossman DO   Date and time admitted to hospital: 7/3/2022 11:41 PM    * Ambulatory dysfunction  Assessment & Plan  · Status post fall at home this morning, patient lives alone  · CT head negative  · Fall precautions  · PT/OT eval    Closed fracture of proximal end of humerus  Assessment & Plan  · Patient reports noting a pop when EMS attempted to help patient back into bed at home prior to arrival  · Right shoulder x-ray revealing fracture proximal humerus head  · Currently in sling  · PT/OT eval  · Nonweightbearing right upper extremity, patient with hemiparesis of 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  · Ortho consult  · P r n  Lidocaine patch, Motrin for pain    Leukocytosis  Assessment & Plan  · Noted to have elevated white blood cell count, patient reports this past week she had a fever at home but currently denies any, has been afebrile here, continue to monitor  · Check procalcitonin, UA  · So far no clear infectious source noted, COVID/flu/RSV negative  · If any elevation and procalcitonin or fevers consider adding on antibiotics, but will monitor for now    Elevated bilirubin  Assessment & Plan  · Bilirubin elevated at 2 06 which appears to be new per review of chart  · Patient denies any abdominal pain  · Discussed finding with patient follow-up outpatient with PCP and GI    Type 2 diabetes mellitus without complication, without long-term current use of insulin ([facility] )  Assessment & Plan  Lab Results   Component Value Date    HGBA1C 7 1 (H) 10/12/2021       No results for input(s): POCGLU in the last 72 hours      Blood Sugar Average: Last 72 hrs:  ·  blood glucose checks q i d , hypoglycemia protocol  · Hold home metformin  · Sliding scale insulin      VTE Pharmacologic Prophylaxis: VTE Score: 4 Moderate Risk (Score 3-4) - Pharmacological DVT Prophylaxis Ordered: enoxaparin (Lovenox)  Code Status: Level 3 - DNAR and DNI per pt  Discussion with family: pt  Anticipated Length of Stay: Patient will be admitted on an inpatient basis with an anticipated length of stay of greater than 2 midnights secondary to see above  Total Time for Visit, including Counseling / Coordination of Care: 60 minutes Greater than 50% of this total time spent on direct patient counseling and coordination of care  Chief Complaint:    Chief Complaint   Patient presents with    Fall     EMS called to help assist pt up from floor    Denies LOC  C/o RUE pain  Hx of stroke  History of Present Illness:  Johnson Koehler is a [de-identified] y o  female with a PMH of right hemiplegia status post CVA, CHF, AFib, diabetes mellitus type 2 who presents with complaint of sudden onset severe right shoulder pain after EMS attempted to help patient get back into bed at home  She reports the pain is in the anterior part of her shoulder without radiation, aching pain  She also reports this morning she felt tired and very weak more than usual and attempted to go to the bathroom, but fell which she reports she more assisted herself to the floor  Denies head injury  Denies any other pain at aside from right shoulder pain  Denies chest pain, abdominal pain, dizziness  Patient also reports this week she did have fevers with chills and reports she has not had any recently  Patient with chronic right lower extremity edema, reports that has not been any worse lately and it is at baseline now  Review of Systems:  Review of Systems   Constitutional: Positive for chills, fatigue and fever  Negative for activity change  Respiratory: Negative for shortness of breath  Cardiovascular: Negative for chest pain  Gastrointestinal: Negative for abdominal pain and diarrhea  Musculoskeletal: Positive for arthralgias  Neurological: Positive for weakness  Negative for dizziness  Past Medical and Surgical History:   Past Medical History:   Diagnosis Date    Aortic valve disease     Atrial fibrillation ([facility] )     Cardiac disease     Diabetes mellitus ([facility] )     Heart murmur     Heart valve disease     Hypertension     Stroke [facility])        Past Surgical History:   Procedure Laterality Date     SECTION      x 2    HYSTERECTOMY      AK ECHO TRANSESOPHAG R-T 2D W/PRB IMG ACQUISJ I&R N/A 2020    Procedure: TRANSESOPHAGEAL ECHOCARDIOGRAM (BOLIVAR); Surgeon: Venkata Benjamin DO;  Location: [facility];  Service: Cardiac Surgery    AK REPLACE AORTIC VALVE OPENFEMORAL ARTERY APPROACH Right 2020    Procedure: REPLACEMENT AORTIC VALVE TRANSCATHETER (TAVR) TRANSFEMORAL W/ 26 MM TODD ANIBAL S3 ULTRA VALVE (ACCESS ON LEFT); Surgeon: Venkata Benjamin DO;  Location: [facility];  Service: Cardiac Surgery    TONSILLECTOMY         Meds/Allergies:  Prior to Admission medications    Medication Sig Start Date End Date Taking?  Authorizing Provider   lubiprostone (AMITIZA) 24 mcg capsule Take 24 mcg by mouth 2 (two) times a day with meals   Yes Historical Provider, MD   acetaminophen (TYLENOL) 325 mg tablet Take 650 mg by mouth every 6 (six) hours as needed for mild pain    Historical Provider, MD   alendronate (FOSAMAX) 70 mg tablet Take 70 mg by mouth Once a week    Historical Provider, MD   ascorbic acid (VITAMIN C) 500 mg tablet Take 500 mg by mouth 2 (two) times a day    Historical Provider, MD   atorvastatin (LIPITOR) 10 mg tablet Take 10 mg by mouth daily    Historical Provider, MD   BABY ASPIRIN PO Take 81 mg by mouth daily 18   Historical Provider, MD   cholecalciferol (VITAMIN D3) 1,000 units tablet Take 1,000 Units by mouth daily    Historical Provider, MD   cyanocobalamin (VITAMIN B-12) 100 mcg tablet Take by mouth daily    Historical Provider, MD   diltiazem (CARDIZEM CD) 240 mg 24 hr capsule Take 1 capsule (240 mg total) by mouth daily 6/26/20   Idalia Valencia PA-C   docusate sodium (COLACE) 100 mg capsule Take 1 capsule (100 mg total) by mouth 2 (two) times a day  Patient not taking: Reported on 2/27/2020 2/20/20   [place] Rivas PA-C   fluticasone-vilanterol [facility] ELLIPTA) 100-25 mcg/inh inhaler Inhale 1 puff daily Rinse mouth after use  7/22/20   Shannan Vitale PA-C   furosemide (LASIX) 20 mg tablet Take 1 tablet (20 mg total) by mouth daily 6/28/20   Idalia Valencia PA-C   metFORMIN (GLUCOPHAGE) 500 mg tablet Take 500 mg by mouth daily with breakfast     Historical Provider, MD   metoprolol tartrate (LOPRESSOR) 25 mg tablet Take 1 tablet (25 mg total) by mouth every 12 (twelve) hours 6/25/20   Idalia Valencia PA-C   omeprazole (PriLOSEC) 20 mg delayed release capsule Take 1 capsule (20 mg total) by mouth daily Take 30 minutes prior to breakfast 2/20/20 3/21/20  Washington Rivas PA-C   polyethylene glycol (MIRALAX) 17 g packet Take 17 g by mouth daily  Patient not taking: Reported on 2/27/2020 10/29/19   Meron Vázquez PA-C   potassium chloride (K-DUR,KLOR-CON) 10 mEq tablet Take 1 tablet (10 mEq total) by mouth daily 6/25/20   Idalia Valencia PA-C   temazepam (RESTORIL) 30 mg capsule Take 30 mg by mouth daily at bedtime     Historical Provider, MD   diltiazem (DILACOR XR) 240 MG 24 hr capsule Take 240 mg by mouth daily 2/12/18 7/4/22  Historical Provider, MD     Have reviewed home medications per review of chart and PDMP  Allergies: Allergies   Allergen Reactions    Atorvastatin      dizziness and shaky    Rivaroxaban      Dizziness, and shaky  Social History:  Marital Status:       Patient Pre-hospital Living Situation: Home alone  Patient Pre-hospital Level of Mobility: unable to be assessed at time of evaluation  Patient Pre-hospital Diet Restrictions:  Diabetic, cardiac  Substance Use History:   Social History     Substance and Sexual Activity   Alcohol Use Not Currently     Social History     Tobacco Use Smoking Status Never Smoker   Smokeless Tobacco Never Used     Social History     Substance and Sexual Activity   Drug Use Never       Family History:  Family History   Problem Relation Age of Onset    Diabetes Mother     Heart disease Mother     Diabetes Father     Heart disease Father        Physical Exam:     Vitals:   Blood Pressure: 127/56 (07/04/22 0600)  Pulse: 60 (07/04/22 0600)  Temperature: 98 1 °F (36 7 °C) (07/03/22 2352)  Temp Source: Oral (07/03/22 2352)  Respirations: (!) 25 (07/04/22 0600)  Weight - Scale: 90 7 kg (200 lb) (07/03/22 2352)  SpO2: 94 % (07/04/22 0600)    Physical Exam  Vitals and nursing note reviewed  Constitutional:       General: She is not in acute distress  Appearance: Normal appearance  She is not diaphoretic  HENT:      Head: Normocephalic and atraumatic  Cardiovascular:      Rate and Rhythm: Normal rate and regular rhythm  Heart sounds: Normal heart sounds  Pulmonary:      Effort: Pulmonary effort is normal  No respiratory distress  Breath sounds: Normal breath sounds  Abdominal:      General: Abdomen is flat  Bowel sounds are normal       Palpations: Abdomen is soft  Musculoskeletal:         General: Tenderness present  Right lower leg: Edema present  Left lower leg: No edema  Comments: Tenderness to palpation over anterior portion of right shoulder  patient with hemiparesis at baseline of right upper extremity  No tenderness to palpation, full range of motion of left shoulder, no tenderness to palpation of bilateral hips   Skin:     General: Skin is warm and dry  Findings: No bruising  Neurological:      General: No focal deficit present  Mental Status: She is alert  Mental status is at baseline  Psychiatric:         Mood and Affect: Mood normal          Behavior: Behavior normal          Thought Content:  Thought content normal          Judgment: Judgment normal           Additional Data:     Lab Results:  Results from last 7 days   Lab Units 07/04/22  0054   WBC Thousand/uL 15 94*   HEMOGLOBIN g/dL 13 8   HEMATOCRIT % 41 9   PLATELETS Thousands/uL 229   NEUTROS PCT % 84*   LYMPHS PCT % 8*   MONOS PCT % 7   EOS PCT % 0     Results from last 7 days   Lab Units 07/04/22  0054   SODIUM mmol/L 136   POTASSIUM mmol/L 3 8   CHLORIDE mmol/L 99*   CO2 mmol/L 26   BUN mg/dL 16   CREATININE mg/dL 0 89   ANION GAP mmol/L 11   CALCIUM mg/dL 8 5   ALBUMIN g/dL 3 2*   TOTAL BILIRUBIN mg/dL 2 06*   ALK PHOS U/L 70   ALT U/L 44   AST U/L 36   GLUCOSE RANDOM mg/dL 199*                       Imaging: Reviewed radiology reports from this admission including: CT head and Personally reviewed the following imaging: xray(s)  XR shoulder 2+ views RIGHT   ED Interpretation by Trent Stephenson MD (07/04 8903)   Abnormal   Prox humeral fx      CT head without contrast    (Results Pending)       EKG and Other Studies Reviewed on Admission:   · EKG: No EKG obtained  ** Please Note: This note has been constructed using a voice recognition system   **

## 2022-07-04 NOTE — ED NOTES
Provider at bedside   Gave pt update to medical status with results       Ronnie Porras RN  07/04/22 1827

## 2022-07-04 NOTE — ED NOTES
Patient transported to CT/XR     Marmarianna Hemal, [facility]  07/04/22 2121

## 2022-07-04 NOTE — ASSESSMENT & PLAN NOTE
· Status post fall at home this morning, patient lives alone  · CT head negative  · Fall precautions  · PT/OT eval

## 2022-07-04 NOTE — CONSULTS
Orthopedics   Elizabeth Moraes [de-identified] y o  female MRN: [id number]  Unit/Bed#: [facility] CT SCAN      Chief Complaint:   right shoulder pain    HPI:  [de-identified] y  o female complaining of right shoulder pain  Patient has a past medical history for right hemiplegia status post CVA, CHF, AFib, type 2 diabetes complaining of sudden onset of right severe shoulder pain after EMS attempted to get the patient off the ground at home  Patient states that EMS dropped her onto the right shoulder  She had significant pain in her right shoulder  She reports weakness in her right upper and lower extremity secondary to her CVA the past   She denies any chest pain, shortness of breath, nausea, vomiting, diarrhea, lightheadedness or dizziness  Review Of Systems:   · Skin: Normal  · Neuro: See HPI  · Musculoskeletal: See HPI  · 14 point review of systems negative except as stated above     Past Medical History:   Past Medical History:   Diagnosis Date    Aortic valve disease     [facility] [address] )     Cardiac disease     [facility]     Heart murmur     Heart valve disease     Hypertension     Stroke [facility])        Past Surgical History:   Past Surgical History:   Procedure Laterality Date     SECTION      x 2    HYSTERECTOMY      NM ECHO TRANSESOPHAG R-T 2D W/PRB IMG ACQUISJ I&R N/A 2020    Procedure: TRANSESOPHAGEAL ECHOCARDIOGRAM (BOLIVAR); Surgeon: Heri Cabrera DO;  [facility];  Service: Cardiac Surgery    NM REPLACE AORTIC VALVE OPENFEMORAL ARTERY APPROACH Right 2020    Procedure: REPLACEMENT AORTIC VALVE TRANSCATHETER (TAVR) TRANSFEMORAL W/ 26 MM TODD ANIBAL S3 ULTRA VALVE (ACCESS ON LEFT);   Surgeon: Heri Cabrera DO;  [facility];  Service: Cardiac Surgery    TONSILLECTOMY         Family History:  Family history reviewed and non-contributory  Family History   Problem Relation Age of Onset    Diabetes Mother     Heart disease Mother     Diabetes Father    Zonia Crawford Heart disease Father        Social History:  Social History     Socioeconomic History    Marital status:      Spouse name: Not on file    Number of children: Not on file    Years of education: Not on file    Highest education level: Not on file   Occupational History    Not on file   Tobacco Use    Smoking status: Never Smoker    Smokeless tobacco: Never Used   Vaping Use    Vaping Use: Never used   Substance and Sexual Activity    Alcohol use: Not Currently    Drug use: Never    Sexual activity: Not on file   Other Topics Concern    Not on file   Social History Narrative    Not on file     Social Determinants of Health     Financial Resource Strain: Not on file   Food Insecurity: Not on file   Transportation Needs: Not on file   Physical Activity: Not on file   Stress: Not on file   Social Connections: Not on file   Intimate Partner Violence: Not on file   Housing Stability: Not on file       Allergies: Allergies   Allergen Reactions    Atorvastatin      dizziness and shaky    Rivaroxaban      Dizziness, and shaky             Labs:  0   Lab Value Date/Time    HCT 41 9 07/04/2022 0054    HCT 46 2 (H) 06/25/2020 0437    HCT 44 6 06/24/2020 0451    HGB 13 8 07/04/2022 0054    HGB 14 7 06/25/2020 0437    HGB 14 3 06/24/2020 0451    INR 1 11 06/23/2020 0435    WBC 15 94 (H) 07/04/2022 0054    WBC 9 92 06/25/2020 0437    WBC 10 39 (H) 06/24/2020 0451       Meds:    Current Facility-Administered Medications:     aluminum-magnesium hydroxide-simethicone (MYLANTA) oral suspension 30 mL, 30 mL, Oral, Q6H PRN, Paris Holden PA-C    aspirin (ECOTRIN LOW STRENGTH) EC tablet 81 mg, 81 mg, Oral, Daily, Shannon Manjarrez PA-C, 81 mg at 07/04/22 0843    ceftriaxone (ROCEPHIN) 1 g/50 mL in dextrose IVPB, 1,000 mg, Intravenous, Q24H, Julio C Mccann MD, Stopped at 07/04/22 1113    cholecalciferol (VITAMIN D3) tablet 1,000 Units, 1,000 Units, Oral, Daily, Paris Holden PA-C, 1,000 Units at 07/04/22 4407   diltiazem (CARDIZEM CD) 24 hr capsule 240 mg, 240 mg, Oral, Daily, Shannon Manjarrez PA-C    docusate sodium (COLACE) capsule 100 mg, 100 mg, Oral, BID PRN, Laurie Poe PA-C    enoxaparin (LOVENOX) subcutaneous injection 40 mg, 40 mg, Subcutaneous, Daily, Shannon Manjarrez PA-C, 40 mg at 07/04/22 0845    fluticasone-vilanterol (BREO ELLIPTA) 100-25 mcg/inh inhaler 1 puff, 1 puff, Inhalation, Daily, Laurie Poe PA-C    furosemide (LASIX) tablet 20 mg, 20 mg, Oral, Daily, Shannon Manjarrez PA-C, 20 mg at 07/04/22 0843    oxyCODONE-acetaminophen (PERCOCET) 5-325 mg per tablet 1 tablet, 1 tablet, Oral, Q6H PRN, 1 tablet at 07/04/22 0842 **OR** HYDROmorphone (DILAUDID) injection 0 5 mg, 0 5 mg, Intravenous, Q6H PRN, Julio C Leblanc MD    ibuprofen (MOTRIN) tablet 400 mg, 400 mg, Oral, Q6H PRN, Laurie Poe PA-C    insulin lispro (HumaLOG) 100 units/mL subcutaneous injection 1-5 Units, 1-5 Units, Subcutaneous, TID AC, 1 Units at 07/04/22 1232 **AND** Fingerstick Glucose (POCT), , , TID AC, Shannon Manjarrez PA-C    insulin lispro (HumaLOG) 100 units/mL subcutaneous injection 1-5 Units, 1-5 Units, Subcutaneous, HS, Shannon Manjarrez PA-C    lidocaine (LIDODERM) 5 % patch 1 patch, 1 patch, Topical, Daily PRN, Laurie Poe PA-C, 1 patch at 07/04/22 0846    lubiprostone (AMITIZA) capsule 24 mcg, 24 mcg, Oral, BID With Meals, Laurie Poe PA-C    pantoprazole (PROTONIX) EC tablet 40 mg, 40 mg, Oral, Daily, Shannon Manjarrez PA-C, 40 mg at 07/04/22 0844    temazepam (RESTORIL) capsule 30 mg, 30 mg, Oral, HS, Laurie Poe PA-C    Current Outpatient Medications:     lubiprostone (AMITIZA) 24 mcg capsule, Take 24 mcg by mouth 2 (two) times a day with meals, Disp: , Rfl:     acetaminophen (TYLENOL) 325 mg tablet, Take 650 mg by mouth every 6 (six) hours as needed for mild pain, Disp: , Rfl:     alendronate (FOSAMAX) 70 mg tablet, Take 70 mg by mouth Once a week, Disp: , Rfl:     ascorbic acid (VITAMIN C) 500 mg tablet, Take 500 mg by mouth 2 (two) times a day, Disp: , Rfl:     atorvastatin (LIPITOR) 10 mg tablet, Take 10 mg by mouth daily, Disp: , Rfl:     BABY ASPIRIN PO, Take 81 mg by mouth daily, Disp: , Rfl:     cholecalciferol (VITAMIN D3) 1,000 units tablet, Take 1,000 Units by mouth daily, Disp: , Rfl:     cyanocobalamin (VITAMIN B-12) 100 mcg tablet, Take by mouth daily, Disp: , Rfl:     diltiazem (CARDIZEM CD) 240 mg 24 hr capsule, Take 1 capsule (240 mg total) by mouth daily, Disp: 30 capsule, Rfl: 0    docusate sodium (COLACE) 100 mg capsule, Take 1 capsule (100 mg total) by mouth 2 (two) times a day (Patient not taking: Reported on 2/27/2020), Disp: 10 capsule, Rfl: 0    fluticasone-vilanterol (BREO ELLIPTA) 100-25 mcg/inh inhaler, Inhale 1 puff daily Rinse mouth after use , Disp: 3 Inhaler, Rfl: 3    furosemide (LASIX) 20 mg tablet, Take 1 tablet (20 mg total) by mouth daily, Disp: 30 tablet, Rfl: 0    metFORMIN (GLUCOPHAGE) 500 mg tablet, Take 500 mg by mouth daily with breakfast , Disp: , Rfl:     metoprolol tartrate (LOPRESSOR) 25 mg tablet, Take 1 tablet (25 mg total) by mouth every 12 (twelve) hours, Disp: 60 tablet, Rfl: 0    omeprazole (PriLOSEC) 20 mg delayed release capsule, Take 1 capsule (20 mg total) by mouth daily Take 30 minutes prior to breakfast, Disp: 30 capsule, Rfl: 0    polyethylene glycol (MIRALAX) 17 g packet, Take 17 g by mouth daily (Patient not taking: Reported on 2/27/2020), Disp: 14 each, Rfl: 0    potassium chloride (K-DUR,KLOR-CON) 10 mEq tablet, Take 1 tablet (10 mEq total) by mouth daily, Disp: 30 tablet, Rfl: 0    temazepam (RESTORIL) 30 mg capsule, Take 30 mg by mouth daily at bedtime , Disp: , Rfl:     Blood Culture:   No results found for: BLOODCX    Wound Culture:   No results found for: WOUNDCULT    Ins and Outs:  No intake/output data recorded            Physical Exam:   /60 (BP Location: Left arm)   Pulse 60   Temp 98 1 °F (36 7 °C) (Oral)   Resp 18   Wt 90 7 kg (200 lb)   SpO2 94%   BMI 34 33 kg/m²   Gen: Alert and oriented to person, place, time  HEENT: EOMI, eyes clear, moist mucus membranes, hearing intact  Respiratory: Bilateral chest rise  No audible wheezing found  Cardiovascular: Regular Rate and Rhythm  Abdomen: soft nontender/nondistended  Musculoskeletal: right upper extremity  · Skin pink dry and intact, arm is resting comfortably in sling  · Tenderness to palpation over the anterior aspect of the shoulder  · Range of motion deferred due to fracture  · Patient is in a resting fist position but is able to passively move the digits with the other hand, no flexion contracture appreciated  · Sensation intact to axillary, musculocutaneous, radial, ulna, median nerves  · 2+ radial pulse  · Capillary refill less than 2 seconds    Radiology:   I personally reviewed the films  X-rays of right shoulder shows a impacted fracture of the surgical neck of the humerus with associated greater tuberosity fracture, tip of scapula nondisplaced fracture    _*_*_*_*_*_*_*_*_*_*_*_*_*_*_*_*_*_*_*_*_*_*_*_*_*_*_*_*_*_*_*_*_*_*_*_*_*_*_*_*_*    Assessment:  [de-identified] y  o female with  right shoulder proximal humerus fracture, possible avulsion of the inferior pole of the scapula    Plan:   · Nonweightbearing right upper extremity  · Analgesics as per primary team  · DVT prophylaxis as per primary team  · Body mass index is 34 33 kg/m²  mildly obese  Recommend behavior modifications, nutrition and physical activity  · Dispo: Ortho will follow   · It was discussed with the patient that we can treat this currently non operatively  She is to maintain the sling is, she does have decreased strength of the right upper extremity prior to her fall secondary to previous CVA leaving with right-sided hemiparesis  It was discussed that she can come out of the sling for elbow range of motion  She was advised to be nonweightbearing to the right upper extremity    She states that she would like to have x-rays of her knees as she is having some knee discomfort  X-rays were placed and we will follow once x-rays are obtained      Debby Vasquez PA-C

## 2022-07-04 NOTE — PLAN OF CARE
Problem: OCCUPATIONAL THERAPY ADULT  Goal: Performs self-care activities at highest level of function for planned discharge setting  See evaluation for individualized goals  Description: Treatment Interventions: ADL retraining, Activityengagement, Energy conservation, Cardiac education, Continued evaluation, Functional transfer training, UE strengthening/ROM, Endurance training, Patient/family training, Equipment evaluation/education          See flowsheet documentation for full assessment, interventions and recommendations  7/4/2022 1219 by Tayler Marina  Note: Limitation: Decreased ADL status, Decreased Safe judgement during ADL, Decreased cognition, Decreased endurance, Decreased self-care trans, Decreased high-level ADLs, Decreased fine motor control, Decreased UE strength, Decreased UE ROM, Non-func R UE  Prognosis: Good  Assessment: Pt is a [de-identified] y o  female seen for OT evaluation s/p [facility] w/ Ambulatory dysfunction  Comorbidities affecting pt's functional performance at time of assessment include:Afib, DM, HTN, closed fx of proximal end of humerus, and r sided weakness, acute pulm embolism, and CHF  Orders placed for OT evaluation and treatment  Performed at least two patient identifiers during session including name and wristband  Personal factors affecting pt at time of IE include:limited home support, difficulty performing ADLS, difficulty performing IADLS , limited insight into deficits, decreased initiation and engagement , financial barriers, health management  and environment  Prior to admission, pt reports A with ADLs, A with IADLs, and (-) driving  Upon evaluation: Pt requires max A with UB ADLs, max A with LB ADLs, and max A of 2 with side stepping at bed side   Limitations 2* the following deficits impacting occupational performance: weakness, decreased strength, decreased dynamic sit/ stand balance, decreased activity tolerance, decreased standing tolerance time for self care and functional mobility, impaired memory, impaired problem solving, decreased safety awareness, increased pain, orthopedic restrictions, environmental deficits, decreased insight, decreased coping skills, decreased mobilty and requiring external assistance to complete transitional movements  Pt to benefit from continued skilled OT tx while in the hospital to address deficits as defined above and maximize level of functional independence w ADL's and functional mobility  Occupational Performance areas to address include: grooming, bathing/shower, toilet hygiene, dressing, medication management, socialization, health maintenance, functional mobility, community mobility, clothing management and household maintenance  From OT standpoint, recommendation at time of d/c would be post acute rehab  OT Discharge Recommendation: Post acute rehabilitation services  OT - OK to Discharge: Yes (when medically cleared)    7/4/2022 1219 by Joaquim Mcfadden  Note: Limitation: Decreased ADL status, Decreased Safe judgement during ADL, Decreased cognition, Decreased endurance, Decreased self-care trans, Decreased high-level ADLs, Decreased fine motor control, Decreased UE strength, Decreased UE ROM, Non-func R UE  Prognosis: Good  Assessment: Pt is a [de-identified] y o  female seen for OT evaluation s/p admit to [facility] on 7/3/2022 w/ Ambulatory dysfunction  Comorbidities affecting pt's functional performance at time of assessment include:Afib, DM, HTN, closed fx of proximal end of humerus, and r sided weakness, acute pulm embolism, and CHF  Orders placed for OT evaluation and treatment  Performed at least two patient identifiers during session including name and wristband   Personal factors affecting pt at time of IE include:limited home support, difficulty performing ADLS, difficulty performing IADLS , limited insight into deficits, decreased initiation and engagement , financial barriers, health management  and environment  Prior to admission, pt reports A with ADLs, A with IADLs, and (-) driving  Upon evaluation: Pt requires max A with UB ADLs, max A with LB ADLs, and max A of 2 with side stepping at bed side  Limitations 2* the following deficits impacting occupational performance: weakness, decreased strength, decreased dynamic sit/ stand balance, decreased activity tolerance, decreased standing tolerance time for self care and functional mobility, impaired memory, impaired problem solving, decreased safety awareness, increased pain, orthopedic restrictions, environmental deficits, decreased insight, decreased coping skills, decreased mobilty and requiring external assistance to complete transitional movements  Pt to benefit from continued skilled OT tx while in the hospital to address deficits as defined above and maximize level of functional independence w ADL's and functional mobility  Occupational Performance areas to address include: grooming, bathing/shower, toilet hygiene, dressing, medication management, socialization, health maintenance, functional mobility, community mobility, clothing management and household maintenance  From OT standpoint, recommendation at time of d/c would be post acute rehab       OT Discharge Recommendation: Post acute rehabilitation services  OT - OK to Discharge: Yes (when medically cleared)

## 2022-07-04 NOTE — ASSESSMENT & PLAN NOTE
· Noted to have elevated white blood cell count, patient reports this past week she had a fever at home but currently denies any, has been afebrile here, continue to monitor  · Check procalcitonin, UA  · So far no clear infectious source noted, COVID/flu/RSV negative  · If any elevation and procalcitonin or fevers consider adding on antibiotics, but will monitor for now

## 2022-07-04 NOTE — OCCUPATIONAL THERAPY NOTE
Occupational Therapy Evaluation      Johnson Rodo    2022    Principal Problem:    Ambulatory dysfunction  Active Problems:    Type 2 diabetes mellitus without complication, without long-term current use of insulin (HCC)    Closed fracture of proximal end of humerus    Elevated bilirubin    Leukocytosis      Past Medical History:   Diagnosis Date    Aortic valve disease     Atrial fibrillation (HCC)     Cardiac disease     Diabetes mellitus ([address] )     Heart murmur     Heart valve disease     Hypertension     Stroke [facility])        Past Surgical History:   Procedure Laterality Date     SECTION      x 2    HYSTERECTOMY      UT ECHO TRANSESOPHAG R-T 2D W/PRB IMG ACQUISJ I&R N/A 2020    Procedure: TRANSESOPHAGEAL ECHOCARDIOGRAM (BOLIVAR); Surgeon: Suzi Cordero DO;  [facility];  Service: Cardiac Surgery    UT REPLACE AORTIC VALVE OPENFEMORAL ARTERY APPROACH Right 2020    Procedure: REPLACEMENT AORTIC VALVE TRANSCATHETER (TAVR) TRANSFEMORAL W/ 26 MM TODD ANIBAL S3 ULTRA VALVE (ACCESS ON LEFT); Surgeon: Suzi Cordero DO;  Location[facility];  Service: Cardiac Surgery    TONSILLECTOMY        Occupational Therapy goals: In 7-14 days:    Patient will verbalize and demonstrate use of energy conservation/ deep breathing technique and work simplification skills during functional activity with no verbal cues  Patient will verbalize and demonstrate good body mechanics and joint protection techniques during  ADLs/ IADLs with no verbal cues  Patient will increase OOB/ sitting tolerance to 2-4 hours per day for increased participation in self care and leisure tasks with no s/s of exertion  Patient will increase standing tolerance time to 5  minutes with unilateral UE support to complete sink level ADLs@ mod I level  Patient will increase sitting tolerance at edge of bed to 20 minutes to complete UB ADLs @ set up assist level      Patient will transfer bed to Chair / toilet at Set up assist level with AD as indicated  Patient will complete UB ADLs with set up assist     Patient will complete LB ADLs with min assist with the use of adaptive equipment  Patient will complete toileting hygiene with set up assist/ supervision for thoroughness      Patient/ Family  will demonstrate competency with UE Home Exercise Program        Alessandra Rodriguez MS OTR/L

## 2022-07-05 ENCOUNTER — APPOINTMENT (INPATIENT)
Dept: RADIOLOGY | Facility: HOSPITAL | Age: 82
DRG: 563 | End: 2022-07-05
Payer: MEDICARE

## 2022-07-05 LAB
ALBUMIN SERPL BCP-MCNC: 2.8 G/DL (ref 3.5–5)
ALP SERPL-CCNC: 77 U/L (ref 46–116)
ALT SERPL W P-5'-P-CCNC: 54 U/L (ref 12–78)
ANION GAP SERPL CALCULATED.3IONS-SCNC: 8 MMOL/L (ref 4–13)
AST SERPL W P-5'-P-CCNC: 35 U/L (ref 5–45)
BASOPHILS # BLD AUTO: 0.03 THOUSANDS/ΜL (ref 0–0.1)
BASOPHILS NFR BLD AUTO: 0 % (ref 0–1)
BILIRUB SERPL-MCNC: 1.23 MG/DL (ref 0.2–1)
BUN SERPL-MCNC: 19 MG/DL (ref 5–25)
CALCIUM ALBUM COR SERPL-MCNC: 9.3 MG/DL (ref 8.3–10.1)
CALCIUM SERPL-MCNC: 8.3 MG/DL (ref 8.3–10.1)
CHLORIDE SERPL-SCNC: 102 MMOL/L (ref 100–108)
CO2 SERPL-SCNC: 29 MMOL/L (ref 21–32)
CREAT SERPL-MCNC: 0.8 MG/DL (ref 0.6–1.3)
EOSINOPHIL # BLD AUTO: 0.15 THOUSAND/ΜL (ref 0–0.61)
EOSINOPHIL NFR BLD AUTO: 1 % (ref 0–6)
ERYTHROCYTE [DISTWIDTH] IN BLOOD BY AUTOMATED COUNT: 13.2 % (ref 11.6–15.1)
GFR SERPL CREATININE-BSD FRML MDRD: 69 ML/MIN/1.73SQ M
GLUCOSE SERPL-MCNC: 147 MG/DL (ref 65–140)
GLUCOSE SERPL-MCNC: 170 MG/DL (ref 65–140)
GLUCOSE SERPL-MCNC: 218 MG/DL (ref 65–140)
GLUCOSE SERPL-MCNC: 240 MG/DL (ref 65–140)
GLUCOSE SERPL-MCNC: 274 MG/DL (ref 65–140)
HCT VFR BLD AUTO: 38 % (ref 34.8–46.1)
HGB BLD-MCNC: 12.4 G/DL (ref 11.5–15.4)
IMM GRANULOCYTES # BLD AUTO: 0.07 THOUSAND/UL (ref 0–0.2)
IMM GRANULOCYTES NFR BLD AUTO: 1 % (ref 0–2)
LYMPHOCYTES # BLD AUTO: 2.17 THOUSANDS/ΜL (ref 0.6–4.47)
LYMPHOCYTES NFR BLD AUTO: 17 % (ref 14–44)
MCH RBC QN AUTO: 31.4 PG (ref 26.8–34.3)
MCHC RBC AUTO-ENTMCNC: 32.6 G/DL (ref 31.4–37.4)
MCV RBC AUTO: 96 FL (ref 82–98)
MONOCYTES # BLD AUTO: 1.03 THOUSAND/ΜL (ref 0.17–1.22)
MONOCYTES NFR BLD AUTO: 8 % (ref 4–12)
NEUTROPHILS # BLD AUTO: 9.02 THOUSANDS/ΜL (ref 1.85–7.62)
NEUTS SEG NFR BLD AUTO: 73 % (ref 43–75)
NRBC BLD AUTO-RTO: 0 /100 WBCS
PLATELET # BLD AUTO: 222 THOUSANDS/UL (ref 149–390)
PMV BLD AUTO: 10.8 FL (ref 8.9–12.7)
POTASSIUM SERPL-SCNC: 4 MMOL/L (ref 3.5–5.3)
PROT SERPL-MCNC: 6.4 G/DL (ref 6.4–8.2)
RBC # BLD AUTO: 3.95 MILLION/UL (ref 3.81–5.12)
SODIUM SERPL-SCNC: 139 MMOL/L (ref 136–145)
WBC # BLD AUTO: 12.47 THOUSAND/UL (ref 4.31–10.16)

## 2022-07-05 PROCEDURE — 99232 SBSQ HOSP IP/OBS MODERATE 35: CPT | Performed by: PHYSICIAN ASSISTANT

## 2022-07-05 PROCEDURE — 80053 COMPREHEN METABOLIC PANEL: CPT | Performed by: FAMILY MEDICINE

## 2022-07-05 PROCEDURE — 71045 X-RAY EXAM CHEST 1 VIEW: CPT

## 2022-07-05 PROCEDURE — 99233 SBSQ HOSP IP/OBS HIGH 50: CPT | Performed by: NURSE PRACTITIONER

## 2022-07-05 PROCEDURE — 82948 REAGENT STRIP/BLOOD GLUCOSE: CPT

## 2022-07-05 PROCEDURE — 85025 COMPLETE CBC W/AUTO DIFF WBC: CPT | Performed by: FAMILY MEDICINE

## 2022-07-05 PROCEDURE — 36415 COLL VENOUS BLD VENIPUNCTURE: CPT | Performed by: FAMILY MEDICINE

## 2022-07-05 RX ORDER — PREDNISONE 20 MG/1
40 TABLET ORAL DAILY
Status: DISCONTINUED | OUTPATIENT
Start: 2022-07-05 | End: 2022-07-07 | Stop reason: HOSPADM

## 2022-07-05 RX ADMIN — ASPIRIN 81 MG: 81 TABLET, COATED ORAL at 09:19

## 2022-07-05 RX ADMIN — INSULIN LISPRO 2 UNITS: 100 INJECTION, SOLUTION INTRAVENOUS; SUBCUTANEOUS at 22:02

## 2022-07-05 RX ADMIN — TEMAZEPAM 30 MG: 15 CAPSULE ORAL at 22:01

## 2022-07-05 RX ADMIN — INSULIN LISPRO 1 UNITS: 100 INJECTION, SOLUTION INTRAVENOUS; SUBCUTANEOUS at 09:21

## 2022-07-05 RX ADMIN — PREDNISONE 40 MG: 20 TABLET ORAL at 13:08

## 2022-07-05 RX ADMIN — OXYCODONE HYDROCHLORIDE AND ACETAMINOPHEN 1 TABLET: 5; 325 TABLET ORAL at 16:21

## 2022-07-05 RX ADMIN — LUBIPROSTONE 24 MCG: 24 CAPSULE, GELATIN COATED ORAL at 16:27

## 2022-07-05 RX ADMIN — FLUTICASONE FUROATE AND VILANTEROL TRIFENATATE 1 PUFF: 100; 25 POWDER RESPIRATORY (INHALATION) at 10:44

## 2022-07-05 RX ADMIN — FUROSEMIDE 20 MG: 20 TABLET ORAL at 09:19

## 2022-07-05 RX ADMIN — ENOXAPARIN SODIUM 40 MG: 40 INJECTION SUBCUTANEOUS at 09:20

## 2022-07-05 RX ADMIN — DILTIAZEM HYDROCHLORIDE 240 MG: 240 CAPSULE, COATED, EXTENDED RELEASE ORAL at 10:01

## 2022-07-05 RX ADMIN — INSULIN LISPRO 1 UNITS: 100 INJECTION, SOLUTION INTRAVENOUS; SUBCUTANEOUS at 13:08

## 2022-07-05 RX ADMIN — Medication 1000 UNITS: at 09:19

## 2022-07-05 RX ADMIN — LUBIPROSTONE 24 MCG: 24 CAPSULE, GELATIN COATED ORAL at 10:01

## 2022-07-05 RX ADMIN — OXYCODONE HYDROCHLORIDE AND ACETAMINOPHEN 1 TABLET: 5; 325 TABLET ORAL at 09:18

## 2022-07-05 RX ADMIN — OXYCODONE HYDROCHLORIDE AND ACETAMINOPHEN 1 TABLET: 5; 325 TABLET ORAL at 22:21

## 2022-07-05 RX ADMIN — CEFTRIAXONE SODIUM 1000 MG: 10 INJECTION, POWDER, FOR SOLUTION INTRAVENOUS at 09:21

## 2022-07-05 RX ADMIN — INSULIN LISPRO 2 UNITS: 100 INJECTION, SOLUTION INTRAVENOUS; SUBCUTANEOUS at 18:09

## 2022-07-05 RX ADMIN — PANTOPRAZOLE SODIUM 40 MG: 40 TABLET, DELAYED RELEASE ORAL at 09:20

## 2022-07-05 NOTE — PROGRESS NOTES
[facility]  Progress Note Karime Us 1940, [de-identified] y o  female MRN: [id number]  Unit/Bed#: ED 20 Encounter: [id number]  Primary Care Provider: Kallie Powers DO   Date and time admitted to hospital: 7/3/2022 11:41 PM    * Ambulatory dysfunction  Assessment & Plan  · Status post fall at home this morning, patient lives alone  · CT head negative  · Fall precautions  · PT/OT eval recommending STR     Closed fracture of proximal end of humerus  Assessment & Plan  · Patient reports noting a pop when EMS attempted to help patient back into bed at home prior to arrival  · Right shoulder x-ray revealing fracture proximal humerus head  · Currently in sling  · PT/OT eval recommending STR  · Ortho consulted recommending the following  · NWB sling  · No indication for surgery at this time Patient has 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  · Patient requested xrays of bilateral knees due to pain imaging ordered by ortho will follow  · P r n   Lidocaine patch, Motrin for pain    Type 2 diabetes mellitus without complication, without long-term current use of insulin [facility])  Assessment & Plan  Lab Results   Component Value Date    HGBA1C 7 1 (H) 10/12/2021       Recent Labs     07/04/22  1540 07/04/22  1816 07/04/22  2116 07/05/22  0728   POCGLU 244* 191* 187* 170*       Blood Sugar Average: Last 72 hrs:  · (P) 211 [id number] blood glucose checks q i d , hypoglycemia protocol  · Hold home metformin  · Sliding scale insulin    Leukocytosis  Assessment & Plan  · Noted to have elevated white blood cell count, patient reports this past week she had a fever at home but currently denies any, has been afebrile here, continue to monitor  · Procalcitonin normal  · UA micro suggestive of UTI  · Urine culture pending   · Continue IV rocephin   · COVID/flu/RSV negative    Elevated bilirubin  Assessment & Plan  · Bilirubin elevated at 2 06 which appears to be new per review of chart  · Patient denies any abdominal pain  · Improved today 1 23   · Discussed finding with patient follow-up outpatient with PCP and GI    Mild intermittent asthma  Assessment & Plan  · Patient with known history however patient reports she no longer has asthma  · Patient is not on any out patient medications  · Patient reporting symptoms x 2 weeks of wheezing  · Will check xray for completeness given presenting leukocytosis  · Given no complaints of SOB will start prednisone 40 mg daily with taper  · If xray negative for any disease process patient will be stable for discharge            VTE Pharmacologic Prophylaxis: VTE Score: 4 Moderate Risk (Score 3-4) - Pharmacological DVT Prophylaxis Ordered: enoxaparin (Lovenox)  Patient Centered Rounds: I performed bedside rounds with nursing staff today  Discussions with Specialists or Other Care Team Provider:      Education and Discussions with Family / Patient: Updated  (daughter) via phone  Deeply encouraged phone call to patient's family    Time Spent for Care: 30 minutes  More than 50% of total time spent on counseling and coordination of care as described above  Current Length of Stay: 1 day(s)  Current Patient Status: Inpatient   Certification Statement: Overall medically stable pending safe dispo plan per case management  Discharge Plan: Medically stable for discharge pending safe dispo plan to short-term rehab    Code Status: Level 3 - DNAR and DNI    Subjective:   Patient discussion denies any pain agreeable that she would like to go to short-term rehab  Patient question to wheezing on exam which she has been experiencing for 2 weeks patient reports she wants has asthma but no longer has it and is not currently on any medications and never followed up with pulmonology as an outpatient  Patient reports no shortness of breath no fevers no chills    It really did not want to take the inhaler for nursing after much discussion in benefit patient was willing to take inhaler along with some steroids  Objective:     Vitals:   No data recorded  HR:  [60-64] 60  Resp:  [14-19] 14  BP: (115-159)/(54-68) 133/58  SpO2:  [92 %-100 %] 92 %  Body mass index is 34 33 kg/m²  Input and Output Summary (last 24 hours):   No intake or output data in the 24 hours ending 07/05/22 1245    Physical Exam:   Physical Exam  Vitals and nursing note reviewed  Constitutional:       General: She is not in acute distress  Appearance: She is well-developed  HENT:      Head: Normocephalic and atraumatic  Eyes:      Conjunctiva/sclera: Conjunctivae normal    Cardiovascular:      Rate and Rhythm: Normal rate and regular rhythm  Heart sounds: No murmur heard  Pulmonary:      Effort: Pulmonary effort is normal  No respiratory distress  Breath sounds: Examination of the right-upper field reveals wheezing  Examination of the left-upper field reveals wheezing  Examination of the right-middle field reveals wheezing  Examination of the right-lower field reveals wheezing  Examination of the left-lower field reveals wheezing  Wheezing present  Abdominal:      Palpations: Abdomen is soft  Tenderness: There is no abdominal tenderness  Musculoskeletal:      Cervical back: Neck supple  Skin:     General: Skin is warm and dry  Neurological:      Mental Status: She is alert and oriented to person, place, and time  Mental status is at baseline     Psychiatric:         Mood and Affect: Mood normal          Behavior: Behavior normal           Additional Data:     Labs:  Results from last 7 days   Lab Units 07/05/22  0604   WBC Thousand/uL 12 47*   HEMOGLOBIN g/dL 12 4   HEMATOCRIT % 38 0   PLATELETS Thousands/uL 222   NEUTROS PCT % 73   LYMPHS PCT % 17   MONOS PCT % 8   EOS PCT % 1     Results from last 7 days   Lab Units 07/05/22  0604   SODIUM mmol/L 139   POTASSIUM mmol/L 4 0   CHLORIDE mmol/L 102   CO2 mmol/L 29   BUN mg/dL 19   CREATININE mg/dL 0 80 ANION GAP mmol/L 8   CALCIUM mg/dL 8 3   ALBUMIN g/dL 2 8*   TOTAL BILIRUBIN mg/dL 1 23*   ALK PHOS U/L 77   ALT U/L 54   AST U/L 35   GLUCOSE RANDOM mg/dL 147*         Results from last 7 days   Lab Units 07/05/22  1140 07/05/22  0728 07/04/22  2116 07/04/22  1816 07/04/22  1540 07/04/22  1233 07/04/22  1045 07/04/22  0715   POC GLUCOSE mg/dl 218* 170* 187* 191* 244* 215* 231* 242*         Results from last 7 days   Lab Units 07/04/22  1039   PROCALCITONIN ng/ml 0 15       Lines/Drains:  Invasive Devices  Report    Peripheral Intravenous Line  Duration           Peripheral IV 07/03/22 Left Arm 1 day                      Imaging: Personally reviewed the following imaging: chest xray    Recent Cultures (last 7 days):   Results from last 7 days   Lab Units 07/04/22  0853   URINE CULTURE  Culture too young- will reincubate       Last 24 Hours Medication List:   Current Facility-Administered Medications   Medication Dose Route Frequency Provider Last Rate    aluminum-magnesium hydroxide-simethicone  30 mL Oral Q6H PRN Matt Bahena PA-C      aspirin  81 mg Oral Daily Matt Bahena PA-C      cefTRIAXone  1,000 mg Intravenous Q24H Julio C Story MD 1,000 mg (07/05/22 0921)    cholecalciferol  1,000 Units Oral Daily Matt Bahena PA-C      diltiazem  240 mg Oral Daily Matt Bahena PA-C      docusate sodium  100 mg Oral BID PRN Matt Bahena PA-C      enoxaparin  40 mg Subcutaneous Daily Matt Bahena PA-C      fluticasone-vilanterol  1 puff Inhalation Daily Matt Bahena PA-C      furosemide  20 mg Oral Daily Matt Bahena PA-C      oxyCODONE-acetaminophen  1 tablet Oral Q6H PRN Julio C Story MD      Or    HYDROmorphone  0 5 mg Intravenous Q6H PRN Julio C Story MD      ibuprofen  400 mg Oral Q6H PRN Matt Bahena PA-C      insulin lispro  1-5 Units Subcutaneous TID AC Matt Bahena PA-C      insulin lispro  1-5 Units Subcutaneous HS Matt Bahena PA-C      lidocaine  1 patch Topical Daily PRN Maximilian Green PA-C      lubiprostone  24 mcg Oral BID With Meals Maximilian Green PA-C      pantoprazole  40 mg Oral Daily Maximilian Green PA-C      predniSONE  40 mg Oral Daily Patsie Brittle, CRNP      temazepam  30 mg Oral HS Maximilian Green PA-C          Today, Patient Was Seen By: Patsie Brittle, CRNP    **Please Note: This note may have been constructed using a voice recognition system  **

## 2022-07-05 NOTE — CASE MANAGEMENT
Case Management Discharge Planning Note    Patient name Rl Desouza  Location ED 20/ED 20 MRN [id number]  : 1940 Date 2022       Current Admission Date: 7/3/2022  Current Admission Diagnosis:Ambulatory dysfunction   Patient Active Problem List    Diagnosis Date Noted    Ambulatory dysfunction 2022    Closed fracture of proximal end of humerus 2022    Elevated bilirubin 2022    Leukocytosis 2022    Mild intermittent asthma 2020    Tachy-chana syndrome ([facility] [facility] ) 2020    Acute diastolic congestive heart failure ([facility] [facility] ) 2020    Acute pulmonary embolism ([facility] [address] ) 2020    Bilateral leg edema 2020    Adenopathy 2020    Right sided weakness 2020    Expressive aphasia 2020    S/P TAVR (transcatheter aortic valve replacement) 2020    Carotid occlusion, left 2020    Carotid stenosis, asymptomatic, right 2020    Severe aortic stenosis 10/26/2019    Tibial plateau fracture     Other persistent atrial fibrillation ([facility] [facility] ) 10/23/2019    History of stroke     Essential hypertension     Type 2 diabetes mellitus without complication, without long-term current use of insulin ([facility] [address] )       LOS (days): 1  Geometric Mean LOS (GMLOS) (days):   Days to GMLOS:     OBJECTIVE:  Risk of Unplanned Readmission Score: 9 24         Current admission status: Inpatient   Preferred Pharmacy:   [facility] [address] URE[address]A [address] [place] [address]  Phone: [phone] Fax: [address] [facility] [place] [address] [facility] [address]  Phone: [phone] Fax: [phone]    Primary Care Provider: Arvin Huffman DO    Primary Insurance: MEDICARE  Secondary Insurance:     DISCHARGE DETAILS:    Other Referral/Resources/Interventions Provided:  Interventions: Short Term Rehab  Referral Comments: Per rounding with [facility], patient is cleared for discharge pending placement  Patient prefers [facility]  CM sent a blanket referral to facilities to determine who can accept but noted that [facility] is patient's preference  Response pending at this time

## 2022-07-05 NOTE — ASSESSMENT & PLAN NOTE
Lab Results   Component Value Date    HGBA1C 7 1 (H) 10/12/2021       Recent Labs     07/04/22  1540 07/04/22  1816 07/04/22  2116 07/05/22  0728   POCGLU 244* 191* 187* 170*       Blood Sugar Average: Last 72 hrs:  · (P) 211 [id number] blood glucose checks q i d , hypoglycemia protocol  · Hold home metformin  · Sliding scale insulin

## 2022-07-05 NOTE — ASSESSMENT & PLAN NOTE
· Noted to have elevated white blood cell count, patient reports this past week she had a fever at home but currently denies any, has been afebrile here, continue to monitor  · Procalcitonin normal  · UA micro suggestive of UTI  · Urine culture pending   · Continue IV rocephin   · COVID/flu/RSV negative

## 2022-07-05 NOTE — ASSESSMENT & PLAN NOTE
· Status post fall at home this morning, patient lives alone  · CT head negative  · Fall precautions  · PT/OT eval recommending STR

## 2022-07-05 NOTE — ASSESSMENT & PLAN NOTE
· Bilirubin elevated at 2 06 which appears to be new per review of chart  · Patient denies any abdominal pain  · Improved today 1 23   · Discussed finding with patient follow-up outpatient with PCP and GI

## 2022-07-05 NOTE — PROGRESS NOTES
Progress Note - Orthopedics   Rl Desouza [de-identified] y o  female MRN: [id number]  Unit/Bed#: MO CT SCAN      Subjective:    [de-identified] y  o female currently admitted for a right proximal humerus fracture who patient states that her shoulder is doing slightly better today compared to yesterday with mobilization  Patient states he continues to have diffuse pain throughout the shoulder becomes worse with direct contact  Patient was also complaining of bilateral knee pain  Patient states that symptoms been ongoing for decades  Patient states she has had no increase in pain status post her fall  Patient states that her pain is on the medial aspect of the becomes worse with weight-bearing  Patient states she has no medical treatment for the knees in the past   Patient states that the pain is localized to her knees and does not extend into her legs  Patient offers no other complaints at this time   past medical history for right hemiplegia status post CVA, CHF, AFib, type 2 diabetes     Labs:  0   Lab Value Date/Time    HCT 38 0 07/05/2022 0604    HCT 41 9 07/04/2022 0054    HCT 46 2 (H) 06/25/2020 0437    HGB 12 4 07/05/2022 0604    HGB 13 8 07/04/2022 0054    HGB 14 7 06/25/2020 0437    INR 1 11 06/23/2020 0435    WBC 12 47 (H) 07/05/2022 0604    WBC 15 94 (H) 07/04/2022 0054    WBC 9 92 06/25/2020 0437       Meds:    Current Facility-Administered Medications:     aluminum-magnesium hydroxide-simethicone (MYLANTA) oral suspension 30 mL, 30 mL, Oral, Q6H PRN, Fany Barrera PA-C    aspirin (ECOTRIN LOW STRENGTH) EC tablet 81 mg, 81 mg, Oral, Daily, Shannon Manjarrez PA-C, 81 mg at 07/04/22 0843    ceftriaxone (ROCEPHIN) 1 g/50 mL in dextrose IVPB, 1,000 mg, Intravenous, Q24H, Julio C Ordoñez MD, Stopped at 07/04/22 1113    cholecalciferol (VITAMIN D3) tablet 1,000 Units, 1,000 Units, Oral, Daily, Fany Barrera PA-C, 1,000 Units at 07/04/22 0844    diltiazem (CARDIZEM CD) 24 hr capsule 240 mg, 240 mg, Oral, Daily, Tanner Saavedra MARIZA Manjarrez PA-C    docusate sodium (COLACE) capsule 100 mg, 100 mg, Oral, BID PRN, Dawson Green PA-C    enoxaparin (LOVENOX) subcutaneous injection 40 mg, 40 mg, Subcutaneous, Daily, Shannon Manjarrez PA-C, 40 mg at 07/04/22 0845    fluticasone-vilanterol (BREO ELLIPTA) 100-25 mcg/inh inhaler 1 puff, 1 puff, Inhalation, Daily, Dawson Green PA-C    furosemide (LASIX) tablet 20 mg, 20 mg, Oral, Daily, Shannon Manjarrez PA-C, 20 mg at 07/04/22 0843    oxyCODONE-acetaminophen (PERCOCET) 5-325 mg per tablet 1 tablet, 1 tablet, Oral, Q6H PRN, 1 tablet at 07/04/22 2125 **OR** HYDROmorphone (DILAUDID) injection 0 5 mg, 0 5 mg, Intravenous, Q6H PRN, Julio C Spivey MD    ibuprofen (MOTRIN) tablet 400 mg, 400 mg, Oral, Q6H PRN, Dawson Green PA-C    insulin lispro (HumaLOG) 100 units/mL subcutaneous injection 1-5 Units, 1-5 Units, Subcutaneous, TID AC, 2 Units at 07/04/22 1541 **AND** Fingerstick Glucose (POCT), , , TID AC, Shannon Manjarrez PA-C    insulin lispro (HumaLOG) 100 units/mL subcutaneous injection 1-5 Units, 1-5 Units, Subcutaneous, HS, Dawson Green PA-C, 1 Units at 07/04/22 2125    lidocaine (LIDODERM) 5 % patch 1 patch, 1 patch, Topical, Daily PRN, Dawson Green PA-C, 1 patch at 07/04/22 0846    lubiprostone (AMITIZA) capsule 24 mcg, 24 mcg, Oral, BID With Meals, Dawson Green PA-C, 24 mcg at 07/04/22 1609    pantoprazole (PROTONIX) EC tablet 40 mg, 40 mg, Oral, Daily, Shannon Manjarrez PA-C, 40 mg at 07/04/22 0844    temazepam (RESTORIL) capsule 30 mg, 30 mg, Oral, HS, Shannon Manjarrez PA-C, 30 mg at 07/04/22 2129    Current Outpatient Medications:     lubiprostone (AMITIZA) 24 mcg capsule, Take 24 mcg by mouth 2 (two) times a day with meals, Disp: , Rfl:     acetaminophen (TYLENOL) 325 mg tablet, Take 650 mg by mouth every 6 (six) hours as needed for mild pain, Disp: , Rfl:     alendronate (FOSAMAX) 70 mg tablet, Take 70 mg by mouth Once a week, Disp: , Rfl:     ascorbic acid (VITAMIN C) 500 mg tablet, Take 500 mg by mouth 2 (two) times a day, Disp: , Rfl:     atorvastatin (LIPITOR) 10 mg tablet, Take 10 mg by mouth daily, Disp: , Rfl:     BABY ASPIRIN PO, Take 81 mg by mouth daily, Disp: , Rfl:     cholecalciferol (VITAMIN D3) 1,000 units tablet, Take 1,000 Units by mouth daily, Disp: , Rfl:     cyanocobalamin (VITAMIN B-12) 100 mcg tablet, Take by mouth daily, Disp: , Rfl:     diltiazem (CARDIZEM CD) 240 mg 24 hr capsule, Take 1 capsule (240 mg total) by mouth daily, Disp: 30 capsule, Rfl: 0    docusate sodium (COLACE) 100 mg capsule, Take 1 capsule (100 mg total) by mouth 2 (two) times a day (Patient not taking: Reported on 2/27/2020), Disp: 10 capsule, Rfl: 0    fluticasone-vilanterol (BREO ELLIPTA) 100-25 mcg/inh inhaler, Inhale 1 puff daily Rinse mouth after use , Disp: 3 Inhaler, Rfl: 3    furosemide (LASIX) 20 mg tablet, Take 1 tablet (20 mg total) by mouth daily, Disp: 30 tablet, Rfl: 0    metFORMIN (GLUCOPHAGE) 500 mg tablet, Take 500 mg by mouth daily with breakfast , Disp: , Rfl:     metoprolol tartrate (LOPRESSOR) 25 mg tablet, Take 1 tablet (25 mg total) by mouth every 12 (twelve) hours, Disp: 60 tablet, Rfl: 0    omeprazole (PriLOSEC) 20 mg delayed release capsule, Take 1 capsule (20 mg total) by mouth daily Take 30 minutes prior to breakfast, Disp: 30 capsule, Rfl: 0    polyethylene glycol (MIRALAX) 17 g packet, Take 17 g by mouth daily (Patient not taking: Reported on 2/27/2020), Disp: 14 each, Rfl: 0    potassium chloride (K-DUR,KLOR-CON) 10 mEq tablet, Take 1 tablet (10 mEq total) by mouth daily, Disp: 30 tablet, Rfl: 0    temazepam (RESTORIL) 30 mg capsule, Take 30 mg by mouth daily at bedtime , Disp: , Rfl:     Blood Culture:   No results found for: BLOODCX    Wound Culture:   No results found for: WOUNDCULT    Ins and Outs:  No intake/output data recorded            Physical:  Vitals:    07/05/22 0300   BP: 141/64   Pulse: 60   Resp: 19   Temp:    SpO2: 100% Musculoskeletal: right Upper Extremity  · Skin pink dry and intact, arm is resting comfortably in sling  · Tenderness to palpation over the anterior aspect of the shoulder  · Range of motion deferred due to fracture  · Patient is in a resting fist position but is able to passively move the digits with the other hand, no flexion contracture appreciated  · Sensation intact to axillary, musculocutaneous, radial, ulna, median nerves  · 2+ radial pulse  · Capillary refill less than 2 seconds       Bilateral lower extremities:  · Patient resting comfortably in hospital bed in no acute distress  · Skin :  Genu valgum deformity present of bilateral knees  No other acute visible abnormalities present  Extremities appear well perfused overall  · TTP  :  Tenderness palpation noted over the medial joint line bilaterally  No other bony or soft tissue tenderness palpation noted at this time  · 0-90 degrees of range of motion with peripatellar crepitus appreciated at this time  · SILT s/s/sp/dp/t  +fhl/ehl, +ankle dorsi/plantar flexion  2+ DP pulse      Imaging :  Bilateral knee x-ray three views:  Severe medial joint space narrowing with osteophyte formation present  Moderate patellofemoral joint narrowing present      Assessment:    [de-identified] y  o female with  right shoulder proximal humerus fracture, possible avulsion of the inferior pole of the scapula  Severe bilateral knee osteoarthritis      Plan:  · Nonweightbearing right upper extremity  · weight-bearing as tolerated bilateral lower extremities  · PT/OT  for ambulation assistance, gait training  · Pain control :  Per primary team  · DVT ppx :  Per primary team  · Dispo: Ortho signing off  Continue conservative treatment of the right proximal humerus fracture with sling  May come out of sling for range of motion exercises of elbow, wrist, hand  Nonweightbearing right upper extremity  X-ray show severe osteoarthritis of the knees    Discussed conservative treatment with patient at length  Patient is opting for oral anti-inflammatory medications at this time  Discussed possibility of cortisone injection in future pending symptoms  If there are any other questions or concerns in regards to orthopedic care of this patient, please feel free to reach out orthopedics  Patient may follow up in the outpatient setting in approximately 2 weeks when medically stable  Avelina Dela Cruz PA-C                   Portions of the record may have been created with voice recognition software  Occasional wrong word or "sound a like" substitutions may have occurred due to the inherent limitations of voice recognition software  Read the chart carefully and recognize, using context, where substitutions have occurred

## 2022-07-05 NOTE — PLAN OF CARE
Problem: Prexisting or High Potential for Compromised Skin Integrity  Goal: Skin integrity is maintained or improved  Description: INTERVENTIONS:  - Identify patients at risk for skin breakdown  - Assess and monitor skin integrity  - Assess and monitor nutrition and hydration status  - Monitor labs   - Assess for incontinence   - Turn and reposition patient  - Assist with mobility/ambulation  - Relieve pressure over bony prominences  - Avoid friction and shearing  - Provide appropriate hygiene as needed including keeping skin clean and dry  - Evaluate need for skin moisturizer/barrier cream  - Collaborate with interdisciplinary team   - Patient/family teaching  - Consider wound care consult   Outcome: Progressing     Problem: MOBILITY - ADULT  Goal: Maintain or return to baseline ADL function  Description: INTERVENTIONS:  -  Assess patient's ability to carry out ADLs; assess patient's baseline for ADL function and identify physical deficits which impact ability to perform ADLs (bathing, care of mouth/teeth, toileting, grooming, dressing, etc )  - Assess/evaluate cause of self-care deficits   - Assess range of motion  - Assess patient's mobility; develop plan if impaired  - Assess patient's need for assistive devices and provide as appropriate  - Encourage maximum independence but intervene and supervise when necessary  - Involve family in performance of ADLs  - Assess for home care needs following discharge   - Consider OT consult to assist with ADL evaluation and planning for discharge  - Provide patient education as appropriate  Outcome: Progressing  Goal: Maintains/Returns to pre admission functional level  Description: INTERVENTIONS:  - Perform BMAT or MOVE assessment daily    - Set and communicate daily mobility goal to care team and patient/family/caregiver  - Collaborate with rehabilitation services on mobility goals if consulted  - Perform Range of Motion  times a day    - Reposition patient every hours   - Dangle patient  times a day  - Stand patient  times a day  - Ambulate patient  times a day  - Out of bed to chair  times a day   - Out of bed for meals  times a day  - Out of bed for toileting  - Record patient progress and toleration of activity level   Outcome: Progressing     Problem: PAIN - ADULT  Goal: Verbalizes/displays adequate comfort level or baseline comfort level  Description: Interventions:  - Encourage patient to monitor pain and request assistance  - Assess pain using appropriate pain scale  - Administer analgesics based on type and severity of pain and evaluate response  - Implement non-pharmacological measures as appropriate and evaluate response  - Consider cultural and social influences on pain and pain management  - Notify physician/advanced practitioner if interventions unsuccessful or patient reports new pain  Outcome: Progressing     Problem: INFECTION - ADULT  Goal: Absence or prevention of progression during hospitalization  Description: INTERVENTIONS:  - Assess and monitor for signs and symptoms of infection  - Monitor lab/diagnostic results  - Monitor all insertion sites, i e  indwelling lines, tubes, and drains  - Monitor endotracheal if appropriate and nasal secretions for changes in amount and color  - [place] appropriate cooling/warming therapies per order  - Administer medications as ordered  - Instruct and encourage patient and family to use good hand hygiene technique  - Identify and instruct in appropriate isolation precautions for identified infection/condition  Outcome: Progressing  Goal: Absence of fever/infection during neutropenic period  Description: INTERVENTIONS:  - Monitor WBC    Outcome: Progressing     Problem: SAFETY ADULT  Goal: Maintain or return to baseline ADL function  Description: INTERVENTIONS:  -  Assess patient's ability to carry out ADLs; assess patient's baseline for ADL function and identify physical deficits which impact ability to perform ADLs (bathing, care of mouth/teeth, toileting, grooming, dressing, etc )  - Assess/evaluate cause of self-care deficits   - Assess range of motion  - Assess patient's mobility; develop plan if impaired  - Assess patient's need for assistive devices and provide as appropriate  - Encourage maximum independence but intervene and supervise when necessary  - Involve family in performance of ADLs  - Assess for home care needs following discharge   - Consider OT consult to assist with ADL evaluation and planning for discharge  - Provide patient education as appropriate  Outcome: Progressing  Goal: Maintains/Returns to pre admission functional level  Description: INTERVENTIONS:  - Perform BMAT or MOVE assessment daily    - Set and communicate daily mobility goal to care team and patient/family/caregiver  - Collaborate with rehabilitation services on mobility goals if consulted  - Perform Range of Motion  times a day  - Reposition patient every  hours    - Dangle patient  times a day  - Stand patient  times a day  - Ambulate patient  times a day  - Out of bed to chair  times a day   - Out of bed for meals  times a day  - Out of bed for toileting  - Record patient progress and toleration of activity level   Outcome: Progressing  Goal: Patient will remain free of falls  Description: INTERVENTIONS:  - Educate patient/family on patient safety including physical limitations  - Instruct patient to call for assistance with activity   - Consult OT/PT to assist with strengthening/mobility   - Keep Call bell within reach  - Keep bed low and locked with side rails adjusted as appropriate  - Keep care items and personal belongings within reach  - Initiate and maintain comfort rounds  - Make Fall Risk Sign visible to staff  - Offer Toileting every  Hours, in advance of need  - Initiate/Maintain alarm  - Obtain necessary fall risk management equipment  - Apply yellow socks and bracelet for high fall risk patients  - Consider moving patient to room near nurses station  Outcome: Progressing     Problem: DISCHARGE PLANNING  Goal: Discharge to home or other facility with appropriate resources  Description: INTERVENTIONS:  - Identify barriers to discharge w/patient and caregiver  - Arrange for needed discharge resources and transportation as appropriate  - Identify discharge learning needs (meds, wound care, etc )  - Arrange for interpretive services to assist at discharge as needed  - Refer to Case Management Department for coordinating discharge planning if the patient needs post-hospital services based on physician/advanced practitioner order or complex needs related to functional status, cognitive ability, or social support system  Outcome: Progressing     Problem: Knowledge Deficit  Goal: Patient/family/caregiver demonstrates understanding of disease process, treatment plan, medications, and discharge instructions  Description: Complete learning assessment and assess knowledge base    Interventions:  - Provide teaching at level of understanding  - Provide teaching via preferred learning methods  Outcome: Progressing     Problem: METABOLIC, FLUID AND ELECTROLYTES - ADULT  Goal: Electrolytes maintained within normal limits  Description: INTERVENTIONS:  - Monitor labs and assess patient for signs and symptoms of electrolyte imbalances  - Administer electrolyte replacement as ordered  - Monitor response to electrolyte replacements, including repeat lab results as appropriate  - Instruct patient on fluid and nutrition as appropriate  Outcome: Progressing  Goal: Fluid balance maintained  Description: INTERVENTIONS:  - Monitor labs   - Monitor I/O and WT  - Instruct patient on fluid and nutrition as appropriate  - Assess for signs & symptoms of volume excess or deficit  Outcome: Progressing  Goal: Glucose maintained within target range  Description: INTERVENTIONS:  - Monitor Blood Glucose as ordered  - Assess for signs and symptoms of hyperglycemia and hypoglycemia  - Administer ordered medications to maintain glucose within target range  - Assess nutritional intake and initiate nutrition service referral as needed  Outcome: Progressing     Problem: MUSCULOSKELETAL - ADULT  Goal: Maintain or return mobility to safest level of function  Description: INTERVENTIONS:  - Assess patient's ability to carry out ADLs; assess patient's baseline for ADL function and identify physical deficits which impact ability to perform ADLs (bathing, care of mouth/teeth, toileting, grooming, dressing, etc )  - Assess/evaluate cause of self-care deficits   - Assess range of motion  - Assess patient's mobility  - Assess patient's need for assistive devices and provide as appropriate  - Encourage maximum independence but intervene and supervise when necessary  - Involve family in performance of ADLs  - Assess for home care needs following discharge   - Consider OT consult to assist with ADL evaluation and planning for discharge  - Provide patient education as appropriate  Outcome: Progressing  Goal: Maintain proper alignment of affected body part  Description: INTERVENTIONS:  - Support, maintain and protect limb and body alignment  - Provide patient/ family with appropriate education  Outcome: Progressing

## 2022-07-05 NOTE — ASSESSMENT & PLAN NOTE
· Patient with known history however patient reports she no longer has asthma  · Patient is not on any out patient medications  · Patient reporting symptoms x 2 weeks of wheezing  · Will check xray for completeness given presenting leukocytosis  · Given no complaints of SOB will start prednisone 40 mg daily with taper  · If xray negative for any disease process patient will be stable for discharge

## 2022-07-05 NOTE — ASSESSMENT & PLAN NOTE
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

## 2022-07-06 LAB
BACTERIA UR CULT: ABNORMAL
BACTERIA UR CULT: ABNORMAL
GLUCOSE SERPL-MCNC: 176 MG/DL (ref 65–140)
GLUCOSE SERPL-MCNC: 232 MG/DL (ref 65–140)
GLUCOSE SERPL-MCNC: 232 MG/DL (ref 65–140)
GLUCOSE SERPL-MCNC: 299 MG/DL (ref 65–140)

## 2022-07-06 PROCEDURE — 82948 REAGENT STRIP/BLOOD GLUCOSE: CPT

## 2022-07-06 PROCEDURE — 99232 SBSQ HOSP IP/OBS MODERATE 35: CPT | Performed by: NURSE PRACTITIONER

## 2022-07-06 RX ORDER — BISACODYL 10 MG
10 SUPPOSITORY, RECTAL RECTAL ONCE
Status: COMPLETED | OUTPATIENT
Start: 2022-07-06 | End: 2022-07-07

## 2022-07-06 RX ORDER — DOCUSATE SODIUM 100 MG/1
100 CAPSULE, LIQUID FILLED ORAL DAILY
Status: DISCONTINUED | OUTPATIENT
Start: 2022-07-07 | End: 2022-07-07

## 2022-07-06 RX ADMIN — FUROSEMIDE 20 MG: 20 TABLET ORAL at 08:58

## 2022-07-06 RX ADMIN — FLUTICASONE FUROATE AND VILANTEROL TRIFENATATE 1 PUFF: 100; 25 POWDER RESPIRATORY (INHALATION) at 09:39

## 2022-07-06 RX ADMIN — ASPIRIN 81 MG: 81 TABLET, COATED ORAL at 08:58

## 2022-07-06 RX ADMIN — INSULIN LISPRO 2 UNITS: 100 INJECTION, SOLUTION INTRAVENOUS; SUBCUTANEOUS at 18:17

## 2022-07-06 RX ADMIN — INSULIN LISPRO 2 UNITS: 100 INJECTION, SOLUTION INTRAVENOUS; SUBCUTANEOUS at 12:01

## 2022-07-06 RX ADMIN — OXYCODONE HYDROCHLORIDE AND ACETAMINOPHEN 1 TABLET: 5; 325 TABLET ORAL at 08:58

## 2022-07-06 RX ADMIN — TEMAZEPAM 30 MG: 15 CAPSULE ORAL at 22:20

## 2022-07-06 RX ADMIN — DILTIAZEM HYDROCHLORIDE 240 MG: 240 CAPSULE, COATED, EXTENDED RELEASE ORAL at 08:58

## 2022-07-06 RX ADMIN — IBUPROFEN 400 MG: 400 TABLET ORAL at 18:22

## 2022-07-06 RX ADMIN — CEFTRIAXONE SODIUM 1000 MG: 10 INJECTION, POWDER, FOR SOLUTION INTRAVENOUS at 10:16

## 2022-07-06 RX ADMIN — PANTOPRAZOLE SODIUM 40 MG: 40 TABLET, DELAYED RELEASE ORAL at 08:58

## 2022-07-06 RX ADMIN — INSULIN LISPRO 1 UNITS: 100 INJECTION, SOLUTION INTRAVENOUS; SUBCUTANEOUS at 08:58

## 2022-07-06 RX ADMIN — MAGNESIUM HYDROXIDE 30 ML: 400 SUSPENSION ORAL at 18:17

## 2022-07-06 RX ADMIN — LUBIPROSTONE 24 MCG: 24 CAPSULE, GELATIN COATED ORAL at 18:17

## 2022-07-06 RX ADMIN — PREDNISONE 40 MG: 20 TABLET ORAL at 08:58

## 2022-07-06 RX ADMIN — LIDOCAINE 5% 1 PATCH: 700 PATCH TOPICAL at 10:15

## 2022-07-06 RX ADMIN — LUBIPROSTONE 24 MCG: 24 CAPSULE, GELATIN COATED ORAL at 08:57

## 2022-07-06 RX ADMIN — MAGNESIUM HYDROXIDE 30 ML: 400 SUSPENSION ORAL at 11:18

## 2022-07-06 RX ADMIN — INSULIN LISPRO 2 UNITS: 100 INJECTION, SOLUTION INTRAVENOUS; SUBCUTANEOUS at 22:14

## 2022-07-06 RX ADMIN — ENOXAPARIN SODIUM 40 MG: 40 INJECTION SUBCUTANEOUS at 08:57

## 2022-07-06 RX ADMIN — Medication 1000 UNITS: at 08:58

## 2022-07-06 RX ADMIN — DOCUSATE SODIUM 100 MG: 100 CAPSULE, LIQUID FILLED ORAL at 10:15

## 2022-07-06 NOTE — PLAN OF CARE
Problem: Prexisting or High Potential for Compromised Skin Integrity  Goal: Skin integrity is maintained or improved  Description: INTERVENTIONS:  - Identify patients at risk for skin breakdown  - Assess and monitor skin integrity  - Assess and monitor nutrition and hydration status  - Monitor labs   - Assess for incontinence   - Turn and reposition patient  - Assist with mobility/ambulation  - Relieve pressure over bony prominences  - Avoid friction and shearing  - Provide appropriate hygiene as needed including keeping skin clean and dry  - Evaluate need for skin moisturizer/barrier cream  - Collaborate with interdisciplinary team   - Patient/family teaching  - Consider wound care consult   Outcome: Progressing     Problem: MOBILITY - ADULT  Goal: Maintain or return to baseline ADL function  Description: INTERVENTIONS:  -  Assess patient's ability to carry out ADLs; assess patient's baseline for ADL function and identify physical deficits which impact ability to perform ADLs (bathing, care of mouth/teeth, toileting, grooming, dressing, etc )  - Assess/evaluate cause of self-care deficits   - Assess range of motion  - Assess patient's mobility; develop plan if impaired  - Assess patient's need for assistive devices and provide as appropriate  - Encourage maximum independence but intervene and supervise when necessary  - Involve family in performance of ADLs  - Assess for home care needs following discharge   - Consider OT consult to assist with ADL evaluation and planning for discharge  - Provide patient education as appropriate  Outcome: Progressing  Goal: Maintains/Returns to pre admission functional level  Description: INTERVENTIONS:  - Perform BMAT or MOVE assessment daily    - Set and communicate daily mobility goal to care team and patient/family/caregiver  - Collaborate with rehabilitation services on mobility goals if consulted  - Perform Range of Motion 2 times a day    - Reposition patient every 2 hours   - Dangle patient 2 times a day  - Stand patient 2 times a day  - Ambulate patient 2 times a day  - Out of bed to chair 2 times a day   - Out of bed for meals 2 times a day  - Out of bed for toileting  - Record patient progress and toleration of activity level   Outcome: Progressing     Problem: PAIN - ADULT  Goal: Verbalizes/displays adequate comfort level or baseline comfort level  Description: Interventions:  - Encourage patient to monitor pain and request assistance  - Assess pain using appropriate pain scale  - Administer analgesics based on type and severity of pain and evaluate response  - Implement non-pharmacological measures as appropriate and evaluate response  - Consider cultural and social influences on pain and pain management  - Notify physician/advanced practitioner if interventions unsuccessful or patient reports new pain  Outcome: Progressing     Problem: INFECTION - ADULT  Goal: Absence or prevention of progression during hospitalization  Description: INTERVENTIONS:  - Assess and monitor for signs and symptoms of infection  - Monitor lab/diagnostic results  - Monitor all insertion sites, i e  indwelling lines, tubes, and drains  - Monitor endotracheal if appropriate and nasal secretions for changes in amount and color  - [place] appropriate cooling/warming therapies per order  - Administer medications as ordered  - Instruct and encourage patient and family to use good hand hygiene technique  - Identify and instruct in appropriate isolation precautions for identified infection/condition  Outcome: Progressing  Goal: Absence of fever/infection during neutropenic period  Description: INTERVENTIONS:  - Monitor WBC    Outcome: Progressing     Problem: SAFETY ADULT  Goal: Maintain or return to baseline ADL function  Description: INTERVENTIONS:  -  Assess patient's ability to carry out ADLs; assess patient's baseline for ADL function and identify physical deficits which impact ability to perform ADLs (bathing, care of mouth/teeth, toileting, grooming, dressing, etc )  - Assess/evaluate cause of self-care deficits   - Assess range of motion  - Assess patient's mobility; develop plan if impaired  - Assess patient's need for assistive devices and provide as appropriate  - Encourage maximum independence but intervene and supervise when necessary  - Involve family in performance of ADLs  - Assess for home care needs following discharge   - Consider OT consult to assist with ADL evaluation and planning for discharge  - Provide patient education as appropriate  Outcome: Progressing  Goal: Maintains/Returns to pre admission functional level  Description: INTERVENTIONS:  - Perform BMAT or MOVE assessment daily    - Set and communicate daily mobility goal to care team and patient/family/caregiver  - Collaborate with rehabilitation services on mobility goals if consulted  - Perform Range of Motion 2 times a day  - Reposition patient every 2 hours    - Dangle patient 2 times a day  - Stand patient 2 times a day  - Ambulate patient 2 times a day  - Out of bed to chair 2 times a day   - Out of bed for meals 2 times a day  - Out of bed for toileting  - Record patient progress and toleration of activity level   Outcome: Progressing  Goal: Patient will remain free of falls  Description: INTERVENTIONS:  - Educate patient/family on patient safety including physical limitations  - Instruct patient to call for assistance with activity   - Consult OT/PT to assist with strengthening/mobility   - Keep Call bell within reach  - Keep bed low and locked with side rails adjusted as appropriate  - Keep care items and personal belongings within reach  - Initiate and maintain comfort rounds  - Make Fall Risk Sign visible to staff  - Offer Toileting every 2 Hours, in advance of need  - Initiate/Maintain alarm  - Obtain necessary fall risk management equipment:   - Apply yellow socks and bracelet for high fall risk patients  - Consider moving patient to room near nurses station  Outcome: Progressing     Problem: DISCHARGE PLANNING  Goal: Discharge to home or other facility with appropriate resources  Description: INTERVENTIONS:  - Identify barriers to discharge w/patient and caregiver  - Arrange for needed discharge resources and transportation as appropriate  - Identify discharge learning needs (meds, wound care, etc )  - Arrange for interpretive services to assist at discharge as needed  - Refer to Case Management Department for coordinating discharge planning if the patient needs post-hospital services based on physician/advanced practitioner order or complex needs related to functional status, cognitive ability, or social support system  Outcome: Progressing     Problem: Knowledge Deficit  Goal: Patient/family/caregiver demonstrates understanding of disease process, treatment plan, medications, and discharge instructions  Description: Complete learning assessment and assess knowledge base    Interventions:  - Provide teaching at level of understanding  - Provide teaching via preferred learning methods  Outcome: Progressing     Problem: METABOLIC, FLUID AND ELECTROLYTES - ADULT  Goal: Electrolytes maintained within normal limits  Description: INTERVENTIONS:  - Monitor labs and assess patient for signs and symptoms of electrolyte imbalances  - Administer electrolyte replacement as ordered  - Monitor response to electrolyte replacements, including repeat lab results as appropriate  - Instruct patient on fluid and nutrition as appropriate  Outcome: Progressing  Goal: Fluid balance maintained  Description: INTERVENTIONS:  - Monitor labs   - Monitor I/O and WT  - Instruct patient on fluid and nutrition as appropriate  - Assess for signs & symptoms of volume excess or deficit  Outcome: Progressing  Goal: Glucose maintained within target range  Description: INTERVENTIONS:  - Monitor Blood Glucose as ordered  - Assess for signs and symptoms of hyperglycemia and hypoglycemia  - Administer ordered medications to maintain glucose within target range  - Assess nutritional intake and initiate nutrition service referral as needed  Outcome: Progressing     Problem: MUSCULOSKELETAL - ADULT  Goal: Maintain or return mobility to safest level of function  Description: INTERVENTIONS:  - Assess patient's ability to carry out ADLs; assess patient's baseline for ADL function and identify physical deficits which impact ability to perform ADLs (bathing, care of mouth/teeth, toileting, grooming, dressing, etc )  - Assess/evaluate cause of self-care deficits   - Assess range of motion  - Assess patient's mobility  - Assess patient's need for assistive devices and provide as appropriate  - Encourage maximum independence but intervene and supervise when necessary  - Involve family in performance of ADLs  - Assess for home care needs following discharge   - Consider OT consult to assist with ADL evaluation and planning for discharge  - Provide patient education as appropriate  Outcome: Progressing  Goal: Maintain proper alignment of affected body part  Description: INTERVENTIONS:  - Support, maintain and protect limb and body alignment  - Provide patient/ family with appropriate education  Outcome: Progressing     Problem: Potential for Falls  Goal: Patient will remain free of falls  Description: INTERVENTIONS:  - Educate patient/family on patient safety including physical limitations  - Instruct patient to call for assistance with activity   - Consult OT/PT to assist with strengthening/mobility   - Keep Call bell within reach  - Keep bed low and locked with side rails adjusted as appropriate  - Keep care items and personal belongings within reach  - Initiate and maintain comfort rounds  - Make Fall Risk Sign visible to staff  - Offer Toileting every 2 Hours, in advance of need  - Initiate/Maintain alarm  - Obtain necessary fall risk management equipment:   - Apply yellow socks and bracelet for high fall risk patients  - Consider moving patient to room near nurses station  Outcome: Progressing

## 2022-07-06 NOTE — ASSESSMENT & PLAN NOTE
· Noted to have elevated white blood cell count, patient reports this past week she had a fever at home but currently denies any, has been afebrile here, continue to monitor  · Procalcitonin normal  · UA micro suggestive of UTI  · Urine culture pending   · Continue IV rocephin can transition to oral upon discharge in treat for 5 days   · COVID/flu/RSV negative

## 2022-07-06 NOTE — PLAN OF CARE
Problem: Prexisting or High Potential for Compromised Skin Integrity  Goal: Skin integrity is maintained or improved  Description: INTERVENTIONS:  - Identify patients at risk for skin breakdown  - Assess and monitor skin integrity  - Assess and monitor nutrition and hydration status  - Monitor labs   - Assess for incontinence   - Turn and reposition patient  - Assist with mobility/ambulation  - Relieve pressure over bony prominences  - Avoid friction and shearing  - Provide appropriate hygiene as needed including keeping skin clean and dry  - Evaluate need for skin moisturizer/barrier cream  - Collaborate with interdisciplinary team   - Patient/family teaching  - Consider wound care consult   Outcome: Progressing     Problem: MOBILITY - ADULT  Goal: Maintain or return to baseline ADL function  Description: INTERVENTIONS:  -  Assess patient's ability to carry out ADLs; assess patient's baseline for ADL function and identify physical deficits which impact ability to perform ADLs (bathing, care of mouth/teeth, toileting, grooming, dressing, etc )  - Assess/evaluate cause of self-care deficits   - Assess range of motion  - Assess patient's mobility; develop plan if impaired  - Assess patient's need for assistive devices and provide as appropriate  - Encourage maximum independence but intervene and supervise when necessary  - Involve family in performance of ADLs  - Assess for home care needs following discharge   - Consider OT consult to assist with ADL evaluation and planning for discharge  - Provide patient education as appropriate  Outcome: Progressing  Goal: Maintains/Returns to pre admission functional level  Description: INTERVENTIONS:  - Perform BMAT or MOVE assessment daily    - Set and communicate daily mobility goal to care team and patient/family/caregiver  - Collaborate with rehabilitation services on mobility goals if consulted  - Perform Range of Motion  times a day    - Reposition patient every hours   - Dangle patient  times a day  - Stand patient  times a day  - Ambulate patient  times a day  - Out of bed to chair  times a day   - Out of bed for meals  times a day  - Out of bed for toileting  - Record patient progress and toleration of activity level   Outcome: Progressing     Problem: PAIN - ADULT  Goal: Verbalizes/displays adequate comfort level or baseline comfort level  Description: Interventions:  - Encourage patient to monitor pain and request assistance  - Assess pain using appropriate pain scale  - Administer analgesics based on type and severity of pain and evaluate response  - Implement non-pharmacological measures as appropriate and evaluate response  - Consider cultural and social influences on pain and pain management  - Notify physician/advanced practitioner if interventions unsuccessful or patient reports new pain  Outcome: Progressing     Problem: INFECTION - ADULT  Goal: Absence or prevention of progression during hospitalization  Description: INTERVENTIONS:  - Assess and monitor for signs and symptoms of infection  - Monitor lab/diagnostic results  - Monitor all insertion sites, i e  indwelling lines, tubes, and drains  - Monitor endotracheal if appropriate and nasal secretions for changes in amount and color  - [place] appropriate cooling/warming therapies per order  - Administer medications as ordered  - Instruct and encourage patient and family to use good hand hygiene technique  - Identify and instruct in appropriate isolation precautions for identified infection/condition  Outcome: Progressing  Goal: Absence of fever/infection during neutropenic period  Description: INTERVENTIONS:  - Monitor WBC    Outcome: Progressing     Problem: SAFETY ADULT  Goal: Maintain or return to baseline ADL function  Description: INTERVENTIONS:  -  Assess patient's ability to carry out ADLs; assess patient's baseline for ADL function and identify physical deficits which impact ability to perform ADLs (bathing, care of mouth/teeth, toileting, grooming, dressing, etc )  - Assess/evaluate cause of self-care deficits   - Assess range of motion  - Assess patient's mobility; develop plan if impaired  - Assess patient's need for assistive devices and provide as appropriate  - Encourage maximum independence but intervene and supervise when necessary  - Involve family in performance of ADLs  - Assess for home care needs following discharge   - Consider OT consult to assist with ADL evaluation and planning for discharge  - Provide patient education as appropriate  Outcome: Progressing  Goal: Maintains/Returns to pre admission functional level  Description: INTERVENTIONS:  - Perform BMAT or MOVE assessment daily    - Set and communicate daily mobility goal to care team and patient/family/caregiver  - Collaborate with rehabilitation services on mobility goals if consulted  - Perform Range of Motion  times a day  - Reposition patient every  hours    - Dangle patient  times a day  - Stand patient  times a day  - Ambulate patient  times a day  - Out of bed to chair  times a day   - Out of bed for meals  times a day  - Out of bed for toileting  - Record patient progress and toleration of activity level   Outcome: Progressing  Goal: Patient will remain free of falls  Description: INTERVENTIONS:  - Educate patient/family on patient safety including physical limitations  - Instruct patient to call for assistance with activity   - Consult OT/PT to assist with strengthening/mobility   - Keep Call bell within reach  - Keep bed low and locked with side rails adjusted as appropriate  - Keep care items and personal belongings within reach  - Initiate and maintain comfort rounds  - Make Fall Risk Sign visible to staff  - Offer Toileting every  Hours, in advance of need  - Initiate/Maintain alarm  - Obtain necessary fall risk management equipment:  - Apply yellow socks and bracelet for high fall risk patients  - Consider moving patient to room near nurses station  Outcome: Progressing     Problem: DISCHARGE PLANNING  Goal: Discharge to home or other facility with appropriate resources  Description: INTERVENTIONS:  - Identify barriers to discharge w/patient and caregiver  - Arrange for needed discharge resources and transportation as appropriate  - Identify discharge learning needs (meds, wound care, etc )  - Arrange for interpretive services to assist at discharge as needed  - Refer to Case Management Department for coordinating discharge planning if the patient needs post-hospital services based on physician/advanced practitioner order or complex needs related to functional status, cognitive ability, or social support system  Outcome: Progressing     Problem: Knowledge Deficit  Goal: Patient/family/caregiver demonstrates understanding of disease process, treatment plan, medications, and discharge instructions  Description: Complete learning assessment and assess knowledge base    Interventions:  - Provide teaching at level of understanding  - Provide teaching via preferred learning methods  Outcome: Progressing     Problem: METABOLIC, FLUID AND ELECTROLYTES - ADULT  Goal: Electrolytes maintained within normal limits  Description: INTERVENTIONS:  - Monitor labs and assess patient for signs and symptoms of electrolyte imbalances  - Administer electrolyte replacement as ordered  - Monitor response to electrolyte replacements, including repeat lab results as appropriate  - Instruct patient on fluid and nutrition as appropriate  Outcome: Progressing  Goal: Fluid balance maintained  Description: INTERVENTIONS:  - Monitor labs   - Monitor I/O and WT  - Instruct patient on fluid and nutrition as appropriate  - Assess for signs & symptoms of volume excess or deficit  Outcome: Progressing  Goal: Glucose maintained within target range  Description: INTERVENTIONS:  - Monitor Blood Glucose as ordered  - Assess for signs and symptoms of hyperglycemia and hypoglycemia  - Administer ordered medications to maintain glucose within target range  - Assess nutritional intake and initiate nutrition service referral as needed  Outcome: Progressing     Problem: MUSCULOSKELETAL - ADULT  Goal: Maintain or return mobility to safest level of function  Description: INTERVENTIONS:  - Assess patient's ability to carry out ADLs; assess patient's baseline for ADL function and identify physical deficits which impact ability to perform ADLs (bathing, care of mouth/teeth, toileting, grooming, dressing, etc )  - Assess/evaluate cause of self-care deficits   - Assess range of motion  - Assess patient's mobility  - Assess patient's need for assistive devices and provide as appropriate  - Encourage maximum independence but intervene and supervise when necessary  - Involve family in performance of ADLs  - Assess for home care needs following discharge   - Consider OT consult to assist with ADL evaluation and planning for discharge  - Provide patient education as appropriate  Outcome: Progressing  Goal: Maintain proper alignment of affected body part  Description: INTERVENTIONS:  - Support, maintain and protect limb and body alignment  - Provide patient/ family with appropriate education  Outcome: Progressing     Problem: Potential for Falls  Goal: Patient will remain free of falls  Description: INTERVENTIONS:  - Educate patient/family on patient safety including physical limitations  - Instruct patient to call for assistance with activity   - Consult OT/PT to assist with strengthening/mobility   - Keep Call bell within reach  - Keep bed low and locked with side rails adjusted as appropriate  - Keep care items and personal belongings within reach  - Initiate and maintain comfort rounds  - Make Fall Risk Sign visible to staff  - Offer Toileting every  Hours, in advance of need  - Initiate/Maintain alarm  - Obtain necessary fall risk management equipment:   - Apply yellow socks and bracelet for high fall risk patients  - Consider moving patient to room near nurses station  Outcome: Progressing

## 2022-07-06 NOTE — ASSESSMENT & PLAN NOTE
· Patient with known history however patient reports she no longer has asthma  · Patient is not on any out patient medications  · Patient reporting symptoms x 2 weeks of wheezing  · No acute findings pleural plaques known history patient overall feels better  · Given no complaints of SOB will start prednisone 40 mg daily with taper  · Wheezing resolved overall patient medically feels better will put her on steroid taper

## 2022-07-06 NOTE — ASSESSMENT & PLAN NOTE
· Bilirubin elevated at 2 06 which appears to be new per review of chart  · Patient denies any abdominal pain  · Improved 1 23   · Discussed finding with patient follow-up outpatient with PCP and GI

## 2022-07-06 NOTE — ASSESSMENT & PLAN NOTE
Lab Results   Component Value Date    HGBA1C 7 1 (H) 10/12/2021       Recent Labs     07/05/22  1140 07/05/22  1617 07/05/22 2023 07/06/22  0733   POCGLU 218* 240* 274* 176*       Blood Sugar Average: Last 72 hrs:  · (P) 217 [id number] blood glucose checks q i d , hypoglycemia protocol  · Hold home metformin  · Sliding scale insulin

## 2022-07-07 VITALS
RESPIRATION RATE: 16 BRPM | HEART RATE: 60 BPM | OXYGEN SATURATION: 90 % | HEIGHT: 63 IN | WEIGHT: 200 LBS | BODY MASS INDEX: 35.44 KG/M2 | SYSTOLIC BLOOD PRESSURE: 167 MMHG | TEMPERATURE: 98.3 F | DIASTOLIC BLOOD PRESSURE: 57 MMHG

## 2022-07-07 LAB
GLUCOSE SERPL-MCNC: 153 MG/DL (ref 65–140)
GLUCOSE SERPL-MCNC: 220 MG/DL (ref 65–140)
GLUCOSE SERPL-MCNC: 259 MG/DL (ref 65–140)

## 2022-07-07 PROCEDURE — RECHECK: Performed by: INTERNAL MEDICINE

## 2022-07-07 PROCEDURE — 91305 COVID-19 PFIZER VAC (TRIS SUCROSE, GRAY CAP FORM) 30 MCG/0.3ML SUSP: CPT | Performed by: INTERNAL MEDICINE

## 2022-07-07 PROCEDURE — 82948 REAGENT STRIP/BLOOD GLUCOSE: CPT

## 2022-07-07 PROCEDURE — 99239 HOSP IP/OBS DSCHRG MGMT >30: CPT | Performed by: INTERNAL MEDICINE

## 2022-07-07 RX ORDER — LISINOPRIL 10 MG/1
10 TABLET ORAL DAILY
Qty: 30 TABLET | Refills: 0 | Status: SHIPPED
Start: 2022-07-08

## 2022-07-07 RX ORDER — AMOXICILLIN 250 MG
1 CAPSULE ORAL
Status: DISCONTINUED | OUTPATIENT
Start: 2022-07-07 | End: 2022-07-07 | Stop reason: HOSPADM

## 2022-07-07 RX ORDER — LISINOPRIL 10 MG/1
10 TABLET ORAL DAILY
Status: DISCONTINUED | OUTPATIENT
Start: 2022-07-07 | End: 2022-07-07 | Stop reason: HOSPADM

## 2022-07-07 RX ORDER — INSULIN GLARGINE 100 [IU]/ML
10 INJECTION, SOLUTION SUBCUTANEOUS
Status: DISCONTINUED | OUTPATIENT
Start: 2022-07-07 | End: 2022-07-07 | Stop reason: HOSPADM

## 2022-07-07 RX ORDER — POLYETHYLENE GLYCOL 3350 17 G/17G
17 POWDER, FOR SOLUTION ORAL DAILY
Status: DISCONTINUED | OUTPATIENT
Start: 2022-07-07 | End: 2022-07-07 | Stop reason: HOSPADM

## 2022-07-07 RX ORDER — MAGNESIUM CARB/ALUMINUM HYDROX 105-160MG
148 TABLET,CHEWABLE ORAL ONCE
Status: DISCONTINUED | OUTPATIENT
Start: 2022-07-07 | End: 2022-07-07

## 2022-07-07 RX ORDER — PREDNISONE 20 MG/1
40 TABLET ORAL DAILY
Qty: 5 TABLET | Refills: 0 | Status: SHIPPED
Start: 2022-07-08

## 2022-07-07 RX ADMIN — LISINOPRIL 10 MG: 10 TABLET ORAL at 15:07

## 2022-07-07 RX ADMIN — DOCUSATE SODIUM 100 MG: 100 CAPSULE, LIQUID FILLED ORAL at 09:10

## 2022-07-07 RX ADMIN — LIDOCAINE 5% 1 PATCH: 700 PATCH TOPICAL at 10:28

## 2022-07-07 RX ADMIN — Medication 1000 UNITS: at 09:03

## 2022-07-07 RX ADMIN — FUROSEMIDE 20 MG: 20 TABLET ORAL at 09:11

## 2022-07-07 RX ADMIN — BNT162B2 0.3 ML: 0.23 INJECTION, SUSPENSION INTRAMUSCULAR at 16:38

## 2022-07-07 RX ADMIN — INSULIN LISPRO 1 UNITS: 100 INJECTION, SOLUTION INTRAVENOUS; SUBCUTANEOUS at 12:24

## 2022-07-07 RX ADMIN — CEFTRIAXONE SODIUM 1000 MG: 10 INJECTION, POWDER, FOR SOLUTION INTRAVENOUS at 10:24

## 2022-07-07 RX ADMIN — LUBIPROSTONE 24 MCG: 24 CAPSULE, GELATIN COATED ORAL at 17:12

## 2022-07-07 RX ADMIN — LUBIPROSTONE 24 MCG: 24 CAPSULE, GELATIN COATED ORAL at 09:11

## 2022-07-07 RX ADMIN — PREDNISONE 40 MG: 20 TABLET ORAL at 09:02

## 2022-07-07 RX ADMIN — ENOXAPARIN SODIUM 40 MG: 40 INJECTION SUBCUTANEOUS at 09:01

## 2022-07-07 RX ADMIN — DILTIAZEM HYDROCHLORIDE 240 MG: 240 CAPSULE, COATED, EXTENDED RELEASE ORAL at 09:03

## 2022-07-07 RX ADMIN — INSULIN LISPRO 1 UNITS: 100 INJECTION, SOLUTION INTRAVENOUS; SUBCUTANEOUS at 09:02

## 2022-07-07 RX ADMIN — INSULIN LISPRO 2 UNITS: 100 INJECTION, SOLUTION INTRAVENOUS; SUBCUTANEOUS at 17:10

## 2022-07-07 RX ADMIN — INSULIN GLARGINE 10 UNITS: 100 INJECTION, SOLUTION SUBCUTANEOUS at 09:02

## 2022-07-07 RX ADMIN — ASPIRIN 81 MG: 81 TABLET, COATED ORAL at 09:02

## 2022-07-07 RX ADMIN — BISACODYL 10 MG: 10 SUPPOSITORY RECTAL at 00:07

## 2022-07-07 RX ADMIN — PANTOPRAZOLE SODIUM 40 MG: 40 TABLET, DELAYED RELEASE ORAL at 09:03

## 2022-07-07 RX ADMIN — FLUTICASONE FUROATE AND VILANTEROL TRIFENATATE 1 PUFF: 100; 25 POWDER RESPIRATORY (INHALATION) at 09:12

## 2022-07-07 NOTE — PLAN OF CARE
Problem: Prexisting or High Potential for Compromised Skin Integrity  Goal: Skin integrity is maintained or improved  Description: INTERVENTIONS:  - Identify patients at risk for skin breakdown  - Assess and monitor skin integrity  - Assess and monitor nutrition and hydration status  - Monitor labs   - Assess for incontinence   - Turn and reposition patient  - Assist with mobility/ambulation  - Relieve pressure over bony prominences  - Avoid friction and shearing  - Provide appropriate hygiene as needed including keeping skin clean and dry  - Evaluate need for skin moisturizer/barrier cream  - Collaborate with interdisciplinary team   - Patient/family teaching  - Consider wound care consult   7/7/2022 1714 by Angeles Pereira  Outcome: Adequate for Discharge  7/7/2022 1130 by Angeles Pereira  Outcome: Progressing     Problem: MOBILITY - ADULT  Goal: Maintain or return to baseline ADL function  Description: INTERVENTIONS:  -  Assess patient's ability to carry out ADLs; assess patient's baseline for ADL function and identify physical deficits which impact ability to perform ADLs (bathing, care of mouth/teeth, toileting, grooming, dressing, etc )  - Assess/evaluate cause of self-care deficits   - Assess range of motion  - Assess patient's mobility; develop plan if impaired  - Assess patient's need for assistive devices and provide as appropriate  - Encourage maximum independence but intervene and supervise when necessary  - Involve family in performance of ADLs  - Assess for home care needs following discharge   - Consider OT consult to assist with ADL evaluation and planning for discharge  - Provide patient education as appropriate  7/7/2022 1714 by Angeles Pereira  Outcome: Adequate for Discharge  7/7/2022 1130 by Angeles Pereira  Outcome: Progressing  Goal: Maintains/Returns to pre admission functional level  Description: INTERVENTIONS:  - Perform BMAT or MOVE assessment daily    - Set and communicate daily mobility goal to care team and patient/family/caregiver  - Collaborate with rehabilitation services on mobility goals if consulted  - Perform Range of Motion  times a day  - Reposition patient every  hours    - Dangle patient  times a day  - Stand patient  times a day  - Ambulate patient  times a day  - Out of bed to chair  times a day   - Out of bed for meals  times a day  - Out of bed for toileting  - Record patient progress and toleration of activity level   7/7/2022 1714 by Noemy Pickett  Outcome: Adequate for Discharge  7/7/2022 1130 by Noemy Pickett  Outcome: Progressing     Problem: PAIN - ADULT  Goal: Verbalizes/displays adequate comfort level or baseline comfort level  Description: Interventions:  - Encourage patient to monitor pain and request assistance  - Assess pain using appropriate pain scale  - Administer analgesics based on type and severity of pain and evaluate response  - Implement non-pharmacological measures as appropriate and evaluate response  - Consider cultural and social influences on pain and pain management  - Notify physician/advanced practitioner if interventions unsuccessful or patient reports new pain  7/7/2022 1714 by Noemy Pickett  Outcome: Adequate for Discharge  7/7/2022 1130 by Noemy Pickett  Outcome: Progressing     Problem: INFECTION - ADULT  Goal: Absence or prevention of progression during hospitalization  Description: INTERVENTIONS:  - Assess and monitor for signs and symptoms of infection  - Monitor lab/diagnostic results  - Monitor all insertion sites, i e  indwelling lines, tubes, and drains  - Monitor endotracheal if appropriate and nasal secretions for changes in amount and color  - [place] appropriate cooling/warming therapies per order  - Administer medications as ordered  - Instruct and encourage patient and family to use good hand hygiene technique  - Identify and instruct in appropriate isolation precautions for identified infection/condition  7/7/2022 1714 by Wendall Oppenheim  Outcome: Adequate for Discharge  7/7/2022 1130 by Wendall Oppenheim  Outcome: Progressing  Goal: Absence of fever/infection during neutropenic period  Description: INTERVENTIONS:  - Monitor WBC    7/7/2022 1714 by Wendall Oppenheim  Outcome: Adequate for Discharge  7/7/2022 1130 by Wendall Oppenheim  Outcome: Progressing     Problem: SAFETY ADULT  Goal: Maintain or return to baseline ADL function  Description: INTERVENTIONS:  -  Assess patient's ability to carry out ADLs; assess patient's baseline for ADL function and identify physical deficits which impact ability to perform ADLs (bathing, care of mouth/teeth, toileting, grooming, dressing, etc )  - Assess/evaluate cause of self-care deficits   - Assess range of motion  - Assess patient's mobility; develop plan if impaired  - Assess patient's need for assistive devices and provide as appropriate  - Encourage maximum independence but intervene and supervise when necessary  - Involve family in performance of ADLs  - Assess for home care needs following discharge   - Consider OT consult to assist with ADL evaluation and planning for discharge  - Provide patient education as appropriate  7/7/2022 1714 by Wendall Oppenheim  Outcome: Adequate for Discharge  7/7/2022 1130 by Wendall Oppenheim  Outcome: Progressing  Goal: Maintains/Returns to pre admission functional level  Description: INTERVENTIONS:  - Perform BMAT or MOVE assessment daily    - Set and communicate daily mobility goal to care team and patient/family/caregiver  - Collaborate with rehabilitation services on mobility goals if consulted  - Perform Range of Motion  times a day  - Reposition patient every  hours    - Dangle patient  times a day  - Stand patient  times a day  - Ambulate patient  times a day  - Out of bed to chair  times a day   - Out of bed for meals  times a day  - Out of bed for toileting  - Record patient progress and toleration of activity level   7/7/2022 1714 by Wendall Oppenheim  Outcome: Adequate for Discharge  7/7/2022 1130 by Joaquim Barahona  Outcome: Progressing  Goal: Patient will remain free of falls  Description: INTERVENTIONS:  - Educate patient/family on patient safety including physical limitations  - Instruct patient to call for assistance with activity   - Consult OT/PT to assist with strengthening/mobility   - Keep Call bell within reach  - Keep bed low and locked with side rails adjusted as appropriate  - Keep care items and personal belongings within reach  - Initiate and maintain comfort rounds  - Make Fall Risk Sign visible to staff  - Offer Toileting every  Hours, in advance of need  - Initiate/Maintain alarm  - Obtain necessary fall risk management equipment:   - Apply yellow socks and bracelet for high fall risk patients  - Consider moving patient to room near nurses station  7/7/2022 1714 by Joaquim Barahona  Outcome: Adequate for Discharge  7/7/2022 1130 by Joaquim Barahona  Outcome: Progressing     Problem: DISCHARGE PLANNING  Goal: Discharge to home or other facility with appropriate resources  Description: INTERVENTIONS:  - Identify barriers to discharge w/patient and caregiver  - Arrange for needed discharge resources and transportation as appropriate  - Identify discharge learning needs (meds, wound care, etc )  - Arrange for interpretive services to assist at discharge as needed  - Refer to Case Management Department for coordinating discharge planning if the patient needs post-hospital services based on physician/advanced practitioner order or complex needs related to functional status, cognitive ability, or social support system  7/7/2022 1714 by Joaquim Barahona  Outcome: Adequate for Discharge  7/7/2022 1130 by Joaquim Barahona  Outcome: Progressing     Problem: Knowledge Deficit  Goal: Patient/family/caregiver demonstrates understanding of disease process, treatment plan, medications, and discharge instructions  Description: Complete learning assessment and assess knowledge base   Interventions:  - Provide teaching at level of understanding  - Provide teaching via preferred learning methods  7/7/2022 1714 by Betty Tran  Outcome: Adequate for Discharge  7/7/2022 1130 by Betty Tran  Outcome: Progressing     Problem: METABOLIC, FLUID AND ELECTROLYTES - ADULT  Goal: Electrolytes maintained within normal limits  Description: INTERVENTIONS:  - Monitor labs and assess patient for signs and symptoms of electrolyte imbalances  - Administer electrolyte replacement as ordered  - Monitor response to electrolyte replacements, including repeat lab results as appropriate  - Instruct patient on fluid and nutrition as appropriate  7/7/2022 1714 by Betty Tran  Outcome: Adequate for Discharge  7/7/2022 1130 by Betty Tran  Outcome: Progressing  Goal: Fluid balance maintained  Description: INTERVENTIONS:  - Monitor labs   - Monitor I/O and WT  - Instruct patient on fluid and nutrition as appropriate  - Assess for signs & symptoms of volume excess or deficit  7/7/2022 1714 by Betty Tran  Outcome: Adequate for Discharge  7/7/2022 1130 by Betty Tran  Outcome: Progressing  Goal: Glucose maintained within target range  Description: INTERVENTIONS:  - Monitor Blood Glucose as ordered  - Assess for signs and symptoms of hyperglycemia and hypoglycemia  - Administer ordered medications to maintain glucose within target range  - Assess nutritional intake and initiate nutrition service referral as needed  7/7/2022 1714 by Betty Tran  Outcome: Adequate for Discharge  7/7/2022 1130 by Betty Tran  Outcome: Progressing     Problem: MUSCULOSKELETAL - ADULT  Goal: Maintain or return mobility to safest level of function  Description: INTERVENTIONS:  - Assess patient's ability to carry out ADLs; assess patient's baseline for ADL function and identify physical deficits which impact ability to perform ADLs (bathing, care of mouth/teeth, toileting, grooming, dressing, etc )  - Assess/evaluate cause of self-care deficits   - Assess range of motion  - Assess patient's mobility  - Assess patient's need for assistive devices and provide as appropriate  - Encourage maximum independence but intervene and supervise when necessary  - Involve family in performance of ADLs  - Assess for home care needs following discharge   - Consider OT consult to assist with ADL evaluation and planning for discharge  - Provide patient education as appropriate  7/7/2022 1714 by Nadene Skiff  Outcome: Adequate for Discharge  7/7/2022 1130 by Nadene Skiff  Outcome: Progressing  Goal: Maintain proper alignment of affected body part  Description: INTERVENTIONS:  - Support, maintain and protect limb and body alignment  - Provide patient/ family with appropriate education  7/7/2022 1714 by Nadene Skiff  Outcome: Adequate for Discharge  7/7/2022 1130 by Nadene Skiff  Outcome: Progressing     Problem: Potential for Falls  Goal: Patient will remain free of falls  Description: INTERVENTIONS:  - Educate patient/family on patient safety including physical limitations  - Instruct patient to call for assistance with activity   - Consult OT/PT to assist with strengthening/mobility   - Keep Call bell within reach  - Keep bed low and locked with side rails adjusted as appropriate  - Keep care items and personal belongings within reach  - Initiate and maintain comfort rounds  - Make Fall Risk Sign visible to staff  - Offer Toileting every  Hours, in advance of need  - Initiate/Maintain alarm  - Obtain necessary fall risk management equipment:   - Apply yellow socks and bracelet for high fall risk patients  - Consider moving patient to room near nurses station  7/7/2022 1714 by Nadene Skiff  Outcome: Adequate for Discharge  7/7/2022 1130 by Nadene Skiff  Outcome: Progressing

## 2022-07-07 NOTE — ASSESSMENT & PLAN NOTE
· Patient with known history however patient reports she no longer has asthma  · Patient is not on any out patient medications  · Patient reporting symptoms x 2 weeks of wheezing  · No acute findings pleural plaques known history patient overall feels better  · Given no complaints of SOB will start prednisone 40 mg-continue for 5 day course

## 2022-07-07 NOTE — CASE MANAGEMENT
Case Management Discharge Planning Note    Patient name Negro Felix  Location [address] 203/-[id number] MRN [id number]  : 1940 Date 2022       Current Admission Date: 7/3/2022  Current Admission Diagnosis:Ambulatory dysfunction   Patient Active Problem List    Diagnosis Date Noted    Ambulatory dysfunction 2022    Closed fracture of proximal end of humerus 2022    Elevated bilirubin 2022    Leukocytosis 2022    Mild intermittent asthma 2020    Tachy-chana syndrome ([facility] ) 2020    Acute diastolic congestive heart failure ([facility] ) 2020    Acute pulmonary embolism ([facility] ) 2020    Bilateral leg edema 2020    Adenopathy 2020    Right sided weakness 2020    Expressive aphasia 2020    S/P TAVR (transcatheter aortic valve replacement) 2020    Carotid occlusion, left 2020    Carotid stenosis, asymptomatic, right 2020    Severe aortic stenosis 10/26/2019    Tibial plateau fracture     Other persistent atrial fibrillation ([facility] ) 10/23/2019    History of stroke     Essential hypertension     Type 2 diabetes mellitus without complication, without long-term current use of insulin (HCC)       LOS (days): 3  Geometric Mean LOS (GMLOS) (days): 3 00  Days to GMLOS:-0 4     OBJECTIVE:  Risk of Unplanned Readmission Score: 13 2      Current admission status: Inpatient   Preferred Pharmacy:   [address] [address]  Phone: [phone] Fax: [phone] [address] [place] [address] [facility] [address] [facility] 94 [facility] 38 [address] [facility]  Phone: [phone] Fax: [phone]    Primary Care Provider: Ramin Deluna DO    Primary Insurance: MEDICARE  Secondary Insurance:     DISCHARGE DETAILS:     IMM Given (Date):: 22  IMM Given to[de-identified] Patient  Family notified[de-identified] The CM provided explaination and a copy of IMM to the patient at the bedside  The CM also notified the patients daughter Lucho Zamora per the patients request   Additional Comments: Per the medical team, the patient is medically ready to discharge to STR  The patients preferred facility was [facility]  However, the facility was not able to accept the patient due to an out standing balance  The accepting STR will be [facility] and the patient agree's  Per the facility request,  a 2rd COVID booster will be required and the patient agree's  The Patient bedside nurse notified to make aware to contact the facility for nurse report  The CM set up SLETS transportatio for 5pm     [address], [address] :  [facility]  [facility] Facility[facility]Agency Phone Number: [phone]  Facility/Agency Fax Number: [phone]

## 2022-07-07 NOTE — PLAN OF CARE
Problem: Prexisting or High Potential for Compromised Skin Integrity  Goal: Skin integrity is maintained or improved  Description: INTERVENTIONS:  - Identify patients at risk for skin breakdown  - Assess and monitor skin integrity  - Assess and monitor nutrition and hydration status  - Monitor labs   - Assess for incontinence   - Turn and reposition patient  - Assist with mobility/ambulation  - Relieve pressure over bony prominences  - Avoid friction and shearing  - Provide appropriate hygiene as needed including keeping skin clean and dry  - Evaluate need for skin moisturizer/barrier cream  - Collaborate with interdisciplinary team   - Patient/family teaching  - Consider wound care consult   Outcome: Progressing     Problem: MOBILITY - ADULT  Goal: Maintain or return to baseline ADL function  Description: INTERVENTIONS:  -  Assess patient's ability to carry out ADLs; assess patient's baseline for ADL function and identify physical deficits which impact ability to perform ADLs (bathing, care of mouth/teeth, toileting, grooming, dressing, etc )  - Assess/evaluate cause of self-care deficits   - Assess range of motion  - Assess patient's mobility; develop plan if impaired  - Assess patient's need for assistive devices and provide as appropriate  - Encourage maximum independence but intervene and supervise when necessary  - Involve family in performance of ADLs  - Assess for home care needs following discharge   - Consider OT consult to assist with ADL evaluation and planning for discharge  - Provide patient education as appropriate  Outcome: Progressing  Goal: Maintains/Returns to pre admission functional level  Description: INTERVENTIONS:  - Perform BMAT or MOVE assessment daily    - Set and communicate daily mobility goal to care team and patient/family/caregiver  - Collaborate with rehabilitation services on mobility goals if consulted  - Perform Range of Motion 3 times a day    - Reposition patient every 2 hours   - Dangle patient 3 times a day  - Stand patient 3 times a day  - Ambulate patient 3 times a day  - Out of bed to chair 3 times a day   - Out of bed for meals 3 times a day  - Out of bed for toileting  - Record patient progress and toleration of activity level   Outcome: Progressing     Problem: PAIN - ADULT  Goal: Verbalizes/displays adequate comfort level or baseline comfort level  Description: Interventions:  - Encourage patient to monitor pain and request assistance  - Assess pain using appropriate pain scale  - Administer analgesics based on type and severity of pain and evaluate response  - Implement non-pharmacological measures as appropriate and evaluate response  - Consider cultural and social influences on pain and pain management  - Notify physician/advanced practitioner if interventions unsuccessful or patient reports new pain  Outcome: Progressing     Problem: INFECTION - ADULT  Goal: Absence or prevention of progression during hospitalization  Description: INTERVENTIONS:  - Assess and monitor for signs and symptoms of infection  - Monitor lab/diagnostic results  - Monitor all insertion sites, i e  indwelling lines, tubes, and drains  - Monitor endotracheal if appropriate and nasal secretions for changes in amount and color  - [place] appropriate cooling/warming therapies per order  - Administer medications as ordered  - Instruct and encourage patient and family to use good hand hygiene technique  - Identify and instruct in appropriate isolation precautions for identified infection/condition  Outcome: Progressing  Goal: Absence of fever/infection during neutropenic period  Description: INTERVENTIONS:  - Monitor WBC    Outcome: Progressing     Problem: SAFETY ADULT  Goal: Maintain or return to baseline ADL function  Description: INTERVENTIONS:  -  Assess patient's ability to carry out ADLs; assess patient's baseline for ADL function and identify physical deficits which impact ability to perform ADLs (bathing, care of mouth/teeth, toileting, grooming, dressing, etc )  - Assess/evaluate cause of self-care deficits   - Assess range of motion  - Assess patient's mobility; develop plan if impaired  - Assess patient's need for assistive devices and provide as appropriate  - Encourage maximum independence but intervene and supervise when necessary  - Involve family in performance of ADLs  - Assess for home care needs following discharge   - Consider OT consult to assist with ADL evaluation and planning for discharge  - Provide patient education as appropriate  Outcome: Progressing  Goal: Maintains/Returns to pre admission functional level  Description: INTERVENTIONS:  - Perform BMAT or MOVE assessment daily    - Set and communicate daily mobility goal to care team and patient/family/caregiver  - Collaborate with rehabilitation services on mobility goals if consulted  - Perform Range of Motion 3 times a day  - Reposition patient every 2 hours    - Dangle patient 3 times a day  - Stand patient 3 times a day  - Ambulate patient 3 times a day  - Out of bed to chair 3 times a day   - Out of bed for meals 3 times a day  - Out of bed for toileting  - Record patient progress and toleration of activity level   Outcome: Progressing  Goal: Patient will remain free of falls  Description: INTERVENTIONS:  - Educate patient/family on patient safety including physical limitations  - Instruct patient to call for assistance with activity   - Consult OT/PT to assist with strengthening/mobility   - Keep Call bell within reach  - Keep bed low and locked with side rails adjusted as appropriate  - Keep care items and personal belongings within reach  - Initiate and maintain comfort rounds  - Make Fall Risk Sign visible to staff  - Offer Toileting every 2 Hours, in advance of need  - Initiate/Maintain bed alarm  - Obtain necessary fall risk management equipment  - Apply yellow socks and bracelet for high fall risk patients  - Consider moving patient to room near nurses station  Outcome: Progressing     Problem: DISCHARGE PLANNING  Goal: Discharge to home or other facility with appropriate resources  Description: INTERVENTIONS:  - Identify barriers to discharge w/patient and caregiver  - Arrange for needed discharge resources and transportation as appropriate  - Identify discharge learning needs (meds, wound care, etc )  - Arrange for interpretive services to assist at discharge as needed  - Refer to Case Management Department for coordinating discharge planning if the patient needs post-hospital services based on physician/advanced practitioner order or complex needs related to functional status, cognitive ability, or social support system  Outcome: Progressing     Problem: Knowledge Deficit  Goal: Patient/family/caregiver demonstrates understanding of disease process, treatment plan, medications, and discharge instructions  Description: Complete learning assessment and assess knowledge base    Interventions:  - Provide teaching at level of understanding  - Provide teaching via preferred learning methods  Outcome: Progressing     Problem: METABOLIC, FLUID AND ELECTROLYTES - ADULT  Goal: Electrolytes maintained within normal limits  Description: INTERVENTIONS:  - Monitor labs and assess patient for signs and symptoms of electrolyte imbalances  - Administer electrolyte replacement as ordered  - Monitor response to electrolyte replacements, including repeat lab results as appropriate  - Instruct patient on fluid and nutrition as appropriate  Outcome: Progressing  Goal: Fluid balance maintained  Description: INTERVENTIONS:  - Monitor labs   - Monitor I/O and WT  - Instruct patient on fluid and nutrition as appropriate  - Assess for signs & symptoms of volume excess or deficit  Outcome: Progressing  Goal: Glucose maintained within target range  Description: INTERVENTIONS:  - Monitor Blood Glucose as ordered  - Assess for signs and symptoms of hyperglycemia and hypoglycemia  - Administer ordered medications to maintain glucose within target range  - Assess nutritional intake and initiate nutrition service referral as needed  Outcome: Progressing     Problem: MUSCULOSKELETAL - ADULT  Goal: Maintain or return mobility to safest level of function  Description: INTERVENTIONS:  - Assess patient's ability to carry out ADLs; assess patient's baseline for ADL function and identify physical deficits which impact ability to perform ADLs (bathing, care of mouth/teeth, toileting, grooming, dressing, etc )  - Assess/evaluate cause of self-care deficits   - Assess range of motion  - Assess patient's mobility  - Assess patient's need for assistive devices and provide as appropriate  - Encourage maximum independence but intervene and supervise when necessary  - Involve family in performance of ADLs  - Assess for home care needs following discharge   - Consider OT consult to assist with ADL evaluation and planning for discharge  - Provide patient education as appropriate  Outcome: Progressing  Goal: Maintain proper alignment of affected body part  Description: INTERVENTIONS:  - Support, maintain and protect limb and body alignment  - Provide patient/ family with appropriate education  Outcome: Progressing     Problem: Potential for Falls  Goal: Patient will remain free of falls  Description: INTERVENTIONS:  - Educate patient/family on patient safety including physical limitations  - Instruct patient to call for assistance with activity   - Consult OT/PT to assist with strengthening/mobility   - Keep Call bell within reach  - Keep bed low and locked with side rails adjusted as appropriate  - Keep care items and personal belongings within reach  - Initiate and maintain comfort rounds  - Make Fall Risk Sign visible to staff  - Offer Toileting every 2 Hours, in advance of need  - Initiate/Maintain bed alarm  - Obtain necessary fall risk management equipment  - Apply yellow socks and bracelet for high fall risk patients  - Consider moving patient to room near nurses station  Outcome: Progressing

## 2022-07-07 NOTE — ASSESSMENT & PLAN NOTE
Lab Results   Component Value Date    HGBA1C 7 1 (H) 10/12/2021       Recent Labs     07/06/22  2041 07/07/22  0717 07/07/22  1114 07/07/22  1621   POCGLU 232* 153* 220* 259*       Blood Sugar Average: Last 72 hrs:  · (P) 222 [id number] blood glucose checks q i d , hypoglycemia protocol  · Hold home metformin  · Sliding scale insulin  · Resume home metformin on discharge

## 2022-07-07 NOTE — ASSESSMENT & PLAN NOTE
· Noted to have elevated white blood cell count, patient reports this past week she had a fever at home but currently denies any, has been afebrile here, continue to monitor  · Procalcitonin normal  · UA micro suggestive of UTI  · Patient completed 3 day course Rocephin for urinary tract infection  · COVID/flu/RSV negative Detail Level: Zone Hide Include Location In Plan Question?: No

## 2022-07-07 NOTE — ASSESSMENT & PLAN NOTE
· Patient with known history however patient reports she no longer has asthma  · Patient is not on any out patient medications  · Patient reporting symptoms x 2 weeks of wheezing  · No acute findings pleural plaques known history patient overall feels better  · Given no complaints of SOB will start prednisone 40 mg

## 2022-07-07 NOTE — PROGRESS NOTES
[address] [facility]  Progress Note Stacy Desir 1940, [de-identified] y o  female MRN: [id number]  Unit/Bed#: -01 Encounter: [id number]  Primary Care Provider: Roberth Sinha DO   Date and time admitted to hospital: 7/3/2022 11:41 PM    Leukocytosis  Assessment & Plan  · Noted to have elevated white blood cell count, patient reports this past week she had a fever at home but currently denies any, has been afebrile here, continue to monitor  · Procalcitonin normal  · UA micro suggestive of UTI  · Patient completed 3 day course Rocephin for urinary tract infection  · COVID/flu/RSV negative    Elevated bilirubin  Assessment & Plan  · Bilirubin elevated at 2 06 which appears to be new per review of chart  · Patient denies any abdominal pain  · Improved 1 23   · Discussed finding with patient follow-up outpatient with PCP and GI    Closed fracture of proximal end of humerus  Assessment & Plan  · Patient reports noting a pop when EMS attempted to help patient back into bed at home prior to arrival  · Right shoulder x-ray revealing fracture proximal humerus head  · Currently in sling  · PT/OT eval recommending STR  · Ortho consulted recommending the following  · NWB sling  · No indication for surgery at this time Patient has 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  · Patient requested xrays of bilateral knees due to pain imaging ordered by ortho will follow  · P r n   Lidocaine patch, Motrin for pain    Mild intermittent asthma  Assessment & Plan  · Patient with known history however patient reports she no longer has asthma  · Patient is not on any out patient medications  · Patient reporting symptoms x 2 weeks of wheezing  · No acute findings pleural plaques known history patient overall feels better  · Given no complaints of SOB will start prednisone 40 mg       Type 2 diabetes mellitus without complication, without long-term current use of insulin ([facility] [address] )  Assessment & Plan  Lab Results   Component Value Date    HGBA1C 7 1 (H) 10/12/2021       Recent Labs     22  1533 22  2041 22  0717 22  1114   POCGLU 299* 232* 153* 220*       Blood Sugar Average: Last 72 hrs:  · (P) 220 25 blood glucose checks q i d , hypoglycemia protocol  · Hold home metformin  · Sliding scale insulin  · Will add on Lantus due to persistent hyperglycemia above 100 start 10 units at bedtime    * Ambulatory dysfunction  Assessment & Plan  · Status post fall at home this morning, patient lives alone  · CT head negative  · Fall precautions  · PT/OT eval recommending STR           VTE Pharmacologic Prophylaxis: VTE Score: 4 Moderate Risk (Score 3-4) - Pharmacological DVT Prophylaxis Ordered: enoxaparin (Lovenox)  Patient Centered Rounds: I performed bedside rounds with nursing staff today  Discussions with Specialists or Other Care Team Provider: n/a    Education and Discussions with Family / Patient: Attempted to update  (daughter) via phone  Left voicemail  Time Spent for Care: 30 minutes  More than 50% of total time spent on counseling and coordination of care as described above  Current Length of Stay: 3 day(s)  Current Patient Status: Inpatient   Certification Statement: The patient will continue to require additional inpatient hospital stay due to Pending placement to a rehab  Discharge Plan: Anticipate discharge tomorrow to rehab facility  Code Status: Level 3 - DNAR and DNI    Subjective:   Patient denies any acute complaints, denies shortness of breath, continues to have constipation but is passing gas    Objective:     Vitals:   Temp (24hrs), Av 4 °F (36 9 °C), Min:98 1 °F (36 7 °C), Max:98 7 °F (37 1 °C)    Temp:  [98 1 °F (36 7 °C)-98 7 °F (37 1 °C)] 98 1 °F (36 7 °C)  HR:  [60] 60  Resp:  [16-18] 16  BP: (158-175)/(57-63) 175/57  SpO2:  [84 %-91 %] 84 %  Body mass index is 35 43 kg/m²  Input and Output Summary (last 24 hours):      Intake/Output Summary (Last 24 hours) at 7/7/2022 1450  Last data filed at 7/7/2022 0858  Gross per 24 hour   Intake 840 ml   Output 450 ml   Net 390 ml       Physical Exam:   Physical Exam  Vitals and nursing note reviewed  Constitutional:       General: She is not in acute distress  Appearance: She is well-developed  She is not ill-appearing, toxic-appearing or diaphoretic  HENT:      Head: Normocephalic and atraumatic  Eyes:      General: No scleral icterus  Conjunctiva/sclera: Conjunctivae normal    Cardiovascular:      Rate and Rhythm: Normal rate and regular rhythm  Heart sounds: No murmur heard  No friction rub  No gallop  Pulmonary:      Effort: Pulmonary effort is normal  No respiratory distress  Breath sounds: Normal breath sounds  No stridor  No wheezing, rhonchi or rales  Chest:      Chest wall: No tenderness  Abdominal:      General: Abdomen is flat  There is no distension  Palpations: Abdomen is soft  There is no mass  Tenderness: There is no abdominal tenderness  There is no guarding or rebound  Musculoskeletal:         General: Signs of injury present  No swelling, tenderness or deformity  Cervical back: Neck supple  Right lower leg: No edema  Left lower leg: No edema  Comments: Right upper extremity in sling   Skin:     General: Skin is warm and dry  Coloration: Skin is not jaundiced  Findings: No bruising or erythema  Neurological:      Mental Status: She is alert and oriented to person, place, and time            Additional Data:     Labs:  Results from last 7 days   Lab Units 07/05/22  0604   WBC Thousand/uL 12 47*   HEMOGLOBIN g/dL 12 4   HEMATOCRIT % 38 0   PLATELETS Thousands/uL 222   NEUTROS PCT % 73   LYMPHS PCT % 17   MONOS PCT % 8   EOS PCT % 1     Results from last 7 days   Lab Units 07/05/22  0604   SODIUM mmol/L 139   POTASSIUM mmol/L 4 0   CHLORIDE mmol/L 102   CO2 mmol/L 29   BUN mg/dL 19   CREATININE mg/dL 0 80   ANION GAP mmol/L 8   CALCIUM mg/dL 8 3   ALBUMIN g/dL 2 8*   TOTAL BILIRUBIN mg/dL 1 23*   ALK PHOS U/L 77   ALT U/L 54   AST U/L 35   GLUCOSE RANDOM mg/dL 147*         Results from last 7 days   Lab Units 07/07/22  1114 07/07/22  0717 07/06/22  2041 07/06/22  1533 07/06/22  1103 07/06/22  0733 07/05/22  2023 07/05/22  1617 07/05/22  1140 07/05/22  0728 07/04/22  2116 07/04/22  1816   POC GLUCOSE mg/dl 220* 153* 232* 299* 232* 176* 274* 240* 218* 170* 187* 191*         Results from last 7 days   Lab Units 07/04/22  1039   PROCALCITONIN ng/ml 0 15       Lines/Drains:  Invasive Devices  Report    Peripheral Intravenous Line  Duration           Peripheral IV 07/03/22 Left Arm 3 days                      Imaging: No pertinent imaging reviewed      Recent Cultures (last 7 days):   Results from last 7 days   Lab Units 07/04/22  0853   URINE CULTURE  >100,000 cfu/ml Bifidobacterium species*  10,000-19,000 cfu/ml        Last 24 Hours Medication List:   Current Facility-Administered Medications   Medication Dose Route Frequency Provider Last Rate    aspirin  81 mg Oral Daily Otilia Hippo, PA-C      cholecalciferol  1,000 Units Oral Daily Otilia Hippo, PA-C      diltiazem  240 mg Oral Daily Otilia Hippo, PA-C      enoxaparin  40 mg Subcutaneous Daily Otilia Hippo, PA-C      fluticasone-vilanterol  1 puff Inhalation Daily Otilia Hippo, PA-C      furosemide  20 mg Oral Daily Otilia Hippo, PA-C      oxyCODONE-acetaminophen  1 tablet Oral Q6H PRN Julio C Gan MD      Or   Ting Burch HYDROmorphone  0 5 mg Intravenous Q6H PRN Julio C Gan MD      insulin glargine  10 Units Subcutaneous HS Tuan Banai, DO      insulin lispro  1-5 Units Subcutaneous TID AC Otilia Hippo, PA-C      insulin lispro  1-5 Units Subcutaneous HS Otilia Hippo, PA-C      lidocaine  1 patch Topical Daily PRN Otilia Hippo, PA-C      lisinopril  10 mg Oral Daily Tuan Banai, DO      lubiprostone  24 mcg Oral BID With Meals Otilia Rowley PA-C      magnesium hydroxide  30 mL Oral BID PRN LINDA Caputo      pantoprazole  40 mg Oral Daily Kevin Quiroz PA-C      polyethylene glycol  17 g Oral Daily Tuan Quinn DO      predniSONE  40 mg Oral Daily LINDA Caputo      senna-docusate sodium  1 tablet Oral HS Tuan Quinn DO      temazepam  30 mg Oral HS Kevin Quiroz PA-C          Today, Patient Was Seen By: Óscar Aguilar DO    **Please Note: This note may have been constructed using a voice recognition system  **

## 2022-07-07 NOTE — DISCHARGE SUMMARY
[address] [facility]  Discharge- Rl Desouza 1940, [de-identified] y o  female MRN: [id number]  Unit/Bed#: -01 Encounter: [id number]  Primary Care Provider: Arvin Huffman DO   Date and time admitted to hospital: 7/3/2022 11:41 PM    Leukocytosis  Assessment & Plan  · Noted to have elevated white blood cell count, patient reports this past week she had a fever at home but currently denies any, has been afebrile here, continue to monitor  · Procalcitonin normal  · UA micro suggestive of UTI  · Patient completed 3 day course Rocephin for urinary tract infection  · COVID/flu/RSV negative    Elevated bilirubin  Assessment & Plan  · Bilirubin elevated at 2 06 which appears to be new per review of chart  · Patient denies any abdominal pain  · Improved 1 23   · Discussed finding with patient follow-up outpatient with PCP and GI    Closed fracture of proximal end of humerus  Assessment & Plan  · Patient reports noting a pop when EMS attempted to help patient back into bed at home prior to arrival  · Right shoulder x-ray revealing fracture proximal humerus head  · Currently in sling  · PT/OT eval recommending STR  · Ortho consulted recommending the following  · NWB sling  · No indication for surgery at this time Patient has 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  · Patient requested xrays of bilateral knees due to pain imaging ordered by ortho will follow  · P r n   Lidocaine patch, Motrin for pain    Mild intermittent asthma  Assessment & Plan  · Patient with known history however patient reports she no longer has asthma  · Patient is not on any out patient medications  · Patient reporting symptoms x 2 weeks of wheezing  · No acute findings pleural plaques known history patient overall feels better  · Given no complaints of SOB will start prednisone 40 mg-continue for 5 day course       Type 2 diabetes mellitus without complication, without long-term current use of insulin [facility])  Assessment & Plan  Lab Results   Component Value Date    HGBA1C 7 1 (H) 10/12/2021       Recent Labs     07/06/22  2041 07/07/22  0717 07/07/22  1114 07/07/22  1621   POCGLU 232* 153* 220* 259*       Blood Sugar Average: Last 72 hrs:  · (P) 222 [id number] blood glucose checks q i d , hypoglycemia protocol  · Hold home metformin  · Sliding scale insulin  · Resume home metformin on discharge    * Ambulatory dysfunction  Assessment & Plan  · Status post fall at home this morning, patient lives alone  · CT head negative  · Fall precautions  · PT/OT eval recommending STR         Medical Problems             Resolved Problems  Date Reviewed: 7/7/2022   None               Discharging Physician / Practitioner: Irving Danielle DO  PCP: eKely Zuleta DO  Admission Date:   Admission Orders (From admission, onward)     Ordered        07/04/22 0549  Inpatient Admission  Once                      Discharge Date: 07/07/22    Consultations During Hospital Stay:  · Orthopedics    Procedures Performed:   · none    Significant Findings / Test Results:   XR chest portable   Final Result by Arnulfo Proctor MD (07/05 1416)      Retrocardiac opacity  Acute right humerus fracture  Workstation performed: [id number]         XR knee 3 vw right non injury   Final Result by Morgan Roque DO (07/05 1056)   1  No evidence of acute osseous abnormality  2   Severe tricompartmental osteoarthritis with small joint effusion  Workstation performed: [id number]         XR knee 3 vw left non injury   Final Result by Morgan Roque DO (07/05 1044)   1  No evidence of acute fracture  2   Severe osteoarthritic changes within the medial compartment  3   Moderate osteoarthritic changes within the lateral and patellofemoral compartments        Workstation performed: [id number]         CT head without contrast   Final Result by Portillo Morales MD (07/04 0732)      Remote left MCA ischemic insult without acute intracranial abnormality noted  The findings concur with the preliminary report provided by Dr Jg Tobias of [address] [place] [address]  Workstation performed: [id number]         XR shoulder 2+ views RIGHT   ED Interpretation by Estelita Brooks MD (07/04 2320)   Abnormal   Prox humeral fx      Final Result by Roro Simmons MD (07/04 1120)      Impacted fracture of the surgical neck of the humerus with associated greater tuberosity fracture   No dislocation   Mild deformity of the tip of the scapula suspect nondisplaced scapular fracture   The study was marked in EPIC for immediate notification  Workstation performed: [id number]         ·     Incidental Findings:   · As under imaging     Test Results Pending at Discharge (will require follow up):   · none     Outpatient Tests Requested:  · none    Complications:  none    Reason for Admission:  Right shoulder pain post fall    Hospital Course:   Wing Blankenship is a [de-identified] y o  female patient who originally presented to the hospital on 7/3/2022 due to severe right shoulder pain after experiencing a mechanical fall when EMS tried to lift her off the ground, falling onto her right shoulder  She was found to have minimally displaced right proximal humerus fracture and underwent conservative management with orthopedic evaluation  Patient was discharged to acute rehab  She was noted to have asthma exacerbation and was prescribed a five-day course of prednisone    Please see above list of diagnoses and related plan for additional information       Condition at Discharge: stable    Discharge Day Visit / Exam:   Subjective:  Patient denies any acute complaints reports having a large bowel movement today  Vitals: Blood Pressure: 167/57 (07/07/22 1512)  Pulse: 60 (07/07/22 1512)  Temperature: 98 3 °F (36 8 °C) (07/07/22 1512)  Temp Source: Oral (07/07/22 0653)  Respirations: 16 (07/07/22 0653)  Height: 5' 3" (160 cm) (07/05/22 2506)  Weight - Scale: 90 7 kg (200 lb) (07/03/22 2352)  SpO2: 90 % (07/07/22 1512)  Exam:   Physical Exam  Vitals and nursing note reviewed  Constitutional:       General: She is not in acute distress  Appearance: She is well-developed  She is not ill-appearing, toxic-appearing or diaphoretic  HENT:      Head: Normocephalic and atraumatic  Eyes:      General: No scleral icterus  Conjunctiva/sclera: Conjunctivae normal    Cardiovascular:      Rate and Rhythm: Normal rate and regular rhythm  Heart sounds: No murmur heard  No friction rub  No gallop  Pulmonary:      Effort: Pulmonary effort is normal  No respiratory distress  Breath sounds: Normal breath sounds  No stridor  No wheezing, rhonchi or rales  Chest:      Chest wall: No tenderness  Abdominal:      General: Abdomen is flat  There is no distension  Palpations: Abdomen is soft  There is no mass  Tenderness: There is no abdominal tenderness  There is no guarding or rebound  Musculoskeletal:         General: Signs of injury present  No swelling, tenderness or deformity  Cervical back: Neck supple  Right lower leg: No edema  Left lower leg: No edema  Comments: Right upper extremity in sling   Skin:     General: Skin is warm and dry  Coloration: Skin is not jaundiced  Findings: No bruising or erythema  Neurological:      Mental Status: She is alert and oriented to person, place, and time  Discussion with Family: Updated  (daughter) via phone  Discharge instructions/Information to patient and family:   See after visit summary for information provided to patient and family  Provisions for Follow-Up Care:  See after visit summary for information related to follow-up care and any pertinent home health orders  Disposition:   Other: rehab    Planned Readmission: no     Discharge Statement:  I spent 35 minutes discharging the patient   This time was spent on the day of discharge  I had direct contact with the patient on the day of discharge  Greater than 50% of the total time was spent examining patient, answering all patient questions, arranging and discussing plan of care with patient as well as directly providing post-discharge instructions  Additional time then spent on discharge activities  Discharge Medications:  See after visit summary for reconciled discharge medications provided to patient and/or family        **Please Note: This note may have been constructed using a voice recognition system**

## 2022-07-07 NOTE — ASSESSMENT & PLAN NOTE
Lab Results   Component Value Date    HGBA1C 7 1 (H) 10/12/2021       Recent Labs     07/06/22  1533 07/06/22  2041 07/07/22  0717 07/07/22  1114   POCGLU 299* 232* 153* 220*       Blood Sugar Average: Last 72 hrs:  · (P) 220 25 blood glucose checks q i d , hypoglycemia protocol  · Hold home metformin  · Sliding scale insulin  · Will add on Lantus due to persistent hyperglycemia above 100 start 10 units at bedtime

## 2022-07-07 NOTE — PLAN OF CARE
Problem: Prexisting or High Potential for Compromised Skin Integrity  Goal: Skin integrity is maintained or improved  Description: INTERVENTIONS:  - Identify patients at risk for skin breakdown  - Assess and monitor skin integrity  - Assess and monitor nutrition and hydration status  - Monitor labs   - Assess for incontinence   - Turn and reposition patient  - Assist with mobility/ambulation  - Relieve pressure over bony prominences  - Avoid friction and shearing  - Provide appropriate hygiene as needed including keeping skin clean and dry  - Evaluate need for skin moisturizer/barrier cream  - Collaborate with interdisciplinary team   - Patient/family teaching  - Consider wound care consult   Outcome: Progressing     Problem: MOBILITY - ADULT  Goal: Maintain or return to baseline ADL function  Description: INTERVENTIONS:  -  Assess patient's ability to carry out ADLs; assess patient's baseline for ADL function and identify physical deficits which impact ability to perform ADLs (bathing, care of mouth/teeth, toileting, grooming, dressing, etc )  - Assess/evaluate cause of self-care deficits   - Assess range of motion  - Assess patient's mobility; develop plan if impaired  - Assess patient's need for assistive devices and provide as appropriate  - Encourage maximum independence but intervene and supervise when necessary  - Involve family in performance of ADLs  - Assess for home care needs following discharge   - Consider OT consult to assist with ADL evaluation and planning for discharge  - Provide patient education as appropriate  Outcome: Progressing  Goal: Maintains/Returns to pre admission functional level  Description: INTERVENTIONS:  - Perform BMAT or MOVE assessment daily    - Set and communicate daily mobility goal to care team and patient/family/caregiver  - Collaborate with rehabilitation services on mobility goals if consulted  - Perform Range of Motion  times a day    - Reposition patient every hours   - Dangle patient  times a day  - Stand patient  times a day  - Ambulate patient  times a day  - Out of bed to chair  times a day   - Out of bed for meals  times a day  - Out of bed for toileting  - Record patient progress and toleration of activity level   Outcome: Progressing     Problem: PAIN - ADULT  Goal: Verbalizes/displays adequate comfort level or baseline comfort level  Description: Interventions:  - Encourage patient to monitor pain and request assistance  - Assess pain using appropriate pain scale  - Administer analgesics based on type and severity of pain and evaluate response  - Implement non-pharmacological measures as appropriate and evaluate response  - Consider cultural and social influences on pain and pain management  - Notify physician/advanced practitioner if interventions unsuccessful or patient reports new pain  Outcome: Progressing     Problem: INFECTION - ADULT  Goal: Absence or prevention of progression during hospitalization  Description: INTERVENTIONS:  - Assess and monitor for signs and symptoms of infection  - Monitor lab/diagnostic results  - Monitor all insertion sites, i e  indwelling lines, tubes, and drains  - Monitor endotracheal if appropriate and nasal secretions for changes in amount and color  - [place] appropriate cooling/warming therapies per order  - Administer medications as ordered  - Instruct and encourage patient and family to use good hand hygiene technique  - Identify and instruct in appropriate isolation precautions for identified infection/condition  Outcome: Progressing  Goal: Absence of fever/infection during neutropenic period  Description: INTERVENTIONS:  - Monitor WBC    Outcome: Progressing     Problem: SAFETY ADULT  Goal: Maintain or return to baseline ADL function  Description: INTERVENTIONS:  -  Assess patient's ability to carry out ADLs; assess patient's baseline for ADL function and identify physical deficits which impact ability to perform ADLs (bathing, care of mouth/teeth, toileting, grooming, dressing, etc )  - Assess/evaluate cause of self-care deficits   - Assess range of motion  - Assess patient's mobility; develop plan if impaired  - Assess patient's need for assistive devices and provide as appropriate  - Encourage maximum independence but intervene and supervise when necessary  - Involve family in performance of ADLs  - Assess for home care needs following discharge   - Consider OT consult to assist with ADL evaluation and planning for discharge  - Provide patient education as appropriate  Outcome: Progressing  Goal: Maintains/Returns to pre admission functional level  Description: INTERVENTIONS:  - Perform BMAT or MOVE assessment daily    - Set and communicate daily mobility goal to care team and patient/family/caregiver  - Collaborate with rehabilitation services on mobility goals if consulted  - Perform Range of Motion  times a day  - Reposition patient every  hours    - Dangle patient  times a day  - Stand patient times a day  - Ambulate patient  times a day  - Out of bed to chair  times a day   - Out of bed for meals  times a day  - Out of bed for toileting  - Record patient progress and toleration of activity level   Outcome: Progressing  Goal: Patient will remain free of falls  Description: INTERVENTIONS:  - Educate patient/family on patient safety including physical limitations  - Instruct patient to call for assistance with activity   - Consult OT/PT to assist with strengthening/mobility   - Keep Call bell within reach  - Keep bed low and locked with side rails adjusted as appropriate  - Keep care items and personal belongings within reach  - Initiate and maintain comfort rounds  - Make Fall Risk Sign visible to staff  - Offer Toileting every  Hours, in advance of need  - Initiate/Maintain alarm  - Obtain necessary fall risk management equipment  - Apply yellow socks and bracelet for high fall risk patients  - Consider moving patient to room near nurses station  Outcome: Progressing     Problem: DISCHARGE PLANNING  Goal: Discharge to home or other facility with appropriate resources  Description: INTERVENTIONS:  - Identify barriers to discharge w/patient and caregiver  - Arrange for needed discharge resources and transportation as appropriate  - Identify discharge learning needs (meds, wound care, etc )  - Arrange for interpretive services to assist at discharge as needed  - Refer to Case Management Department for coordinating discharge planning if the patient needs post-hospital services based on physician/advanced practitioner order or complex needs related to functional status, cognitive ability, or social support system  Outcome: Progressing     Problem: Knowledge Deficit  Goal: Patient/family/caregiver demonstrates understanding of disease process, treatment plan, medications, and discharge instructions  Description: Complete learning assessment and assess knowledge base    Interventions:  - Provide teaching at level of understanding  - Provide teaching via preferred learning methods  Outcome: Progressing     Problem: METABOLIC, FLUID AND ELECTROLYTES - ADULT  Goal: Electrolytes maintained within normal limits  Description: INTERVENTIONS:  - Monitor labs and assess patient for signs and symptoms of electrolyte imbalances  - Administer electrolyte replacement as ordered  - Monitor response to electrolyte replacements, including repeat lab results as appropriate  - Instruct patient on fluid and nutrition as appropriate  Outcome: Progressing  Goal: Fluid balance maintained  Description: INTERVENTIONS:  - Monitor labs   - Monitor I/O and WT  - Instruct patient on fluid and nutrition as appropriate  - Assess for signs & symptoms of volume excess or deficit  Outcome: Progressing  Goal: Glucose maintained within target range  Description: INTERVENTIONS:  - Monitor Blood Glucose as ordered  - Assess for signs and symptoms of hyperglycemia and hypoglycemia  - Administer ordered medications to maintain glucose within target range  - Assess nutritional intake and initiate nutrition service referral as needed  Outcome: Progressing     Problem: MUSCULOSKELETAL - ADULT  Goal: Maintain or return mobility to safest level of function  Description: INTERVENTIONS:  - Assess patient's ability to carry out ADLs; assess patient's baseline for ADL function and identify physical deficits which impact ability to perform ADLs (bathing, care of mouth/teeth, toileting, grooming, dressing, etc )  - Assess/evaluate cause of self-care deficits   - Assess range of motion  - Assess patient's mobility  - Assess patient's need for assistive devices and provide as appropriate  - Encourage maximum independence but intervene and supervise when necessary  - Involve family in performance of ADLs  - Assess for home care needs following discharge   - Consider OT consult to assist with ADL evaluation and planning for discharge  - Provide patient education as appropriate  Outcome: Progressing  Goal: Maintain proper alignment of affected body part  Description: INTERVENTIONS:  - Support, maintain and protect limb and body alignment  - Provide patient/ family with appropriate education  Outcome: Progressing     Problem: Potential for Falls  Goal: Patient will remain free of falls  Description: INTERVENTIONS:  - Educate patient/family on patient safety including physical limitations  - Instruct patient to call for assistance with activity   - Consult OT/PT to assist with strengthening/mobility   - Keep Call bell within reach  - Keep bed low and locked with side rails adjusted as appropriate  - Keep care items and personal belongings within reach  - Initiate and maintain comfort rounds  - Make Fall Risk Sign visible to staff  - Offer Toileting every  Hours, in advance of need  - Initiate/Maintain alarm  - Obtain necessary fall risk management equipment:   - Apply yellow socks and bracelet for high fall risk patients  - Consider moving patient to room near nurses station  Outcome: Progressing

## 2022-07-07 NOTE — ASSESSMENT & PLAN NOTE
· Noted to have elevated white blood cell count, patient reports this past week she had a fever at home but currently denies any, has been afebrile here, continue to monitor  · Procalcitonin normal  · UA micro suggestive of UTI  · Patient completed 3 day course Rocephin for urinary tract infection  · COVID/flu/RSV negative

## 2022-07-23 DIAGNOSIS — M25.511 ACUTE PAIN OF RIGHT SHOULDER: Primary | ICD-10-CM

## 2022-08-23 ENCOUNTER — APPOINTMENT (OUTPATIENT)
Dept: RADIOLOGY | Facility: CLINIC | Age: 82
End: 2022-08-23
Payer: MEDICARE

## 2022-08-23 ENCOUNTER — OFFICE VISIT (OUTPATIENT)
Dept: OBGYN CLINIC | Facility: CLINIC | Age: 82
End: 2022-08-23

## 2022-08-23 VITALS
SYSTOLIC BLOOD PRESSURE: 137 MMHG | WEIGHT: 200 LBS | HEART RATE: 68 BPM | HEIGHT: 63 IN | DIASTOLIC BLOOD PRESSURE: 73 MMHG | BODY MASS INDEX: 35.44 KG/M2

## 2022-08-23 DIAGNOSIS — M25.511 ACUTE PAIN OF RIGHT SHOULDER: ICD-10-CM

## 2022-08-23 DIAGNOSIS — S42.201D CLOSED FRACTURE OF PROXIMAL END OF RIGHT HUMERUS WITH ROUTINE HEALING, UNSPECIFIED FRACTURE MORPHOLOGY, SUBSEQUENT ENCOUNTER: Primary | ICD-10-CM

## 2022-08-23 PROCEDURE — 99024 POSTOP FOLLOW-UP VISIT: CPT | Performed by: ORTHOPAEDIC SURGERY

## 2022-08-23 PROCEDURE — 73030 X-RAY EXAM OF SHOULDER: CPT

## 2022-08-23 NOTE — PROGRESS NOTES
Orthopaedics Office Visit - New Patient Visit    ASSESSMENT/PLAN:    Assessment:   81F here today for initial evaluation 6 weeks status post right proximal humerus fracture on 2022  Healed clinically and radiographically    Plan:   · She no longer needs the sling, and can use it for comfort  · Start physical therapy to work on PROM, AROM, and overall right shoulder strength  · Followup prn    To Do Next Visit:  Re-Evaluate right shoulder if symptoms fail to improve or worsen    _____________________________________________________  CHIEF COMPLAINT:  Chief Complaint   Patient presents with    Right Shoulder - Pain         SUBJECTIVE:  Marcus Dallas is a [de-identified] y o  female who presents for initial evaluation of her right shoulder  She went to the ED on 2022 for right proximal humerus fracture  She was provided with a sling at that time told to for follow-up with Ortho  She notes some improvement but has not started physical therapy yet  PAST MEDICAL HISTORY:  Past Medical History:   Diagnosis Date    Aortic valve disease     Atrial fibrillation ([facility] [address] )     Cardiac disease     Diabetes mellitus ([facility] [address] )     Heart murmur     Heart valve disease     Hypertension     Stroke [facility])        PAST SURGICAL HISTORY:  Past Surgical History:   Procedure Laterality Date     SECTION      x 2    HYSTERECTOMY      ND ECHO TRANSESOPHAG R-T 2D W/PRB IMG ACQUISJ I&R N/A 2020    Procedure: TRANSESOPHAGEAL ECHOCARDIOGRAM (BOLIVAR); Surgeon: Margarita Koenig DO;  [facility];  Service: Cardiac Surgery    ND REPLACE AORTIC VALVE OPENFEMORAL ARTERY APPROACH Right 2020    Procedure: REPLACEMENT AORTIC VALVE TRANSCATHETER (TAVR) TRANSFEMORAL W/ 26 MM TODD ANIBAL S3 ULTRA VALVE (ACCESS ON LEFT);   Surgeon: Margarita Koenig DO;  [facility];  Service: Cardiac Surgery    TONSILLECTOMY         FAMILY HISTORY:  Family History   Problem Relation Age of Onset    Diabetes Mother    Tejinder Lara Heart disease Mother     Diabetes Father     Heart disease Father        SOCIAL HISTORY:  Social History     Tobacco Use    Smoking status: Never Smoker    Smokeless tobacco: Never Used   Vaping Use    Vaping Use: Never used   Substance Use Topics    Alcohol use: Not Currently    Drug use: Never       MEDICATIONS:    Current Outpatient Medications:     alendronate (FOSAMAX) 70 mg tablet, Take 70 mg by mouth Once a week, Disp: , Rfl:     ascorbic acid (VITAMIN C) 500 mg tablet, Take 500 mg by mouth 2 (two) times a day, Disp: , Rfl:     BABY ASPIRIN PO, Take 81 mg by mouth daily, Disp: , Rfl:     cyanocobalamin (VITAMIN B-12) 100 mcg tablet, Take by mouth daily, Disp: , Rfl:     diltiazem (CARDIZEM CD) 240 mg 24 hr capsule, Take 1 capsule (240 mg total) by mouth daily, Disp: 30 capsule, Rfl: 0    docusate sodium (COLACE) 100 mg capsule, Take 1 capsule (100 mg total) by mouth 2 (two) times a day, Disp: 10 capsule, Rfl: 0    fluticasone-vilanterol (BREO ELLIPTA) 100-25 mcg/inh inhaler, Inhale 1 puff daily Rinse mouth after use , Disp: 3 Inhaler, Rfl: 3    furosemide (LASIX) 20 mg tablet, Take 1 tablet (20 mg total) by mouth daily, Disp: 30 tablet, Rfl: 0    lisinopril (ZESTRIL) 10 mg tablet, Take 1 tablet (10 mg total) by mouth daily, Disp: 30 tablet, Rfl: 0    metFORMIN (GLUCOPHAGE) 500 mg tablet, Take 500 mg by mouth daily with breakfast , Disp: , Rfl:     potassium chloride (K-DUR,KLOR-CON) 10 mEq tablet, Take 1 tablet (10 mEq total) by mouth daily, Disp: 30 tablet, Rfl: 0    predniSONE 20 mg tablet, Take 2 tablets (40 mg total) by mouth daily, Disp: 5 tablet, Rfl: 0    temazepam (RESTORIL) 30 mg capsule, Take 30 mg by mouth daily at bedtime , Disp: , Rfl:     acetaminophen (TYLENOL) 325 mg tablet, Take 650 mg by mouth every 6 (six) hours as needed for mild pain (Patient not taking: No sig reported), Disp: , Rfl:     cholecalciferol (VITAMIN D3) 1,000 units tablet, Take 1,000 Units by mouth daily (Patient not taking: No sig reported), Disp: , Rfl:     lubiprostone (AMITIZA) 24 mcg capsule, Take 24 mcg by mouth 2 (two) times a day with meals (Patient not taking: No sig reported), Disp: , Rfl:     omeprazole (PriLOSEC) 20 mg delayed release capsule, Take 1 capsule (20 mg total) by mouth daily Take 30 minutes prior to breakfast, Disp: 30 capsule, Rfl: 0    polyethylene glycol (MIRALAX) 17 g packet, Take 17 g by mouth daily (Patient not taking: No sig reported), Disp: 14 each, Rfl: 0    ALLERGIES:  Allergies   Allergen Reactions    Atorvastatin      dizziness and shaky    Rivaroxaban      Dizziness, and shaky  REVIEW OF SYSTEMS:  MSK:  Right shoulder pain  Neuro: WNL  Pertinent items are otherwise noted in HPI  A comprehensive review of systems was otherwise negative  LABS:  HgA1c:   Lab Results   Component Value Date    HGBA1C 7 1 (H) 10/12/2021     BMP:   Lab Results   Component Value Date    GLUCOSE 127 02/18/2020    CALCIUM 8 3 07/05/2022    K 4 0 07/05/2022    CO2 29 07/05/2022     07/05/2022    BUN 19 07/05/2022    CREATININE 0 80 07/05/2022     CBC: No components found for: CBC    _____________________________________________________  PHYSICAL EXAMINATION:  Vital signs: /73   Pulse 68   Ht 5' 3" (1 6 m)   Wt 90 7 kg (200 lb)   BMI 35 43 kg/m²   General: No acute distress, awake and alert  Psychiatric: Mood and affect appear appropriate  HEENT: Trachea Midline, No torticollis, no apparent facial trauma  Cardiovascular: No audible murmurs; Extremities appear perfused  Pulmonary: No audible wheezing or stridor  Skin: No open lesions; see further details (if any) below    MUSCULOSKELETAL EXAMINATION:  Extremities:   Right Shoulder Exam     Tenderness   The patient is experiencing no tenderness      Range of Motion   Passive abduction: 100     Muscle Strength   Internal rotation: 4/5   External rotation: 2/5   Supraspinatus: 4/5   Subscapularis: 4/5   Biceps: 4/5     Other Erythema: absent  Scars: absent  Sensation: normal  Pulse: present    Comments:  Passive forward flexion: 90°                _____________________________________________________  STUDIES REVIEWED:  I personally reviewed the images and interpretation is as follows:   X-rays of the right shoulder taken on 08/23/2022 demonstrate healing of a nondisplaced proximal humerus fracture      PROCEDURES PERFORMED:  Procedures  None       Scribe Attestation    I,:  Shahbaz Model am acting as a scribe while in the presence of the attending physician :       I,:  Rosmery Gee MD personally performed the services described in this documentation    as scribed in my presence :

## 2023-01-25 ENCOUNTER — HOSPITAL ENCOUNTER (INPATIENT)
Facility: HOSPITAL | Age: 83
LOS: 7 days | Discharge: NURSING, REHAB OR LONG-TERM CARE | End: 2023-02-02
Attending: EMERGENCY MEDICINE | Admitting: STUDENT IN AN ORGANIZED HEALTH CARE EDUCATION/TRAINING PROGRAM

## 2023-01-25 DIAGNOSIS — R78.81 GRAM-POSITIVE BACTEREMIA: ICD-10-CM

## 2023-01-25 DIAGNOSIS — R06.02 SHORTNESS OF BREATH: ICD-10-CM

## 2023-01-25 DIAGNOSIS — L03.115 CELLULITIS OF RIGHT LOWER EXTREMITY: ICD-10-CM

## 2023-01-25 DIAGNOSIS — M79.89 LEG SWELLING: Primary | ICD-10-CM

## 2023-01-25 LAB
ALBUMIN SERPL BCP-MCNC: 4.1 G/DL (ref 3.5–5)
ALP SERPL-CCNC: 61 U/L (ref 46–116)
ALT SERPL W P-5'-P-CCNC: 33 U/L (ref 12–78)
ANION GAP SERPL CALCULATED.3IONS-SCNC: 10 MMOL/L (ref 4–13)
APTT PPP: 25 SECONDS (ref 23–37)
AST SERPL W P-5'-P-CCNC: 27 U/L (ref 5–45)
BASOPHILS # BLD AUTO: 0.05 THOUSANDS/ÂΜL (ref 0–0.1)
BASOPHILS NFR BLD AUTO: 0 % (ref 0–1)
BILIRUB SERPL-MCNC: 1.42 MG/DL (ref 0.2–1)
BUN SERPL-MCNC: 13 MG/DL (ref 5–25)
CALCIUM SERPL-MCNC: 9 MG/DL (ref 8.3–10.1)
CHLORIDE SERPL-SCNC: 97 MMOL/L (ref 96–108)
CO2 SERPL-SCNC: 28 MMOL/L (ref 21–32)
CREAT SERPL-MCNC: 0.91 MG/DL (ref 0.6–1.3)
EOSINOPHIL # BLD AUTO: 0.06 THOUSAND/ÂΜL (ref 0–0.61)
EOSINOPHIL NFR BLD AUTO: 0 % (ref 0–6)
ERYTHROCYTE [DISTWIDTH] IN BLOOD BY AUTOMATED COUNT: 13.5 % (ref 11.6–15.1)
GFR SERPL CREATININE-BSD FRML MDRD: 59 ML/MIN/1.73SQ M
GLUCOSE SERPL-MCNC: 191 MG/DL (ref 65–140)
HCT VFR BLD AUTO: 45.6 % (ref 34.8–46.1)
HGB BLD-MCNC: 15 G/DL (ref 11.5–15.4)
IMM GRANULOCYTES # BLD AUTO: 0.11 THOUSAND/UL (ref 0–0.2)
IMM GRANULOCYTES NFR BLD AUTO: 1 % (ref 0–2)
INR PPP: 1.08 (ref 0.84–1.19)
LYMPHOCYTES # BLD AUTO: 1.49 THOUSANDS/ÂΜL (ref 0.6–4.47)
LYMPHOCYTES NFR BLD AUTO: 7 % (ref 14–44)
MCH RBC QN AUTO: 30.8 PG (ref 26.8–34.3)
MCHC RBC AUTO-ENTMCNC: 32.9 G/DL (ref 31.4–37.4)
MCV RBC AUTO: 94 FL (ref 82–98)
MONOCYTES # BLD AUTO: 1 THOUSAND/ÂΜL (ref 0.17–1.22)
MONOCYTES NFR BLD AUTO: 5 % (ref 4–12)
NEUTROPHILS # BLD AUTO: 18.43 THOUSANDS/ÂΜL (ref 1.85–7.62)
NEUTS SEG NFR BLD AUTO: 87 % (ref 43–75)
NRBC BLD AUTO-RTO: 0 /100 WBCS
PLATELET # BLD AUTO: 258 THOUSANDS/UL (ref 149–390)
PMV BLD AUTO: 10.6 FL (ref 8.9–12.7)
POTASSIUM SERPL-SCNC: 3.8 MMOL/L (ref 3.5–5.3)
PROT SERPL-MCNC: 7.4 G/DL (ref 6.4–8.4)
PROTHROMBIN TIME: 13.8 SECONDS (ref 11.6–14.5)
RBC # BLD AUTO: 4.87 MILLION/UL (ref 3.81–5.12)
SODIUM SERPL-SCNC: 135 MMOL/L (ref 135–147)
WBC # BLD AUTO: 21.14 THOUSAND/UL (ref 4.31–10.16)

## 2023-01-26 ENCOUNTER — APPOINTMENT (OUTPATIENT)
Dept: VASCULAR ULTRASOUND | Facility: HOSPITAL | Age: 83
End: 2023-01-26

## 2023-01-26 ENCOUNTER — APPOINTMENT (EMERGENCY)
Dept: CT IMAGING | Facility: HOSPITAL | Age: 83
End: 2023-01-26

## 2023-01-26 ENCOUNTER — APPOINTMENT (INPATIENT)
Dept: NON INVASIVE DIAGNOSTICS | Facility: HOSPITAL | Age: 83
End: 2023-01-26

## 2023-01-26 PROBLEM — I50.30 DIASTOLIC HEART FAILURE (HCC): Status: ACTIVE | Noted: 2020-06-19

## 2023-01-26 PROBLEM — N30.00 ACUTE CYSTITIS: Status: ACTIVE | Noted: 2023-01-26

## 2023-01-26 PROBLEM — A41.9 SEPSIS (HCC): Status: ACTIVE | Noted: 2023-01-26

## 2023-01-26 PROBLEM — L03.115 CELLULITIS OF RIGHT LOWER EXTREMITY: Status: ACTIVE | Noted: 2023-01-26

## 2023-01-26 LAB
2HR DELTA HS TROPONIN: 2 NG/L
4HR DELTA HS TROPONIN: 4 NG/L
ANION GAP SERPL CALCULATED.3IONS-SCNC: 10 MMOL/L (ref 4–13)
BACTERIA UR QL AUTO: ABNORMAL /HPF
BILIRUB UR QL STRIP: NEGATIVE
BUN SERPL-MCNC: 14 MG/DL (ref 5–25)
CALCIUM SERPL-MCNC: 8.4 MG/DL (ref 8.3–10.1)
CARDIAC TROPONIN I PNL SERPL HS: 10 NG/L
CARDIAC TROPONIN I PNL SERPL HS: 12 NG/L
CARDIAC TROPONIN I PNL SERPL HS: 8 NG/L
CHLORIDE SERPL-SCNC: 100 MMOL/L (ref 96–108)
CLARITY UR: CLEAR
CO2 SERPL-SCNC: 26 MMOL/L (ref 21–32)
COLOR UR: ABNORMAL
CREAT SERPL-MCNC: 0.96 MG/DL (ref 0.6–1.3)
ERYTHROCYTE [DISTWIDTH] IN BLOOD BY AUTOMATED COUNT: 13.6 % (ref 11.6–15.1)
FLUAV RNA RESP QL NAA+PROBE: NEGATIVE
FLUBV RNA RESP QL NAA+PROBE: NEGATIVE
GFR SERPL CREATININE-BSD FRML MDRD: 55 ML/MIN/1.73SQ M
GLUCOSE SERPL-MCNC: 173 MG/DL (ref 65–140)
GLUCOSE SERPL-MCNC: 177 MG/DL (ref 65–140)
GLUCOSE SERPL-MCNC: 185 MG/DL (ref 65–140)
GLUCOSE SERPL-MCNC: 203 MG/DL (ref 65–140)
GLUCOSE SERPL-MCNC: 204 MG/DL (ref 65–140)
GLUCOSE UR STRIP-MCNC: NEGATIVE MG/DL
HCT VFR BLD AUTO: 40.1 % (ref 34.8–46.1)
HGB BLD-MCNC: 13.3 G/DL (ref 11.5–15.4)
HGB UR QL STRIP.AUTO: NEGATIVE
KETONES UR STRIP-MCNC: ABNORMAL MG/DL
LACTATE SERPL-SCNC: 2.3 MMOL/L (ref 0.5–2)
LACTATE SERPL-SCNC: 2.3 MMOL/L (ref 0.5–2)
LACTATE SERPL-SCNC: 2.5 MMOL/L (ref 0.5–2)
LACTATE SERPL-SCNC: 2.6 MMOL/L (ref 0.5–2)
LEUKOCYTE ESTERASE UR QL STRIP: ABNORMAL
MAGNESIUM SERPL-MCNC: 1.4 MG/DL (ref 1.6–2.6)
MAGNESIUM SERPL-MCNC: 2 MG/DL (ref 1.6–2.6)
MCH RBC QN AUTO: 30.9 PG (ref 26.8–34.3)
MCHC RBC AUTO-ENTMCNC: 33.2 G/DL (ref 31.4–37.4)
MCV RBC AUTO: 93 FL (ref 82–98)
NITRITE UR QL STRIP: NEGATIVE
NON-SQ EPI CELLS URNS QL MICRO: ABNORMAL /HPF
NT-PROBNP SERPL-MCNC: 635 PG/ML
PH UR STRIP.AUTO: 7 [PH]
PLATELET # BLD AUTO: 236 THOUSANDS/UL (ref 149–390)
PMV BLD AUTO: 10.1 FL (ref 8.9–12.7)
POTASSIUM SERPL-SCNC: 3.6 MMOL/L (ref 3.5–5.3)
PROCALCITONIN SERPL-MCNC: 0.06 NG/ML
PROT UR STRIP-MCNC: ABNORMAL MG/DL
RBC # BLD AUTO: 4.3 MILLION/UL (ref 3.81–5.12)
RBC #/AREA URNS AUTO: ABNORMAL /HPF
RSV RNA RESP QL NAA+PROBE: NEGATIVE
SARS-COV-2 RNA RESP QL NAA+PROBE: NEGATIVE
SODIUM SERPL-SCNC: 136 MMOL/L (ref 135–147)
SP GR UR STRIP.AUTO: >=1.05 (ref 1–1.03)
UROBILINOGEN UR STRIP-ACNC: <2 MG/DL
WBC # BLD AUTO: 21.68 THOUSAND/UL (ref 4.31–10.16)
WBC #/AREA URNS AUTO: ABNORMAL /HPF

## 2023-01-26 RX ORDER — POTASSIUM CHLORIDE 750 MG/1
10 TABLET, EXTENDED RELEASE ORAL DAILY
Status: DISCONTINUED | OUTPATIENT
Start: 2023-01-26 | End: 2023-02-02 | Stop reason: HOSPADM

## 2023-01-26 RX ORDER — ACETAMINOPHEN 325 MG/1
650 TABLET ORAL EVERY 6 HOURS PRN
Status: DISCONTINUED | OUTPATIENT
Start: 2023-01-26 | End: 2023-02-02 | Stop reason: HOSPADM

## 2023-01-26 RX ORDER — ENOXAPARIN SODIUM 100 MG/ML
40 INJECTION SUBCUTANEOUS DAILY
Status: DISCONTINUED | OUTPATIENT
Start: 2023-01-26 | End: 2023-02-02 | Stop reason: HOSPADM

## 2023-01-26 RX ORDER — CEFAZOLIN SODIUM 2 G/50ML
2000 SOLUTION INTRAVENOUS EVERY 8 HOURS
Status: DISCONTINUED | OUTPATIENT
Start: 2023-01-26 | End: 2023-01-29

## 2023-01-26 RX ORDER — ASPIRIN 81 MG/1
81 TABLET, CHEWABLE ORAL DAILY
Status: DISCONTINUED | OUTPATIENT
Start: 2023-01-26 | End: 2023-02-02 | Stop reason: HOSPADM

## 2023-01-26 RX ORDER — FUROSEMIDE 20 MG/1
20 TABLET ORAL DAILY
Status: DISCONTINUED | OUTPATIENT
Start: 2023-01-26 | End: 2023-01-27

## 2023-01-26 RX ORDER — FLUTICASONE FUROATE AND VILANTEROL 100; 25 UG/1; UG/1
1 POWDER RESPIRATORY (INHALATION) DAILY
Status: DISCONTINUED | OUTPATIENT
Start: 2023-01-26 | End: 2023-02-02 | Stop reason: HOSPADM

## 2023-01-26 RX ORDER — TEMAZEPAM 15 MG/1
30 CAPSULE ORAL
Status: DISCONTINUED | OUTPATIENT
Start: 2023-01-26 | End: 2023-02-02 | Stop reason: HOSPADM

## 2023-01-26 RX ORDER — ACETAMINOPHEN 325 MG/1
650 TABLET ORAL ONCE
Status: COMPLETED | OUTPATIENT
Start: 2023-01-26 | End: 2023-01-26

## 2023-01-26 RX ORDER — LISINOPRIL 10 MG/1
10 TABLET ORAL DAILY
Status: DISCONTINUED | OUTPATIENT
Start: 2023-01-26 | End: 2023-01-31

## 2023-01-26 RX ORDER — FUROSEMIDE 10 MG/ML
40 INJECTION INTRAMUSCULAR; INTRAVENOUS ONCE
Status: COMPLETED | OUTPATIENT
Start: 2023-01-26 | End: 2023-01-26

## 2023-01-26 RX ORDER — PANTOPRAZOLE SODIUM 20 MG/1
20 TABLET, DELAYED RELEASE ORAL
Status: DISCONTINUED | OUTPATIENT
Start: 2023-01-26 | End: 2023-02-02 | Stop reason: HOSPADM

## 2023-01-26 RX ORDER — INSULIN LISPRO 100 [IU]/ML
1-6 INJECTION, SOLUTION INTRAVENOUS; SUBCUTANEOUS
Status: DISCONTINUED | OUTPATIENT
Start: 2023-01-26 | End: 2023-02-02 | Stop reason: HOSPADM

## 2023-01-26 RX ORDER — DILTIAZEM HYDROCHLORIDE 240 MG/1
240 CAPSULE, COATED, EXTENDED RELEASE ORAL DAILY
Status: DISCONTINUED | OUTPATIENT
Start: 2023-01-26 | End: 2023-01-31

## 2023-01-26 RX ORDER — IPRATROPIUM BROMIDE AND ALBUTEROL SULFATE 2.5; .5 MG/3ML; MG/3ML
3 SOLUTION RESPIRATORY (INHALATION)
Status: DISCONTINUED | OUTPATIENT
Start: 2023-01-26 | End: 2023-01-26

## 2023-01-26 RX ORDER — MAGNESIUM SULFATE HEPTAHYDRATE 40 MG/ML
2 INJECTION, SOLUTION INTRAVENOUS ONCE
Status: COMPLETED | OUTPATIENT
Start: 2023-01-26 | End: 2023-01-26

## 2023-01-26 RX ADMIN — ENOXAPARIN SODIUM 40 MG: 40 INJECTION SUBCUTANEOUS at 08:26

## 2023-01-26 RX ADMIN — IOHEXOL 100 ML: 350 INJECTION, SOLUTION INTRAVENOUS at 00:31

## 2023-01-26 RX ADMIN — INSULIN LISPRO 1 UNITS: 100 INJECTION, SOLUTION INTRAVENOUS; SUBCUTANEOUS at 07:38

## 2023-01-26 RX ADMIN — CEFAZOLIN SODIUM 2000 MG: 2 SOLUTION INTRAVENOUS at 08:27

## 2023-01-26 RX ADMIN — ACETAMINOPHEN 650 MG: 325 TABLET ORAL at 03:04

## 2023-01-26 RX ADMIN — FUROSEMIDE 40 MG: 10 INJECTION, SOLUTION INTRAMUSCULAR; INTRAVENOUS at 02:49

## 2023-01-26 RX ADMIN — LISINOPRIL 10 MG: 10 TABLET ORAL at 08:27

## 2023-01-26 RX ADMIN — SODIUM CHLORIDE, SODIUM LACTATE, POTASSIUM CHLORIDE, AND CALCIUM CHLORIDE 250 ML: .6; .31; .03; .02 INJECTION, SOLUTION INTRAVENOUS at 17:07

## 2023-01-26 RX ADMIN — FUROSEMIDE 20 MG: 40 TABLET ORAL at 08:26

## 2023-01-26 RX ADMIN — SODIUM CHLORIDE 500 ML: 0.9 INJECTION, SOLUTION INTRAVENOUS at 00:27

## 2023-01-26 RX ADMIN — ACETAMINOPHEN 650 MG: 325 TABLET, FILM COATED ORAL at 22:12

## 2023-01-26 RX ADMIN — IPRATROPIUM BROMIDE AND ALBUTEROL SULFATE 3 ML: .5; 3 SOLUTION RESPIRATORY (INHALATION) at 02:37

## 2023-01-26 RX ADMIN — DILTIAZEM HYDROCHLORIDE 240 MG: 240 CAPSULE, COATED, EXTENDED RELEASE ORAL at 08:34

## 2023-01-26 RX ADMIN — INSULIN LISPRO 2 UNITS: 100 INJECTION, SOLUTION INTRAVENOUS; SUBCUTANEOUS at 11:24

## 2023-01-26 RX ADMIN — INSULIN LISPRO 1 UNITS: 100 INJECTION, SOLUTION INTRAVENOUS; SUBCUTANEOUS at 22:13

## 2023-01-26 RX ADMIN — ASPIRIN 81 MG: 81 TABLET, CHEWABLE ORAL at 08:27

## 2023-01-26 RX ADMIN — PANTOPRAZOLE SODIUM 20 MG: 20 TABLET, DELAYED RELEASE ORAL at 06:47

## 2023-01-26 RX ADMIN — Medication 2000 MG: at 01:31

## 2023-01-26 RX ADMIN — TEMAZEPAM 30 MG: 15 CAPSULE ORAL at 22:12

## 2023-01-26 RX ADMIN — INSULIN LISPRO 1 UNITS: 100 INJECTION, SOLUTION INTRAVENOUS; SUBCUTANEOUS at 16:06

## 2023-01-26 RX ADMIN — FLUTICASONE FUROATE AND VILANTEROL TRIFENATATE 1 PUFF: 100; 25 POWDER RESPIRATORY (INHALATION) at 08:58

## 2023-01-26 RX ADMIN — ACETAMINOPHEN 650 MG: 325 TABLET, FILM COATED ORAL at 11:25

## 2023-01-26 RX ADMIN — POTASSIUM CHLORIDE 10 MEQ: 750 TABLET, EXTENDED RELEASE ORAL at 08:27

## 2023-01-26 RX ADMIN — MAGNESIUM SULFATE HEPTAHYDRATE 2 G: 40 INJECTION, SOLUTION INTRAVENOUS at 00:27

## 2023-01-26 RX ADMIN — CEFAZOLIN SODIUM 2000 MG: 2 SOLUTION INTRAVENOUS at 17:08

## 2023-01-26 NOTE — ASSESSMENT & PLAN NOTE
Presents with pain, increased swelling RLE   Unable to ambulate   · RLE with + edema, weeping, mild erythema   · Meeting sepsis criteria   · Started empirically on ceftriaxone  · Will de-escelate to ancef   · Doppler US RLE pending  · Elevate RLE, Monitor response

## 2023-01-26 NOTE — ASSESSMENT & PLAN NOTE
· CT a/p: urinary bladder wall appears mildly thickened and there is mild urothelial enhancement  · Patient meeting sepsis criteria   · Received IV ceftriaxone 1g x1 for cellulitis   · UA/urine culture pending

## 2023-01-26 NOTE — ED NOTES
RN placed call to pharmacy to request dose of abx, out of stock in [facility] at this time        Emmie Kapoor, DARYL  01/26/23 0030

## 2023-01-26 NOTE — PHYSICAL THERAPY NOTE
Physical Therapy Evaluation     Patient's Name: Sepideh Vela    Admitting Diagnosis  Weakness of right upper extremity [R29 [id number]]    Problem List  Patient Active Problem List   Diagnosis    History of stroke    Essential hypertension    Type 2 diabetes mellitus without complication, without long-term current use of insulin (HCC)    Tibial plateau fracture    Other persistent atrial fibrillation (HCC)    Severe aortic stenosis    Carotid occlusion, left    Carotid stenosis, asymptomatic, right    S/P TAVR (transcatheter aortic valve replacement)    Right sided weakness    Expressive aphasia    Acute pulmonary embolism (HCC)    Bilateral leg edema    Adenopathy    Diastolic heart failure (HCC)    Tachy-chana syndrome (HCC)    Mild intermittent asthma    Ambulatory dysfunction    Closed fracture of proximal end of humerus    Elevated bilirubin    Leukocytosis    Sepsis ([facility] [address] )    Cellulitis of right lower extremity    Acute cystitis     Past Medical History  Past Medical History:   Diagnosis Date    Aortic valve disease     Atrial fibrillation (HCC)     Cardiac disease     Diabetes mellitus ([facility] [address] )     Heart murmur     Heart valve disease     Hypertension     Stroke [facility])      Past Surgical History  Past Surgical History:   Procedure Laterality Date     SECTION      x 2    HYSTERECTOMY      ME ECHO TRANSESOPHAG R-T 2D W/PRB IMG ACQUISJ I&R N/A 2020    Procedure: TRANSESOPHAGEAL ECHOCARDIOGRAM (BOLIVAR); Surgeon: Syeda Gallego DO;  [facility];  Service: Cardiac Surgery    ME REPLACE AORTIC VALVE OPENFEMORAL ARTERY APPROACH Right 2020    Procedure: REPLACEMENT AORTIC VALVE TRANSCATHETER (TAVR) TRANSFEMORAL W/ 26 MM TODD ANIBAL S3 ULTRA VALVE (ACCESS ON LEFT);   Surgeon: Syeda Gallego DO;  [facility];  Service: Cardiac Surgery    TONSILLECTOMY        23 0908   PT Last Visit   PT Visit Date 23   Note Type   Note type Evaluation   Pain Assessment   Pain Assessment Tool 0-10   Pain Score No Pain   Restrictions/Precautions   Weight Bearing Precautions Per Order No   Braces or Orthoses Other (Comment)  (none per patient)   Other Precautions Chair Alarm; Bed Alarm;Multiple lines;Telemetry; Fall Risk   Home Living   Type of [address] [facility] One level; Able to live on main level with bedroom/bathroom; Performs ADLs on one level;Ramped entrance   Bathroom Shower/Tub Tub/shower unit  (pt has been sponge bathing)   Bathroom Toilet Raised   Bathroom Equipment Other (Comment)  (none per patient)   [address] Walker;Quad cane; Hospital bed   Additional Comments Pt ambulates with a quad cane  Prior Function   Level of [place] Independent with functional mobility; Needs assistance with ADLs; Needs assistance with IADLS   Lives With Alone  (daughter lives next door, "15 feet away")   Receives Help From Family; Other (Comment)  (aide 1x/week for 2 hours for bathing and housekeeping)   IADLs Independent with medication management; Family/Friend/Other provides transportation; Family/Friend/Other provides meals  (pt has meals on wheels)   Falls in the last 6 months 0   Vocational Retired   General   Family/Caregiver Present No   Cognition   Overall Cognitive Status WFL   Arousal/Participation Alert   Orientation Level Oriented X4   Memory Within functional limits   Following Commands Follows all commands and directions without difficulty   Comments Pt agreeable to PT     Subjective   Subjective "I haven't been able to walk in the last few days "   RUE Assessment   RUE Assessment   (defer to OT assessment)   LUE Assessment   LUE Assessment   (defer to OT assessment)   RLE Assessment   RLE Assessment X   Strength RLE   RLE Overall Strength 3-/5   LLE Assessment   LLE Assessment X   Strength LLE   LLE Overall Strength 3+/5   Light Touch   RLE Light Touch Grossly intact   LLE Light Touch Grossly intact   Bed Mobility   Supine to Sit 3  Moderate assistance Additional items Assist x 2;HOB elevated; Increased time required;Verbal cues;LE management   Sit to Supine 3  Moderate assistance   Additional items Assist x 2; Increased time required;Verbal cues;LE management   Transfers   Sit to Stand 3  Moderate assistance   Additional items Assist x 2; Increased time required;Verbal cues   Stand to Sit 3  Moderate assistance   Additional items Assist x 2; Increased time required;Verbal cues   Ambulation/Elevation   Gait pattern Decreased toe off;Decreased heel strike;Decreased hip extension; Excessively slow; Step to;Short stride; Shuffling;Decreased R stance;Decreased foot clearance; Improper Weight shift   Gait Assistance 3  Moderate assist   Additional items Assist x 2;Verbal cues   Assistive Device Rolling walker   Distance 3 side steps at EOB; 2 steps forward/backward   Balance   Static Sitting Fair   Dynamic Sitting Fair -   Static Standing Poor   Dynamic Standing Poor -   Ambulatory Poor -   Endurance Deficit   Endurance Deficit Yes   Endurance Deficit Description decreased activity tolerance   Activity Tolerance   Activity Tolerance Patient limited by fatigue   Medical Staff Made Aware OT Richelle Thompson  (Co-evaluation performed with OT secondary to complex medical condition of patient and regression of functional status from baseline  PT/OT goals were addressed separately )   Assessment   Prognosis Good   Problem List Decreased strength;Decreased endurance; Impaired balance;Decreased mobility;Obesity   Assessment Pt is 81year old female seen for PT evaluation s/p admit to [facility] on 1/25/2023 with Sepsis ([facility] [address] )  PT consulted to assess pt's functional mobility and d/c needs  Order placed for PT eval and tx, with up and OOB as tolerated order   Comorbidities affecting pt's physical performance at time of assessment include history of stroke, essential hypertension, type 2 DM, other persistent atrial fibrillation, severe aortic stenosis, diastolic heart failure, ambulatory dysfunction, cellulitis of right lower extremity, and acute cystitis  PTA, pt was independent with all functional mobility with a quad cane  Pt ambulates household distances  Pt resides alone in a one level house with a ramped entrance  Personal factors affecting pt at time of IE include inability to ambulate household distances, inability to navigate level surfaces without external assistance, unable to perform dynamic tasks in community, limited home support, inability to perform IADLs, and inability to perform ADLs  Please find objective findings from PT assessment regarding body systems outlined above with impairments and limitations including weakness, impaired balance, decreased endurance, gait deviations, decreased activity tolerance, decreased functional mobility tolerance, and fall risk  The following objective measures performed on IE also reveal limitations: Barthel Index: 35/100, Modified [place]: 4 (moderate/severe disability) and AM-PAC 6-Clicks: 1/24  Pt's clinical presentation is currently unstable/unpredictable seen in pt's presentation of need for ongoing medical management/monitoring, pt is a fall risk, and pt requires cues and assist of two for safety with functional mobility  Pt to benefit from continued PT tx to address deficits as defined above and maximize level of functional independent mobility and consistency  From PT/mobility standpoint, recommendation at time of d/c would be STR pending progress in order to facilitate return to PLOF     Barriers to Discharge Decreased caregiver support   Goals   STG Expiration Date 02/05/23   Short Term Goal #1 In 10 days: Increase bilateral LE strength 1/2 grade to facilitate independent mobility, Perform all bed mobility tasks with min A of 1 to decrease caregiver burden, Perform all transfers with min A of 1 to improve independence, Ambulate > 50 ft  with least restrictive assistive device with min A of 1 w/o LOB and w/ normalized gait pattern 100% of the time and Increase all balance 1/2 grade to decrease risk for falls   Plan   Treatment/Interventions Functional transfer training;LE strengthening/ROM; Therapeutic exercise; Endurance training;Patient/family training;Bed mobility;Gait training;Spoke to nursing;OT   PT Frequency 3-5x/wk   Recommendation   PT Discharge Recommendation Post acute rehabilitation services   AM-PAC Basic Mobility Inpatient   Turning in Flat Bed Without Bedrails 2   Lying on Back to Sitting on Edge of Flat Bed Without Bedrails 1   Moving Bed to Chair 1   Standing Up From Chair Using Arms 1   Walk in Room 1   Climb 3-5 Stairs With Railing 1   Basic Mobility Inpatient Raw Score 7   Turning Head Towards Sound 4   Follow Simple Instructions 4   Low Function Basic Mobility Raw Score 15   Low Function Basic Mobility Standardized Score 23 9   Highest Level Of Mobility   -HLM Goal 2: Bed activities/Dependent transfer   -HL Achieved 4: Move to chair/commode   Modified [place] Scale   Modified [place] Scale 4   Barthel Index   Feeding 5   Bathing 0   Grooming Score 0   Dressing Score 5   Bladder Score 5   Bowels Score 10   Toilet Use Score 5   Transfers (Bed/Chair) Score 5   Mobility (Level Surface) Score 0   Stairs Score 0   Barthel Index Score 35     PT Evaluation Time: 4548-5903  Laurie Pulling, PT, DPT

## 2023-01-26 NOTE — PLAN OF CARE
Problem: PHYSICAL THERAPY ADULT  Goal: Performs mobility at highest level of function for planned discharge setting  See evaluation for individualized goals  Description: Treatment/Interventions: Functional transfer training, LE strengthening/ROM, Therapeutic exercise, Endurance training, Patient/family training, Bed mobility, Gait training, Spoke to nursing, OT          See flowsheet documentation for full assessment, interventions and recommendations  Note: Prognosis: Good  Problem List: Decreased strength, Decreased endurance, Impaired balance, Decreased mobility, Obesity  Assessment: Pt is 81year old female seen for PT evaluation s/p admit to [facility] on 1/25/2023 with Sepsis ([facility] [address] )  PT consulted to assess pt's functional mobility and d/c needs  Order placed for PT eval and tx, with up and OOB as tolerated order  Comorbidities affecting pt's physical performance at time of assessment include history of stroke, essential hypertension, type 2 DM, other persistent atrial fibrillation, severe aortic stenosis, diastolic heart failure, ambulatory dysfunction, cellulitis of right lower extremity, and acute cystitis  PTA, pt was independent with all functional mobility with a quad cane  Pt ambulates household distances  Pt resides alone in a one level house with a ramped entrance  Personal factors affecting pt at time of IE include inability to ambulate household distances, inability to navigate level surfaces without external assistance, unable to perform dynamic tasks in community, limited home support, inability to perform IADLs, and inability to perform ADLs  Please find objective findings from PT assessment regarding body systems outlined above with impairments and limitations including weakness, impaired balance, decreased endurance, gait deviations, decreased activity tolerance, decreased functional mobility tolerance, and fall risk   The following objective measures performed on IE also reveal limitations: Barthel Index: 35/100, Modified [place]: 4 (moderate/severe disability) and AM-PAC 6-Clicks: 5/21  Pt's clinical presentation is currently unstable/unpredictable seen in pt's presentation of need for ongoing medical management/monitoring, pt is a fall risk, and pt requires cues and assist of two for safety with functional mobility  Pt to benefit from continued PT tx to address deficits as defined above and maximize level of functional independent mobility and consistency  From PT/mobility standpoint, recommendation at time of d/c would be STR pending progress in order to facilitate return to PLOF  Barriers to Discharge: Decreased caregiver support     PT Discharge Recommendation: Post acute rehabilitation services    See flowsheet documentation for full assessment

## 2023-01-26 NOTE — OCCUPATIONAL THERAPY NOTE
Occupational Therapy Evaluation        Patient Name: Boubacar Chou  XTOTS'M Date: 1/26/2023 01/26/23 0854   OT Last Visit   OT Visit Date 01/26/23   Note Type   Note type Evaluation   Pain Assessment   Pain Assessment Tool 0-10   Pain Score No Pain  (at rest)   Restrictions/Precautions   Weight Bearing Precautions Per Order No   Braces or Orthoses Other (Comment)  (none reported)   Other Precautions Chair Alarm; Bed Alarm; Fall Risk;Pain   Home Living   Type of [address] [facility] One level;Ramped entrance   Bathroom Shower/Tub Tub/shower unit  (sponge bathing)   Bathroom Toilet Raised   Bathroom Equipment Other (Comment)  (none)   [facility] bed;Quad cane  (Meals on Wheels)   Prior Function   Level of [place] Needs assistance with ADLs   Lives With Alone  (daughter lives next door "15 feet away")   Receives Help From Family   IADLs Family/Friend/Other provides transportation; Family/Friend/Other provides meals; Independent with medication management   Falls in the last 6 months 0   Vocational Retired   Comments HHA 1x week for 2 hours - bathing, housekeeping   Lifestyle   Autonomy Patient reported independent with ADLs except for bathing  Patient lives alone in a one story house with ramped entrance, daughter lives next door " she is about 15 feet away from me"  patient has a life alert, meals on wheels and assistance 1x week for bathing and housekeeping  Patient reported she ambulates independently with [facility]     Reciprocal Relationships Supportive Family   Service to Others Retired Nurse   Intrinsic Gratification watch TV   General   Family/Caregiver Present No   ADL   Eating Assistance 5  Supervision/Setup   Eating Deficit   (assist with bimanual tasks)   Grooming Assistance 5  Supervision/Setup   Grooming Deficit   (assist with bimanual tasks)   [address] [place] [address] 3  Moderate Assistance   LB Bathing Assistance 2  Maximal Assistance   UB Dressing Assistance 3  Moderate Assistance   LB Dressing Assistance 2  Maximal Assistance   Toileting Assistance  2  Maximal Assistance   Functional Assistance 2  Maximal Assistance   Bed Mobility   Supine to Sit 3  Moderate assistance   Additional items Assist x 2; Increased time required;Verbal cues;LE management   Sit to Supine 3  Moderate assistance   Additional items Assist x 2; Increased time required;Verbal cues;LE management   Transfers   Sit to Stand 3  Moderate assistance   Additional items Assist x 2; Increased time required;Verbal cues   Stand to Sit 3  Moderate assistance   Additional items Assist x 2; Increased time required;Verbal cues; Bed elevated   Functional Mobility   Functional Mobility 3  Moderate assistance   Additional Comments assist of 2   Balance   Static Sitting Fair   Dynamic Sitting Fair -   Static Standing Poor +   Dynamic Standing Poor   Activity Tolerance   Activity Tolerance Patient limited by fatigue;Patient limited by pain   RUE Assessment   RUE Assessment X  (hemiparesis with increased tone and multiple contractures, no functional use of RUE )   RUE Strength   RUE Overall Strength Other (Comment)  (N/A)   LUE Assessment   LUE Assessment X  (AROM grossly WFL, strength deficit)   LUE Strength   LUE Overall Strength Deficits  (3+/5)   Hand Function   Gross Motor Coordination Impaired   Fine Motor Coordination Impaired   Hand Function Comments non functional use of RUE   Sensation   Light Touch No apparent deficits  (LUE)   Vision-Basic Assessment   Current Vision Does not wear glasses   Patient Visual Report Other (Comment)  (No significant changes reported)   Cognition   Overall Cognitive Status WFL   Arousal/Participation Alert; Responsive; Cooperative   Attention Within functional limits   Orientation Level Oriented X4   Memory Within functional limits   Following Commands Follows all commands and directions without difficulty   Assessment   Limitation Decreased ADL status; Decreased UE ROM; Decreased UE strength;Decreased endurance;Decreased self-care trans;Decreased high-level ADLs; Non-func R UE   Prognosis Good   Assessment Patient is a 81 y o  female seen for OT evaluation s/p admit to [id number] [facility] on 1/25/2023 w/Sepsis [facility])  Commorbidities affecting patient's functional performance at time of assessment include:  history of stroke, essential hypertension, type 2 DM, other persistent atrial fibrillation, severe aortic stenosis, diastolic heart failure, ambulatory dysfunction, cellulitis of right lower extremity, and acute cystitis  Orders placed for OT evaluation and treatment  Performed at least two patient identifiers during session including name and wristband  Prior to admission, Patient reported independent with ADLs except for bathing  Patient lives alone in a one story house with ramped entrance, daughter lives next door " she is about 15 feet away from me"  patient has a life alert, meals on wheels and assistance 1x week for bathing and housekeeping  Patient reported she ambulates independently with [facility]  Personal factors affecting patient at time of initial evaluation include: difficulty performing ADLs and difficulty performing IADLs  Upon evaluation, patient requires moderate assist for UB ADLs, maximal assist for LB ADLs,   Occupational performance is affected by the following deficits: decreased functional use of BUEs, in hand manipulation deficit with impaired reach, grasp and coordination, limited active ROM, decreased muscle strength, contractures, degenerative arthritic joint changes, impaired gross motor coordination, dynamic sit/ stand balance deficit with poor standing tolerance time for self care and functional mobility, decreased activity tolerance, decreased safety awareness, abnormal muscle tone and postural control and postural alignment deficit, requiring external assistance to complete transitional movements    Patient to benefit from continued Occupational Therapy treatment while in the hospital to address deficits as defined above and maximize level of functional independence with ADLs and functional mobility  Occupational Performance areas to address include: grooming , bathing/ shower, dressing, toilet hygiene, transfer to all surfaces, functional mobility, health maintenance, medication routine/ management, IADLs: safety procedures and IADLs: meal prep/ clean up  From OT standpoint, recommendation at time of d/c would be Short Term Rehab  Plan   Treatment Interventions ADL retraining;Functional transfer training;UE strengthening/ROM; Endurance training;Patient/family training;Equipment evaluation/education; Compensatory technique education; Energy conservation; Activityengagement   Goal Expiration Date 02/08/23   OT Frequency 3-5x/wk   Recommendation   OT Discharge Recommendation Post acute rehabilitation services   AM-PAC Daily Activity Inpatient   Lower Body Dressing 2   Bathing 2   Toileting 2   Upper Body Dressing 2   Grooming 3   Eating 3   Daily Activity Raw Score 14   Daily Activity Standardized Score (Calc for Raw Score >=11) 33 39   AM-PAC Applied Cognition Inpatient   Following a Speech/Presentation 4   Understanding Ordinary Conversation 4   Taking Medications 3   Remembering Where Things Are Placed or Put Away 3   Remembering List of 4-5 Errands 3   Taking Care of Complicated Tasks 3   Applied Cognition Raw Score 20   Applied Cognition Standardized Score 41 76   Barthel Index   Feeding 5   Bathing 0   Grooming Score 0   Dressing Score 5   Bladder Score 5   Bowels Score 10   Toilet Use Score 5   Transfers (Bed/Chair) Score 5   Mobility (Level Surface) Score 0   Stairs Score 0   Barthel Index Score 35         1 - Patient will verbalize and demonstrate use of energy conservation/ deep breathing technique and work simplification skills during functional activity with no verbal cues      2 - Patient will verbalize and demonstrate good body mechanics and joint protection techniques during  ADLs/ IADLs with no verbal cues  3 - Patient will increase OOB/ sitting tolerance to 2-4 hours per day for increased participation in self care and leisure tasks with no s/s of exertion  4 - Patient will increase standing tolerance time to 5  minutes with unilateral UE support to complete sink level ADLs@ mod I level  5 - Patient will increase sitting tolerance at edge of bed to 20 minutes to complete UB ADLs @ set up assist level  6 - Patient will transfer bed to Chair / toilet at Set up assist level with AD as indicated  7 - Patient will complete UB ADLs with set up assist      8 - Patient will complete LB ADLs with min assist with the use of adaptive equipment       9 - Patient will complete toileting hygiene with set up assist/ supervision for thoroughness    10 - Patient/ Family  will demonstrate competency with UE Home Exercise Program

## 2023-01-26 NOTE — ED PROVIDER NOTES
History  Chief Complaint   Patient presents with   • Extremity Weakness     Pt BIBA d/t c/o increased weakness on R side where pt has preexisting deficits from a previous stroke and she states that it is 'worse'  Pt is A&OX4  Walt Rene is a 81 y o  female with a pertinent past medical history for atrial fibrillation, aortic valve disease, diabetes mellitus, hypertension presenting today with worsening right lower extremity swelling  Patient with history of stroke 15 years ago with baseline right upper extremity and right lower extremity strength deficit  Patient reports that it has been slightly worse since her swelling to the right lower extremity started  Reports that her symptoms began Friday  Have been constant since onset  She denies any chest pain, complaining of mild shortness of breath and cough  She reports some redness and warmth to the right lower extremity  Denies any nausea/vomiting/diarrhea  MDM:  On arrival patient is awake alert and oriented x4 vital signs reviewed, temperature of 90 9 8F  Elevated blood pressure, tachypneic at a oxygen saturation of 92% on room air  Neurological examination revealed 3/5 strength to the right upper and lower extremity  As per patient this is worse than her baseline  Her symptoms began on Friday  She is out of the window for any stroke intervention  No changes to symptoms today  Denies any headache  Patient was complaining of some shortness of breath and increased right lower extremity swelling  There was some redness to the anterior shin with warmth  Differential diagnosis includes cellulitis, PE, DVT, CHF exacerbation, myocardial infarction, less likely pneumonia, stroke  Drug alert not called secondary to patient being out of window for any intervention at this time  We will treat for infection likely causing CHF exacerbation  Patient admitted to [place] [address] internal medicine  History was obtained from the patient    Patient was agreeable to plan of care  Prior to Admission Medications   Prescriptions Last Dose Informant Patient Reported? Taking? BABY ASPIRIN PO   Yes No   Sig: Take 81 mg by mouth daily   acetaminophen (TYLENOL) 325 mg tablet   Yes No   Sig: Take 650 mg by mouth every 6 (six) hours as needed for mild pain   Patient not taking: No sig reported   alendronate (FOSAMAX) 70 mg tablet   Yes No   Sig: Take 70 mg by mouth Once a week   ascorbic acid (VITAMIN C) 500 mg tablet   Yes No   Sig: Take 500 mg by mouth 2 (two) times a day   cholecalciferol (VITAMIN D3) 1,000 units tablet   Yes No   Sig: Take 1,000 Units by mouth daily   Patient not taking: No sig reported   cyanocobalamin (VITAMIN B-12) 100 mcg tablet   Yes No   Sig: Take by mouth daily   diltiazem (CARDIZEM CD) 240 mg 24 hr capsule   No No   Sig: Take 1 capsule (240 mg total) by mouth daily   docusate sodium (COLACE) 100 mg capsule   No No   Sig: Take 1 capsule (100 mg total) by mouth 2 (two) times a day   fluticasone-vilanterol (BREO ELLIPTA) 100-25 mcg/inh inhaler   No No   Sig: Inhale 1 puff daily Rinse mouth after use     furosemide (LASIX) 20 mg tablet   No No   Sig: Take 1 tablet (20 mg total) by mouth daily   lisinopril (ZESTRIL) 10 mg tablet   No No   Sig: Take 1 tablet (10 mg total) by mouth daily   lubiprostone (AMITIZA) 24 mcg capsule   Yes No   Sig: Take 24 mcg by mouth 2 (two) times a day with meals   Patient not taking: No sig reported   metFORMIN (GLUCOPHAGE) 500 mg tablet   Yes No   Sig: Take 500 mg by mouth daily with breakfast    omeprazole (PriLOSEC) 20 mg delayed release capsule   No No   Sig: Take 1 capsule (20 mg total) by mouth daily Take 30 minutes prior to breakfast   polyethylene glycol (MIRALAX) 17 g packet   No No   Sig: Take 17 g by mouth daily   Patient not taking: No sig reported   potassium chloride (K-DUR,KLOR-CON) 10 mEq tablet   No No   Sig: Take 1 tablet (10 mEq total) by mouth daily   predniSONE 20 mg tablet   No No   Sig: Take 2 tablets (40 mg total) by mouth daily   temazepam (RESTORIL) 30 mg capsule   Yes No   Sig: Take 30 mg by mouth daily at bedtime       Facility-Administered Medications: None       Past Medical History:   Diagnosis Date   • Aortic valve disease    • Atrial fibrillation (HCC)    • Cardiac disease    • Diabetes mellitus (HCC)    • Heart murmur    • Heart valve disease    • Hypertension    • Stroke [facility])        Past Surgical History:   Procedure Laterality Date   •  SECTION      x 2   • HYSTERECTOMY     • ME ECHO TRANSESOPHAG R-T 2D W/PRB IMG ACQUISJ I&R N/A 2020    Procedure: TRANSESOPHAGEAL ECHOCARDIOGRAM (BOLIVAR); Surgeon: Wynn Bamberger, DO;  Location: [facility];  Service: Cardiac Surgery   • ME REPLACE AORTIC VALVE OPENFEMORAL ARTERY APPROACH Right 2020    Procedure: REPLACEMENT AORTIC VALVE TRANSCATHETER (TAVR) TRANSFEMORAL W/ 26 MM TODD ANIBAL S3 ULTRA VALVE (ACCESS ON LEFT); Surgeon: Wynn Bamberger, DO;  Location: [facility];  Service: Cardiac Surgery   • TONSILLECTOMY         Family History   Problem Relation Age of Onset   • Diabetes Mother    • Heart disease Mother    • Diabetes Father    • Heart disease Father      I have reviewed and agree with the history as documented  E-Cigarette/Vaping   • E-Cigarette Use Never User      E-Cigarette/Vaping Substances   • Nicotine No    • THC No    • CBD No    • Flavoring No    • Other No    • Unknown No      Social History     Tobacco Use   • Smoking status: Never   • Smokeless tobacco: Never   Vaping Use   • Vaping Use: Never used   Substance Use Topics   • Alcohol use: Not Currently   • Drug use: Never       Review of Systems   Respiratory: Positive for shortness of breath  Skin: Positive for color change  All other systems reviewed and are negative  Physical Exam  Physical Exam  Vitals reviewed  Constitutional:       General: She is not in acute distress  Appearance: She is obese  She is ill-appearing   She is not toxic-appearing or diaphoretic  HENT:      Head: Normocephalic and atraumatic  Right Ear: External ear normal       Left Ear: External ear normal       Nose: Nose normal  No congestion or rhinorrhea  Mouth/Throat:      Mouth: Mucous membranes are moist       Pharynx: Oropharynx is clear  Eyes:      Extraocular Movements: Extraocular movements intact  Pupils: Pupils are equal, round, and reactive to light  Cardiovascular:      Rate and Rhythm: Normal rate and regular rhythm  Pulses: Normal pulses  Pulmonary:      Effort: Pulmonary effort is normal       Breath sounds: Wheezing present  Abdominal:      General: Abdomen is flat  Bowel sounds are normal       Tenderness: There is no abdominal tenderness  Musculoskeletal:         General: Swelling (Right lower extremity) and tenderness present  No deformity  Cervical back: Normal range of motion and neck supple  No tenderness  Skin:     Capillary Refill: Capillary refill takes less than 2 seconds  Findings: Erythema (Right lower extremity) present  Neurological:      Mental Status: She is alert  Cranial Nerves: Cranial nerves 2-12 are intact  No cranial nerve deficit or dysarthria  Sensory: Sensation is intact  No sensory deficit  Motor: Weakness (Right upper and lower extremity) present           Vital Signs  ED Triage Vitals   Temperature Pulse Respirations Blood Pressure SpO2   01/25/23 2304 01/25/23 2304 01/25/23 2304 01/25/23 2304 01/25/23 2304   99 8 °F (37 7 °C) 64 (!) 30 (!) 181/71 95 %      Temp Source Heart Rate Source Patient Position - Orthostatic VS BP Location FiO2 (%)   01/25/23 2304 01/25/23 2304 01/25/23 2304 01/25/23 2304 --   Oral Monitor Sitting Left arm       Pain Score       01/26/23 0304       6           Vitals:    01/25/23 2304 01/26/23 0100   BP: (!) 181/71 (!) 172/74   Pulse: 64 60   Patient Position - Orthostatic VS: Sitting Sitting         Visual Acuity      ED Medications  Medications   ipratropium-albuterol (DUO-NEB) 0 5-2 5 mg/3 mL inhalation solution 3 mL (3 mL Nebulization Given 1/26/23 0237)   ceFAZolin (ANCEF) IVPB (premix in dextrose) 2,000 mg 50 mL (has no administration in time range)   insulin lispro (HumaLOG) 100 units/mL subcutaneous injection 1-6 Units (has no administration in time range)   temazepam (RESTORIL) capsule 30 mg (has no administration in time range)   potassium chloride (K-DUR,KLOR-CON) CR tablet 10 mEq (has no administration in time range)   pantoprazole (PROTONIX) EC tablet 20 mg (has no administration in time range)   lisinopril (ZESTRIL) tablet 10 mg (has no administration in time range)   furosemide (LASIX) tablet 20 mg (has no administration in time range)   Fluticasone Furoate-Vilanterol 100-25 mcg/actuation 1 puff (has no administration in time range)   diltiazem (CARDIZEM CD) 24 hr capsule 240 mg (has no administration in time range)   aspirin chewable tablet 81 mg (has no administration in time range)   acetaminophen (TYLENOL) tablet 650 mg (has no administration in time range)   enoxaparin (LOVENOX) subcutaneous injection 40 mg (has no administration in time range)   sodium chloride 0 9 % bolus 500 mL (0 mL Intravenous Stopped 1/26/23 0149)   magnesium sulfate 2 g/50 mL IVPB (premix) 2 g (0 g Intravenous Stopped 1/26/23 0132)   ceftriaxone (ROCEPHIN) 2 g/50 mL in dextrose IVPB (0 mg Intravenous Stopped 1/26/23 0149)   iohexol (OMNIPAQUE) 350 MG/ML injection (SINGLE-DOSE) 100 mL (100 mL Intravenous Given 1/26/23 0031)   furosemide (LASIX) injection 40 mg (40 mg Intravenous Given 1/26/23 0249)   acetaminophen (TYLENOL) tablet 650 mg (650 mg Oral Given 1/26/23 0304)       Diagnostic Studies  Results Reviewed     Procedure Component Value Units Date/Time    HS Troponin I 4hr [[id number]] Collected: 01/26/23 0319    Lab Status:  In process Specimen: Blood from Arm, Left Updated: 01/26/23 0322    Lactic acid 2 Hours [[id number]]  (Abnormal) Collected: 01/26/23 0236    Lab Status: Final result Specimen: Blood from Arm, Left Updated: 01/26/23 0305     LACTIC ACID 2 3 mmol/L     Narrative:      Result may be elevated if tourniquet was used during collection  HS Troponin I 2hr [[id number]]  (Normal) Collected: 01/26/23 0133    Lab Status: Final result Specimen: Blood Updated: 01/26/23 0201     hs TnI 2hr 10 ng/L      Delta 2hr hsTnI 2 ng/L     FLU/RSV/COVID - if FLU/RSV clinically relevant [[id number]]  (Normal) Collected: 01/25/23 2323    Lab Status: Final result Specimen: Nares from Nasopharyngeal Swab Updated: 01/26/23 0015     SARS-CoV-2 Negative     INFLUENZA A PCR Negative     INFLUENZA B PCR Negative     RSV PCR Negative    Narrative:      FOR PEDIATRIC PATIENTS - copy/paste COVID Guidelines URL to browser: [email or web address]  [email or web address]    SARS-CoV-2 assay is a Nucleic Acid Amplification assay intended for the  qualitative detection of nucleic acid from SARS-CoV-2 in nasopharyngeal  swabs  Results are for the presumptive identification of SARS-CoV-2 RNA  Positive results are indicative of infection with SARS-CoV-2, the virus  causing COVID-19, but do not rule out bacterial infection or co-infection  with other viruses  Laboratories within the [place] and its  territories are required to report all positive results to the appropriate  public health authorities  Negative results do not preclude SARS-CoV-2  infection and should not be used as the sole basis for treatment or other  patient management decisions  Negative results must be combined with  clinical observations, patient history, and epidemiological information  This test has not been FDA cleared or approved  This test has been authorized by FDA under an Emergency Use Authorization  (EUA)   This test is only authorized for the duration of time the  declaration that circumstances exist justifying the authorization of the  emergency use of an in vitro diagnostic tests for detection of SARS-CoV-2  virus and/or diagnosis of COVID-19 infection under section 564(b)(1) of  the Act, 21 U  S C  373LJO-1(K)(0), unless the authorization is terminated  or revoked sooner  The test has been validated but independent review by FDA  and CLIA is pending  Test performed using [employer] GeneXpert: This RT-PCR assay targets N2,  a region unique to SARS-CoV-2  A conserved region in the E-gene was chosen  for pan-Sarbecovirus detection which includes SARS-CoV-2  According to CMS-2020-01-R, this platform meets the definition of high-throughput technology  Procalcitonin [[id number]]  (Normal) Collected: 01/25/23 2323    Lab Status: Final result Specimen: Blood from Arm, Left Updated: 01/26/23 0006     Procalcitonin 0 06 ng/ml     Lactic acid [[id number]]  (Abnormal) Collected: 01/25/23 2323    Lab Status: Final result Specimen: Blood from Arm, Left Updated: 01/26/23 0004     LACTIC ACID 2 6 mmol/L     Narrative:      Result may be elevated if tourniquet was used during collection      HS Troponin 0hr (reflex protocol) [[id number]]  (Normal) Collected: 01/25/23 2323    Lab Status: Final result Specimen: Blood from Arm, Left Updated: 01/26/23 0003     hs TnI 0hr 8 ng/L     NT-BNP PRO [[id number]]  (Abnormal) Collected: 01/25/23 2323    Lab Status: Final result Specimen: Blood from Arm, Left Updated: 01/26/23 0001     NT-proBNP 635 pg/mL     Magnesium [[id number]]  (Abnormal) Collected: 01/25/23 2323    Lab Status: Final result Specimen: Blood from Arm, Left Updated: 01/26/23 0001     Magnesium 1 4 mg/dL     Comprehensive metabolic panel [[id number]]  (Abnormal) Collected: 01/25/23 2323    Lab Status: Final result Specimen: Blood from Arm, Left Updated: 01/25/23 2354     Sodium 135 mmol/L      Potassium 3 8 mmol/L      Chloride 97 mmol/L      CO2 28 mmol/L      ANION GAP 10 mmol/L      BUN 13 mg/dL      Creatinine 0 91 mg/dL      Glucose 191 mg/dL      Calcium 9 0 mg/dL AST 27 U/L      ALT 33 U/L      Alkaline Phosphatase 61 U/L      Total Protein 7 4 g/dL      Albumin 4 1 g/dL      Total Bilirubin 1 42 mg/dL      eGFR 59 ml/min/1 73sq m     Narrative:      [place] guidelines for Chronic Kidney Disease (CKD):   •  Stage 1 with normal or high GFR (GFR > 90 mL/min/1 73 square meters)  •  Stage 2 Mild CKD (GFR = 60-89 mL/min/1 73 square meters)  •  Stage 3A Moderate CKD (GFR = 45-59 mL/min/1 73 square meters)  •  Stage 3B Moderate CKD (GFR = 30-44 mL/min/1 73 square meters)  •  Stage 4 Severe CKD (GFR = 15-29 mL/min/1 73 square meters)  •  Stage 5 End Stage CKD (GFR <15 mL/min/1 73 square meters)  Note: GFR calculation is accurate only with a steady state creatinine    Protime-INR [[id number]]  (Normal) Collected: 01/25/23 2323    Lab Status: Final result Specimen: Blood from Arm, Left Updated: 01/25/23 2349     Protime 13 8 seconds      INR 1 08    APTT [[id number]]  (Normal) Collected: 01/25/23 2323    Lab Status: Final result Specimen: Blood from Arm, Left Updated: 01/25/23 2349     PTT 25 seconds     CBC and differential [[id number]]  (Abnormal) Collected: 01/25/23 2323    Lab Status: Final result Specimen: Blood from Arm, Left Updated: 01/25/23 2336     WBC 21 14 Thousand/uL      RBC 4 87 Million/uL      Hemoglobin 15 0 g/dL      Hematocrit 45 6 %      MCV 94 fL      MCH 30 8 pg      MCHC 32 9 g/dL      RDW 13 5 %      MPV 10 6 fL      Platelets 155 Thousands/uL      nRBC 0 /100 WBCs      Neutrophils Relative 87 %      Immat GRANS % 1 %      Lymphocytes Relative 7 %      Monocytes Relative 5 %      Eosinophils Relative 0 %      Basophils Relative 0 %      Neutrophils Absolute 18 43 Thousands/µL      Immature Grans Absolute 0 11 Thousand/uL      Lymphocytes Absolute 1 49 Thousands/µL      Monocytes Absolute 1 00 Thousand/µL      Eosinophils Absolute 0 06 Thousand/µL      Basophils Absolute 0 05 Thousands/µL     Blood culture #1 [[id number]] Collected: 01/25/23 2323    Lab Status: In process Specimen: Blood from Hand, Left Updated: 01/25/23 2332    Blood culture #2 [[id number]] Collected: 01/25/23 2323    Lab Status: In process Specimen: Blood from Arm, Left Updated: 01/25/23 2332    UA w Reflex to Microscopic w Reflex to Culture [[id number]]     Lab Status: No result Specimen: Urine, Clean Catch                  CT head without contrast   Final Result by Nazanin Mark MD (01/26 0101)      No acute intracranial abnormality  Chronic microangiopathic changes  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  Workstation performed: [id number]         PE Study with CT Abdomen and Pelvis with contrast   Final Result by Gonzales Powers MD (01/26 0151)      Findings suggesting cystitis/urinary tract infection  Correlation with urinalysis and urine culture and sensitivity is recommended  No evidence of pulmonary embolism is seen  Findings suggesting possible mild fluid overload  Clinical correlation recommended  Other nonemergent and chronic findings, as described above  Please see discussion  Workstation performed: [id number]         VAS lower limb venous duplex study, unilateral/limited    (Results Pending)              Procedures  Procedures         ED Course  ED Course as of 01/26/23 0345   Thu Jan 26, 2023   0016 The 30ml/kg fluid bolus was not given to the patient despite hypotension and/or significantly elevated lactate of = 4 and/or presence of septic shock due to: Concern for fluid/volume overload  The patient will be administered 500 ml of crystalloid fluid instead  Orders for this have been placed in Epic  The patient may receive additional colloid or crystalloid fluids thereafter based on clinical condition  Pauletta Goldberg, PA-C    3746 Discussed with [facility]'s internal medicine who accepted the patient for observation                                 SBIRT 22yo+    Flowsheet Row Most Recent Value   SBIRT (25 yo +)    In order to provide better care to our patients, we are screening all of our patients for alcohol and drug use  Would it be okay to ask you these screening questions? No Filed at: 01/25/2023 2308   Initial Alcohol Screen: US AUDIT-C     1  How often do you have a drink containing alcohol? 0 Filed at: 01/25/2023 2308   2  How many drinks containing alcohol do you have on a typical day you are drinking? 0 Filed at: 01/25/2023 2308   3b  FEMALE Any Age, or MALE 65+: How often do you have 4 or more drinks on one occassion? 0 Filed at: 01/25/2023 2308   Audit-C Score 0 Filed at: 01/25/2023 2308   DIANELYS: How many times in the past year have you    Used an illegal drug or used a prescription medication for non-medical reasons? Never Filed at: 01/25/2023 2308                    Medical Decision Making  Leg swelling: complicated acute illness or injury  Shortness of breath: complicated acute illness or injury  Amount and/or Complexity of Data Reviewed  Labs: ordered  Radiology: ordered  Risk  Prescription drug management  Decision regarding hospitalization            Disposition  Final diagnoses:   Leg swelling   Shortness of breath     Time reflects when diagnosis was documented in both MDM as applicable and the Disposition within this note     Time User Action Codes Description Comment    1/26/2023  2:31 AM Precilla Stands Add [N39 0] UTI (urinary tract infection)     1/26/2023  2:31 AM Precilla Stands Add [M79 89] Leg swelling     1/26/2023  2:31 AM Precilla Stands Modify [M79 89] Leg swelling     1/26/2023  2:31 AM Red Car, Aidan Nettle [N39 0] UTI (urinary tract infection)     1/26/2023  2:31 AM Precilla Stands Add [R06 02] Shortness of breath       ED Disposition     ED Disposition   Admit    Condition   Stable    Date/Time   Thu Jan 26, 2023 0230    Comment   Case was discussed with [facility] and the patient's admission status was agreed to be Admission Status: observation status to the service of Dr Prabha Givens  Follow-up Information    None         Patient's Medications   Discharge Prescriptions    No medications on file       No discharge procedures on file      PDMP Review       Value Time User    PDMP Reviewed  Yes 7/4/2022  6:31 AM Ramon Padgett PA-C          ED Provider  Electronically Signed by           Damir Saini PA-C  01/26/23 8415

## 2023-01-26 NOTE — PLAN OF CARE
Problem: OCCUPATIONAL THERAPY ADULT  Goal: Performs self-care activities at highest level of function for planned discharge setting  See evaluation for individualized goals  Description: Treatment Interventions: ADL retraining, Functional transfer training, UE strengthening/ROM, Endurance training, Patient/family training, Equipment evaluation/education, Compensatory technique education, Energy conservation, Activityengagement          See flowsheet documentation for full assessment, interventions and recommendations  Note: Limitation: Decreased ADL status, Decreased UE ROM, Decreased UE strength, Decreased endurance, Decreased self-care trans, Decreased high-level ADLs, Non-func R UE  Prognosis: Good  Assessment: Patient is a 81 y o  female seen for OT evaluation s/p admit to [address] [place] [address] on 1/25/2023 w/Sepsis [facility])  Commorbidities affecting patient's functional performance at time of assessment include:  history of stroke, essential hypertension, type 2 DM, other persistent atrial fibrillation, severe aortic stenosis, diastolic heart failure, ambulatory dysfunction, cellulitis of right lower extremity, and acute cystitis  Orders placed for OT evaluation and treatment  Performed at least two patient identifiers during session including name and wristband  Prior to admission, Patient reported independent with ADLs except for bathing  Patient lives alone in a one story house with ramped entrance, daughter lives next door " she is about 15 feet away from me"  patient has a life alert, meals on wheels and assistance 1x week for bathing and housekeeping  Patient reported she ambulates independently with [facility]  Personal factors affecting patient at time of initial evaluation include: difficulty performing ADLs and difficulty performing IADLs   Upon evaluation, patient requires moderate assist for UB ADLs, maximal assist for LB ADLs,   Occupational performance is affected by the following deficits: decreased functional use of BUEs, in hand manipulation deficit with impaired reach, grasp and coordination, limited active ROM, decreased muscle strength, contractures, degenerative arthritic joint changes, impaired gross motor coordination, dynamic sit/ stand balance deficit with poor standing tolerance time for self care and functional mobility, decreased activity tolerance, decreased safety awareness, abnormal muscle tone and postural control and postural alignment deficit, requiring external assistance to complete transitional movements  Patient to benefit from continued Occupational Therapy treatment while in the hospital to address deficits as defined above and maximize level of functional independence with ADLs and functional mobility  Occupational Performance areas to address include: grooming , bathing/ shower, dressing, toilet hygiene, transfer to all surfaces, functional mobility, health maintenance, medication routine/ management, IADLs: safety procedures and IADLs: meal prep/ clean up  From OT standpoint, recommendation at time of d/c would be Short Term Rehab       OT Discharge Recommendation: Post acute rehabilitation services

## 2023-01-26 NOTE — ASSESSMENT & PLAN NOTE
• Hx CVA with residual right sided hemiparesis, facial droop and mild aphasia  • continue ASA/statin

## 2023-01-26 NOTE — ASSESSMENT & PLAN NOTE
Meeting sepsis criteria for leukocytosis, tachypnea  · Likely due to +RLE cellulitis vs possible UTI  · LA 2 6 will continue to trend  · procal wnl  · Started empirically on IV ceftriaxone  · Follow-up blood cultures, urine culture

## 2023-01-26 NOTE — ASSESSMENT & PLAN NOTE
Nasal cannula restarted after infant desaturating while sleeping supine in bassinet without recovering despite position change. Father at bedside-aware of change and the reason-questions answered. Patient lives alone  Presents with ambulatory dysfunction due to pain and weakness   Unable to get out of bed  · CT head wo negative  · At baseline walks with walker   · Fall precautions   · PT/OT

## 2023-01-26 NOTE — H&P
[address] [facility] 1940, 81 y o  female MRN: [id number]  Unit/Bed#: ED 07 Encounter: [id number]  Primary Care Provider: Yuri Serrano DO   Date and time admitted to hospital: 1/25/2023 10:58 PM    * Sepsis [facility])  Assessment & Plan  Meeting sepsis criteria for leukocytosis, tachypnea  · Likely due to +RLE cellulitis vs possible UTI  · LA 2 6 will continue to trend  · procal wnl  · Started empirically on IV ceftriaxone  · Follow-up blood cultures, urine culture    Cellulitis of right lower extremity  Assessment & Plan  Presents with pain, increased swelling RLE  Unable to ambulate   · RLE with + edema, weeping, mild erythema   · Meeting sepsis criteria   · Started empirically on ceftriaxone  · Will de-escelate to ancef   · Doppler US RLE pending  · Elevate RLE, Monitor response    Acute cystitis  Assessment & Plan  · CT a/p: urinary bladder wall appears mildly thickened and there is mild urothelial enhancement  · Patient meeting sepsis criteria   · Received IV ceftriaxone 1g x1 for cellulitis   · UA/urine culture pending    Ambulatory dysfunction  Assessment & Plan  Patient lives alone  Presents with ambulatory dysfunction due to pain and weakness   Unable to get out of bed  · CT head wo negative  · At baseline walks with walker   · Fall precautions   · PT/OT      Diastolic heart failure (HCC)  Assessment & Plan  Wt Readings from Last 3 Encounters:   08/23/22 90 7 kg (200 lb)   07/03/22 90 7 kg (200 lb)   06/25/20 73 6 kg (162 lb 4 1 oz)     · CT chest: evidence of mild volume overload   · Lasix 40mg IV x1   · Continue lasix 20mg po daily   · Monitor I/O, daily weight, cardiac diet, 2g sodium diet           Severe aortic stenosis  Assessment & Plan  S/p TAVR    Other persistent atrial fibrillation (HCC)  Assessment & Plan  · Rate controlled    · Continue Cardizem     Type 2 diabetes mellitus without complication, without long-term current use of insulin [facility])  Assessment & Plan  Lab Results   Component Value Date    HGBA1C 7 1 (H) 10/12/2021     · Humalog SSI and glucochecks ac and hs   · Monitor for hypoglycemia   · CCD    Essential hypertension  Assessment & Plan  · Continue lisinopril     History of stroke  Assessment & Plan  • Hx CVA with residual right sided hemiparesis, facial droop and mild aphasia  • continue ASA/statin     VTE Pharmacologic Prophylaxis: VTE Score: 5 High Risk (Score >/= 5) - Pharmacological DVT Prophylaxis Ordered: enoxaparin (Lovenox)  Sequential Compression Devices Ordered  Code Status: Prior   Discussion with family: Patient declined call to   Anticipated Length of Stay: Patient will be admitted on an observation basis with an anticipated length of stay of less than 2 midnights secondary to sepsis, ambulatory dysfucniton   Total Time for Visit, including Counseling / Coordination of Care: 60 minutes Greater than 50% of this total time spent on direct patient counseling and coordination of care  Chief Complaint:   Chief Complaint   Patient presents with   • Extremity Weakness     Pt BIBA d/t c/o increased weakness on R side where pt has preexisting deficits from a previous stroke and she states that it is 'worse'  Pt is A&OX4  History of Present Illness:  Zahraa Haddad is a 81 y o  female with a PMH of T2DM, afib, AS s/p TAVR, CVA, HTN who presents with increased weakness  Patient reports increased generalized weakness over the past few days  She was unable to get out of her bed today  She does have weakness in her right upper and lower extremity due to prior CVA  However she lives alone and is typically able to ambulate with walker  She endorses pain in her right lower extremity and shortness of breath  Denies history of DVT/PE  Denies chest pain, palpitations, dizziness or lightheadedness  Patient does have difficult time expressing her symptoms due to aphasia from prior stroke    No fever, chills or dysuria  Review of Systems:  A 10-point review of systems was obtained  Pertinent positives and negatives are outlined in the HPI above  Remainder of review of systems are otherwise negative  Past Medical and Surgical History:   Past Medical History:   Diagnosis Date   • Aortic valve disease    • Atrial fibrillation [facility])    • Cardiac disease    • Diabetes mellitus ([facility] [address] )    • Heart murmur    • Heart valve disease    • Hypertension    • Stroke [facility])        Past Surgical History:   Procedure Laterality Date   •  SECTION      x 2   • HYSTERECTOMY     • TX ECHO TRANSESOPHAG R-T 2D W/PRB IMG ACQUISJ I&R N/A 2020    Procedure: TRANSESOPHAGEAL ECHOCARDIOGRAM (BOLIVAR); Surgeon: Rio Carter DO;  Location: [facility];  Service: Cardiac Surgery   • TX REPLACE AORTIC VALVE OPENFEMORAL ARTERY APPROACH Right 2020    Procedure: REPLACEMENT AORTIC VALVE TRANSCATHETER (TAVR) TRANSFEMORAL W/ 26 MM TODD ANIBAL S3 ULTRA VALVE (ACCESS ON LEFT); Surgeon: Rio Carter DO;  Location: [facility];  Service: Cardiac Surgery   • TONSILLECTOMY         Meds/Allergies:  Prior to Admission medications    Medication Sig Start Date End Date Taking?  Authorizing Provider   acetaminophen (TYLENOL) 325 mg tablet Take 650 mg by mouth every 6 (six) hours as needed for mild pain  Patient not taking: No sig reported    Historical Provider, MD   alendronate (FOSAMAX) 70 mg tablet Take 70 mg by mouth Once a week    Historical Provider, MD   ascorbic acid (VITAMIN C) 500 mg tablet Take 500 mg by mouth 2 (two) times a day    Historical Provider, MD   BABY ASPIRIN PO Take 81 mg by mouth daily 18   Historical Provider, MD   cholecalciferol (VITAMIN D3) 1,000 units tablet Take 1,000 Units by mouth daily  Patient not taking: No sig reported    Historical Provider, MD   cyanocobalamin (VITAMIN B-12) 100 mcg tablet Take by mouth daily    Historical Provider, MD   diltiazem (CARDIZEM CD) 240 mg 24 hr capsule Take 1 capsule (240 mg total) by mouth daily 6/26/20   Ryanne Manzanares PA-C   docusate sodium (COLACE) 100 mg capsule Take 1 capsule (100 mg total) by mouth 2 (two) times a day 2/20/20   Carlitomercedes Kim FLORINA Carvajal   fluticasone-vilanterol [facility] ELLIPTA) 100-25 mcg/inh inhaler Inhale 1 puff daily Rinse mouth after use  7/22/20   Pierre Infante PA-C   furosemide (LASIX) 20 mg tablet Take 1 tablet (20 mg total) by mouth daily 6/28/20   Ryanne Manzanares PA-C   lisinopril (ZESTRIL) 10 mg tablet Take 1 tablet (10 mg total) by mouth daily 7/8/22   Tuan Quinn DO   lubiprostone (AMITIZA) 24 mcg capsule Take 24 mcg by mouth 2 (two) times a day with meals  Patient not taking: No sig reported    Historical Provider, MD   metFORMIN (GLUCOPHAGE) 500 mg tablet Take 500 mg by mouth daily with breakfast     Historical Provider, MD   omeprazole (PriLOSEC) 20 mg delayed release capsule Take 1 capsule (20 mg total) by mouth daily Take 30 minutes prior to breakfast 2/20/20 3/21/20  Carson Yanes PA-C   polyethylene glycol (MIRALAX) 17 g packet Take 17 g by mouth daily  Patient not taking: No sig reported 10/29/19   Merline Glaser, PA-C   potassium chloride (K-DUR,KLOR-CON) 10 mEq tablet Take 1 tablet (10 mEq total) by mouth daily 6/25/20   Ryanne Manzanares PA-C   predniSONE 20 mg tablet Take 2 tablets (40 mg total) by mouth daily 7/8/22   Tuan Quinn DO   temazepam (RESTORIL) 30 mg capsule Take 30 mg by mouth daily at bedtime     Historical Provider, MD   diltiazem (DILACOR XR) 240 MG 24 hr capsule Take 240 mg by mouth daily 2/12/18 7/4/22  Historical Provider, MD BAIG have reviewed home medications with patient personally  Allergies: Allergies   Allergen Reactions   • Atorvastatin      dizziness and shaky   • Rivaroxaban      Dizziness, and shaky  Social History:  Marital Status:     Occupation:   Patient Pre-hospital Living Situation: Alone  Patient Pre-hospital Level of Mobility: walks with walker  Patient Pre-hospital Diet Restrictions:   Substance Use History:   Social History     Substance and Sexual Activity   Alcohol Use Not Currently     Social History     Tobacco Use   Smoking Status Never   Smokeless Tobacco Never     Social History     Substance and Sexual Activity   Drug Use Never       Family History:  Family History   Problem Relation Age of Onset   • Diabetes Mother    • Heart disease Mother    • Diabetes Father    • Heart disease Father        Physical Exam:     Vitals:   Blood Pressure: (!) 172/74 (01/26/23 0100)  Pulse: 60 (01/26/23 0100)  Temperature: 99 8 °F (37 7 °C) (01/25/23 2304)  Temp Source: Oral (01/25/23 2304)  Respirations: (!) 24 (01/26/23 0100)  SpO2: 92 % (01/26/23 0100)    Physical Exam  Vitals and nursing note reviewed  Constitutional:       General: She is not in acute distress  Appearance: She is well-developed  HENT:      Head: Normocephalic and atraumatic  Eyes:      General: No scleral icterus  Extraocular Movements: Extraocular movements intact  Conjunctiva/sclera: Conjunctivae normal       Pupils: Pupils are equal, round, and reactive to light  Cardiovascular:      Rate and Rhythm: Normal rate and regular rhythm  Heart sounds: No murmur heard  Pulmonary:      Effort: Pulmonary effort is normal  No respiratory distress  Breath sounds: Normal breath sounds  Abdominal:      Palpations: Abdomen is soft  Tenderness: There is no abdominal tenderness  Musculoskeletal:         General: No swelling  Cervical back: Neck supple  Right lower leg: Edema (3+, weeping) present  Skin:     General: Skin is warm and dry  Capillary Refill: Capillary refill takes less than 2 seconds  Comments: warmth, mild erythema, +tenderness RLE  Neurological:      Mental Status: She is alert  Mental status is at baseline  Motor: Weakness (RUE, RLE) present        Comments: +aphasia   Psychiatric:         Mood and Affect: Mood normal  Additional Data:     Lab Results:  Results from last 7 days   Lab Units 01/25/23  2323   WBC Thousand/uL 21 14*   HEMOGLOBIN g/dL 15 0   HEMATOCRIT % 45 6   PLATELETS Thousands/uL 258   NEUTROS PCT % 87*   LYMPHS PCT % 7*   MONOS PCT % 5   EOS PCT % 0     Results from last 7 days   Lab Units 01/25/23  2323   SODIUM mmol/L 135   POTASSIUM mmol/L 3 8   CHLORIDE mmol/L 97   CO2 mmol/L 28   BUN mg/dL 13   CREATININE mg/dL 0 91   ANION GAP mmol/L 10   CALCIUM mg/dL 9 0   ALBUMIN g/dL 4 1   TOTAL BILIRUBIN mg/dL 1 42*   ALK PHOS U/L 61   ALT U/L 33   AST U/L 27   GLUCOSE RANDOM mg/dL 191*     Results from last 7 days   Lab Units 01/25/23  2323   INR  1 08             Results from last 7 days   Lab Units 01/26/23  0236 01/25/23  2323   LACTIC ACID mmol/L 2 3* 2 6*   PROCALCITONIN ng/ml  --  0 06       Lines/Drains:  Invasive Devices     Peripheral Intravenous Line  Duration           Peripheral IV 01/25/23 Left Antecubital <1 day                    Imaging: Personally reviewed the following imaging: CT head  CT head without contrast   Final Result by Angela Torres MD (01/26 0101)      No acute intracranial abnormality  Chronic microangiopathic changes  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  Workstation performed: [id number]         PE Study with CT Abdomen and Pelvis with contrast   Final Result by Joe West MD (01/26 0151)      Findings suggesting cystitis/urinary tract infection  Correlation with urinalysis and urine culture and sensitivity is recommended  No evidence of pulmonary embolism is seen  Findings suggesting possible mild fluid overload  Clinical correlation recommended  Other nonemergent and chronic findings, as described above  Please see discussion                 Workstation performed: [id number]         VAS lower limb venous duplex study, unilateral/limited    (Results Pending)       EKG and Other Studies Reviewed on Admission:   · EKG: junctional rhythm HR 61     ** Please Note: This note has been constructed using a voice recognition system   **

## 2023-01-26 NOTE — ASSESSMENT & PLAN NOTE
Lab Results   Component Value Date    HGBA1C 7 1 (H) 10/12/2021     · Humalog SSI and glucochecks ac and hs   · Monitor for hypoglycemia   · CCD

## 2023-01-26 NOTE — ASSESSMENT & PLAN NOTE
Wt Readings from Last 3 Encounters:   08/23/22 90 7 kg (200 lb)   07/03/22 90 7 kg (200 lb)   06/25/20 73 6 kg (162 lb 4 1 oz)     · CT chest: evidence of mild volume overload   · Lasix 40mg IV x1   · Continue lasix 20mg po daily   · Monitor I/O, daily weight, cardiac diet, 2g sodium diet

## 2023-01-27 ENCOUNTER — APPOINTMENT (INPATIENT)
Dept: NON INVASIVE DIAGNOSTICS | Facility: HOSPITAL | Age: 83
End: 2023-01-27

## 2023-01-27 PROBLEM — R78.81 GRAM-POSITIVE BACTEREMIA: Status: ACTIVE | Noted: 2023-01-27

## 2023-01-27 LAB
ANION GAP SERPL CALCULATED.3IONS-SCNC: 8 MMOL/L (ref 4–13)
AORTIC ROOT: 3 CM
AORTIC VALVE MEAN VELOCITY: 15.7 M/S
APICAL FOUR CHAMBER EJECTION FRACTION: 66 %
ASCENDING AORTA: 3.6 CM
AV AREA BY CONTINUOUS VTI: 1.2 CM2
AV AREA PEAK VELOCITY: 1.1 CM2
AV LVOT MEAN GRADIENT: 3 MMHG
AV LVOT PEAK GRADIENT: 4 MMHG
AV MEAN GRADIENT: 13 MMHG
AV PEAK GRADIENT: 22 MMHG
AV VALVE AREA: 1.25 CM2
AV VELOCITY RATIO: 0.43
BACTERIA UR CULT: NORMAL
BASOPHILS # BLD AUTO: 0.04 THOUSANDS/ÂΜL (ref 0–0.1)
BASOPHILS NFR BLD AUTO: 1 % (ref 0–1)
BUN SERPL-MCNC: 13 MG/DL (ref 5–25)
CALCIUM SERPL-MCNC: 9.1 MG/DL (ref 8.3–10.1)
CHLORIDE SERPL-SCNC: 101 MMOL/L (ref 96–108)
CO2 SERPL-SCNC: 28 MMOL/L (ref 21–32)
CREAT SERPL-MCNC: 0.78 MG/DL (ref 0.6–1.3)
DOP CALC AO PEAK VEL: 2.4 M/S
DOP CALC AO VTI: 47.7 CM
DOP CALC LVOT AREA: 2.54
DOP CALC LVOT DIAMETER: 1.8 CM
DOP CALC LVOT PEAK VEL VTI: 23.4 CM
DOP CALC LVOT PEAK VEL: 1.03 M/S
DOP CALC LVOT STROKE INDEX: 29.5 ML/M2
DOP CALC LVOT STROKE VOLUME: 59.52
E WAVE DECELERATION TIME: 211 MS
EOSINOPHIL # BLD AUTO: 0.15 THOUSAND/ÂΜL (ref 0–0.61)
EOSINOPHIL NFR BLD AUTO: 2 % (ref 0–6)
ERYTHROCYTE [DISTWIDTH] IN BLOOD BY AUTOMATED COUNT: 13.9 % (ref 11.6–15.1)
FRACTIONAL SHORTENING: 36 (ref 28–44)
GFR SERPL CREATININE-BSD FRML MDRD: 71 ML/MIN/1.73SQ M
GLUCOSE SERPL-MCNC: 153 MG/DL (ref 65–140)
GLUCOSE SERPL-MCNC: 153 MG/DL (ref 65–140)
GLUCOSE SERPL-MCNC: 166 MG/DL (ref 65–140)
GLUCOSE SERPL-MCNC: 181 MG/DL (ref 65–140)
GLUCOSE SERPL-MCNC: 215 MG/DL (ref 65–140)
HCT VFR BLD AUTO: 39.4 % (ref 34.8–46.1)
HGB BLD-MCNC: 12.9 G/DL (ref 11.5–15.4)
IMM GRANULOCYTES # BLD AUTO: 0.04 THOUSAND/UL (ref 0–0.2)
IMM GRANULOCYTES NFR BLD AUTO: 1 % (ref 0–2)
INTERVENTRICULAR SEPTUM IN DIASTOLE (PARASTERNAL SHORT AXIS VIEW): 1.5 CM
INTERVENTRICULAR SEPTUM: 1.5 CM (ref 0.6–1.1)
LA/AORTA RATIO 2D: 2
LAAS-AP2: 21.2 CM2
LAAS-AP4: 15.2 CM2
LACTATE SERPL-SCNC: 1.1 MMOL/L (ref 0.5–2)
LEFT ATRIUM SIZE: 6 CM
LEFT INTERNAL DIMENSION IN SYSTOLE: 2.7 CM (ref 2.1–4)
LEFT VENTRICULAR INTERNAL DIMENSION IN DIASTOLE: 4.2 CM (ref 3.5–6)
LEFT VENTRICULAR POSTERIOR WALL IN END DIASTOLE: 1.1 CM
LEFT VENTRICULAR STROKE VOLUME: 52 ML
LVSV (TEICH): 52 ML
LYMPHOCYTES # BLD AUTO: 1.7 THOUSANDS/ÂΜL (ref 0.6–4.47)
LYMPHOCYTES NFR BLD AUTO: 19 % (ref 14–44)
MCH RBC QN AUTO: 30.9 PG (ref 26.8–34.3)
MCHC RBC AUTO-ENTMCNC: 32.7 G/DL (ref 31.4–37.4)
MCV RBC AUTO: 94 FL (ref 82–98)
MITRAL REGURGITATION PEAK VELOCITY: 5.02 M/S
MITRAL VALVE MEAN INFLOW VELOCITY: 3.99 M/S
MITRAL VALVE REGURGITANT PEAK GRADIENT: 101 MMHG
MONOCYTES # BLD AUTO: 0.55 THOUSAND/ÂΜL (ref 0.17–1.22)
MONOCYTES NFR BLD AUTO: 6 % (ref 4–12)
MV PEAK E VEL: 153 CM/S
MV STENOSIS PRESSURE HALF TIME: 62 MS
MV VALVE AREA P 1/2 METHOD: 3.55
NEUTROPHILS # BLD AUTO: 6.3 THOUSANDS/ÂΜL (ref 1.85–7.62)
NEUTS SEG NFR BLD AUTO: 71 % (ref 43–75)
NRBC BLD AUTO-RTO: 0 /100 WBCS
PA SYSTOLIC PRESSURE: 65 MMHG
PISA RADIUS: 0.5 CM
PLATELET # BLD AUTO: 204 THOUSANDS/UL (ref 149–390)
PMV BLD AUTO: 10.2 FL (ref 8.9–12.7)
POTASSIUM SERPL-SCNC: 3.6 MMOL/L (ref 3.5–5.3)
RBC # BLD AUTO: 4.18 MILLION/UL (ref 3.81–5.12)
SL CV DOP CALC MV VTI RETROGRADE: 158 CM
SL CV ECHO AV MEAN VELOCITY RETROGRADE: 1.7 M/S
SL CV LV EF: 65
SL CV MV MEAN GRADIENT RETROGRADE: 69 MMHG
SL CV PED ECHO LEFT VENTRICLE DIASTOLIC VOLUME (MOD BIPLANE) 2D: 79 ML
SL CV PED ECHO LEFT VENTRICLE SYSTOLIC VOLUME (MOD BIPLANE) 2D: 27 ML
SODIUM SERPL-SCNC: 137 MMOL/L (ref 135–147)
TR MAX PG: 47 MMHG
TR PEAK VELOCITY: 3.4 M/S
TRICUSPID ANNULAR PLANE SYSTOLIC EXCURSION: 2.7 CM
TRICUSPID VALVE PEAK REGURGITATION VELOCITY: 3.42 M/S
WBC # BLD AUTO: 8.78 THOUSAND/UL (ref 4.31–10.16)

## 2023-01-27 RX ORDER — FUROSEMIDE 10 MG/ML
40 INJECTION INTRAMUSCULAR; INTRAVENOUS DAILY
Status: DISCONTINUED | OUTPATIENT
Start: 2023-01-27 | End: 2023-01-31

## 2023-01-27 RX ADMIN — ENOXAPARIN SODIUM 40 MG: 40 INJECTION SUBCUTANEOUS at 09:02

## 2023-01-27 RX ADMIN — PANTOPRAZOLE SODIUM 20 MG: 20 TABLET, DELAYED RELEASE ORAL at 06:27

## 2023-01-27 RX ADMIN — INSULIN LISPRO 1 UNITS: 100 INJECTION, SOLUTION INTRAVENOUS; SUBCUTANEOUS at 22:39

## 2023-01-27 RX ADMIN — INSULIN LISPRO 1 UNITS: 100 INJECTION, SOLUTION INTRAVENOUS; SUBCUTANEOUS at 18:16

## 2023-01-27 RX ADMIN — DILTIAZEM HYDROCHLORIDE 240 MG: 240 CAPSULE, COATED, EXTENDED RELEASE ORAL at 09:02

## 2023-01-27 RX ADMIN — CEFAZOLIN SODIUM 2000 MG: 2 SOLUTION INTRAVENOUS at 09:02

## 2023-01-27 RX ADMIN — INSULIN LISPRO 2 UNITS: 100 INJECTION, SOLUTION INTRAVENOUS; SUBCUTANEOUS at 12:09

## 2023-01-27 RX ADMIN — LISINOPRIL 10 MG: 10 TABLET ORAL at 09:02

## 2023-01-27 RX ADMIN — POTASSIUM CHLORIDE 10 MEQ: 750 TABLET, EXTENDED RELEASE ORAL at 09:07

## 2023-01-27 RX ADMIN — TEMAZEPAM 30 MG: 15 CAPSULE ORAL at 22:39

## 2023-01-27 RX ADMIN — INSULIN LISPRO 1 UNITS: 100 INJECTION, SOLUTION INTRAVENOUS; SUBCUTANEOUS at 09:03

## 2023-01-27 RX ADMIN — CEFAZOLIN SODIUM 2000 MG: 2 SOLUTION INTRAVENOUS at 17:26

## 2023-01-27 RX ADMIN — CEFAZOLIN SODIUM 2000 MG: 2 SOLUTION INTRAVENOUS at 01:17

## 2023-01-27 RX ADMIN — ASPIRIN 81 MG: 81 TABLET, CHEWABLE ORAL at 09:02

## 2023-01-27 RX ADMIN — FUROSEMIDE 40 MG: 10 INJECTION, SOLUTION INTRAVENOUS at 13:55

## 2023-01-27 RX ADMIN — FUROSEMIDE 20 MG: 40 TABLET ORAL at 09:02

## 2023-01-27 RX ADMIN — FLUTICASONE FUROATE AND VILANTEROL TRIFENATATE 1 PUFF: 100; 25 POWDER RESPIRATORY (INHALATION) at 09:03

## 2023-01-27 NOTE — ASSESSMENT & PLAN NOTE
2/2 blood culture growing gram-positive cocci in clusters, pairs, chains  BC ID positive for Streptococcus  Sepsis improving with IV cefazolin  Follow-up with pending 2 sets of blood cultures sent yesterday  Continue supportive care

## 2023-01-27 NOTE — PROGRESS NOTES
[address] [facility]  Progress Note Kendra Woodard 1940, 81 y o  female MRN: [id number]  Unit/Bed#: -01 Encounter: [id number]  Primary Care Provider: Art Terry DO   Date and time admitted to hospital: 1/25/2023 10:58 PM    * Sepsis [facility])  Assessment & Plan  Sepsis secondary to right lower extremity cellulitis  Lactic acid resolved  2/2 blood cultures growing gram-positive cocci in pairs, chains, clusters  BCID noted with Streptococcus  Currently patient is afebrile  Leukocytosis improving  Sepsis improving  Currently acutely nontoxic-appearing  Continue IV cefazolin  Continue supportive care  Gram-positive bacteremia  Assessment & Plan  2/2 blood culture growing gram-positive cocci in clusters, pairs, chains  BC ID positive for Streptococcus  Sepsis improving with IV cefazolin  Follow-up with pending 2 sets of blood cultures sent yesterday  Continue supportive care  Acute cystitis  Assessment & Plan  · CT a/p: urinary bladder wall appears mildly thickened and there is mild urothelial enhancement  · Patient meeting sepsis criteria   · Received IV ceftriaxone 1g x1 for cellulitis   · UA noted negative UTI  · Urine culture without growth  · Cystitis ruled out  Acute on chronic diastolic (congestive) heart failure (HCC)  Assessment & Plan  Wt Readings from Last 3 Encounters:   01/27/23 95 7 kg (211 lb)   08/23/22 90 7 kg (200 lb)   07/03/22 90 7 kg (200 lb)     · Mild acute on chronic diastolic CHF present admission due to volume overload in the settings of decreased mobility due to acute infection as evident by volume overload noted on CT chest report  · Patient takes Lasix 20 mg p o  daily at home    · Will start on IV Lasix 40 mg daily for now  · Monitor I/O, daily weight, cardiac diet, 2g sodium diet           Severe aortic stenosis  Assessment & Plan  S/p TAVR    Other persistent atrial fibrillation (HCC)  Assessment & Plan  · Rate controlled · Continue Cardizem     Type 2 diabetes mellitus without complication, without long-term current use of insulin (HCC)  Assessment & Plan  Lab Results   Component Value Date    HGBA1C 7 1 (H) 10/12/2021     · Humalog SSI and glucochecks ac and hs   · Monitor for hypoglycemia   · CCD    Essential hypertension  Assessment & Plan  · Continue lisinopril     History of stroke  Assessment & Plan  • Hx CVA with residual right sided hemiparesis, facial droop and mild aphasia  • continue ASA/statin         VTE Pharmacologic Prophylaxis: VTE Score: 5 Moderate Risk (Score 3-4) - Pharmacological DVT Prophylaxis Ordered: enoxaparin (Lovenox)  Patient Centered Rounds: I performed bedside rounds with nursing staff today  Education and Discussions with Family / Patient: Attempted to update  (called and left a voicemail at [address] phone [phone] around 12:42 pm ) via phone  Left voicemail  Time Spent for Care: 30 minutes  More than 50% of total time spent on counseling and coordination of care as described above  Current Length of Stay: 1 day(s)  Current Patient Status: Inpatient   Certification Statement: The patient will continue to require additional inpatient hospital stay due to Sepsis, bacteremia  Discharge Plan: Anticipate discharge in 48-72 hrs to rehab facility  Code Status: Level 1 - Full Code    Subjective:   Patient is in good spirit  Denies chest pain, dyspnea, fever, chills, nausea, vomiting, any new complaints  Tolerating antibiotics well  No other events reported  Objective:     Vitals:   Temp (24hrs), Av °F (36 7 °C), Min:97 9 °F (36 6 °C), Max:98 1 °F (36 7 °C)    Temp:  [97 9 °F (36 6 °C)-98 1 °F (36 7 °C)] 97 9 °F (36 6 °C)  HR:  [55-63] 60  Resp:  [16-20] 16  BP: (102-127)/(46-61) 121/55  SpO2:  [91 %-94 %] 91 %  Body mass index is 36 22 kg/m²  Input and Output Summary (last 24 hours):      Intake/Output Summary (Last 24 hours) at 2023 1241  Last data filed at 1/27/2023 0501  Gross per 24 hour   Intake 180 ml   Output 800 ml   Net -620 ml       Physical Exam:   Physical Exam  Constitutional:       General: She is not in acute distress  Appearance: Normal appearance  She is obese  She is not ill-appearing or toxic-appearing  Comments: Elderly female patient, morbidly obese, acutely nontoxic-appearing  Deconditioned  HENT:      Head: Normocephalic and atraumatic  Eyes:      Pupils: Pupils are equal, round, and reactive to light  Cardiovascular:      Rate and Rhythm: Normal rate  Pulses: Normal pulses  Heart sounds: No murmur heard  Pulmonary:      Effort: Pulmonary effort is normal  No respiratory distress  Breath sounds: No wheezing  Abdominal:      General: Bowel sounds are normal  There is no distension  Palpations: Abdomen is soft  Tenderness: There is no abdominal tenderness  Musculoskeletal:      Cervical back: Normal range of motion  No rigidity  Right lower leg: Edema present  Skin:     Capillary Refill: Capillary refill takes more than 3 seconds  Findings: Erythema (Right lower extremity erythema improving slowly) present  Neurological:      Mental Status: She is alert and oriented to person, place, and time     Psychiatric:         Mood and Affect: Mood normal          Behavior: Behavior normal          Additional Data:     Labs:  Results from last 7 days   Lab Units 01/27/23  0628   WBC Thousand/uL 8 78   HEMOGLOBIN g/dL 12 9   HEMATOCRIT % 39 4   PLATELETS Thousands/uL 204   NEUTROS PCT % 71   LYMPHS PCT % 19   MONOS PCT % 6   EOS PCT % 2     Results from last 7 days   Lab Units 01/27/23  0628 01/26/23  0601 01/25/23  2323   SODIUM mmol/L 137   < > 135   POTASSIUM mmol/L 3 6   < > 3 8   CHLORIDE mmol/L 101   < > 97   CO2 mmol/L 28   < > 28   BUN mg/dL 13   < > 13   CREATININE mg/dL 0 78   < > 0 91   ANION GAP mmol/L 8   < > 10   CALCIUM mg/dL 9 1   < > 9 0   ALBUMIN g/dL  --   --  4 1   TOTAL BILIRUBIN mg/dL  --   --  1 42*   ALK PHOS U/L  --   --  61   ALT U/L  --   --  33   AST U/L  --   --  27   GLUCOSE RANDOM mg/dL 153*   < > 191*    < > = values in this interval not displayed  Results from last 7 days   Lab Units 01/25/23  2323   INR  1 08     Results from last 7 days   Lab Units 01/27/23  1142 01/27/23  0701 01/26/23  2051 01/26/23  1604 01/26/23  1122 01/26/23  0630   POC GLUCOSE mg/dl 215* 153* 177* 173* 203* 185*         Results from last 7 days   Lab Units 01/27/23  0628 01/26/23  1127 01/26/23  0858 01/26/23  0236 01/25/23  2323   LACTIC ACID mmol/L 1 1 2 5* 2 3* 2 3* 2 6*   PROCALCITONIN ng/ml  --   --   --   --  0 06       Lines/Drains:  Invasive Devices     Peripheral Intravenous Line  Duration           Peripheral IV 01/25/23 Left Antecubital 1 day                  Recent Cultures (last 7 days):   Results from last 7 days   Lab Units 01/27/23  0043 01/26/23  0601 01/25/23  2323   BLOOD CULTURE  Received in Microbiology Lab  Culture in Progress  Received in Microbiology Lab  Culture in Progress    --   --    GRAM STAIN RESULT   --   --  Gram positive cocci in pairs, chains and clusters*  Gram positive cocci in pairs and chains*   URINE CULTURE   --  No Growth <1000 cfu/mL  --        Last 24 Hours Medication List:   Current Facility-Administered Medications   Medication Dose Route Frequency Provider Last Rate   • acetaminophen  650 mg Oral Q6H PRN Shalonda Becerril PA-C     • aspirin  81 mg Oral Daily Shalonda Becerril PA-C     • cefazolin  2,000 mg Intravenous Q8H Shalonda Becerril PA-C 2,000 mg (01/27/23 0902)   • diltiazem  240 mg Oral Daily Shalonda Becerril PA-C     • enoxaparin  40 mg Subcutaneous Daily Shalonda Becerril PA-C     • Fluticasone Furoate-Vilanterol  1 puff Inhalation Daily Shalonda Becerril PA-C     • furosemide  40 mg Intravenous Daily Ross BLEVINS MD     • insulin lispro  1-6 Units Subcutaneous 4x Daily (AC & HS) Alma Delia Go PA-C     • lisinopril  10 mg Oral Daily Shalonda Becerril PA-C     • pantoprazole  20 mg Oral Early Morning Shalonda Parikh PA-C     • potassium chloride  10 mEq Oral Daily Shalonda Becerril PA-C     • temazepam  30 mg Oral HS Shalonda Parikh PA-C          Today, Patient Was Seen By: Elham Cruz MD    **Please Note: This note may have been constructed using a voice recognition system  **

## 2023-01-27 NOTE — ASSESSMENT & PLAN NOTE
Sepsis secondary to right lower extremity cellulitis  Lactic acid resolved  2/2 blood cultures growing gram-positive cocci in pairs, chains, clusters  BCID noted with Streptococcus  Currently patient is afebrile  Leukocytosis improving  Sepsis improving  Currently acutely nontoxic-appearing  Continue IV cefazolin  Continue supportive care

## 2023-01-27 NOTE — PLAN OF CARE
Problem: MOBILITY - ADULT  Goal: Maintain or return to baseline ADL function  Description: INTERVENTIONS:  -  Assess patient's ability to carry out ADLs; assess patient's baseline for ADL function and identify physical deficits which impact ability to perform ADLs (bathing, care of mouth/teeth, toileting, grooming, dressing, etc )  - Assess/evaluate cause of self-care deficits   - Assess range of motion  - Assess patient's mobility; develop plan if impaired  - Assess patient's need for assistive devices and provide as appropriate  - Encourage maximum independence but intervene and supervise when necessary  - Involve family in performance of ADLs  - Assess for home care needs following discharge   - Consider OT consult to assist with ADL evaluation and planning for discharge  - Provide patient education as appropriate  Outcome: Progressing  Goal: Maintains/Returns to pre admission functional level  Description: INTERVENTIONS:  - Perform BMAT or MOVE assessment daily    - Set and communicate daily mobility goal to care team and patient/family/caregiver  - Collaborate with rehabilitation services on mobility goals if consulted  - Perform Range of Motion 4 times a day  - Reposition patient every 2 hours    - Dangle patient 3 times a day  - Stand patient 3 times a day  - Ambulate patient 3 times a day  - Out of bed to chair 3 times a day   - Out of bed for meals 3 times a day  - Out of bed for toileting  - Record patient progress and toleration of activity level   Outcome: Progressing     Problem: Prexisting or High Potential for Compromised Skin Integrity  Goal: Skin integrity is maintained or improved  Description: INTERVENTIONS:  - Identify patients at risk for skin breakdown  - Assess and monitor skin integrity  - Assess and monitor nutrition and hydration status  - Monitor labs   - Assess for incontinence   - Turn and reposition patient  - Assist with mobility/ambulation  - Relieve pressure over bony prominences  - Avoid friction and shearing  - Provide appropriate hygiene as needed including keeping skin clean and dry  - Evaluate need for skin moisturizer/barrier cream  - Collaborate with interdisciplinary team   - Patient/family teaching  - Consider wound care consult   Outcome: Progressing     Problem: Potential for Falls  Goal: Patient will remain free of falls  Description: INTERVENTIONS:  - Educate patient/family on patient safety including physical limitations  - Instruct patient to call for assistance with activity   - Consult OT/PT to assist with strengthening/mobility   - Keep Call bell within reach  - Keep bed low and locked with side rails adjusted as appropriate  - Keep care items and personal belongings within reach  - Initiate and maintain comfort rounds  - Make Fall Risk Sign visible to staff  - Offer Toileting every 2 Hours, in advance of need  - Initiate/Maintain bed alarm  - Obtain necessary fall risk management equipment: bed alarm  - Apply yellow socks and bracelet for high fall risk patients  - Consider moving patient to room near nurses station  Outcome: Progressing

## 2023-01-27 NOTE — QUICK NOTE
Notified by RN 1/2 blood cultures is positive for G+CC in pairs and chains  Patient is currently on cefazolin for cellulitis  Has leukocytosis without fevers  ID panel reveals streptococcus  Possible contaminate  Will continue current atb and repeat cultures

## 2023-01-27 NOTE — ASSESSMENT & PLAN NOTE
· CT a/p: urinary bladder wall appears mildly thickened and there is mild urothelial enhancement  · Patient meeting sepsis criteria   · Received IV ceftriaxone 1g x1 for cellulitis   · UA noted negative UTI  · Urine culture without growth  · Cystitis ruled out

## 2023-01-27 NOTE — PLAN OF CARE
Problem: MOBILITY - ADULT  Goal: Maintain or return to baseline ADL function  Description: INTERVENTIONS:  -  Assess patient's ability to carry out ADLs; assess patient's baseline for ADL function and identify physical deficits which impact ability to perform ADLs (bathing, care of mouth/teeth, toileting, grooming, dressing, etc )  - Assess/evaluate cause of self-care deficits   - Assess range of motion  - Assess patient's mobility; develop plan if impaired  - Assess patient's need for assistive devices and provide as appropriate  - Encourage maximum independence but intervene and supervise when necessary  - Involve family in performance of ADLs  - Assess for home care needs following discharge   - Consider OT consult to assist with ADL evaluation and planning for discharge  - Provide patient education as appropriate  Outcome: Progressing  Goal: Maintains/Returns to pre admission functional level  Description: INTERVENTIONS:  - Perform BMAT or MOVE assessment daily    - Set and communicate daily mobility goal to care team and patient/family/caregiver  - Collaborate with rehabilitation services on mobility goals if consulted  - Perform Range of Motion 2 times a day  - Reposition patient every 2 hours    - Out of bed to chair  - Out of bed for toileting  - Record patient progress and toleration of activity level   Outcome: Progressing     Problem: Prexisting or High Potential for Compromised Skin Integrity  Goal: Skin integrity is maintained or improved  Description: INTERVENTIONS:  - Identify patients at risk for skin breakdown  - Assess and monitor skin integrity  - Assess and monitor nutrition and hydration status  - Monitor labs   - Assess for incontinence   - Turn and reposition patient  - Assist with mobility/ambulation  - Relieve pressure over bony prominences  - Avoid friction and shearing  - Provide appropriate hygiene as needed including keeping skin clean and dry  - Evaluate need for skin moisturizer/barrier cream  - Collaborate with interdisciplinary team   - Patient/family teaching  - Consider wound care consult   Outcome: Progressing     Problem: Potential for Falls  Goal: Patient will remain free of falls  Description: INTERVENTIONS:  - Educate patient/family on patient safety including physical limitations  - Instruct patient to call for assistance with activity   - Consult OT/PT to assist with strengthening/mobility   - Keep Call bell within reach  - Keep bed low and locked with side rails adjusted as appropriate  - Keep care items and personal belongings within reach  - Initiate and maintain comfort rounds  - Make Fall Risk Sign visible to staff  - Offer Toileting every 2 Hours, in advance of need  - Initiate/Maintain bed/chair alarm  - Obtain necessary fall risk management equipment  - Apply yellow socks and bracelet for high fall risk patients  - Consider moving patient to room near nurses station  Outcome: Progressing     Problem: GENITOURINARY - ADULT  Goal: Absence of urinary retention  Description: INTERVENTIONS:  - Assess patient’s ability to void and empty bladder  - Monitor I/O  - Bladder scan as needed  - Discuss with physician/AP medications to alleviate retention as needed  - Discuss catheterization for long term situations as appropriate  Outcome: Progressing     Problem: METABOLIC, FLUID AND ELECTROLYTES - ADULT  Goal: Fluid balance maintained  Description: INTERVENTIONS:  - Monitor labs   - Monitor I/O and WT  - Instruct patient on fluid and nutrition as appropriate  - Assess for signs & symptoms of volume excess or deficit  Outcome: Progressing  Goal: Glucose maintained within target range  Description: INTERVENTIONS:  - Monitor Blood Glucose as ordered  - Assess for signs and symptoms of hyperglycemia and hypoglycemia  - Administer ordered medications to maintain glucose within target range  - Assess nutritional intake and initiate nutrition service referral as needed  Outcome: Progressing     Problem: SKIN/TISSUE INTEGRITY - ADULT  Goal: Skin Integrity remains intact(Skin Breakdown Prevention)  Description: Assess:  -Perform Earl assessment every shift  -Clean and moisturize   -Inspect skin when repositioning, toileting, and assisting with ADLS  -Assess extremities for adequate circulation and sensation     Bed Management:  -Have minimal linens on bed & keep smooth, unwrinkled  -Change linens as needed when moist or perspiring  -Avoid sitting or lying in one position for more than 2 hours while in bed    Toileting:  -Offer bedside commode  -Assess for incontinence every hour  -Use incontinent care products after each incontinent episode    Activity:  -Encourage or provide ROM exercises   -Turn and reposition patient every 2 Hours  -Use appropriate equipment to lift or move patient in bed  -Instruct/ Assist with weight shifting every 2 when out of bed in chair      Outcome: Progressing     Problem: HEMATOLOGIC - ADULT  Goal: Maintains hematologic stability  Description: INTERVENTIONS  - Assess for signs and symptoms of bleeding or hemorrhage  - Monitor labs  - Administer supportive blood products/factors as ordered and appropriate  Outcome: Progressing     Problem: MUSCULOSKELETAL - ADULT  Goal: Maintain or return mobility to safest level of function  Description: INTERVENTIONS:  - Assess patient's ability to carry out ADLs; assess patient's baseline for ADL function and identify physical deficits which impact ability to perform ADLs (bathing, care of mouth/teeth, toileting, grooming, dressing, etc )  - Assess/evaluate cause of self-care deficits   - Assess range of motion  - Assess patient's mobility  - Assess patient's need for assistive devices and provide as appropriate  - Encourage maximum independence but intervene and supervise when necessary  - Involve family in performance of ADLs  - Assess for home care needs following discharge   - Consider OT consult to assist with ADL evaluation and planning for discharge  - Provide patient education as appropriate  Outcome: Progressing

## 2023-01-27 NOTE — ASSESSMENT & PLAN NOTE
Wt Readings from Last 3 Encounters:   01/27/23 95 7 kg (211 lb)   08/23/22 90 7 kg (200 lb)   07/03/22 90 7 kg (200 lb)     · Mild acute on chronic diastolic CHF present admission due to volume overload in the settings of decreased mobility due to acute infection as evident by volume overload noted on CT chest report  · Patient takes Lasix 20 mg p o  daily at home    · Will start on IV Lasix 40 mg daily for now  · Monitor I/O, daily weight, cardiac diet, 2g sodium diet

## 2023-01-28 LAB
ANION GAP SERPL CALCULATED.3IONS-SCNC: 10 MMOL/L (ref 4–13)
BACTERIA BLD CULT: ABNORMAL
BUN SERPL-MCNC: 12 MG/DL (ref 5–25)
CALCIUM SERPL-MCNC: 9 MG/DL (ref 8.3–10.1)
CHLORIDE SERPL-SCNC: 101 MMOL/L (ref 96–108)
CO2 SERPL-SCNC: 26 MMOL/L (ref 21–32)
CREAT SERPL-MCNC: 0.71 MG/DL (ref 0.6–1.3)
ERYTHROCYTE [DISTWIDTH] IN BLOOD BY AUTOMATED COUNT: 13.6 % (ref 11.6–15.1)
GFR SERPL CREATININE-BSD FRML MDRD: 80 ML/MIN/1.73SQ M
GLUCOSE SERPL-MCNC: 138 MG/DL (ref 65–140)
GLUCOSE SERPL-MCNC: 154 MG/DL (ref 65–140)
GLUCOSE SERPL-MCNC: 158 MG/DL (ref 65–140)
GLUCOSE SERPL-MCNC: 260 MG/DL (ref 65–140)
GRAM STN SPEC: ABNORMAL
HCT VFR BLD AUTO: 39.2 % (ref 34.8–46.1)
HGB BLD-MCNC: 12.9 G/DL (ref 11.5–15.4)
MCH RBC QN AUTO: 30.6 PG (ref 26.8–34.3)
MCHC RBC AUTO-ENTMCNC: 32.9 G/DL (ref 31.4–37.4)
MCV RBC AUTO: 93 FL (ref 82–98)
PLATELET # BLD AUTO: 226 THOUSANDS/UL (ref 149–390)
PMV BLD AUTO: 10.4 FL (ref 8.9–12.7)
POTASSIUM SERPL-SCNC: 3.4 MMOL/L (ref 3.5–5.3)
RBC # BLD AUTO: 4.21 MILLION/UL (ref 3.81–5.12)
SODIUM SERPL-SCNC: 137 MMOL/L (ref 135–147)
STREPTOCOCCUS DNA BLD POS NAA+NON-PROBE: DETECTED
WBC # BLD AUTO: 7.81 THOUSAND/UL (ref 4.31–10.16)

## 2023-01-28 RX ORDER — MAGNESIUM SULFATE 1 G/100ML
1 INJECTION INTRAVENOUS ONCE
Status: COMPLETED | OUTPATIENT
Start: 2023-01-28 | End: 2023-01-28

## 2023-01-28 RX ORDER — POTASSIUM CHLORIDE 20 MEQ/1
40 TABLET, EXTENDED RELEASE ORAL 2 TIMES DAILY
Status: COMPLETED | OUTPATIENT
Start: 2023-01-28 | End: 2023-01-29

## 2023-01-28 RX ADMIN — INSULIN LISPRO 3 UNITS: 100 INJECTION, SOLUTION INTRAVENOUS; SUBCUTANEOUS at 12:36

## 2023-01-28 RX ADMIN — CEFAZOLIN SODIUM 2000 MG: 2 SOLUTION INTRAVENOUS at 01:19

## 2023-01-28 RX ADMIN — DILTIAZEM HYDROCHLORIDE 240 MG: 240 CAPSULE, COATED, EXTENDED RELEASE ORAL at 10:21

## 2023-01-28 RX ADMIN — ENOXAPARIN SODIUM 40 MG: 40 INJECTION SUBCUTANEOUS at 10:21

## 2023-01-28 RX ADMIN — LISINOPRIL 10 MG: 10 TABLET ORAL at 10:21

## 2023-01-28 RX ADMIN — POTASSIUM CHLORIDE 40 MEQ: 1500 TABLET, EXTENDED RELEASE ORAL at 10:21

## 2023-01-28 RX ADMIN — INSULIN LISPRO 1 UNITS: 100 INJECTION, SOLUTION INTRAVENOUS; SUBCUTANEOUS at 22:30

## 2023-01-28 RX ADMIN — POTASSIUM CHLORIDE 40 MEQ: 1500 TABLET, EXTENDED RELEASE ORAL at 17:15

## 2023-01-28 RX ADMIN — CEFAZOLIN SODIUM 2000 MG: 2 SOLUTION INTRAVENOUS at 16:53

## 2023-01-28 RX ADMIN — FUROSEMIDE 40 MG: 10 INJECTION, SOLUTION INTRAVENOUS at 10:29

## 2023-01-28 RX ADMIN — CEFAZOLIN SODIUM 2000 MG: 2 SOLUTION INTRAVENOUS at 10:23

## 2023-01-28 RX ADMIN — MAGNESIUM SULFATE HEPTAHYDRATE 1 G: 1 INJECTION, SOLUTION INTRAVENOUS at 07:43

## 2023-01-28 RX ADMIN — FLUTICASONE FUROATE AND VILANTEROL TRIFENATATE 1 PUFF: 100; 25 POWDER RESPIRATORY (INHALATION) at 10:29

## 2023-01-28 RX ADMIN — ASPIRIN 81 MG: 81 TABLET, CHEWABLE ORAL at 10:21

## 2023-01-28 RX ADMIN — TEMAZEPAM 30 MG: 15 CAPSULE ORAL at 22:30

## 2023-01-28 RX ADMIN — INSULIN LISPRO 1 UNITS: 100 INJECTION, SOLUTION INTRAVENOUS; SUBCUTANEOUS at 16:53

## 2023-01-28 RX ADMIN — PANTOPRAZOLE SODIUM 20 MG: 20 TABLET, DELAYED RELEASE ORAL at 05:12

## 2023-01-28 NOTE — PROGRESS NOTES
[facility]  Progress Note Hue Man 1940, 81 y o  female MRN: [id number]  Unit/Bed#: -01 Encounter: [id number]  Primary Care Provider: Magaly Rogers DO   Date and time admitted to hospital: 1/25/2023 10:58 PM    * Sepsis [facility])  Assessment & Plan  Sepsis secondary to right lower extremity cellulitis  Lactic acid resolved  2/2 blood cultures growing gram-positive cocci in pairs, chains, clusters  BCID noted with Streptococcus  Currently patient is afebrile  Leukocytosis improving  Sepsis improving  Currently acutely nontoxic-appearing  Continue IV cefazolin  Continue supportive care  Gram-positive bacteremia  Assessment & Plan  2/2 blood culture growing gram-positive cocci in clusters, pairs, chains  BC ID positive for Streptococcus  Sepsis improving with IV cefazolin  Follow-up with pending 2 sets of blood cultures sent yesterday  Continue supportive care  Acute cystitis  Assessment & Plan  · CT a/p: urinary bladder wall appears mildly thickened and there is mild urothelial enhancement  · Patient meeting sepsis criteria   · Received IV ceftriaxone 1g x1 for cellulitis   · UA noted negative UTI  · Urine culture without growth  · Cystitis ruled out  Cellulitis of right lower extremity  Assessment & Plan  · Improving slowly with IV cefazolin  Ambulatory dysfunction  Assessment & Plan  Patient lives alone  Presents with ambulatory dysfunction due to pain and weakness  · CT head wo negative  · At baseline walks with walker   · Fall precautions   · PT/OT: recommended post acute care rehab         Acute on chronic diastolic (congestive) heart failure (HCC)  Assessment & Plan  Wt Readings from Last 3 Encounters:   01/28/23 96 2 kg (212 lb 1 3 oz)   08/23/22 90 7 kg (200 lb)   07/03/22 90 7 kg (200 lb)     · Mild acute on chronic diastolic CHF present admission due to volume overload in the settings of decreased mobility due to acute infection as evident by volume overload noted on CT chest report  · Patient takes Lasix 20 mg p o  daily at home  · Now in negative 5 L balance  · Will continue IV Lasix 40 mg daily for now  · Monitor I/O, daily weight, cardiac diet, 2g sodium diet           Severe aortic stenosis  Assessment & Plan  S/p TAVR    Other persistent atrial fibrillation (HCC)  Assessment & Plan  · Rate controlled    · Continue Cardizem   · Not on anticoagulation due to history of bleeding  Type 2 diabetes mellitus without complication, without long-term current use of insulin (HCC)  Assessment & Plan  Lab Results   Component Value Date    HGBA1C 7 1 (H) 10/12/2021     · Humalog SSI and glucochecks ac and hs   · Monitor for hypoglycemia   · CCD    Essential hypertension  Assessment & Plan  · Continue lisinopril     History of stroke  Assessment & Plan  • Hx CVA with residual right sided hemiparesis, facial droop and mild aphasia  • continue ASA/statin         VTE Pharmacologic Prophylaxis: VTE Score: 5 Moderate Risk (Score 3-4) - Pharmacological DVT Prophylaxis Ordered: enoxaparin (Lovenox)  Patient Centered Rounds: I performed bedside rounds with nursing staff today  Time Spent for Care: 30 minutes  More than 50% of total time spent on counseling and coordination of care as desribed above  Current Length of Stay: 2 day(s)  Current Patient Status: Inpatient   Certification Statement: The patient will continue to require additional inpatient hospital stay due to sepsis  on iv abx  Discharge Plan: Anticipate discharge in 24-48 hrs to rehab facility  Code Status: Level 1 - Full Code    Subjective:   Currently patient is in good spirit  Denies fever, chills, nausea, vomiting, abdominal pain, diarrhea, any genital respiratory taking IV cefazolin well  No other events reported      Objective:     Vitals:   Temp (24hrs), Av 4 °F (36 9 °C), Min:97 8 °F (36 6 °C), Max:99 1 °F (37 3 °C)    Temp:  [97 8 °F (36 6 °C)-99 1 °F (37 3 °C)] 97 8 °F (36 6 °C)  HR:  [59-62] 62  Resp:  [16-19] 19  BP: (111-128)/(51-60) 111/51  SpO2:  [89 %-93 %] 89 %  Body mass index is 36 4 kg/m²  Input and Output Summary (last 24 hours): Intake/Output Summary (Last 24 hours) at 1/28/2023 1601  Last data filed at 1/28/2023 1401  Gross per 24 hour   Intake 180 ml   Output 3800 ml   Net -3620 ml       Physical Exam:   Physical Exam  Constitutional:       General: She is not in acute distress  Appearance: Normal appearance  She is obese  She is not ill-appearing or toxic-appearing  Comments: Elderly female patient, morbidly obese, acutely nontoxic-appearing  Deconditioned  HENT:      Head: Normocephalic and atraumatic  Eyes:      Pupils: Pupils are equal, round, and reactive to light  Cardiovascular:      Rate and Rhythm: Normal rate  Pulses: Normal pulses  Heart sounds: No murmur heard  Pulmonary:      Effort: Pulmonary effort is normal  No respiratory distress  Breath sounds: No wheezing  Abdominal:      General: Bowel sounds are normal  There is no distension  Palpations: Abdomen is soft  Tenderness: There is no abdominal tenderness  Musculoskeletal:      Cervical back: Normal range of motion  No rigidity  Right lower leg: Edema present  Skin:     Findings: Erythema (Right lower extremity erythema improving slowly) present  Neurological:      Mental Status: She is alert and oriented to person, place, and time     Psychiatric:         Mood and Affect: Mood normal          Behavior: Behavior normal          Additional Data:     Labs:  Results from last 7 days   Lab Units 01/28/23  0448 01/27/23  0628   WBC Thousand/uL 7 81 8 78   HEMOGLOBIN g/dL 12 9 12 9   HEMATOCRIT % 39 2 39 4   PLATELETS Thousands/uL 226 204   NEUTROS PCT %  --  71   LYMPHS PCT %  --  19   MONOS PCT %  --  6   EOS PCT %  --  2     Results from last 7 days   Lab Units 01/28/23  0448 01/26/23  0601 01/25/23  2323   SODIUM mmol/L 137   < > 135 POTASSIUM mmol/L 3 4*   < > 3 8   CHLORIDE mmol/L 101   < > 97   CO2 mmol/L 26   < > 28   BUN mg/dL 12   < > 13   CREATININE mg/dL 0 71   < > 0 91   ANION GAP mmol/L 10   < > 10   CALCIUM mg/dL 9 0   < > 9 0   ALBUMIN g/dL  --   --  4 1   TOTAL BILIRUBIN mg/dL  --   --  1 42*   ALK PHOS U/L  --   --  61   ALT U/L  --   --  33   AST U/L  --   --  27   GLUCOSE RANDOM mg/dL 154*   < > 191*    < > = values in this interval not displayed  Results from last 7 days   Lab Units 01/25/23  2323   INR  1 08     Results from last 7 days   Lab Units 01/28/23  1117 01/28/23  0726 01/27/23  2108 01/27/23  1547 01/27/23  1142 01/27/23  0701 01/26/23  2051 01/26/23  1604 01/26/23  1122 01/26/23  0630   POC GLUCOSE mg/dl 260* 138 166* 181* 215* 153* 177* 173* 203* 185*         Results from last 7 days   Lab Units 01/27/23  0628 01/26/23  1127 01/26/23  0858 01/26/23  0236 01/25/23  2323   LACTIC ACID mmol/L 1 1 2 5* 2 3* 2 3* 2 6*   PROCALCITONIN ng/ml  --   --   --   --  0 06       Lines/Drains:  Invasive Devices     Peripheral Intravenous Line  Duration           Peripheral IV 01/27/23 Left Antecubital 1 day                  Recent Cultures (last 7 days):   Results from last 7 days   Lab Units 01/27/23  0043 01/26/23  0601 01/25/23  2323   BLOOD CULTURE  No Growth at 24 hrs    No Growth at 24 hrs   --  Streptococcus mitis oralis group*  Streptococcus mitis oralis group*   GRAM STAIN RESULT   --   --  Gram positive cocci in pairs, chains and clusters*  Gram positive cocci in pairs and chains*   URINE CULTURE   --  No Growth <1000 cfu/mL  --        Last 24 Hours Medication List:   Current Facility-Administered Medications   Medication Dose Route Frequency Provider Last Rate   • acetaminophen  650 mg Oral Q6H PRN Shalonda Becerril PA-C     • aspirin  81 mg Oral Daily Shalonda Becerril PA-C     • cefazolin  2,000 mg Intravenous Q8H Shalonda Becerril PA-C 2,000 mg (01/28/23 1023)   • diltiazem  240 mg Oral Daily [address], PA-C     • enoxaparin  40 mg Subcutaneous Daily Shalonda Becerril PA-C     • Fluticasone Furoate-Vilanterol  1 puff Inhalation Daily Shalonda Becerril PA-C     • furosemide  40 mg Intravenous Daily Ross BLEVINS MD     • insulin lispro  1-6 Units Subcutaneous 4x Daily (AC & HS) Shalonda Conway PA-C     • lisinopril  10 mg Oral Daily Shalonda Becerril PA-C     • pantoprazole  20 mg Oral Early Morning Shalonda Becerril PA-C     • potassium chloride  10 mEq Oral Daily Shalonad Becerril PA-C     • potassium chloride  40 mEq Oral BID Ross BLEVINS MD     • temazepam  30 mg Oral HS Shalonda Conway PA-C          Today, Patient Was Seen By: Ankur Concepcion MD    **Please Note: This note may have been constructed using a voice recognition system  **

## 2023-01-28 NOTE — CASE MANAGEMENT
Case Management Assessment & Discharge Planning Note    Patient name Yaneli Hall Luite Rojas [id number] MRN [id number]  : 1940 Date 2023       Current Admission Date: 2023  Current Admission Diagnosis:Sepsis [facility])   Patient Active Problem List    Diagnosis Date Noted   • Gram-positive bacteremia 2023   • Sepsis ([facility] ) 2023   • Cellulitis of right lower extremity 2023   • Acute cystitis 2023   • Ambulatory dysfunction 2022   • Closed fracture of proximal end of humerus 2022   • Elevated bilirubin 2022   • Leukocytosis 2022   • Mild intermittent asthma 2020   • Tachy-chana syndrome ([facility] ) 2020   • Acute on chronic diastolic (congestive) heart failure ([facility] ) 2020   • Acute pulmonary embolism ([facility] ) 2020   • Bilateral leg edema 2020   • Adenopathy 2020   • Right sided weakness 2020   • Expressive aphasia 2020   • S/P TAVR (transcatheter aortic valve replacement) 2020   • Carotid occlusion, left 2020   • Carotid stenosis, asymptomatic, right 2020   • Severe aortic stenosis 10/26/2019   • Tibial plateau fracture    • Other persistent atrial fibrillation ([facility] ) 10/23/2019   • History of stroke    • Essential hypertension    • Type 2 diabetes mellitus without complication, without long-term current use of insulin ([facility] )       LOS (days): 2  Geometric Mean LOS (GMLOS) (days): 5 00  Days to GMLOS:2 9     OBJECTIVE:    Risk of Unplanned Readmission Score: 12 97         Current admission status: Inpatient       Preferred Pharmacy:   [facility] [address] MALIKA UREÑA [id number] - Mary E[place] [place] [address]  Phone: [phone] Fax: [phone] [address] - [facility] [place] [address]en [address] [facility]  Phone: [phone] Fax: [phone]    Primary Care Provider: Marty Car, DO    Primary Insurance: MEDICARE  Secondary Insurance:     ASSESSMENT:  Active Health Care Proxies    There are no active Health Care Proxies on file  Advance Directives  Does patient have a Health Care POA?: Yes  Does patient have Advance Directives?: Yes  Advance Directives: Living will, Power of  for health care  Primary Contact: Larissaas Agent         Readmission Root Cause  30 Day Readmission: No    Patient Information  Admitted from[de-identified] Home  Mental Status: Alert  During Assessment patient was accompanied by: Daughter  Assessment information provided by[de-identified] Daughter (Patient was sleeping )  Primary Caregiver: Family  Support Systems: Self, Daughter  [place] of Residence: [facility] do you live in?: [address] entry access options  Select all that apply : Ramp  Type of Current Residence: [place]  In the last 12 months, was there a time when you were not able to pay the mortgage or rent on time?: No  In the last 12 months, how many places have you lived?: 1  In the last 12 months, was there a time when you did not have a steady place to sleep or slept in a shelter (including now)?: No  Homeless/housing insecurity resource given?: N/A  Living Arrangements: Lives Alone, Lives w/ Daughter (Patient has a house behind her daughter  Daughter is 5 steps away and is at patients house every single day )  Is patient a ?: No    Activities of Daily Living Prior to Admission  Functional Status: Assistance  Completes ADLs independently?: No  Level of ADL dependence: Assistance  Ambulates independently?: No  Level of ambulatory dependence: Assistance  Does patient use assisted devices?: Yes  Assisted Devices (DME) used:  Wheelchair, Hospital Bed, Shower Chair, Bedside Commode, Other (Comment) (toilet lift)  Does patient currently own DME?: Yes  What DME does the patient currently own?: Bedside Commode, Wheelchair, Other (Comment), [address] [facility] Bed (toilet lift)  Does the patient have a history of Short-Term Rehab?: Yes ([facility])  Does patient have a history of HHC?: Yes  Does patient currently have [facility]?: Yes    Current Home Health Care  Type of Current Home Care Services: Other (Comment) (Daughter stated once a month)  [address][de-identified] Other (please enter name in comment) ([facility])  [facility] Provider[de-identified] PCP    Patient Information Continued  Income Source: SSI/SSD  Does patient have prescription coverage?: Yes  Within the past 12 months, you worried that your food would run out before you got the money to buy more : Never true  Within the past 12 months, the food you bought just didn't last and you didn't have money to get more : Never true  Food insecurity resource given?: N/A  Does patient receive dialysis treatments?: No  Does patient have a history of substance abuse?: No  Does patient have a history of Mental Health Diagnosis?: No    PHQ 2/9 Screening   Reviewed PHQ 2/9 Depression Screening Score?: No    Means of Transportation  Means of Transport to Appts[de-identified] Family transport  In the past 12 months, has lack of transportation kept you from medical appointments or from getting medications?: No  In the past 12 months, has lack of transportation kept you from meetings, work, or from getting things needed for daily living?: No  Was application for public transport provided?: N/A        DISCHARGE DETAILS:    Discharge planning discussed with[de-identified] Patients daughter since patient was sleeping  Freedom of Choice: Yes  Comments - Freedom of Choice: CM discussed freedom of choice as it pertains to discharge planning  Patients daughter is agreeable to rehab and stated that patient knows that rehab is necessary    CM contacted family/caregiver?: Yes  Were Treatment Team discharge recommendations reviewed with patient/caregiver?: Yes  Did patient/caregiver verbalize understanding of patient care needs?: Yes  Were patient/caregiver advised of the risks associated with not following Treatment Team discharge recommendations?: Yes    Contacts  Patient Contacts: daughter  Relationship to Patient[de-identified] Family  Contact Method: Phone  Phone Number: [phone]    Requested [address]         Is the patient interested in [facility] at discharge?: No    DME Referral Provided  Referral made for DME?: No         Would you like to participate in our [facility] [address] service program?  : No - Declined    Treatment Team Recommendation: SNF, Short Term Rehab  Discharge Destination Plan[de-identified] SNF, Short Term Rehab  Transport at Discharge :  Other (Comment) (TBD)

## 2023-01-28 NOTE — PLAN OF CARE
Problem: Potential for Falls  Goal: Patient will remain free of falls  Description: INTERVENTIONS:  - Educate patient/family on patient safety including physical limitations  - Instruct patient to call for assistance with activity   - Consult OT/PT to assist with strengthening/mobility   - Keep Call bell within reach  - Keep bed low and locked with side rails adjusted as appropriate  - Keep care items and personal belongings within reach  - Initiate and maintain comfort rounds  - Make Fall Risk Sign visible to staff  - Offer Toileting every 2 Hours, in advance of need  - Initiate/Maintain alarm  - Obtain necessary fall risk management equipment:   - Apply yellow socks and bracelet for high fall risk patients  - Consider moving patient to room near nurses station  Outcome: Progressing     Problem: MUSCULOSKELETAL - ADULT  Goal: Maintain or return mobility to safest level of function  Description: INTERVENTIONS:  - Assess patient's ability to carry out ADLs; assess patient's baseline for ADL function and identify physical deficits which impact ability to perform ADLs (bathing, care of mouth/teeth, toileting, grooming, dressing, etc )  - Assess/evaluate cause of self-care deficits   - Assess range of motion  - Assess patient's mobility  - Assess patient's need for assistive devices and provide as appropriate  - Encourage maximum independence but intervene and supervise when necessary  - Involve family in performance of ADLs  - Assess for home care needs following discharge   - Consider OT consult to assist with ADL evaluation and planning for discharge  - Provide patient education as appropriate  Outcome: Progressing

## 2023-01-28 NOTE — ASSESSMENT & PLAN NOTE
Patient lives alone  Presents with ambulatory dysfunction due to pain and weakness  · CT head wo negative  · At baseline walks with walker   · Fall precautions   · PT/OT: recommended post acute care rehab

## 2023-01-28 NOTE — ASSESSMENT & PLAN NOTE
Wt Readings from Last 3 Encounters:   01/28/23 96 2 kg (212 lb 1 3 oz)   08/23/22 90 7 kg (200 lb)   07/03/22 90 7 kg (200 lb)     · Mild acute on chronic diastolic CHF present admission due to volume overload in the settings of decreased mobility due to acute infection as evident by volume overload noted on CT chest report  · Patient takes Lasix 20 mg p o  daily at home  · Now in negative 5 L balance     · Will continue IV Lasix 40 mg daily for now  · Monitor I/O, daily weight, cardiac diet, 2g sodium diet

## 2023-01-28 NOTE — RESPIRATORY THERAPY NOTE
RT Protocol Note  Amado Lim 81 y o  female MRN: [id number]  Unit/Bed#: -01 Encounter: [id number]    Assessment    Principal Problem:    Sepsis ([facility] )  Active Problems:    History of stroke    Essential hypertension    Type 2 diabetes mellitus without complication, without long-term current use of insulin (HCC)    Other persistent atrial fibrillation (HCC)    Severe aortic stenosis    Acute on chronic diastolic (congestive) heart failure (HCC)    Ambulatory dysfunction    Cellulitis of right lower extremity    Acute cystitis    Gram-positive bacteremia      Home Pulmonary Medications:  MDI       Past Medical History:   Diagnosis Date   • Aortic valve disease    • Atrial fibrillation ([facility] )    • Cardiac disease    • Diabetes mellitus ([facility] )    • Heart murmur    • Heart valve disease    • Hypertension    • Stroke [facility])      Social History     Socioeconomic History   • Marital status:       Spouse name: None   • Number of children: None   • Years of education: None   • Highest education level: None   Occupational History   • None   Tobacco Use   • Smoking status: Never   • Smokeless tobacco: Never   Vaping Use   • Vaping Use: Never used   Substance and Sexual Activity   • Alcohol use: Not Currently   • Drug use: Never   • Sexual activity: None   Other Topics Concern   • None   Social History Narrative   • None     Social Determinants of Health     Financial Resource Strain: Not on file   Food Insecurity: Not on file   Transportation Needs: Not on file   Physical Activity: Not on file   Stress: Not on file   Social Connections: Not on file   Intimate Partner Violence: Not on file   Housing Stability: Not on file       Subjective    Subjective Data: awake and alert    Objective    Physical Exam:   Assessment Type: Assess only  General Appearance: Alert, Awake  Respiratory Pattern: Shallow, Spontaneous  Chest Assessment: Chest expansion symmetrical  Bilateral Breath Sounds: Clear, Diminished  Cough: None  O2 Device: room air    Vitals:  Blood pressure 118/52, pulse 60, temperature 98 3 °F (36 8 °C), resp  rate 16, height 5' 4" (1 626 m), weight 95 7 kg (211 lb), SpO2 93 %  Imaging and other studies: I have personally reviewed pertinent reports  O2 Device: room air     Plan    Respiratory Plan: Home Bronchodilator Patient pathway        Resp Comments: respiratory assessment completed patient resting without apparent respiratory distress patient has been using her home MDI therapy per home regimen wihtout indication or request for PRN therapy needed   nothing further from respiratory needed at this time

## 2023-01-28 NOTE — CASE MANAGEMENT
Case Management Discharge Planning Note    Patient name Ananya Hernández  Location [address] [id number] MRN [id number]  : 1940 Date 2023       Current Admission Date: 2023  Current Admission Diagnosis:Sepsis [facility])   Patient Active Problem List    Diagnosis Date Noted   • Gram-positive bacteremia 2023   • Sepsis ([facility] ) 2023   • Cellulitis of right lower extremity 2023   • Acute cystitis 2023   • Ambulatory dysfunction 2022   • Closed fracture of proximal end of humerus 2022   • Elevated bilirubin 2022   • Leukocytosis 2022   • Mild intermittent asthma 2020   • Tachy-chana syndrome ([facility] ) 2020   • Acute on chronic diastolic (congestive) heart failure ([facility] ) 2020   • Acute pulmonary embolism ([facility] ) 2020   • Bilateral leg edema 2020   • Adenopathy 2020   • Right sided weakness 2020   • Expressive aphasia 2020   • S/P TAVR (transcatheter aortic valve replacement) 2020   • Carotid occlusion, left 2020   • Carotid stenosis, asymptomatic, right 2020   • Severe aortic stenosis 10/26/2019   • Tibial plateau fracture 73/10/0304   • Other persistent atrial fibrillation ([facility] ) 10/23/2019   • History of stroke    • Essential hypertension    • Type 2 diabetes mellitus without complication, without long-term current use of insulin ([facility])       LOS (days): 1  Geometric Mean LOS (GMLOS) (days): 5 00  Days to GMLOS:3 5     OBJECTIVE:  Risk of Unplanned Readmission Score: 12 73         Current admission status: Inpatient   Preferred Pharmacy:   [facility] [address]RE[address]A [address] [place] [address]  Phone: [phone] Fax: [phone] [address] - [address] [facility]  [address] [facility] [address] [facility]  Phone: [phone] Fax: [phone]    Primary Care Provider: Magaly Rogers DO    Primary Insurance: MEDICARE  Secondary Insurance:     DISCHARGE DETAILS:                                          Other Referral/Resources/Interventions Provided:  Referral Comments: Pt discussed at SLIM rounds this AM   D/C anticipated in 48 hrs  MD reports pt will need STR placement  [place] referrals made;  awaiting responses  Need pt and family input

## 2023-01-29 LAB
ANION GAP SERPL CALCULATED.3IONS-SCNC: 9 MMOL/L (ref 4–13)
ATRIAL RATE: 87 BPM
BACTERIA BLD CULT: ABNORMAL
BUN SERPL-MCNC: 14 MG/DL (ref 5–25)
CALCIUM SERPL-MCNC: 8.7 MG/DL (ref 8.3–10.1)
CHLORIDE SERPL-SCNC: 102 MMOL/L (ref 96–108)
CO2 SERPL-SCNC: 26 MMOL/L (ref 21–32)
CREAT SERPL-MCNC: 0.7 MG/DL (ref 0.6–1.3)
ERYTHROCYTE [DISTWIDTH] IN BLOOD BY AUTOMATED COUNT: 13.3 % (ref 11.6–15.1)
FLUAV RNA RESP QL NAA+PROBE: NEGATIVE
FLUBV RNA RESP QL NAA+PROBE: NEGATIVE
GFR SERPL CREATININE-BSD FRML MDRD: 81 ML/MIN/1.73SQ M
GLUCOSE SERPL-MCNC: 166 MG/DL (ref 65–140)
GLUCOSE SERPL-MCNC: 191 MG/DL (ref 65–140)
GLUCOSE SERPL-MCNC: 215 MG/DL (ref 65–140)
GLUCOSE SERPL-MCNC: 217 MG/DL (ref 65–140)
GLUCOSE SERPL-MCNC: 254 MG/DL (ref 65–140)
GRAM STN SPEC: ABNORMAL
HCT VFR BLD AUTO: 40.4 % (ref 34.8–46.1)
HGB BLD-MCNC: 13.3 G/DL (ref 11.5–15.4)
MAGNESIUM SERPL-MCNC: 1.9 MG/DL (ref 1.6–2.6)
MCH RBC QN AUTO: 30.7 PG (ref 26.8–34.3)
MCHC RBC AUTO-ENTMCNC: 32.9 G/DL (ref 31.4–37.4)
MCV RBC AUTO: 93 FL (ref 82–98)
PLATELET # BLD AUTO: 258 THOUSANDS/UL (ref 149–390)
PMV BLD AUTO: 10.2 FL (ref 8.9–12.7)
POTASSIUM SERPL-SCNC: 4.1 MMOL/L (ref 3.5–5.3)
QRS AXIS: -11 DEGREES
QRSD INTERVAL: 116 MS
QT INTERVAL: 412 MS
QTC INTERVAL: 414 MS
RBC # BLD AUTO: 4.33 MILLION/UL (ref 3.81–5.12)
RSV RNA RESP QL NAA+PROBE: NEGATIVE
SARS-COV-2 RNA RESP QL NAA+PROBE: NEGATIVE
SODIUM SERPL-SCNC: 137 MMOL/L (ref 135–147)
T WAVE AXIS: 48 DEGREES
VENTRICULAR RATE: 61 BPM
WBC # BLD AUTO: 9.01 THOUSAND/UL (ref 4.31–10.16)

## 2023-01-29 RX ORDER — INSULIN LISPRO 100 [IU]/ML
2 INJECTION, SOLUTION INTRAVENOUS; SUBCUTANEOUS
Status: DISCONTINUED | OUTPATIENT
Start: 2023-01-29 | End: 2023-02-02 | Stop reason: HOSPADM

## 2023-01-29 RX ADMIN — CEFAZOLIN SODIUM 2000 MG: 2 SOLUTION INTRAVENOUS at 01:07

## 2023-01-29 RX ADMIN — ENOXAPARIN SODIUM 40 MG: 40 INJECTION SUBCUTANEOUS at 09:14

## 2023-01-29 RX ADMIN — ACETAMINOPHEN 650 MG: 325 TABLET, FILM COATED ORAL at 13:06

## 2023-01-29 RX ADMIN — DILTIAZEM HYDROCHLORIDE 240 MG: 240 CAPSULE, COATED, EXTENDED RELEASE ORAL at 09:14

## 2023-01-29 RX ADMIN — FLUTICASONE FUROATE AND VILANTEROL TRIFENATATE 1 PUFF: 100; 25 POWDER RESPIRATORY (INHALATION) at 09:15

## 2023-01-29 RX ADMIN — ACETAMINOPHEN 650 MG: 325 TABLET, FILM COATED ORAL at 04:26

## 2023-01-29 RX ADMIN — TEMAZEPAM 30 MG: 15 CAPSULE ORAL at 23:03

## 2023-01-29 RX ADMIN — ACETAMINOPHEN 650 MG: 325 TABLET, FILM COATED ORAL at 20:56

## 2023-01-29 RX ADMIN — ASPIRIN 81 MG: 81 TABLET, CHEWABLE ORAL at 09:14

## 2023-01-29 RX ADMIN — INSULIN LISPRO 2 UNITS: 100 INJECTION, SOLUTION INTRAVENOUS; SUBCUTANEOUS at 17:38

## 2023-01-29 RX ADMIN — INSULIN LISPRO 1 UNITS: 100 INJECTION, SOLUTION INTRAVENOUS; SUBCUTANEOUS at 09:16

## 2023-01-29 RX ADMIN — CEFAZOLIN SODIUM 2000 MG: 2 SOLUTION INTRAVENOUS at 09:14

## 2023-01-29 RX ADMIN — POTASSIUM CHLORIDE 40 MEQ: 1500 TABLET, EXTENDED RELEASE ORAL at 09:14

## 2023-01-29 RX ADMIN — POTASSIUM CHLORIDE 10 MEQ: 750 TABLET, EXTENDED RELEASE ORAL at 09:26

## 2023-01-29 RX ADMIN — CEFTRIAXONE SODIUM 2000 MG: 10 INJECTION, POWDER, FOR SOLUTION INTRAVENOUS at 14:00

## 2023-01-29 RX ADMIN — INSULIN LISPRO 3 UNITS: 100 INJECTION, SOLUTION INTRAVENOUS; SUBCUTANEOUS at 13:07

## 2023-01-29 RX ADMIN — DICLOFENAC SODIUM TOPICAL GEL, 1%, 2 G: 10 GEL TOPICAL at 18:38

## 2023-01-29 RX ADMIN — FUROSEMIDE 40 MG: 10 INJECTION, SOLUTION INTRAVENOUS at 09:14

## 2023-01-29 RX ADMIN — LISINOPRIL 10 MG: 10 TABLET ORAL at 09:14

## 2023-01-29 RX ADMIN — PANTOPRAZOLE SODIUM 20 MG: 20 TABLET, DELAYED RELEASE ORAL at 06:14

## 2023-01-29 RX ADMIN — DICLOFENAC SODIUM TOPICAL GEL, 1%, 2 G: 10 GEL TOPICAL at 23:04

## 2023-01-29 RX ADMIN — POTASSIUM CHLORIDE 40 MEQ: 1500 TABLET, EXTENDED RELEASE ORAL at 17:36

## 2023-01-29 NOTE — PROGRESS NOTES
[address] [facility]  Progress Note Monica Mejía 1940, 81 y o  female MRN: [id number]  Unit/Bed#: -01 Encounter: [id number]  Primary Care Provider: Josh Phillips DO   Date and time admitted to hospital: 1/25/2023 10:58 PM    * Sepsis [facility])  Assessment & Plan  Sepsis secondary to right lower extremity cellulitis  Lactic acid resolved  2/2 blood cultures growing Streptococcus mitis oralis intermediate sensitivity to penicillin, sensitive to ceftriaxone and vancomycin   2/2 repeat blood culture remain without growth in 48 hours  Bacteremia cleared  2D echo report noted without vegetation  Currently patient is afebrile  Leukocytosis now resolved  Sepsis resolved  Currently acutely nontoxic-appearing  Will transition to IV ceftriaxone 2g q24 hrs day #1  Completed 3 days of IV cefazolin  I suspect patient may need 2 weeks of IV antibiotics  Follow-up with ID consult  Continue supportive care  Gram-positive bacteremia  Assessment & Plan  2/2 blood culture growing Streptococcus mitis oralis intermediate sensitivity to penicillin, sensitive to ceftriaxone, vancomycin  Repeat 2/2 blood cultures remained without growth in 48 hours  Bacteremia cleared  Currently febrile  Sepsis resolved  Completed 3 days of IV cefazolin  Will transition to IV ceftriaxone 2 g every 24 hours for now  2D echo report noted without vegetation  Patient may need 2 weeks of antibiotics per  Follow-up with ID consult  Acute cystitis  Assessment & Plan  · CT a/p: urinary bladder wall appears mildly thickened and there is mild urothelial enhancement  · Patient meeting sepsis criteria   · Received IV ceftriaxone 1g x1 for cellulitis   · UA noted negative UTI  · Urine culture without growth  · Cystitis ruled out  Cellulitis of right lower extremity  Assessment & Plan  Right lower extremity cellulitis improving with cefazolin  Completed 3 days of IV cefazolin    Transition to IV ceftriaxone as per blood culture sensitivity report  Ambulatory dysfunction  Assessment & Plan  Patient lives alone  Presents with ambulatory dysfunction due to pain and weakness  · CT head wo negative  · At baseline walks with walker   · Fall precautions   · PT/OT: recommended post acute care rehab  Acute on chronic diastolic (congestive) heart failure (HCC)  Assessment & Plan  Wt Readings from Last 3 Encounters:   01/29/23 96 2 kg (212 lb 1 3 oz)   08/23/22 90 7 kg (200 lb)   07/03/22 90 7 kg (200 lb)     · Mild acute on chronic diastolic CHF present admission due to volume overload in the settings of decreased mobility due to acute infection as evident by volume overload noted on CT chest report  · Patient takes Lasix 20 mg p o  daily at home  · Now in negative  6500 cc balance  · Will continue IV Lasix 40 mg daily for now  · Monitor I/O, daily weight, cardiac diet, 2g sodium diet           Severe aortic stenosis  Assessment & Plan  S/p TAVR    Other persistent atrial fibrillation (HCC)  Assessment & Plan  · Rate controlled    · Continue Cardizem   · Not on anticoagulation due to history of bleeding  Type 2 diabetes mellitus without complication, without long-term current use of insulin ([facility])  Assessment & Plan  Lab Results   Component Value Date    HGBA1C 7 1 (H) 10/12/2021     · Humalog SSI and glucochecks ac and hs   · Monitor for hypoglycemia   · CCD    Essential hypertension  Assessment & Plan  · Continue lisinopril     History of stroke  Assessment & Plan  • Hx CVA with residual right sided hemiparesis, facial droop and mild aphasia  • continue ASA/statin         VTE Pharmacologic Prophylaxis: VTE Score: 5 Moderate Risk (Score 3-4) - Pharmacological DVT Prophylaxis Ordered: enoxaparin (Lovenox)  Patient Centered Rounds: I performed bedside rounds with nursing staff today  Time Spent for Care: 30 minutes   More than 50% of total time spent on counseling and coordination of care as described above  Current Length of Stay: 3 day(s)  Current Patient Status: Inpatient   Certification Statement: The patient will continue to require additional inpatient hospital stay due to need for IV abx  Discharge Plan: Anticipate discharge in 48-72 hrs to rehab facility  Code Status: Level 1 - Full Code    Subjective:   2/2 blood culture growing Streptococcus  Patient tolerating IV cefazolin well  She is in good spirit  however complaining of bilateral knee pain  Patient does have history of bilateral knee osteoarthritis and I have ordered ice packs as well as voltran gel for both knees  Denies chest pain, dyspnea, fever, chills, nausea, vomiting, any other new complaints  Objective:     Vitals:   Temp (24hrs), Av 1 °F (36 7 °C), Min:97 8 °F (36 6 °C), Max:98 3 °F (36 8 °C)    Temp:  [97 8 °F (36 6 °C)-98 3 °F (36 8 °C)] 98 3 °F (36 8 °C)  HR:  [60-62] 62  Resp:  [18-19] 18  BP: (110-118)/(51-68) 118/62  SpO2:  [88 %-93 %] 93 %  Body mass index is 36 4 kg/m²  Input and Output Summary (last 24 hours): Intake/Output Summary (Last 24 hours) at 2023 1440  Last data filed at 2023 1149  Gross per 24 hour   Intake 240 ml   Output 1750 ml   Net -1510 ml       Physical Exam:   Physical Exam  Constitutional:       General: She is not in acute distress  Appearance: Normal appearance  She is obese  She is not ill-appearing or toxic-appearing  Comments: Elderly female patient, morbidly obese, acutely nontoxic-appearing  Deconditioned  HENT:      Head: Normocephalic and atraumatic  Eyes:      Pupils: Pupils are equal, round, and reactive to light  Cardiovascular:      Rate and Rhythm: Normal rate  Pulses: Normal pulses  Heart sounds: No murmur heard  Pulmonary:      Effort: Pulmonary effort is normal  No respiratory distress  Breath sounds: No wheezing  Abdominal:      General: Bowel sounds are normal  There is no distension        Palpations: Abdomen is soft       Tenderness: There is no abdominal tenderness  Musculoskeletal:      Cervical back: Normal range of motion  No rigidity  Right lower leg: Edema present  Skin:     Findings: Erythema (Right lower extremity erythema improving slowly) present  Neurological:      Mental Status: She is alert and oriented to person, place, and time  Psychiatric:         Mood and Affect: Mood normal          Behavior: Behavior normal           Additional Data:     Labs:  Results from last 7 days   Lab Units 01/29/23  0638 01/28/23  0448 01/27/23  0628   WBC Thousand/uL 9 01   < > 8 78   HEMOGLOBIN g/dL 13 3   < > 12 9   HEMATOCRIT % 40 4   < > 39 4   PLATELETS Thousands/uL 258   < > 204   NEUTROS PCT %  --   --  71   LYMPHS PCT %  --   --  19   MONOS PCT %  --   --  6   EOS PCT %  --   --  2    < > = values in this interval not displayed  Results from last 7 days   Lab Units 01/29/23  0638 01/26/23  0601 01/25/23  2323   SODIUM mmol/L 137   < > 135   POTASSIUM mmol/L 4 1   < > 3 8   CHLORIDE mmol/L 102   < > 97   CO2 mmol/L 26   < > 28   BUN mg/dL 14   < > 13   CREATININE mg/dL 0 70   < > 0 91   ANION GAP mmol/L 9   < > 10   CALCIUM mg/dL 8 7   < > 9 0   ALBUMIN g/dL  --   --  4 1   TOTAL BILIRUBIN mg/dL  --   --  1 42*   ALK PHOS U/L  --   --  61   ALT U/L  --   --  33   AST U/L  --   --  27   GLUCOSE RANDOM mg/dL 191*   < > 191*    < > = values in this interval not displayed       Results from last 7 days   Lab Units 01/25/23  2323   INR  1 08     Results from last 7 days   Lab Units 01/29/23  1106 01/29/23  0728 01/28/23  1621 01/28/23  1117 01/28/23  0726 01/27/23  2108 01/27/23  1547 01/27/23  1142 01/27/23  0701 01/26/23  2051 01/26/23  1604 01/26/23  1122   POC GLUCOSE mg/dl 254* 166* 158* 260* 138 166* 181* 215* 153* 177* 173* 203*         Results from last 7 days   Lab Units 01/27/23  0628 01/26/23  1127 01/26/23  0858 01/26/23  0236 01/25/23  2323   LACTIC ACID mmol/L 1 1 2 5* 2 3* 2 3* 2 6* PROCALCITONIN ng/ml  --   --   --   --  0 06       Lines/Drains:  Invasive Devices     Peripheral Intravenous Line  Duration           Peripheral IV 01/27/23 Left Antecubital 2 days                  Recent Cultures (last 7 days):   Results from last 7 days   Lab Units 01/27/23  0043 01/26/23  0601 01/25/23  2323   BLOOD CULTURE  No Growth at 48 hrs  No Growth at 48 hrs  --  Streptococcus mitis oralis group*  Streptococcus mitis oralis group*   GRAM STAIN RESULT   --   --  Gram positive cocci in pairs, chains and clusters*  Gram positive cocci in pairs and chains*   URINE CULTURE   --  No Growth <1000 cfu/mL  --        Last 24 Hours Medication List:   Current Facility-Administered Medications   Medication Dose Route Frequency Provider Last Rate   • acetaminophen  650 mg Oral Q6H PRN Shalonda Becerril PA-C     • aspirin  81 mg Oral Daily Shalonda Becerril PA-C     • cefTRIAXone  2,000 mg Intravenous Q24H Ross BLEVINS MD     • Diclofenac Sodium  2 g Topical 4x Daily Ross BLEVINS MD     • diltiazem  240 mg Oral Daily Shalonda Becerril PA-C     • enoxaparin  40 mg Subcutaneous Daily Shalonda Becerril PA-C     • Fluticasone Furoate-Vilanterol  1 puff Inhalation Daily Shalonda Becerril PA-C     • furosemide  40 mg Intravenous Daily Ross BLEVINS MD     • insulin lispro  1-6 Units Subcutaneous 4x Daily (AC & HS) Shalonda Becerril PA-C     • lisinopril  10 mg Oral Daily Shalonda Becerril PA-C     • pantoprazole  20 mg Oral Early Morning Shalonda Becerril PA-C     • potassium chloride  10 mEq Oral Daily Shalonda Becerril PA-C     • potassium chloride  40 mEq Oral BID Ross BLEVINS MD     • temazepam  30 mg Oral HS Shalonda Medley PA-C          Today, Patient Was Seen By: Shelibe Bowden MD    **Please Note: This note may have been constructed using a voice recognition system  **

## 2023-01-29 NOTE — PLAN OF CARE
Problem: Prexisting or High Potential for Compromised Skin Integrity  Goal: Skin integrity is maintained or improved  Description: INTERVENTIONS:  - Identify patients at risk for skin breakdown  - Assess and monitor skin integrity  - Assess and monitor nutrition and hydration status  - Monitor labs   - Assess for incontinence   - Turn and reposition patient  - Assist with mobility/ambulation  - Relieve pressure over bony prominences  - Avoid friction and shearing  - Provide appropriate hygiene as needed including keeping skin clean and dry  - Evaluate need for skin moisturizer/barrier cream  - Collaborate with interdisciplinary team   - Patient/family teaching  - Consider wound care consult   Outcome: Progressing     Problem: Potential for Falls  Goal: Patient will remain free of falls  Description: INTERVENTIONS:  - Educate patient/family on patient safety including physical limitations  - Instruct patient to call for assistance with activity   - Consult OT/PT to assist with strengthening/mobility   - Keep Call bell within reach  - Keep bed low and locked with side rails adjusted as appropriate  - Keep care items and personal belongings within reach  - Initiate and maintain comfort rounds  - Make Fall Risk Sign visible to staff  - Offer Toileting every 2  Hours, in advance of need  - Initiate/Maintain bed alarm  - Obtain necessary fall risk management equipment  - Apply yellow socks and bracelet for high fall risk patients  - Consider moving patient to room near nurses station  Outcome: Progressing

## 2023-01-29 NOTE — ASSESSMENT & PLAN NOTE
Sepsis secondary to right lower extremity cellulitis  Lactic acid resolved  2/2 blood cultures growing Streptococcus mitis oralis intermediate sensitivity to penicillin, sensitive to ceftriaxone and vancomycin   2/2 repeat blood culture remain without growth in 48 hours  Bacteremia cleared  2D echo report noted without vegetation  Currently patient is afebrile  Leukocytosis now resolved  Sepsis resolved  Currently acutely nontoxic-appearing  Will transition to IV ceftriaxone 2g q24 hrs day #1  Completed 3 days of IV cefazolin  I suspect patient may need 2 weeks of IV antibiotics  Follow-up with ID consult  Continue supportive care

## 2023-01-29 NOTE — ASSESSMENT & PLAN NOTE
Right lower extremity cellulitis improving with cefazolin  Completed 3 days of IV cefazolin  Transition to IV ceftriaxone as per blood culture sensitivity report

## 2023-01-29 NOTE — ASSESSMENT & PLAN NOTE
Wt Readings from Last 3 Encounters:   01/29/23 96 2 kg (212 lb 1 3 oz)   08/23/22 90 7 kg (200 lb)   07/03/22 90 7 kg (200 lb)     · Mild acute on chronic diastolic CHF present admission due to volume overload in the settings of decreased mobility due to acute infection as evident by volume overload noted on CT chest report  · Patient takes Lasix 20 mg p o  daily at home  · Now in negative  6500 cc balance     · Will continue IV Lasix 40 mg daily for now  · Monitor I/O, daily weight, cardiac diet, 2g sodium diet

## 2023-01-29 NOTE — ASSESSMENT & PLAN NOTE
2/2 blood culture growing Streptococcus mitis oralis intermediate sensitivity to penicillin, sensitive to ceftriaxone, vancomycin  Repeat 2/2 blood cultures remained without growth in 48 hours  Bacteremia cleared  Currently febrile  Sepsis resolved  Completed 3 days of IV cefazolin  Will transition to IV ceftriaxone 2 g every 24 hours for now  2D echo report noted without vegetation  Patient may need 2 weeks of antibiotics per  Follow-up with ID consult

## 2023-01-30 LAB
ANION GAP SERPL CALCULATED.3IONS-SCNC: 8 MMOL/L (ref 4–13)
BUN SERPL-MCNC: 16 MG/DL (ref 5–25)
CALCIUM SERPL-MCNC: 8.8 MG/DL (ref 8.3–10.1)
CHLORIDE SERPL-SCNC: 103 MMOL/L (ref 96–108)
CO2 SERPL-SCNC: 25 MMOL/L (ref 21–32)
CREAT SERPL-MCNC: 0.61 MG/DL (ref 0.6–1.3)
ERYTHROCYTE [DISTWIDTH] IN BLOOD BY AUTOMATED COUNT: 13.3 % (ref 11.6–15.1)
GFR SERPL CREATININE-BSD FRML MDRD: 85 ML/MIN/1.73SQ M
GLUCOSE SERPL-MCNC: 172 MG/DL (ref 65–140)
GLUCOSE SERPL-MCNC: 174 MG/DL (ref 65–140)
GLUCOSE SERPL-MCNC: 177 MG/DL (ref 65–140)
GLUCOSE SERPL-MCNC: 182 MG/DL (ref 65–140)
GLUCOSE SERPL-MCNC: 185 MG/DL (ref 65–140)
GLUCOSE SERPL-MCNC: 253 MG/DL (ref 65–140)
HCT VFR BLD AUTO: 41.4 % (ref 34.8–46.1)
HGB BLD-MCNC: 13.5 G/DL (ref 11.5–15.4)
MCH RBC QN AUTO: 30.4 PG (ref 26.8–34.3)
MCHC RBC AUTO-ENTMCNC: 32.6 G/DL (ref 31.4–37.4)
MCV RBC AUTO: 93 FL (ref 82–98)
PLATELET # BLD AUTO: 272 THOUSANDS/UL (ref 149–390)
PMV BLD AUTO: 9.9 FL (ref 8.9–12.7)
POTASSIUM SERPL-SCNC: 4.3 MMOL/L (ref 3.5–5.3)
RBC # BLD AUTO: 4.44 MILLION/UL (ref 3.81–5.12)
SODIUM SERPL-SCNC: 136 MMOL/L (ref 135–147)
WBC # BLD AUTO: 8.43 THOUSAND/UL (ref 4.31–10.16)

## 2023-01-30 RX ADMIN — CEFTRIAXONE SODIUM 2000 MG: 10 INJECTION, POWDER, FOR SOLUTION INTRAVENOUS at 13:23

## 2023-01-30 RX ADMIN — ACETAMINOPHEN 650 MG: 325 TABLET, FILM COATED ORAL at 22:41

## 2023-01-30 RX ADMIN — PANTOPRAZOLE SODIUM 20 MG: 20 TABLET, DELAYED RELEASE ORAL at 06:08

## 2023-01-30 RX ADMIN — FLUTICASONE FUROATE AND VILANTEROL TRIFENATATE 1 PUFF: 100; 25 POWDER RESPIRATORY (INHALATION) at 09:48

## 2023-01-30 RX ADMIN — POTASSIUM CHLORIDE 10 MEQ: 750 TABLET, EXTENDED RELEASE ORAL at 09:48

## 2023-01-30 RX ADMIN — ASPIRIN 81 MG: 81 TABLET, CHEWABLE ORAL at 09:04

## 2023-01-30 RX ADMIN — TEMAZEPAM 30 MG: 15 CAPSULE ORAL at 22:41

## 2023-01-30 RX ADMIN — LISINOPRIL 10 MG: 10 TABLET ORAL at 09:04

## 2023-01-30 RX ADMIN — INSULIN LISPRO 1 UNITS: 100 INJECTION, SOLUTION INTRAVENOUS; SUBCUTANEOUS at 17:32

## 2023-01-30 RX ADMIN — INSULIN LISPRO 1 UNITS: 100 INJECTION, SOLUTION INTRAVENOUS; SUBCUTANEOUS at 08:58

## 2023-01-30 RX ADMIN — DILTIAZEM HYDROCHLORIDE 240 MG: 240 CAPSULE, COATED, EXTENDED RELEASE ORAL at 09:04

## 2023-01-30 RX ADMIN — DICLOFENAC SODIUM TOPICAL GEL, 1%, 2 G: 10 GEL TOPICAL at 13:26

## 2023-01-30 RX ADMIN — INSULIN LISPRO 2 UNITS: 100 INJECTION, SOLUTION INTRAVENOUS; SUBCUTANEOUS at 13:21

## 2023-01-30 RX ADMIN — DICLOFENAC SODIUM TOPICAL GEL, 1%, 2 G: 10 GEL TOPICAL at 22:45

## 2023-01-30 RX ADMIN — INSULIN LISPRO 3 UNITS: 100 INJECTION, SOLUTION INTRAVENOUS; SUBCUTANEOUS at 13:21

## 2023-01-30 RX ADMIN — DICLOFENAC SODIUM TOPICAL GEL, 1%, 2 G: 10 GEL TOPICAL at 08:55

## 2023-01-30 RX ADMIN — INSULIN LISPRO 2 UNITS: 100 INJECTION, SOLUTION INTRAVENOUS; SUBCUTANEOUS at 08:58

## 2023-01-30 RX ADMIN — INSULIN LISPRO 1 UNITS: 100 INJECTION, SOLUTION INTRAVENOUS; SUBCUTANEOUS at 23:17

## 2023-01-30 RX ADMIN — ENOXAPARIN SODIUM 40 MG: 40 INJECTION SUBCUTANEOUS at 09:05

## 2023-01-30 RX ADMIN — ACETAMINOPHEN 650 MG: 325 TABLET, FILM COATED ORAL at 06:08

## 2023-01-30 RX ADMIN — INSULIN LISPRO 2 UNITS: 100 INJECTION, SOLUTION INTRAVENOUS; SUBCUTANEOUS at 17:32

## 2023-01-30 RX ADMIN — DICLOFENAC SODIUM TOPICAL GEL, 1%, 2 G: 10 GEL TOPICAL at 17:54

## 2023-01-30 RX ADMIN — FUROSEMIDE 40 MG: 10 INJECTION, SOLUTION INTRAVENOUS at 09:06

## 2023-01-30 NOTE — CONSULTS
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, came to the ER on 1/26 with right lower leg cellulitis  Patient now has Streptococcus mitis oralis bacteremia  1   Streptococcus mitis oralis bacteremia  Source is most likely right lower leg cellulitis  Patient's dentition is good and she does see dentist on a regular basis  Although bacteremia cleared rapidly, patient has bioprosthetic AV and 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  We need to consider the possibility of prosthetic valve endocarditis and infected PPM   Patient will likely need long-term IV antibiotic  Continue high-dose IV ceftriaxone  Follow-up on repeat blood cultures  Recommend BOLIVAR  Likely need for long-term IV antibiotic  2   Sepsis, present on admission, presenting with leukocytosis and tachypnea  Source of sepsis is most likely cellulitis and secondary bacteremia  Patient is systemically improved  VBG is normalized  Antibiotic plan as in above  Monitor temperature/WBC  3   Right leg cellulitis, most likely secondary to underlying venous stasis  Cellulitis appears to be superficial and clinically improved  No evidence of septic ankle clinically  Antibiotic plan as in above  Serial leg exams  4   Status post TVAR in 2020 for AS  Presence of alpha strep bacteremia, we need to consider the possibility of prosthetic valve endocarditis  Patient will need further work-up  He will likely need long-term IV antibiotic  Antibiotic plan as in above  Recommend BOLIVAR  5   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  Consider PPM infection, as in above  Recommend BOLIVAR  6   Probable right leg venous stasis, with poorly controlled leg edema    Discussed with patient regarding keeping leg elevated to control edema   Leg elevation to control edema  7   CVA, with residual right-sided weakness  Prior records reviewed in detail  Hospitalization records reviewed in detail  Discussed with patient in detail regarding the above plan  Discussed with Dr Maldonado Sanz from pul service  Thank you for this consultation  We will follow along with you  HISTORY OF PRESENT ILLNESS:  Reason for Consult: Bacteremia  HPI: Teri Dodd is a 81 y o  female, with multiple medical problems including DM, A  fib, CVA, As status post TVAR, 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, came to the ER on  with bilateral leg weakness and pain in the right lower leg  On presentation, she did not have fever but had high-level leukocytosis and tachypnea  She had evidence of right lower leg cellulitis  Patient was admitted and started on IV ceftriaxone  Admission blood cultures now have growth of Streptococcus mitis oralis  We are asked to evaluate the patient  At present, patient states that she feels better  She had pain in her right leg approximately 1 week prior to presentation  Pain in right leg is improved now  No fever or chills  Patient sees dentist on a regular basis, with last teeth cleaning October of last year  Patient has history of AS, status post TVAR in   She also has 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  REVIEW OF SYSTEMS:  A complete system-based review was done  Except for what is noted in HPI above, ROS of systems is otherwise negative  PAST MEDICAL HISTORY:  Past Medical History:   Diagnosis Date   • Aortic valve disease    • Atrial fibrillation [facility])    • Cardiac disease    • Diabetes mellitus ([facility] [address] )    • Heart murmur    • Heart valve disease    • Hypertension    • Stroke [facility])      Past Surgical History:   Procedure Laterality Date   •  SECTION      x 2   • HYSTERECTOMY     • NV ECHO TRANSESOPHAG R-T 2D W/PRB IMG ACQUISJ I&R N/A 2020    Procedure: TRANSESOPHAGEAL ECHOCARDIOGRAM (BOLIVAR);   Surgeon: Clarence Rodriguez DO; Location: [facility];  Service: Cardiac Surgery   • DC REPLACE AORTIC VALVE OPENFEMORAL ARTERY APPROACH Right 2020    Procedure: REPLACEMENT AORTIC VALVE TRANSCATHETER (TAVR) TRANSFEMORAL W/ 26 MM TODD ANIBAL S3 ULTRA VALVE (ACCESS ON LEFT); Surgeon: Emeli Rodas DO;  Location: [facility];  Service: Cardiac Surgery   • TONSILLECTOMY       Problem list reviewed  FAMILY HISTORY:  Non-contributory    SOCIAL HISTORY:  Social History     Substance and Sexual Activity   Alcohol Use Not Currently     Social History     Substance and Sexual Activity   Drug Use Never     Social History     Tobacco Use   Smoking Status Never   Smokeless Tobacco Never       ALLERGIES:  Allergies   Allergen Reactions   • Atorvastatin      dizziness and shaky   • Rivaroxaban      Dizziness, and shaky  MEDICATIONS:  All current active medications have been reviewed  Patient is currently on IV ceftriaxone  PHYSICAL EXAM:  Vitals:  Temp:  [97 3 °F (36 3 °C)-98 4 °F (36 9 °C)] 98 4 °F (36 9 °C)  HR:  [55-70] 63  Resp:  [17] 17  BP: (115-145)/(36-81) 127/65  SpO2:  [86 %-92 %] 92 %  Temp (24hrs), Av °F (36 7 °C), Min:97 3 °F (36 3 °C), Max:98 4 °F (36 9 °C)  Current: Temperature: 98 4 °F (36 9 °C)     Physical Exam:  General:  Well-nourished, well-developed, in no acute distress  Awake, alert and oriented x 3  Eyes:  Conjunctive clear with no hemorrhages or effusions  Oropharynx:  No ulcers, no lesions, pharynx benign, no tonsillitis  Dentition is good  Neck:  Supple, no lymphadenopathy, no mass, nontender  Lungs:  Expansion symmetric, no rales, no wheezing, no accessory muscle use  Cardiac:  Regular rate and rhythm, normal S1, normal S2, no murmurs  Abdomen:  Soft, nondistended, non-tender, no HSM  Extremities: 1+ leg edema  Right lower leg with mild erythema/warmth and tenderness  No new wound  Ankle without effusion and with good ROM    Skin:  No rashes, no ulcers  Neurological:  Moves all four extremities spontaneously with weakness in right arm and leg    LABS, IMAGING, & OTHER STUDIES:  Lab Results:  I have personally reviewed pertinent labs  Results from last 7 days   Lab Units 01/30/23  0513 01/29/23  8760 01/28/23  0448 01/26/23  0601 01/25/23  2323   POTASSIUM mmol/L 4 3 4 1 3 4*   < > 3 8   CHLORIDE mmol/L 103 102 101   < > 97   CO2 mmol/L 25 26 26   < > 28   BUN mg/dL 16 14 12   < > 13   CREATININE mg/dL 0 61 0 70 0 71   < > 0 91   EGFR ml/min/1 73sq m 85 81 80   < > 59   CALCIUM mg/dL 8 8 8 7 9 0   < > 9 0   AST U/L  --   --   --   --  27   ALT U/L  --   --   --   --  33   ALK PHOS U/L  --   --   --   --  61    < > = values in this interval not displayed  Results from last 7 days   Lab Units 01/30/23  0513 01/29/23  0638 01/28/23  0448   WBC Thousand/uL 8 43 9 01 7 81   HEMOGLOBIN g/dL 13 5 13 3 12 9   PLATELETS Thousands/uL 272 258 226     Results from last 7 days   Lab Units 01/27/23  0043 01/26/23  0601 01/25/23  2323   BLOOD CULTURE  No Growth at 72 hrs  No Growth at 72 hrs   --  Streptococcus mitis oralis group*  Streptococcus mitis oralis group*   GRAM STAIN RESULT   --   --  Gram positive cocci in pairs, chains and clusters*  Gram positive cocci in pairs and chains*   URINE CULTURE   --  No Growth <1000 cfu/mL  --        Imaging Studies:   I have personally reviewed pertinent imaging study reports and images in PACS  EKG, Pathology, and Other Studies:   I have personally reviewed pertinent reports

## 2023-01-30 NOTE — PROGRESS NOTES
[address]  Progress Note Flakito Post 1940, 81 y o  female MRN: [id number]  Unit/Bed#: -01 Encounter: [id number]  Primary Care Provider: Tomasz Miller DO   Date and time admitted to hospital: 1/25/2023 10:58 PM    * Sepsis [facility])  Assessment & Plan  Sepsis secondary to right lower extremity cellulitis  Lactic acid resolved  2/2 blood cultures growing Streptococcus mitis oralis intermediate sensitivity to penicillin, sensitive to ceftriaxone and vancomycin   2/2 repeat blood culture remain without growth in 48 hours  Bacteremia cleared  2D echo report noted without vegetation  Currently patient is afebrile  Leukocytosis now resolved  Sepsis resolved  Currently acutely nontoxic-appearing  Will transition to IV ceftriaxone 2g q24 hrs day #2  Completed 3 days of IV cefazolin  She does have bioprosthetic aortic valve as well as pacemaker in place  Follow-up with cardiology consult for BOLIVAR and patient may need long term  IV antibiotics  ID following  Continue supportive care  Gram-positive bacteremia  Assessment & Plan  2/2 blood culture growing Streptococcus mitis oralis intermediate sensitivity to penicillin, sensitive to ceftriaxone, vancomycin  Repeat 2/2 blood cultures remained without growth in 48 hours  Bacteremia cleared  Currently febrile  Sepsis resolved  Completed 3 days of IV cefazolin  Will transition to IV ceftriaxone 2 g every 24 hours for now  Day #2  2D echo report noted without vegetation  Patient has bioprosthetic aortic valve as well as pacemaker in place  Follow-up with cardiology consult for BOLIVAR  May Need long-term antibiotics  ID following  Acute cystitis  Assessment & Plan  · CT a/p: urinary bladder wall appears mildly thickened and there is mild urothelial enhancement  · Patient meeting sepsis criteria   · Received IV ceftriaxone 1g x1 for cellulitis   · UA noted negative UTI  · Urine culture without growth    · Cystitis ruled out  Cellulitis of right lower extremity  Assessment & Plan  Patient has chronic right lower extremity venous stasis, edema  Venous Doppler negative for DVT on this admission  Right lower extremity cellulitis improving with cefazolin  Completed 3 days of IV cefazolin  Transition to IV ceftriaxone as per blood culture sensitivity report  Encouraged to keep right lower extremity elevated help reduce edema  Ambulatory dysfunction  Assessment & Plan  Patient lives alone  Presents with ambulatory dysfunction due to pain and weakness  · CT head wo negative  · At baseline walks with walker   · Fall precautions   · PT/OT: recommended post acute care rehab  Acute on chronic diastolic (congestive) heart failure (HCC)  Assessment & Plan  Wt Readings from Last 3 Encounters:   01/30/23 94 kg (207 lb 3 7 oz)   08/23/22 90 7 kg (200 lb)   07/03/22 90 7 kg (200 lb)     · Mild acute on chronic diastolic CHF present admission due to volume overload in the settings of decreased mobility due to acute infection as evident by volume overload noted on CT chest report  · Patient takes Lasix 20 mg p o  daily at home  · Now in negative  8500 cc balance  · Will continue IV Lasix 40 mg daily for now  · Monitor I/O, daily weight, cardiac diet, 2g sodium diet           Severe aortic stenosis  Assessment & Plan  S/p TAVR    Other persistent atrial fibrillation (HCC)  Assessment & Plan  · Rate controlled    · Continue Cardizem   · Not on anticoagulation due to history of bleeding      Type 2 diabetes mellitus without complication, without long-term current use of insulin ([facility])  Assessment & Plan  Lab Results   Component Value Date    HGBA1C 7 1 (H) 10/12/2021     · Humalog SSI and glucochecks ac and hs   · Monitor for hypoglycemia   · CCD    Essential hypertension  Assessment & Plan  · Continue lisinopril     History of stroke  Assessment & Plan  • Hx CVA with residual right sided hemiparesis, facial droop and mild aphasia  • continue ASA/statin         VTE Pharmacologic Prophylaxis: VTE Score: 5 Moderate Risk (Score 3-4) - Pharmacological DVT Prophylaxis Ordered: enoxaparin (Lovenox)  Patient Centered Rounds: I performed bedside rounds with nursing staff today  Discussions with Specialists or Other Care Team Provider: ID and Cardiology    Education and Discussions with Family / Patient: Updated  (daughter) via phone  Spoke with Korey over the phone at length  Time Spent for Care: 30 minutes  More than 50% of total time spent on counseling and coordination of care as described above  Current Length of Stay: 4 day(s)  Current Patient Status: Inpatient   Certification Statement: The patient will continue to require additional inpatient hospital stay due to Sepsis, gram-positive bacteremia currently on IV antibiotics, adding BOLIVAR and may need long term Abx   Discharge Plan: Anticipate discharge in 48 hrs to rehab facility  Code Status: Level 1 - Full Code    Subjective:     Currently patient appears comfortable nondistressed  Reports bilateral knee feels better after Voltaren gel  Denies chest pain, dyspnea, fever, chills, nausea, vomiting, any other new complaints  No other events reported  Objective:     Vitals:   Temp (24hrs), Av °F (36 7 °C), Min:97 3 °F (36 3 °C), Max:98 4 °F (36 9 °C)    Temp:  [97 3 °F (36 3 °C)-98 4 °F (36 9 °C)] 98 4 °F (36 9 °C)  HR:  [55-70] 63  Resp:  [17] 17  BP: (115-145)/(36-81) 127/65  SpO2:  [86 %-92 %] 92 %  Body mass index is 35 57 kg/m²  Input and Output Summary (last 24 hours): Intake/Output Summary (Last 24 hours) at 2023 1500  Last data filed at 2023 1001  Gross per 24 hour   Intake 240 ml   Output 2300 ml   Net -2060 ml       Physical Exam:   Physical Exam  Constitutional:       General: She is not in acute distress  Appearance: Normal appearance  She is obese  She is not ill-appearing or toxic-appearing        Comments: Elderly female patient, morbidly obese, acutely nontoxic-appearing  Deconditioned  HENT:      Head: Normocephalic and atraumatic  Eyes:      Pupils: Pupils are equal, round, and reactive to light  Cardiovascular:      Rate and Rhythm: Normal rate  Pulses: Normal pulses  Heart sounds: No murmur heard  Pulmonary:      Effort: Pulmonary effort is normal  No respiratory distress  Breath sounds: No wheezing  Abdominal:      General: Bowel sounds are normal  There is no distension  Palpations: Abdomen is soft  Tenderness: There is no abdominal tenderness  Musculoskeletal:      Cervical back: Normal range of motion  No rigidity  Right lower leg: Edema present  Skin:     Findings: Erythema (Right lower extremity erythema improving slowly) present  Neurological:      Mental Status: She is alert and oriented to person, place, and time  Psychiatric:         Mood and Affect: Mood normal          Behavior: Behavior normal           Additional Data:     Labs:  Results from last 7 days   Lab Units 01/30/23  0513 01/28/23  0448 01/27/23  0628   WBC Thousand/uL 8 43   < > 8 78   HEMOGLOBIN g/dL 13 5   < > 12 9   HEMATOCRIT % 41 4   < > 39 4   PLATELETS Thousands/uL 272   < > 204   NEUTROS PCT %  --   --  71   LYMPHS PCT %  --   --  19   MONOS PCT %  --   --  6   EOS PCT %  --   --  2    < > = values in this interval not displayed  Results from last 7 days   Lab Units 01/30/23  0513 01/26/23  0601 01/25/23  2323   SODIUM mmol/L 136   < > 135   POTASSIUM mmol/L 4 3   < > 3 8   CHLORIDE mmol/L 103   < > 97   CO2 mmol/L 25   < > 28   BUN mg/dL 16   < > 13   CREATININE mg/dL 0 61   < > 0 91   ANION GAP mmol/L 8   < > 10   CALCIUM mg/dL 8 8   < > 9 0   ALBUMIN g/dL  --   --  4 1   TOTAL BILIRUBIN mg/dL  --   --  1 42*   ALK PHOS U/L  --   --  61   ALT U/L  --   --  33   AST U/L  --   --  27   GLUCOSE RANDOM mg/dL 177*   < > 191*    < > = values in this interval not displayed       Results from last 7 days   Lab Units 01/25/23  2323   INR  1 08     Results from last 7 days   Lab Units 01/30/23  1137 01/30/23  0809 01/30/23  0224 01/29/23  2055 01/29/23  1503 01/29/23  1106 01/29/23  0728 01/28/23  1621 01/28/23  1117 01/28/23  0726 01/27/23  2108 01/27/23  1547   POC GLUCOSE mg/dl 253* 172* 182* 217* 215* 254* 166* 158* 260* 138 166* 181*         Results from last 7 days   Lab Units 01/27/23  0628 01/26/23  1127 01/26/23  0858 01/26/23  0236 01/25/23  2323   LACTIC ACID mmol/L 1 1 2 5* 2 3* 2 3* 2 6*   PROCALCITONIN ng/ml  --   --   --   --  0 06       Lines/Drains:  Invasive Devices     Peripheral Intravenous Line  Duration           Peripheral IV 01/27/23 Left Antecubital 3 days          Drain  Duration           External Urinary Catheter 1 day                  Recent Cultures (last 7 days):   Results from last 7 days   Lab Units 01/27/23  0043 01/26/23  0601 01/25/23  2323   BLOOD CULTURE  No Growth at 72 hrs    No Growth at 72 hrs   --  Streptococcus mitis oralis group*  Streptococcus mitis oralis group*   GRAM STAIN RESULT   --   --  Gram positive cocci in pairs, chains and clusters*  Gram positive cocci in pairs and chains*   URINE CULTURE   --  No Growth <1000 cfu/mL  --        Last 24 Hours Medication List:   Current Facility-Administered Medications   Medication Dose Route Frequency Provider Last Rate   • acetaminophen  650 mg Oral Q6H PRN Shalonda Becerril PA-C     • aspirin  81 mg Oral Daily Shalonda Becerril PA-C     • cefTRIAXone  2,000 mg Intravenous Q24H Ross BLEVINS MD 2,000 mg (01/30/23 1323)   • Diclofenac Sodium  2 g Topical 4x Daily Ross BLEVINS MD     • diltiazem  240 mg Oral Daily Shalonda Becerril PA-C     • enoxaparin  40 mg Subcutaneous Daily Shalonda Becerril PA-C     • Fluticasone Furoate-Vilanterol  1 puff Inhalation Daily Shalonda Becerril PA-C     • furosemide  40 mg Intravenous Daily Ross BLEVINS MD     • insulin lispro  1-6 Units Subcutaneous 4x Daily (AC & HS) Shalonda Jacobson PA-C     • insulin lispro  2 Units Subcutaneous TID With Meals Constance BLEVINS MD     • lisinopril  10 mg Oral Daily Shalonda Jacobson PA-C     • pantoprazole  20 mg Oral Early Morning Shalonda Becerril PA-C     • potassium chloride  10 mEq Oral Daily Shalonda Becerril PA-C     • temazepam  30 mg Oral HS Shalonda Jacobson PA-C          Today, Patient Was Seen By: Masood Borges MD    **Please Note: This note may have been constructed using a voice recognition system  **

## 2023-01-30 NOTE — ASSESSMENT & PLAN NOTE
Wt Readings from Last 3 Encounters:   01/30/23 94 kg (207 lb 3 7 oz)   08/23/22 90 7 kg (200 lb)   07/03/22 90 7 kg (200 lb)     · Mild acute on chronic diastolic CHF present admission due to volume overload in the settings of decreased mobility due to acute infection as evident by volume overload noted on CT chest report  · Patient takes Lasix 20 mg p o  daily at home  · Now in negative  8500 cc balance     · Will continue IV Lasix 40 mg daily for now  · Monitor I/O, daily weight, cardiac diet, 2g sodium diet

## 2023-01-30 NOTE — PLAN OF CARE
Problem: Prexisting or High Potential for Compromised Skin Integrity  Goal: Skin integrity is maintained or improved  Description: INTERVENTIONS:  - Identify patients at risk for skin breakdown  - Assess and monitor skin integrity  - Assess and monitor nutrition and hydration status  - Monitor labs   - Assess for incontinence   - Turn and reposition patient  - Assist with mobility/ambulation  - Relieve pressure over bony prominences  - Avoid friction and shearing  - Provide appropriate hygiene as needed including keeping skin clean and dry  - Evaluate need for skin moisturizer/barrier cream  - Collaborate with interdisciplinary team   - Patient/family teaching  - Consider wound care consult   1/29/2023 2347 by Gunnar Suárez LPN  Outcome: Progressing  1/29/2023 2346 by Gunnar Suárez LPN  Outcome: Progressing     Problem: Potential for Falls  Goal: Patient will remain free of falls  Description: INTERVENTIONS:  - Educate patient/family on patient safety including physical limitations  - Instruct patient to call for assistance with activity   - Consult OT/PT to assist with strengthening/mobility   - Keep Call bell within reach  - Keep bed low and locked with side rails adjusted as appropriate  - Keep care items and personal belongings within reach  - Initiate and maintain comfort rounds  - Make Fall Risk Sign visible to staff  - Offer Toileting every 2 Hours, in advance of need  - Initiate/Maintain bed alarm  - Apply yellow socks and bracelet for high fall risk patients  - Consider moving patient to room near nurses station  1/29/2023 2347 by Gunnar Suárez LPN  Outcome: Progressing  1/29/2023 2346 by Gunnar Suárez LPN  Outcome: Progressing  1/29/2023 2344 by Gunnar Suárez LPN  Outcome: Progressing     Problem: GENITOURINARY - ADULT  Goal: Absence of urinary retention  Description: INTERVENTIONS:  - Assess patient’s ability to void and empty bladder  - Monitor I/O  - Bladder scan as needed  - Discuss with physician/AP medications to alleviate retention as needed  - Discuss catheterization for long term situations as appropriate  Outcome: Progressing

## 2023-01-30 NOTE — PLAN OF CARE
Problem: MOBILITY - ADULT  Goal: Maintains/Returns to pre admission functional level  Description: INTERVENTIONS:  - Perform BMAT or MOVE assessment daily    - Set and communicate daily mobility goal to care team and patient/family/caregiver     - Collaborate with rehabilitation services on mobility goals if consulted  - Perform Range of Motion    Problem: Potential for Falls  Goal: Patient will remain free of falls  Description: INTERVENTIONS:  - Educate patient/family on patient safety including physical limitations  - Instruct patient to call for assistance with activity   - Consult OT/PT to assist with strengthening/mobility   - Keep Call bell within reach  - Keep bed low and locked with side rails adjusted as appropriate  - Keep care items and personal belongings within reach  - Initiate and maintain comfort rounds  - Make Fall Risk Sign visible to staff  - Offer Toileting every 2 Hours, in advance of need  - Initiate/Maintain bed alarm  - Apply yellow socks and bracelet for high fall risk patients  - Consider moving patient to room near nurses station  1/30/2023 1226 by Stacy Howe, RN  Outcome: Progressing  1/30/2023 1225 by Stacy Howe, RN  Outcome: Progressing

## 2023-01-30 NOTE — ASSESSMENT & PLAN NOTE
Patient has chronic right lower extremity venous stasis, edema  Venous Doppler negative for DVT on this admission  Antibiotics as above  Encouraged to keep right lower extremity elevated help reduce edema

## 2023-01-30 NOTE — PLAN OF CARE
Problem: Prexisting or High Potential for Compromised Skin Integrity  Goal: Skin integrity is maintained or improved  Description: INTERVENTIONS:  - Identify patients at risk for skin breakdown  - Assess and monitor skin integrity  - Assess and monitor nutrition and hydration status  - Monitor labs   - Assess for incontinence   - Turn and reposition patient  - Assist with mobility/ambulation  - Relieve pressure over bony prominences  - Avoid friction and shearing  - Provide appropriate hygiene as needed including keeping skin clean and dry  - Evaluate need for skin moisturizer/barrier cream  - Collaborate with interdisciplinary team   - Patient/family teaching  - Consider wound care consult   Outcome: Progressing     Problem: GENITOURINARY - ADULT  Goal: Absence of urinary retention  Description: INTERVENTIONS:  - Assess patient’s ability to void and empty bladder  - Monitor I/O  - Bladder scan as needed  - Discuss with physician/AP medications to alleviate retention as needed  - Discuss catheterization for long term situations as appropriate  Outcome: Progressing

## 2023-01-30 NOTE — ASSESSMENT & PLAN NOTE
2/2 blood culture growing Streptococcus mitis oralis intermediate sensitivity to penicillin, sensitive to ceftriaxone, vancomycin  Repeat 2/2 blood cultures remained without growth in 48 hours  Bacteremia cleared  Currently febrile  Sepsis resolved  Completed 3 days of IV cefazolin  Will transition to IV ceftriaxone 2 g every 24 hours for now  Day #2  2D echo report noted without vegetation  Patient has bioprosthetic aortic valve as well as pacemaker in place  Follow-up with cardiology consult for BOLIVAR  May Need long-term antibiotics  ID following

## 2023-01-30 NOTE — CONSULTS
Consultation - Cardiology   [facility] 81 y o  female MRN: [id number]  Unit/Bed#: -01 Encounter: [id number]  01/30/23  5:59 PM    Assessment/ Plan:  1  Gram-positive bacteremia  • Cardiology consulted for BOLIVAR evaluation; plan for tomorrow  • Management per infectious disease    2  Aortic stenosis s/p TAVR  • S/p TAVR 2/18/2020  • TTE reveals bioprosthetic TAVR is functioning normally    3  Persistent atrial fibrillation  • EKG reveals atrial fibrillation  • Continue Cardizem CD  • Not on anticoagulation due to history of retroperitoneal bleed    4  Chronic diastolic heart failure  • Euvolemic on exam; does not appear to be in acute heart failure  •   • TTE reveals EF 65%  • Will continue IV Lasix 40 mg daily for another day    5  SSS s/p PPM  • S/p MDT single-chamber PPM (6/23/2020)  • Continue follow-up with device clinic    6  Moderate tricuspid regurgitation, mild to moderate mitral regurgitation  • Recommend periodic outpatient surveillance    7  Severe pulmonary hypertension  · Visualized on TTE    7  Hypertension  · Continue lisinopril      History of Present Illness   Physician Requesting Consult: Mandy Merlos MD    Reason for Consult / Principal Problem: BOLIVAR for bacteremia    HPI: [facility] is a 81y o  year old female with history of aortic stenosis s/p TAVR, persistent atrial fibrillation not on AC due to history of retroperitoneal bleed, chronic diastolic heart failure, SSS s/p MDT single-chamber PPM, hypertension, expressive aphasia secondary to CVA, and diabetes mellitus who presents with bilateral leg weakness and right lower extremity pain  Patient was admitted for sepsis secondary to right lower extremity cellulitis  Denies chest pain, shortness of breath, palpitations, lightheadedness or syncope      Inpatient consult to Cardiology  Consult performed by: Deborra Lombard, PA-C  Consult ordered by: Mandy Merlos MD          EKG: Atrial fibrillation; electronic ventricular pacemaker      Review of Systems   Constitutional: Negative for diaphoresis and fever  HENT: Negative for ear pain and sore throat  Eyes: Negative for pain and visual disturbance  Respiratory: Negative for cough, chest tightness and shortness of breath  Cardiovascular: Negative for chest pain, palpitations and leg swelling  Gastrointestinal: Negative for abdominal pain and vomiting  Genitourinary: Negative for dysuria and hematuria  Musculoskeletal: Positive for myalgias (Right lower extremity)  Negative for arthralgias and back pain  Skin: Negative for color change and rash  Neurological: Positive for weakness  Negative for seizures and syncope  All other systems reviewed and are negative  Historical Information   Past Medical History:   Diagnosis Date   • Aortic valve disease    • Atrial fibrillation [facility])    • Cardiac disease    • Diabetes mellitus ([facility] [address] )    • Heart murmur    • Heart valve disease    • Hypertension    • Stroke [facility])      Past Surgical History:   Procedure Laterality Date   •  SECTION      x 2   • HYSTERECTOMY     • NJ ECHO TRANSESOPHAG R-T 2D W/PRB IMG ACQUISJ I&R N/A 2020    Procedure: TRANSESOPHAGEAL ECHOCARDIOGRAM (BOLIVAR); Surgeon: Renetta Lima DO;  Location: [facility];  Service: Cardiac Surgery   • NJ REPLACE AORTIC VALVE OPENFEMORAL ARTERY APPROACH Right 2020    Procedure: REPLACEMENT AORTIC VALVE TRANSCATHETER (TAVR) TRANSFEMORAL W/ 26 MM TODD ANIBAL S3 ULTRA VALVE (ACCESS ON LEFT);   Surgeon: Renetta Lima DO;  Location: [facility];  Service: Cardiac Surgery   • TONSILLECTOMY       Social History     Substance and Sexual Activity   Alcohol Use Not Currently     Social History     Substance and Sexual Activity   Drug Use Never     Social History     Tobacco Use   Smoking Status Never   Smokeless Tobacco Never       Family History:   Family History   Problem Relation Age of Onset   • Diabetes Mother    • Heart disease Mother    • Diabetes Father    • Heart disease Father        Meds/Allergies   all current active meds have been reviewed  Allergies   Allergen Reactions   • Atorvastatin      dizziness and shaky   • Rivaroxaban      Dizziness, and shaky  Objective   Vitals: Blood pressure (!) 121/48, pulse 60, temperature 98 7 °F (37 1 °C), resp  rate 18, height 5' 4" (1 626 m), weight 94 kg (207 lb 3 7 oz), SpO2 91 % , Body mass index is 35 57 kg/m² ,   Orthostatic Blood Pressures    Flowsheet Row Most Recent Value   Blood Pressure 121/48 filed at 01/30/2023 1518   Patient Position - Orthostatic VS Lying filed at 01/30/2023 2894          Systolic (82WSR), WEX:432 , Min:118 , IWP:004     Diastolic (90FRA), WMJ:74, Min:37, Max:81        Intake/Output Summary (Last 24 hours) at 1/30/2023 1759  Last data filed at 1/30/2023 1323  Gross per 24 hour   Intake 600 ml   Output 1600 ml   Net -1000 ml       Invasive Devices     Peripheral Intravenous Line  Duration           Peripheral IV 01/27/23 Left Antecubital 3 days          Drain  Duration           External Urinary Catheter 1 day                    Physical Exam:  Physical Exam  Vitals reviewed  Constitutional:       General: She is not in acute distress  Appearance: She is not diaphoretic  HENT:      Head: Normocephalic and atraumatic  Nose: Nose normal    Eyes:      Conjunctiva/sclera: Conjunctivae normal    Cardiovascular:      Rate and Rhythm: Normal rate and regular rhythm  Pulses: Normal pulses  Heart sounds: Murmur heard  Systolic murmur is present with a grade of 1/6  No friction rub  No gallop  Pulmonary:      Effort: Pulmonary effort is normal  No respiratory distress  Breath sounds: Normal breath sounds  No wheezing, rhonchi or rales  Chest:      Chest wall: No tenderness  Abdominal:      Tenderness: There is no guarding  Musculoskeletal:         General: No swelling  Cervical back: Neck supple        Right lower le+ Edema present  Left lower le+ Edema present  Comments: Erythema and warmth of right lower extremity   Skin:     General: Skin is warm and dry  Capillary Refill: Capillary refill takes less than 2 seconds  Neurological:      Mental Status: She is alert     Psychiatric:         Mood and Affect: Mood normal           Lab Results:     Cardiac Profile:   Results from last 7 days   Lab Units 23  0319 23  0133 23  2323   HS TNI 0HR ng/L  --   --  8   HS TNI 2HR ng/L  --  10  --    HSTNI D2 ng/L  --  2  --    HS TNI 4HR ng/L 12  --   --    HSTNI D4 ng/L 4  --   --    NT-PRO BNP pg/mL  --   --  635*       CBC with diff:   Results from last 7 days   Lab Units 23  0513 23  3545 23  0448 23  0628 23  0601 23  2323   WBC Thousand/uL 8 43 9 01 7 81 8 78 21 68* 21 14*   HEMOGLOBIN g/dL 13 5 13 3 12 9 12 9 13 3 15 0   HEMATOCRIT % 41 4 40 4 39 2 39 4 40 1 45 6   MCV fL 93 93 93 94 93 94   PLATELETS Thousands/uL 272 258 226 204 236 258   MCH pg 30 4 30 7 30 6 30 9 30 9 30 8   MCHC g/dL 32 6 32 9 32 9 32 7 33 2 32 9   RDW % 13 3 13 3 13 6 13 9 13 6 13 5   MPV fL 9 9 10 2 10 4 10 2 10 1 10 6   NRBC AUTO /100 WBCs  --   --   --  0  --  0         CMP:   Results from last 7 days   Lab Units 23  0513 23  1439 23  0448 23  0628 23  0601 23  2323   POTASSIUM mmol/L 4 3 4 1 3 4* 3 6 3 6 3 8   CHLORIDE mmol/L 103 102 101 101 100 97   CO2 mmol/L 25 26 26 28 26 28   BUN mg/dL 16 14 12 13 14 13   CREATININE mg/dL 0 61 0 70 0 71 0 78 0 96 0 91   CALCIUM mg/dL 8 8 8 7 9 0 9 1 8 4 9 0   AST U/L  --   --   --   --   --  27   ALT U/L  --   --   --   --   --  33   ALK PHOS U/L  --   --   --   --   --  61   EGFR ml/min/1 73sq m 85 81 80 71 55 59

## 2023-01-30 NOTE — CASE MANAGEMENT
Case Management Discharge Planning Note    Patient name Leslie Hoyos  Location [address] [id number] MRN [id number]  : 1940 Date 2023       Current Admission Date: 2023  Current Admission Diagnosis:Sepsis [facility])   Patient Active Problem List    Diagnosis Date Noted   • Gram-positive bacteremia 2023   • Sepsis ([facility] [address] ) 2023   • Cellulitis of right lower extremity 2023   • Acute cystitis 2023   • Ambulatory dysfunction 2022   • Closed fracture of proximal end of humerus 2022   • Elevated bilirubin 2022   • Leukocytosis 2022   • Mild intermittent asthma 2020   • Tachy-chana syndrome ([address] ) 2020   • Acute on chronic diastolic (congestive) heart failure ([address] ) 2020   • Acute pulmonary embolism ([facility] [address] ) 2020   • Bilateral leg edema 2020   • Adenopathy 2020   • Right sided weakness 2020   • Expressive aphasia 2020   • S/P TAVR (transcatheter aortic valve replacement) 2020   • Carotid occlusion, left 2020   • Carotid stenosis, asymptomatic, right 2020   • Severe aortic stenosis 10/26/2019   • Tibial plateau fracture    • Other persistent atrial fibrillation ([facility] [address] ) 10/23/2019   • History of stroke    • Essential hypertension    • Type 2 diabetes mellitus without complication, without long-term current use of insulin ([facility])       LOS (days): 4  Geometric Mean LOS (GMLOS) (days): 5 00  Days to GMLOS:0 8     OBJECTIVE:  Risk of Unplanned Readmission Score: 13 55         Current admission status: Inpatient   Preferred Pharmacy:   [facility] [address] [place] [address]  Phone: [phone] Fax: [phone] [address] [facility]  [address] [facility]  Phone: [phone] Fax: [phone]    Primary Care Provider: González Bergeron DO    Primary Insurance: MEDICARE  Secondary Insurance:     DISCHARGE DETAILS:    Discharge planning discussed with[de-identified] patients daughter Maxine Meade and Raj Ruiz from [facility] of Choice: Yes     CM contacted family/caregiver?: Yes  Were Treatment Team discharge recommendations reviewed with patient/caregiver?: Yes  Did patient/caregiver verbalize understanding of patient care needs?: N/A- going to facility  Were patient/caregiver advised of the risks associated with not following Treatment Team discharge recommendations?: Yes    Contacts  Patient Contacts: Daughter Maxine Meade  Relationship to Patient[de-identified] Family  Contact Method: Phone  Phone Number: [phone]  Reason/Outcome: Continuity of Care, Emergency Contact, Discharge [address]         Is the patient interested in [facility] at discharge?: No    DME Referral Provided  Referral made for DME?: No    Other Referral/Resources/Interventions Provided:  Interventions: Short Term Rehab  Referral Comments: [facility] and able to accept for inpatient rehab at discharge    Treatment Team Recommendation: Short Term Rehab  Discharge Destination Plan[de-identified] Short Term Rehab     CM spoke with patient's daughter Maxine Meade at   CM introduced self and role  Patient's daughter stated her preference for inpatient rehab is [facility]  Patient was at facility in the past and really liked it  CM updated daughter [facility] is available to accept for inpatient rehab  CM will contact admissions of facility now to discuss DCP  All daughter questions/concerns answered at this time  CM spoke with Raj Ruiz at [facility] at [phone]  Raj Ruiz confirmed facility is able to accept for inpatient rehab once medically stable  No Covid swab needed prior to discharge

## 2023-01-30 NOTE — ASSESSMENT & PLAN NOTE
Sepsis secondary to right lower extremity cellulitis  Lactic acid resolved  2/2 blood cultures growing Streptococcus mitis oralis intermediate sensitivity to penicillin, sensitive to ceftriaxone and vancomycin   2/2 repeat blood culture remain without growth in 48 hours  Bacteremia cleared  2D echo report noted without vegetation  Currently patient is afebrile  Leukocytosis now resolved  Sepsis resolved  Currently acutely nontoxic-appearing  Will transition to IV ceftriaxone 2g q24 hrs day #2  Completed 3 days of IV cefazolin  She does have bioprosthetic aortic valve as well as pacemaker in place  Follow-up with cardiology consult for BOLIVAR and patient may need long term  IV antibiotics  ID following  Continue supportive care

## 2023-01-31 ENCOUNTER — ANESTHESIA EVENT (INPATIENT)
Dept: NON INVASIVE DIAGNOSTICS | Facility: HOSPITAL | Age: 83
End: 2023-01-31

## 2023-01-31 ENCOUNTER — APPOINTMENT (OUTPATIENT)
Dept: NON INVASIVE DIAGNOSTICS | Facility: HOSPITAL | Age: 83
End: 2023-01-31

## 2023-01-31 ENCOUNTER — ANESTHESIA (INPATIENT)
Dept: NON INVASIVE DIAGNOSTICS | Facility: HOSPITAL | Age: 83
End: 2023-01-31

## 2023-01-31 LAB
ALBUMIN SERPL BCP-MCNC: 2.9 G/DL (ref 3.5–5)
ALP SERPL-CCNC: 109 U/L (ref 46–116)
ALT SERPL W P-5'-P-CCNC: 44 U/L (ref 12–78)
ANION GAP SERPL CALCULATED.3IONS-SCNC: 8 MMOL/L (ref 4–13)
AST SERPL W P-5'-P-CCNC: 40 U/L (ref 5–45)
BILIRUB SERPL-MCNC: 0.7 MG/DL (ref 0.2–1)
BUN SERPL-MCNC: 16 MG/DL (ref 5–25)
CALCIUM ALBUM COR SERPL-MCNC: 10.2 MG/DL (ref 8.3–10.1)
CALCIUM SERPL-MCNC: 9.3 MG/DL (ref 8.3–10.1)
CHLORIDE SERPL-SCNC: 101 MMOL/L (ref 96–108)
CO2 SERPL-SCNC: 25 MMOL/L (ref 21–32)
CREAT SERPL-MCNC: 0.69 MG/DL (ref 0.6–1.3)
ERYTHROCYTE [DISTWIDTH] IN BLOOD BY AUTOMATED COUNT: 13.3 % (ref 11.6–15.1)
GFR SERPL CREATININE-BSD FRML MDRD: 81 ML/MIN/1.73SQ M
GLUCOSE SERPL-MCNC: 155 MG/DL (ref 65–140)
GLUCOSE SERPL-MCNC: 158 MG/DL (ref 65–140)
GLUCOSE SERPL-MCNC: 178 MG/DL (ref 65–140)
GLUCOSE SERPL-MCNC: 216 MG/DL (ref 65–140)
GLUCOSE SERPL-MCNC: 269 MG/DL (ref 65–140)
GLUCOSE SERPL-MCNC: 312 MG/DL (ref 65–140)
HCT VFR BLD AUTO: 43.2 % (ref 34.8–46.1)
HGB BLD-MCNC: 14.3 G/DL (ref 11.5–15.4)
MCH RBC QN AUTO: 31 PG (ref 26.8–34.3)
MCHC RBC AUTO-ENTMCNC: 33.1 G/DL (ref 31.4–37.4)
MCV RBC AUTO: 94 FL (ref 82–98)
PLATELET # BLD AUTO: 296 THOUSANDS/UL (ref 149–390)
PMV BLD AUTO: 9.8 FL (ref 8.9–12.7)
POTASSIUM SERPL-SCNC: 4.1 MMOL/L (ref 3.5–5.3)
PROT SERPL-MCNC: 6.5 G/DL (ref 6.4–8.4)
RBC # BLD AUTO: 4.62 MILLION/UL (ref 3.81–5.12)
SODIUM SERPL-SCNC: 134 MMOL/L (ref 135–147)
WBC # BLD AUTO: 10.17 THOUSAND/UL (ref 4.31–10.16)

## 2023-01-31 RX ORDER — SODIUM CHLORIDE, SODIUM LACTATE, POTASSIUM CHLORIDE, CALCIUM CHLORIDE 600; 310; 30; 20 MG/100ML; MG/100ML; MG/100ML; MG/100ML
INJECTION, SOLUTION INTRAVENOUS CONTINUOUS PRN
Status: DISCONTINUED | OUTPATIENT
Start: 2023-01-31 | End: 2023-01-31

## 2023-01-31 RX ORDER — PROPOFOL 10 MG/ML
INJECTION, EMULSION INTRAVENOUS AS NEEDED
Status: DISCONTINUED | OUTPATIENT
Start: 2023-01-31 | End: 2023-01-31

## 2023-01-31 RX ORDER — LIDOCAINE HYDROCHLORIDE 20 MG/ML
INJECTION, SOLUTION EPIDURAL; INFILTRATION; INTRACAUDAL; PERINEURAL AS NEEDED
Status: DISCONTINUED | OUTPATIENT
Start: 2023-01-31 | End: 2023-01-31

## 2023-01-31 RX ORDER — LISINOPRIL 5 MG/1
5 TABLET ORAL DAILY
Status: DISCONTINUED | OUTPATIENT
Start: 2023-02-01 | End: 2023-02-02 | Stop reason: HOSPADM

## 2023-01-31 RX ORDER — FUROSEMIDE 20 MG/1
20 TABLET ORAL DAILY
Status: DISCONTINUED | OUTPATIENT
Start: 2023-02-01 | End: 2023-02-02 | Stop reason: HOSPADM

## 2023-01-31 RX ORDER — DILTIAZEM HYDROCHLORIDE 180 MG/1
180 CAPSULE, COATED, EXTENDED RELEASE ORAL DAILY
Status: DISCONTINUED | OUTPATIENT
Start: 2023-02-01 | End: 2023-02-02 | Stop reason: HOSPADM

## 2023-01-31 RX ADMIN — ACETAMINOPHEN 650 MG: 325 TABLET, FILM COATED ORAL at 06:06

## 2023-01-31 RX ADMIN — PROPOFOL 40 MG: 10 INJECTION, EMULSION INTRAVENOUS at 10:25

## 2023-01-31 RX ADMIN — ASPIRIN 81 MG: 81 TABLET, CHEWABLE ORAL at 08:03

## 2023-01-31 RX ADMIN — PROPOFOL 40 MG: 10 INJECTION, EMULSION INTRAVENOUS at 10:29

## 2023-01-31 RX ADMIN — INSULIN LISPRO 2 UNITS: 100 INJECTION, SOLUTION INTRAVENOUS; SUBCUTANEOUS at 21:43

## 2023-01-31 RX ADMIN — DICLOFENAC SODIUM TOPICAL GEL, 1%, 2 G: 10 GEL TOPICAL at 11:42

## 2023-01-31 RX ADMIN — INSULIN LISPRO 1 UNITS: 100 INJECTION, SOLUTION INTRAVENOUS; SUBCUTANEOUS at 11:42

## 2023-01-31 RX ADMIN — PROPOFOL 60 MG: 10 INJECTION, EMULSION INTRAVENOUS at 10:23

## 2023-01-31 RX ADMIN — DICLOFENAC SODIUM TOPICAL GEL, 1%, 2 G: 10 GEL TOPICAL at 08:02

## 2023-01-31 RX ADMIN — TEMAZEPAM 30 MG: 15 CAPSULE ORAL at 21:44

## 2023-01-31 RX ADMIN — ENOXAPARIN SODIUM 40 MG: 40 INJECTION SUBCUTANEOUS at 08:03

## 2023-01-31 RX ADMIN — INSULIN LISPRO 2 UNITS: 100 INJECTION, SOLUTION INTRAVENOUS; SUBCUTANEOUS at 07:56

## 2023-01-31 RX ADMIN — DICLOFENAC SODIUM TOPICAL GEL, 1%, 2 G: 10 GEL TOPICAL at 21:44

## 2023-01-31 RX ADMIN — FUROSEMIDE 40 MG: 10 INJECTION, SOLUTION INTRAVENOUS at 08:25

## 2023-01-31 RX ADMIN — LISINOPRIL 10 MG: 10 TABLET ORAL at 08:03

## 2023-01-31 RX ADMIN — POTASSIUM CHLORIDE 10 MEQ: 750 TABLET, EXTENDED RELEASE ORAL at 08:12

## 2023-01-31 RX ADMIN — DILTIAZEM HYDROCHLORIDE 240 MG: 240 CAPSULE, COATED, EXTENDED RELEASE ORAL at 08:02

## 2023-01-31 RX ADMIN — LIDOCAINE HYDROCHLORIDE 100 MG: 20 INJECTION, SOLUTION EPIDURAL; INFILTRATION; INTRACAUDAL; PERINEURAL at 10:23

## 2023-01-31 RX ADMIN — INSULIN LISPRO 2 UNITS: 100 INJECTION, SOLUTION INTRAVENOUS; SUBCUTANEOUS at 16:41

## 2023-01-31 RX ADMIN — CEFTRIAXONE SODIUM 2000 MG: 10 INJECTION, POWDER, FOR SOLUTION INTRAVENOUS at 13:07

## 2023-01-31 RX ADMIN — INSULIN LISPRO 2 UNITS: 100 INJECTION, SOLUTION INTRAVENOUS; SUBCUTANEOUS at 11:42

## 2023-01-31 RX ADMIN — FLUTICASONE FUROATE AND VILANTEROL TRIFENATATE 1 PUFF: 100; 25 POWDER RESPIRATORY (INHALATION) at 08:04

## 2023-01-31 RX ADMIN — PANTOPRAZOLE SODIUM 20 MG: 20 TABLET, DELAYED RELEASE ORAL at 05:55

## 2023-01-31 RX ADMIN — SODIUM CHLORIDE, SODIUM LACTATE, POTASSIUM CHLORIDE, AND CALCIUM CHLORIDE: .6; .31; .03; .02 INJECTION, SOLUTION INTRAVENOUS at 10:21

## 2023-01-31 RX ADMIN — ACETAMINOPHEN 650 MG: 325 TABLET, FILM COATED ORAL at 21:44

## 2023-01-31 RX ADMIN — INSULIN LISPRO 1 UNITS: 100 INJECTION, SOLUTION INTRAVENOUS; SUBCUTANEOUS at 07:55

## 2023-01-31 RX ADMIN — INSULIN LISPRO 3 UNITS: 100 INJECTION, SOLUTION INTRAVENOUS; SUBCUTANEOUS at 16:41

## 2023-01-31 NOTE — ASSESSMENT & PLAN NOTE
Lab Results   Component Value Date    HGBA1C 7 1 (H) 10/12/2021     · Humalog SSI and glucochecks ac and hs   · Can restart metformin  Recommended frequent glucose monitoring  Can increase metformin dose if needed

## 2023-01-31 NOTE — PROGRESS NOTES
sequential compression device and foot pump appliedsequential compression device and foot pump appliedsequential compression device and foot pump applied    INTERNAL MEDICINE RESIDENCY PROGRESS NOTE     Name: Jay Davis   Age & Sex: 81 y o  female   MRN: [id number]  Unit/Bed#: -01   Encounter: [id number]      PATIENT INFORMATION     Name: Jay Davis   Age & Sex: 81 y o  female   MRN: [id number]  Hospital Stay Days: 5    ASSESSMENT/PLAN     Principal Problem:    Sepsis ([place] [address] )  Active Problems:    Gram-positive bacteremia    History of stroke    Essential hypertension    Type 2 diabetes mellitus without complication, without long-term current use of insulin ([place] [address] )    Other persistent atrial fibrillation (HCC)    Severe aortic stenosis    Acute on chronic diastolic (congestive) heart failure ([facility])    Ambulatory dysfunction    Cellulitis of right lower extremity    Acute cystitis      * Sepsis ([facility] )  Assessment & Plan  Presented admission with tachycardia, leukocytosis, tachypnea with cellulitis of right lower extremity and bacteremia  Has improved overall  Afebrile since 48 hours  Blood culture 2/2 initially grew Streptococcus mitis Rose intermediate sensitive to penicillin, sensitive to ceftriaxone and vancomycin  ID following recommended high-dose IV ceftriaxone  Repeat blood culture 2/2 has been without any growth  Bacteremia cleared  BOLIVAR by cardiology without any vegetation noted  Hemodynamically stable  ID following  Recommended 6 weeks of IV antibiotic through 3/10  Cleared for PICC line, PICC line consult in place  Trend fever/leukocyte count    Gram-positive bacteremia  Assessment & Plan  2/2 blood culture growing Streptococcus mitis oralis intermediate sensitivity to penicillin, sensitive to ceftriaxone, vancomycin  Repeat 2/2 blood cultures remained without growth  Bacteremia cleared      See plan as in sepsis    Acute cystitis  Assessment & Plan  · CT a/p: urinary bladder wall appears mildly thickened and there is mild urothelial enhancement  · Patient meeting sepsis criteria   · Received IV ceftriaxone 1g x1 for cellulitis   · UA noted negative UTI  · Urine culture without growth  · Cystitis ruled out  Cellulitis of right lower extremity  Assessment & Plan  Patient has chronic right lower extremity venous stasis, edema  Venous Doppler negative for DVT on this admission  Antibiotics as above  Encouraged to keep right lower extremity elevated help reduce edema  Ambulatory dysfunction  Assessment & Plan  Patient lives alone  Presents with ambulatory dysfunction due to pain and weakness  · CT head wo negative  · At baseline walks with walker   · Fall precautions   · PT/OT: recommended post acute care rehab  Patient agrees  Acute on chronic diastolic (congestive) heart failure (HCC)  Assessment & Plan  · Had mild acute on chronic diastolic CHF on admission in setting of volume overload noted on CT  Treated with IV diuretics initially  Negative net 9 L  Saturating well on room air  Currently looking euvolemic  Will start p o  Lasix home dose along with potassium supplements  Monitor I's and O's, daily weight, low-salt diet    Severe aortic stenosis  Assessment & Plan  S/p TAVR    Other persistent atrial fibrillation (HCC)  Assessment & Plan  · Rate controlled    · Continue Cardizem   · Not on anticoagulation due to history of bleeding      Type 2 diabetes mellitus without complication, without long-term current use of insulin ([facility])  Assessment & Plan  Lab Results   Component Value Date    HGBA1C 7 1 (H) 10/12/2021     · Humalog SSI and glucochecks ac and hs   · Monitor for hypoglycemia   · CCD    Essential hypertension  Assessment & Plan  · Continue lisinopril     History of stroke  Assessment & Plan  • Hx CVA with residual right sided hemiparesis, facial droop and mild aphasia  • continue ASA/statin       Disposition: Pending placement    SUBJECTIVE     Patient seen and examined  No acute events overnight  Patient has no new complaints  Denies fever, nausea vomiting, or chest pain  Patient's right lower extremity cellulitis is clinically improving, reports no pain  Of note, chronic nonpitting edema present in right lower extremity  OBJECTIVE     Vitals:    23 1037 23 1045 23 1100 23 1120   BP: (!) 101/49 (!) 85/42 110/53 109/51   Pulse: 62 60 60 60   Resp: 19 20 19 18   Temp: 97 5 °F (36 4 °C)   (!) 97 1 °F (36 2 °C)   TempSrc: Temporal      SpO2: 95% 99% 95% (!) 89%   Weight:       Height:          Temperature:   Temp (24hrs), Av 5 °F (36 4 °C), Min:97 °F (36 1 °C), Max:98 7 °F (37 1 °C)    Temperature: (!) 97 1 °F (36 2 °C)  Intake & Output:  I/O        0701   0700  0701   0700  0701   0700    P  O  480 840 0    Total Intake(mL/kg) 480 (5 1) 840 (8 8) 0 (0)    Urine (mL/kg/hr) 2500 (1 1) 3000 (1 3)     Total Output 2500 3000     Net -2020 -2160 0           Unmeasured Urine Occurrence 1 x          Weights:   IBW (Ideal Body Weight): 54 7 kg    Body mass index is 36 03 kg/m²  Weight (last 2 days)     Date/Time Weight    23 0600 95 2 (209 88)    23 0600 94 (207 23)    23 0513 95 1 (209 66)    23 0600 96 2 (212 08)        Physical Exam  HENT:      Head: Normocephalic and atraumatic  Right Ear: External ear normal       Left Ear: External ear normal       Nose: Nose normal       Mouth/Throat:      Mouth: Mucous membranes are moist       Pharynx: Oropharynx is clear  Eyes:      Extraocular Movements: Extraocular movements intact  Conjunctiva/sclera: Conjunctivae normal       Pupils: Pupils are equal, round, and reactive to light  Cardiovascular:      Rate and Rhythm: Normal rate  Pulses: Normal pulses  Pulmonary:      Breath sounds: Wheezing present  Abdominal:      General: Bowel sounds are normal    Musculoskeletal:         General: Tenderness present        Cervical back: Normal range of motion  Right lower leg: Edema present  Comments: Tenderness in right lower extremity upon palpation   Non-pitting edema present   Mild erythema   Baseline weakness of right upper and lower extremity    Neurological:      Mental Status: She is alert  Mental status is at baseline  LABORATORY DATA     Labs: I have personally reviewed pertinent reports  Results from last 7 days   Lab Units 01/31/23  0548 01/30/23  0513 01/29/23  5205 01/28/23  0448 01/27/23  0628 01/26/23  0601 01/25/23  2323   WBC Thousand/uL 10 17* 8 43 9 01   < > 8 78   < > 21 14*   HEMOGLOBIN g/dL 14 3 13 5 13 3   < > 12 9   < > 15 0   HEMATOCRIT % 43 2 41 4 40 4   < > 39 4   < > 45 6   PLATELETS Thousands/uL 296 272 258   < > 204   < > 258   NEUTROS PCT %  --   --   --   --  71  --  87*   MONOS PCT %  --   --   --   --  6  --  5    < > = values in this interval not displayed  Results from last 7 days   Lab Units 01/31/23  0548 01/30/23  0513 01/29/23  2106 01/26/23  0601 01/25/23  2323   POTASSIUM mmol/L 4 1 4 3 4 1   < > 3 8   CHLORIDE mmol/L 101 103 102   < > 97   CO2 mmol/L 25 25 26   < > 28   BUN mg/dL 16 16 14   < > 13   CREATININE mg/dL 0 69 0 61 0 70   < > 0 91   CALCIUM mg/dL 9 3 8 8 8 7   < > 9 0   ALK PHOS U/L 109  --   --   --  61   ALT U/L 44  --   --   --  33   AST U/L 40  --   --   --  27    < > = values in this interval not displayed  Results from last 7 days   Lab Units 01/29/23  0638 01/26/23  0858 01/25/23  2323   MAGNESIUM mg/dL 1 9 2 0 1 4*          Results from last 7 days   Lab Units 01/25/23  2323   INR  1 08   PTT seconds 25     Results from last 7 days   Lab Units 01/27/23  0628   LACTIC ACID mmol/L 1 1           IMAGING & DIAGNOSTIC TESTING     Radiology Results: I have personally reviewed pertinent reports  CT head without contrast    Result Date: 1/26/2023  Impression: No acute intracranial abnormality  Chronic microangiopathic changes  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  Workstation performed: [id number]     PE Study with CT Abdomen and Pelvis with contrast    Result Date: 1/26/2023  Impression: Findings suggesting cystitis/urinary tract infection  Correlation with urinalysis and urine culture and sensitivity is recommended  No evidence of pulmonary embolism is seen  Findings suggesting possible mild fluid overload  Clinical correlation recommended  Other nonemergent and chronic findings, as described above  Please see discussion  Workstation performed: [id number]     Other Diagnostic Testing: I have personally reviewed pertinent reports      ACTIVE MEDICATIONS     Current Facility-Administered Medications   Medication Dose Route Frequency   • acetaminophen (TYLENOL) tablet 650 mg  650 mg Oral Q6H PRN   • aspirin chewable tablet 81 mg  81 mg Oral Daily   • cefTRIAXone (ROCEPHIN) 2,000 mg in dextrose 5 % 50 mL IVPB  2,000 mg Intravenous Q24H   • Diclofenac Sodium (VOLTAREN) 1 % topical gel 2 g  2 g Topical 4x Daily   • [START ON 2/1/2023] diltiazem (CARDIZEM CD) 24 hr capsule 180 mg  180 mg Oral Daily   • enoxaparin (LOVENOX) subcutaneous injection 40 mg  40 mg Subcutaneous Daily   • Fluticasone Furoate-Vilanterol 100-25 mcg/actuation 1 puff  1 puff Inhalation Daily   • [START ON 2/1/2023] furosemide (LASIX) tablet 20 mg  20 mg Oral Daily   • insulin lispro (HumaLOG) 100 units/mL subcutaneous injection 1-6 Units  1-6 Units Subcutaneous 4x Daily (AC & HS)   • insulin lispro (HumaLOG) 100 units/mL subcutaneous injection 2 Units  2 Units Subcutaneous TID With Meals   • [START ON 2/1/2023] lisinopril (ZESTRIL) tablet 5 mg  5 mg Oral Daily   • pantoprazole (PROTONIX) EC tablet 20 mg  20 mg Oral Early Morning   • potassium chloride (K-DUR,KLOR-CON) CR tablet 10 mEq  10 mEq Oral Daily   • temazepam (RESTORIL) capsule 30 mg  30 mg Oral HS       VTE Pharmacologic Prophylaxis: Enoxaparin (Lovenox)  VTE Mechanical Prophylaxis:   SCD  Portions of the record may have been created with voice recognition software  Occasional wrong word or "sound a like" substitutions may have occurred due to the inherent limitations of voice recognition software    Read the chart carefully and recognize, using context, where substitutions have occurred   ==  Kris Antoine MD  [address]  Internal Medicine Residency PGY-3

## 2023-01-31 NOTE — ANESTHESIA POSTPROCEDURE EVALUATION
Post-Op Assessment Note    CV Status:  Stable    Pain management: adequate     Mental Status:  Sleepy   Hydration Status:  Euvolemic   PONV Controlled:  Controlled   Airway Patency:  Patent      Post Op Vitals Reviewed: Yes      Staff: CRNA         No notable events documented      BP   101/59   Temp  97 6   Pulse  59   Resp   14   SpO2   97

## 2023-01-31 NOTE — ASSESSMENT & PLAN NOTE
Presented admission with tachycardia, leukocytosis, tachypnea with cellulitis of right lower extremity and bacteremia  Has improved overall  Afebrile since 48 hours  Blood culture 2/2 initially grew Streptococcus mitis oralis intermediate sensitive to penicillin, sensitive to ceftriaxone and vancomycin  ID following recommended high-dose IV ceftriaxone  Repeat blood culture 2/2 has been without any growth  Bacteremia cleared  BOLIVAR by cardiology without any vegetation noted  Hemodynamically stable      ID following  Recommended 6 weeks of IV antibiotic through 3/10  Patient received PICC line today 02/01/2023  Trend fever/leukocyte count

## 2023-01-31 NOTE — PLAN OF CARE
Problem: MOBILITY - ADULT  Goal: Maintain or return to baseline ADL function  Description: INTERVENTIONS:  -  Assess patient's ability to carry out ADLs; assess patient's baseline for ADL function and identify physical deficits which impact ability to perform ADLs (bathing, care of mouth/teeth, toileting, grooming, dressing, etc )  - Assess/evaluate cause of self-care deficits   - Assess range of motion  - Assess patient's mobility; develop plan if impaired  - Assess patient's need for assistive devices and provide as appropriate  - Encourage maximum independence but intervene and supervise when necessary  - Involve family in performance of ADLs  - Assess for home care needs following discharge   - Consider OT consult to assist with ADL evaluation and planning for discharge  - Provide patient education as appropriate  Outcome: Progressing  Goal: Maintains/Returns to pre admission functional level  Description: INTERVENTIONS:  - Perform BMAT or MOVE assessment daily    - Set and communicate daily mobility goal to care team and patient/family/caregiver  - Collaborate with rehabilitation services on mobility goals if consulted  - Perform Range of Motion 2 times a day  - Reposition patient every 2 hours    - Dangle patient 2 times a day  - Stand patient 2 times a day  - Ambulate patient 2 times a day  - Out of bed to chair 2 times a day   - Out of bed for meals 2 times a day  - Out of bed for toileting  - Record patient progress and toleration of activity level   Outcome: Progressing     Problem: Prexisting or High Potential for Compromised Skin Integrity  Goal: Skin integrity is maintained or improved  Description: INTERVENTIONS:  - Identify patients at risk for skin breakdown  - Assess and monitor skin integrity  - Assess and monitor nutrition and hydration status  - Monitor labs   - Assess for incontinence   - Turn and reposition patient  - Assist with mobility/ambulation  - Relieve pressure over bony prominences  - Avoid friction and shearing  - Provide appropriate hygiene as needed including keeping skin clean and dry  - Evaluate need for skin moisturizer/barrier cream  - Collaborate with interdisciplinary team   - Patient/family teaching  - Consider wound care consult   Outcome: Progressing     Problem: Potential for Falls  Goal: Patient will remain free of falls  Description: INTERVENTIONS:  - Educate patient/family on patient safety including physical limitations  - Instruct patient to call for assistance with activity   - Consult OT/PT to assist with strengthening/mobility   - Keep Call bell within reach  - Keep bed low and locked with side rails adjusted as appropriate  - Keep care items and personal belongings within reach  - Initiate and maintain comfort rounds  - Make Fall Risk Sign visible to staff  - Offer Toileting every 2 Hours, in advance of need  - Initiate/Maintain bed/chair alarm  - Obtain necessary fall risk management equipment: yellow socks  - Apply yellow socks and bracelet for high fall risk patients  - Consider moving patient to room near nurses station  Outcome: Progressing     Problem: GENITOURINARY - ADULT  Goal: Absence of urinary retention  Description: INTERVENTIONS:  - Assess patient’s ability to void and empty bladder  - Monitor I/O  - Bladder scan as needed  - Discuss with physician/AP medications to alleviate retention as needed  - Discuss catheterization for long term situations as appropriate  Outcome: Progressing     Problem: METABOLIC, FLUID AND ELECTROLYTES - ADULT  Goal: Fluid balance maintained  Description: INTERVENTIONS:  - Monitor labs   - Monitor I/O and WT  - Instruct patient on fluid and nutrition as appropriate  - Assess for signs & symptoms of volume excess or deficit  Outcome: Progressing  Goal: Glucose maintained within target range  Description: INTERVENTIONS:  - Monitor Blood Glucose as ordered  - Assess for signs and symptoms of hyperglycemia and hypoglycemia  - Administer ordered medications to maintain glucose within target range  - Assess nutritional intake and initiate nutrition service referral as needed  Outcome: Progressing     Problem: SKIN/TISSUE INTEGRITY - ADULT  Goal: Skin Integrity remains intact(Skin Breakdown Prevention)  Description: Assess:  -Perform Earl assessment every shift  -Clean and moisturize skin every shift and prn  -Inspect skin when repositioning, toileting, and assisting with ADLS  -Assess extremities for adequate circulation and sensation     Bed Management:  -Have minimal linens on bed & keep smooth, unwrinkled  -Change linens as needed when moist or perspiring  -Avoid sitting or lying in one position for more than 2 hours while in bed  -Keep HOB at 45degrees     Toileting:  -Offer bedside commode  -Assess for incontinence every 2 hrs    Activity:  -Mobilize patient 2 times a day  -Encourage activity and walks on unit  -Encourage or provide ROM exercises   -Turn and reposition patient every 2 Hours  -Use appropriate equipment to lift or move patient in bed  -Instruct/ Assist with weight shifting every 2 hrs when out of bed in chair  -Consider limitation of chair time 2 hour intervals    Skin Care:  -Avoid use of baby powder, tape, friction and shearing, hot water or constrictive clothing  -Relieve pressure over bony prominences using ehob waffle cushion,allevyn on bony prominences  -Do not massage red bony areas       Problem: HEMATOLOGIC - ADULT  Goal: Maintains hematologic stability  Description: INTERVENTIONS  - Assess for signs and symptoms of bleeding or hemorrhage  - Monitor labs  - Administer supportive blood products/factors as ordered and appropriate  Outcome: Progressing     Problem: MUSCULOSKELETAL - ADULT  Goal: Maintain or return mobility to safest level of function  Description: INTERVENTIONS:  - Assess patient's ability to carry out ADLs; assess patient's baseline for ADL function and identify physical deficits which impact ability to perform ADLs (bathing, care of mouth/teeth, toileting, grooming, dressing, etc )  - Assess/evaluate cause of self-care deficits   - Assess range of motion  - Assess patient's mobility  - Assess patient's need for assistive devices and provide as appropriate  - Encourage maximum independence but intervene and supervise when necessary  - Involve family in performance of ADLs  - Assess for home care needs following discharge   - Consider OT consult to assist with ADL evaluation and planning for discharge  - Provide patient education as appropriate  Outcome: Progressing

## 2023-01-31 NOTE — ANESTHESIA PREPROCEDURE EVALUATION
Procedure:  BOLIVAR    Relevant Problems   CARDIO   (+) Essential hypertension   (+) Other persistent atrial fibrillation (HCC)   (+) S/P TAVR (transcatheter aortic valve replacement)   (+) Severe aortic stenosis   (+) Tachy-chana syndrome (HCC)      ENDO   (+) Type 2 diabetes mellitus without complication, without long-term current use of insulin (HCC)      NEURO/PSYCH   (+) History of stroke      PULMONARY   (+) Mild intermittent asthma       Left Ventricle: Left ventricular cavity size is normal  Wall thickness    is mildly increased  There is mild asymmetric hypertrophy of the basal    septal wall  The left ventricular ejection fraction is 65%  Systolic    function is normal  Although no diagnostic regional wall motion    abnormality was identified, this possibility cannot be completely excluded    on the basis of this study  •  Right Ventricle: Right ventricle is not well visualized  Right    ventricular cavity size is mildly dilated  •  Right Atrium: The atrium is mildly dilated  •  Aortic Valve: There is a TAVR bioprosthetic valve  The prosthetic valve    appears well-seated and appears to be functioning normally  There is no    evidence of paravalvular regurgitation  There is trace transvalvular    regurgitation  The aortic valve mean gradient is 13 0 mmHg  •  Mitral Valve: There is annular calcification  There is mild to moderate    regurgitation  •  Tricuspid Valve: There is moderate regurgitation  •  Pulmonary Artery: The estimated pulmonary artery systolic pressure is    62 4 mmHg  The pulmonary artery systolic pressure is severely increased  Physical Exam    Airway    Mallampati score: I  TM Distance: >3 FB  Neck ROM: full     Dental   upper dentures and lower dentures,     Cardiovascular  Cardiovascular exam normal    Pulmonary  Pulmonary exam normal     Other Findings        Anesthesia Plan  ASA Score- 4     Anesthesia Type- IV sedation with anesthesia with ASA Monitors           Additional Monitors:   Airway Plan:           Plan Factors-Exercise tolerance (METS): >4 METS  Chart reviewed  EKG reviewed  Imaging results reviewed  Existing labs reviewed  Patient summary reviewed  Induction- intravenous  Postoperative Plan-     Informed Consent- Anesthetic plan and risks discussed with patient  I personally reviewed this patient with the CRNA  Discussed and agreed on the Anesthesia Plan with the CRNA  Joy Mason

## 2023-01-31 NOTE — PROGRESS NOTES
Progress Note - Infectious Disease   Belinda Fletcher 81 y o  female MRN: [id number]  Unit/Bed#: -01 Encounter: [id number]      Impression/Recommendations:  1  Streptococcus mitis oralis bacteremia  Source is most likely right lower leg cellulitis  Patient's dentition is good and she does see dentist on a regular basis  Although bacteremia cleared rapidly, patient has bioprosthetic AV and 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  We need to consider the possibility of prosthetic valve endocarditis and infected PPM   If BOLIVAR with sedation, patient will need CT surgery and EP cardiology evaluation  Patient will likely need long-term IV antibiotic regardless  Continue high-dose IV ceftriaxone  Follow-up on repeat blood cultures  Follow-up BOLIVAR  Treat x 6 weeks after clearance of bacteremia, through 3/10      2  Sepsis, present on admission, presenting with leukocytosis and tachypnea  Source of sepsis is most likely cellulitis and secondary bacteremia  Patient is systemically improved  WBC has normalized  Antibiotic plan as in above  Monitor temperature/WBC      3  Right leg cellulitis, most likely secondary to underlying venous stasis  Cellulitis appears to be superficial and clinically improving  No evidence of septic ankle clinically  Antibiotic plan as in above  Serial leg exams      4   Status post TVAR in 2020 for AS  Presence of alpha strep bacteremia, we need to consider the possibility of prosthetic valve endocarditis  Patient will need further work-up  She will likely need long-term IV antibiotic  Antibiotic plan as in above  Follow-up BOLIVAR      5  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  Consider PPM infection, as in above  Follow-up BOLIVAR      6   Probable right leg venous stasis, with poorly controlled leg edema  Discussed with patient regarding keeping leg elevated to control edema  Leg elevation to control edema      7    CVA, with residual right-sided weakness      Discussed with patient in detail regarding the above plan   Discussed with slim service      Antibiotics:  Ceftriaxone # 3  Antibiotic # 6  Last negative blood culture     Subjective:  Patient feels better  Pain in right lower leg improved  Temperature stays down  No chills  She is tolerating antibiotic well  No nausea, vomiting or diarrhea  Objective:  Vitals:  Temp:  [97 °F (36 1 °C)-98 7 °F (37 1 °C)] 97 °F (36 1 °C)  HR:  [60-64] 60  Resp:  [16-19] 19  BP: (121-128)/(47-60) 123/60  SpO2:  [91 %-97 %] 97 %  Temp (24hrs), Av 7 °F (36 5 °C), Min:97 °F (36 1 °C), Max:98 7 °F (37 1 °C)  Current: Temperature: (!) 97 °F (36 1 °C)    Physical Exam:     General: Awake, alert, cooperative, no distress  Neck:  Supple  No mass  No lymphadenopathy  Lungs: Expansion symmetric, no rales, no wheezing, respirations unlabored  Heart:  Regular rate and rhythm, S1 and S2 normal, no murmur  Abdomen: Soft, nondistended, non-tender, bowel sounds active all four quadrants, no masses, no organomegaly  Extremities: Stable leg edema  Improved right lower leg erythema/warmth  No ulcer  Improved tenderness  Skin:  No rash  Neuro: Moves all extremities, with stable right-sided weakness  Invasive Devices     Peripheral Intravenous Line  Duration           Peripheral IV 23 Left Antecubital 3 days          Drain  Duration           External Urinary Catheter 2 days                Labs studies:   I have personally reviewed pertinent labs  Results from last 7 days   Lab Units 23  0548 23  0513 23  6367 23  0601 23  2323   POTASSIUM mmol/L 4 1 4 3 4 1   < > 3 8   CHLORIDE mmol/L 101 103 102   < > 97   CO2 mmol/L 25 25 26   < > 28   BUN mg/dL 16 16 14   < > 13   CREATININE mg/dL 0 69 0 61 0 70   < > 0 91   EGFR ml/min/1 73sq m 81 85 81   < > 59   CALCIUM mg/dL 9 3 8 8 8 7   < > 9 0   AST U/L 40  --   --   --  27   ALT U/L 44  --   --   --  33   ALK PHOS U/L 109  --   --   --  61    < > = values in this interval not displayed  Results from last 7 days   Lab Units 01/31/23  0548 01/30/23  0513 01/29/23  0638   WBC Thousand/uL 10 17* 8 43 9 01   HEMOGLOBIN g/dL 14 3 13 5 13 3   PLATELETS Thousands/uL 296 272 258     Results from last 7 days   Lab Units 01/27/23  0043 01/26/23  0601 01/25/23  2323   BLOOD CULTURE  No Growth After 4 Days  No Growth After 4 Days  --  Streptococcus mitis oralis group*  Streptococcus mitis oralis group*   GRAM STAIN RESULT   --   --  Gram positive cocci in pairs, chains and clusters*  Gram positive cocci in pairs and chains*   URINE CULTURE   --  No Growth <1000 cfu/mL  --        Imaging Studies:   I have personally reviewed pertinent imaging study reports and images in PACS  EKG, Pathology, and Other Studies:   I have personally reviewed pertinent reports

## 2023-01-31 NOTE — ASSESSMENT & PLAN NOTE
· Had mild acute on chronic diastolic CHF on admission in setting of volume overload noted on CT  Treated with IV diuretics initially     Saturating well on room air  Currently looking euvolemic      Continue Lasix p o  20 mg daily and potassium supplements  Monitor I's and O's, daily weight, low-salt diet

## 2023-01-31 NOTE — DISCHARGE INSTRUCTIONS
Transesophageal Echocardiogram   WHAT YOU NEED TO KNOW:   A transesophageal echocardiogram (BOLIVAR) is a procedure used to check for problems with your heart  It will also show any problems in the blood vessels near your heart  Sound waves are sent to the heart through a tube inserted into your throat  The sound waves show the structure and function of your heart through pictures on a monitor  DISCHARGE INSTRUCTIONS:   Call your local emergency number (911 in the [facility]) if:   You have any of the following signs of a heart attack:      Squeezing, pressure, or pain in your chest    You may  also have any of the following:     Discomfort or pain in your back, neck, jaw, stomach, or arm    Shortness of breath    Nausea or vomiting    Lightheadedness or a sudden cold sweat      Call your doctor or cardiologist if:   You have a fever or chills  You taste blood  You have a severe sore throat or trouble swallowing  You have questions or concerns about your condition or care  Do not eat or drink anything until you are able to swallow normally:  Start with soft foods, such as oatmeal, yogurt, or applesauce  When you can eat soft foods easily, you may begin to eat solid foods  What you can do to keep your heart healthy:   Eat heart-healthy foods  Eat whole grains, fruits, and vegetables every day  Limit salt and high-fat foods  Ask your healthcare provider for more information on a heart-healthy diet  Exercise as directed  Your healthcare provider may suggest an exercise program to help improve your heart health  It is best to start slowly, and do more as you get stronger  Do not start an exercise program without talking with your healthcare provider  Maintain a healthy weight  Keep a healthy weight so your heart does not have to work so hard  Ask what weight is healthy for you  Your healthcare provider can help you create a safe weight-loss plan, if needed  Manage your medical conditions    High blood pressure, high cholesterol, and diabetes can lead to heart problems  Work with your healthcare provider to manage your medical conditions and decrease your risk for heart problems  Do not smoke  Nicotine and other chemicals in cigarettes and cigars can cause lung damage  Ask your healthcare provider for information if you currently smoke and need help to quit  E-cigarettes or smokeless tobacco still contain nicotine  Talk to your healthcare provider before you use these products  Follow up with your doctor or cardiologist as directed:  Write down your questions so you remember to ask them during your visits  © Copyright [employer] 2022 Information is for End User's use only and may not be sold, redistributed or otherwise used for commercial purposes  All illustrations and images included in CareNotes® are the copyrighted property of A D A M , Inc  or [facility] [employer] [employer]   The above information is an  only  It is not intended as medical advice for individual conditions or treatments  Talk to your doctor, nurse or pharmacist before following any medical regimen to see if it is safe and effective for you

## 2023-01-31 NOTE — PLAN OF CARE
Problem: GENITOURINARY - ADULT  Goal: Absence of urinary retention  Description: INTERVENTIONS:  - Assess patient’s ability to void and empty bladder  - Monitor I/O  - Bladder scan as needed  - Discuss with physician/AP medications to alleviate retention as needed  - Discuss catheterization for long term situations as appropriate  Outcome: Progressing     Problem: METABOLIC, FLUID AND ELECTROLYTES - ADULT  Goal: Fluid balance maintained  Description: INTERVENTIONS:  - Monitor labs   - Monitor I/O and WT  - Instruct patient on fluid and nutrition as appropriate  - Assess for signs & symptoms of volume excess or deficit  Outcome: Progressing  Goal: Glucose maintained within target range  Description: INTERVENTIONS:  - Monitor Blood Glucose as ordered  - Assess for signs and symptoms of hyperglycemia and hypoglycemia  - Administer ordered medications to maintain glucose within target range  - Assess nutritional intake and initiate nutrition service referral as needed  Outcome: Progressing     Problem: HEMATOLOGIC - ADULT  Goal: Maintains hematologic stability  Description: INTERVENTIONS  - Assess for signs and symptoms of bleeding or hemorrhage  - Monitor labs  - Administer supportive blood products/factors as ordered and appropriate  Outcome: Progressing

## 2023-01-31 NOTE — PROGRESS NOTES
Cardiology Progress Note - Maria Eugenia Garrett 81 y o  female MRN: [id number]    Unit/Bed#: -01 Encounter: [id number]      Assessment/Plan:  1  Gram-positive bacteremia  · Patient underwent BOLIVAR today which revealed that vegetation could not be excluded on the pulmonic valve  No vegetation was present on the aortic, mitral, or tricuspid valves  2   Aortic stenosis s/p TAVR on 2/18/2020  · TTE showed bioprosthetic TAVR is functioning normally  3  Persistent atrial fibrillation  · Rate controlled on Cardizem CD, decrease dose to 180 mg due to hypotension  · No anticoagulation due to history of retroperitoneal bleed  4  Chronic HFpEF  · Patient appears euvolemic  · Transition to Lasix 20 mg daily  · Decrease lisinopril to 5 mg daily due to hypotension    5  SSS s/p PPM    6  Moderate TR, mild to moderate MR    7  Severe pulmonary hypertension    8  Hypertension  · Currently mildly hypotensive, decrease doses of Cardizem CD and lisinopril    Will see as needed  Please reach out with any questions or concerns  Subjective:   Patient seen and examined  No significant events overnight  Objective:     Vitals: Blood pressure (!) 111/49, pulse 61, temperature 98 2 °F (36 8 °C), resp  rate 18, height 5' 4" (1 626 m), weight 95 2 kg (209 lb 14 1 oz), SpO2 93 %  , Body mass index is 36 03 kg/m² ,   Orthostatic Blood Pressures    Flowsheet Row Most Recent Value   Blood Pressure 111/49 filed at 01/31/2023 1522   Patient Position - Orthostatic VS Lying filed at 01/30/2023 0024            Intake/Output Summary (Last 24 hours) at 1/31/2023 1649  Last data filed at 1/31/2023 1140  Gross per 24 hour   Intake 240 ml   Output 2700 ml   Net -2460 ml         Physical Exam:  Physical Exam  Vitals and nursing note reviewed  Constitutional:       General: She is not in acute distress  Appearance: She is well-developed  She is obese  HENT:      Head: Normocephalic and atraumatic     Eyes: Conjunctiva/sclera: Conjunctivae normal    Cardiovascular:      Rate and Rhythm: Normal rate and regular rhythm  Heart sounds: No murmur heard  Pulmonary:      Effort: Pulmonary effort is normal  No respiratory distress  Breath sounds: Normal breath sounds  Abdominal:      Palpations: Abdomen is soft  Tenderness: There is no abdominal tenderness  Musculoskeletal:         General: No swelling  Cervical back: Neck supple  Skin:     General: Skin is warm and dry  Capillary Refill: Capillary refill takes less than 2 seconds  Neurological:      Mental Status: She is alert and oriented to person, place, and time     Psychiatric:         Mood and Affect: Mood normal               Medications:      Current Facility-Administered Medications:   •  acetaminophen (TYLENOL) tablet 650 mg, 650 mg, Oral, Q6H PRN, Shalonda Becerril PA-C, 650 mg at 01/31/23 0606  •  aspirin chewable tablet 81 mg, 81 mg, Oral, Daily, Shalodna Jacobson PA-C, 81 mg at 01/31/23 0803  •  cefTRIAXone (ROCEPHIN) 2,000 mg in dextrose 5 % 50 mL IVPB, 2,000 mg, Intravenous, Q24H, Ross BLEVINS MD, Last Rate: 100 mL/hr at 01/31/23 1307, 2,000 mg at 01/31/23 1307  •  Diclofenac Sodium (VOLTAREN) 1 % topical gel 2 g, 2 g, Topical, 4x Daily, Ross BLEVINS MD, 2 g at 01/31/23 1142  •  [START ON 2/1/2023] diltiazem (CARDIZEM CD) 24 hr capsule 180 mg, 180 mg, Oral, Daily, Kimmy Monsivais MD  •  enoxaparin (LOVENOX) subcutaneous injection 40 mg, 40 mg, Subcutaneous, Daily, Shalonda Becerril PA-C, 40 mg at 01/31/23 0803  •  Fluticasone Furoate-Vilanterol 100-25 mcg/actuation 1 puff, 1 puff, Inhalation, Daily, Shalonda Jacobson PA-C, 1 puff at 01/31/23 0804  •  [START ON 2/1/2023] furosemide (LASIX) tablet 20 mg, 20 mg, Oral, Daily, Sav Patel MD  •  insulin lispro (HumaLOG) 100 units/mL subcutaneous injection 1-6 Units, 1-6 Units, Subcutaneous, 4x Daily (AC & HS), 3 Units at 01/31/23 1641 **AND** Fingerstick Glucose (POCT), , , 4x Daily AC and at bedtime, Shalonda Bateman PA-C  •  insulin lispro (HumaLOG) 100 units/mL subcutaneous injection 2 Units, 2 Units, Subcutaneous, TID With Meals, Brittani BLEVINS MD, 2 Units at 01/31/23 1641  •  [START ON 2/1/2023] lisinopril (ZESTRIL) tablet 5 mg, 5 mg, Oral, Daily, Rohit Bauer MD  •  pantoprazole (PROTONIX) EC tablet 20 mg, 20 mg, Oral, Early Morning, Shalonda Becerril PA-C, 20 mg at 01/31/23 0555  •  potassium chloride (K-DUR,KLOR-CON) CR tablet 10 mEq, 10 mEq, Oral, Daily, Shalonda Becerril PA-C, 10 mEq at 01/31/23 2114  •  temazepam (RESTORIL) capsule 30 mg, 30 mg, Oral, HS, Shalonda Becerril PA-C, 30 mg at 01/30/23 2241     Labs & Results:     Results from last 7 days   Lab Units 01/26/23  0319 01/26/23  0133 01/25/23  2323   HS TNI 0HR ng/L  --   --  8   HS TNI 2HR ng/L  --  10  --    HSTNI D2 ng/L  --  2  --    HS TNI 4HR ng/L 12  --   --    HSTNI D4 ng/L 4  --   --    NT-PRO BNP pg/mL  --   --  635*     Results from last 7 days   Lab Units 01/31/23  0548 01/30/23  0513 01/29/23  0638   WBC Thousand/uL 10 17* 8 43 9 01   HEMOGLOBIN g/dL 14 3 13 5 13 3   HEMATOCRIT % 43 2 41 4 40 4   PLATELETS Thousands/uL 296 272 258         Results from last 7 days   Lab Units 01/31/23  0548 01/30/23  0513 01/29/23  0638 01/26/23  0601 01/25/23  2323   POTASSIUM mmol/L 4 1 4 3 4 1   < > 3 8   CHLORIDE mmol/L 101 103 102   < > 97   CO2 mmol/L 25 25 26   < > 28   BUN mg/dL 16 16 14   < > 13   CREATININE mg/dL 0 69 0 61 0 70   < > 0 91   CALCIUM mg/dL 9 3 8 8 8 7   < > 9 0   ALK PHOS U/L 109  --   --   --  61   ALT U/L 44  --   --   --  33   AST U/L 40  --   --   --  27    < > = values in this interval not displayed       Results from last 7 days   Lab Units 01/25/23  2323   INR  1 08   PTT seconds 25     Results from last 7 days   Lab Units 01/29/23  0638 01/26/23  0858 01/25/23  2323   MAGNESIUM mg/dL 1 9 2 0 1 4* Vitals: Blood pressure (!) 111/49, pulse 61, temperature 98 2 °F (36 8 °C), resp  rate 18, height 5' 4" (1 626 m), weight 95 2 kg (209 lb 14 1 oz), SpO2 93 %  , Body mass index is 36 03 kg/m² ,   Orthostatic Blood Pressures    Flowsheet Row Most Recent Value   Blood Pressure 111/49 filed at 01/31/2023 1522   Patient Position - Orthostatic VS Lying filed at 01/30/2023 4438          Systolic (34OFK), YII:337 , Min:85 , MMT:286     Diastolic (42JJO), PMR:51, Min:42, Max:60        Intake/Output Summary (Last 24 hours) at 1/31/2023 1649  Last data filed at 1/31/2023 1140  Gross per 24 hour   Intake 240 ml   Output 2700 ml   Net -2460 ml       Invasive Devices     Peripheral Intravenous Line  Duration           Peripheral IV 01/27/23 Left Antecubital 4 days          Drain  Duration           External Urinary Catheter 2 days                    BP Readings from Last 3 Encounters:   01/31/23 (!) 111/49   08/23/22 137/73   07/07/22 167/57      Wt Readings from Last 3 Encounters:   01/31/23 95 2 kg (209 lb 14 1 oz)   08/23/22 90 7 kg (200 lb)   07/03/22 90 7 kg (200 lb)

## 2023-01-31 NOTE — ASSESSMENT & PLAN NOTE
2/2 blood culture growing Streptococcus mitis oralis intermediate sensitivity to penicillin, sensitive to ceftriaxone, vancomycin  Repeat 2/2 blood cultures remained without growth  Bacteremia cleared      See plan as in sepsis

## 2023-01-31 NOTE — ASSESSMENT & PLAN NOTE
Patient lives alone  Presents with ambulatory dysfunction due to pain and weakness  · CT head wo negative  · At baseline walks with walker   · Fall precautions   · PT/OT: recommended post acute care rehab  Patient agrees

## 2023-01-31 NOTE — QUICK NOTE
Discussed with cardiology  BOLIVAR is without obvious vegetation  With no obvious vegetation and rapid clearance of bacteremia, we can try to salvage PPM and no need for CT surgery evaluation  However, since BOLIVAR completely rule out early PVE, will treat patient with 6 weeks of IV antibiotic, as planned  Okay for PICC placement  Rx on chart for outpatient IV antibiotic  Okay for discharge from ID viewpoint  Follow-up with us 2 weeks after discharge  Discussed with slim service

## 2023-02-01 ENCOUNTER — APPOINTMENT (INPATIENT)
Dept: RADIOLOGY | Facility: HOSPITAL | Age: 83
End: 2023-02-01

## 2023-02-01 LAB
ANION GAP SERPL CALCULATED.3IONS-SCNC: 9 MMOL/L (ref 4–13)
BACTERIA BLD CULT: NORMAL
BACTERIA BLD CULT: NORMAL
BUN SERPL-MCNC: 24 MG/DL (ref 5–25)
CALCIUM SERPL-MCNC: 9.7 MG/DL (ref 8.3–10.1)
CHLORIDE SERPL-SCNC: 100 MMOL/L (ref 96–108)
CO2 SERPL-SCNC: 26 MMOL/L (ref 21–32)
CREAT SERPL-MCNC: 0.75 MG/DL (ref 0.6–1.3)
ERYTHROCYTE [DISTWIDTH] IN BLOOD BY AUTOMATED COUNT: 13.2 % (ref 11.6–15.1)
GFR SERPL CREATININE-BSD FRML MDRD: 74 ML/MIN/1.73SQ M
GLUCOSE SERPL-MCNC: 145 MG/DL (ref 65–140)
GLUCOSE SERPL-MCNC: 187 MG/DL (ref 65–140)
GLUCOSE SERPL-MCNC: 200 MG/DL (ref 65–140)
GLUCOSE SERPL-MCNC: 226 MG/DL (ref 65–140)
GLUCOSE SERPL-MCNC: 240 MG/DL (ref 65–140)
HCT VFR BLD AUTO: 42.8 % (ref 34.8–46.1)
HGB BLD-MCNC: 14.1 G/DL (ref 11.5–15.4)
MCH RBC QN AUTO: 30.4 PG (ref 26.8–34.3)
MCHC RBC AUTO-ENTMCNC: 32.9 G/DL (ref 31.4–37.4)
MCV RBC AUTO: 92 FL (ref 82–98)
PLATELET # BLD AUTO: 322 THOUSANDS/UL (ref 149–390)
PMV BLD AUTO: 9.8 FL (ref 8.9–12.7)
POTASSIUM SERPL-SCNC: 4.4 MMOL/L (ref 3.5–5.3)
RBC # BLD AUTO: 4.64 MILLION/UL (ref 3.81–5.12)
SODIUM SERPL-SCNC: 135 MMOL/L (ref 135–147)
WBC # BLD AUTO: 9.9 THOUSAND/UL (ref 4.31–10.16)

## 2023-02-01 PROCEDURE — 02HV33Z INSERTION OF INFUSION DEVICE INTO SUPERIOR VENA CAVA, PERCUTANEOUS APPROACH: ICD-10-PCS | Performed by: INTERNAL MEDICINE

## 2023-02-01 RX ORDER — INSULIN GLARGINE 100 [IU]/ML
5 INJECTION, SOLUTION SUBCUTANEOUS EVERY MORNING
Status: DISCONTINUED | OUTPATIENT
Start: 2023-02-01 | End: 2023-02-02 | Stop reason: HOSPADM

## 2023-02-01 RX ADMIN — PANTOPRAZOLE SODIUM 20 MG: 20 TABLET, DELAYED RELEASE ORAL at 05:31

## 2023-02-01 RX ADMIN — INSULIN LISPRO 2 UNITS: 100 INJECTION, SOLUTION INTRAVENOUS; SUBCUTANEOUS at 22:09

## 2023-02-01 RX ADMIN — CEFTRIAXONE SODIUM 2000 MG: 10 INJECTION, POWDER, FOR SOLUTION INTRAVENOUS at 12:37

## 2023-02-01 RX ADMIN — INSULIN LISPRO 2 UNITS: 100 INJECTION, SOLUTION INTRAVENOUS; SUBCUTANEOUS at 17:30

## 2023-02-01 RX ADMIN — INSULIN LISPRO 3 UNITS: 100 INJECTION, SOLUTION INTRAVENOUS; SUBCUTANEOUS at 12:30

## 2023-02-01 RX ADMIN — INSULIN LISPRO 1 UNITS: 100 INJECTION, SOLUTION INTRAVENOUS; SUBCUTANEOUS at 08:30

## 2023-02-01 RX ADMIN — INSULIN GLARGINE 5 UNITS: 100 INJECTION, SOLUTION SUBCUTANEOUS at 08:54

## 2023-02-01 RX ADMIN — ACETAMINOPHEN 650 MG: 325 TABLET, FILM COATED ORAL at 22:08

## 2023-02-01 RX ADMIN — INSULIN LISPRO 2 UNITS: 100 INJECTION, SOLUTION INTRAVENOUS; SUBCUTANEOUS at 12:35

## 2023-02-01 RX ADMIN — FUROSEMIDE 20 MG: 20 TABLET ORAL at 08:40

## 2023-02-01 RX ADMIN — DICLOFENAC SODIUM TOPICAL GEL, 1%, 2 G: 10 GEL TOPICAL at 22:09

## 2023-02-01 RX ADMIN — ASPIRIN 81 MG: 81 TABLET, CHEWABLE ORAL at 08:40

## 2023-02-01 RX ADMIN — TEMAZEPAM 30 MG: 15 CAPSULE ORAL at 22:06

## 2023-02-01 RX ADMIN — POTASSIUM CHLORIDE 10 MEQ: 750 TABLET, EXTENDED RELEASE ORAL at 12:34

## 2023-02-01 RX ADMIN — DILTIAZEM HYDROCHLORIDE 180 MG: 180 CAPSULE, COATED, EXTENDED RELEASE ORAL at 08:40

## 2023-02-01 RX ADMIN — DICLOFENAC SODIUM TOPICAL GEL, 1%, 2 G: 10 GEL TOPICAL at 12:29

## 2023-02-01 RX ADMIN — FLUTICASONE FUROATE AND VILANTEROL TRIFENATATE 1 PUFF: 100; 25 POWDER RESPIRATORY (INHALATION) at 08:46

## 2023-02-01 RX ADMIN — INSULIN LISPRO 2 UNITS: 100 INJECTION, SOLUTION INTRAVENOUS; SUBCUTANEOUS at 08:43

## 2023-02-01 RX ADMIN — DICLOFENAC SODIUM TOPICAL GEL, 1%, 2 G: 10 GEL TOPICAL at 09:00

## 2023-02-01 RX ADMIN — LISINOPRIL 5 MG: 5 TABLET ORAL at 08:39

## 2023-02-01 RX ADMIN — DICLOFENAC SODIUM TOPICAL GEL, 1%, 2 G: 10 GEL TOPICAL at 17:37

## 2023-02-01 RX ADMIN — ACETAMINOPHEN 650 MG: 325 TABLET, FILM COATED ORAL at 08:53

## 2023-02-01 RX ADMIN — ENOXAPARIN SODIUM 40 MG: 40 INJECTION SUBCUTANEOUS at 08:46

## 2023-02-01 NOTE — QUICK NOTE
Tried to call Brigid Alcocer as well as betty but was unreachable  Left voicemail  We will try to call back again later tomorrow

## 2023-02-01 NOTE — PROGRESS NOTES
Progress Note - Infectious Disease   Maria Eugenia Garrett 81 y o  female MRN: [id number]  Unit/Bed#: -01 Encounter: [id number]      Impression/Plan:  1   Streptococcus mitis oralis bacteremia   Source is most likely right lower leg cellulitis   Patient's dentition is good and she does see dentist on a regular basis   Although bacteremia cleared rapidly, patient has bioprosthetic AV and 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   We need to consider the possibility of prosthetic valve endocarditis and infected PPM   Transesophageal echocardiogram without valvular vegetation or evidence of pacemaker infection  Continue ceftriaxone through 3/10/2023 to complete 6 weeks total  Recheck CBC with differential and CMP weekly while on the ceftriaxone  Follow-up on repeat blood cultures        2   Sepsis, present on admission, presenting with leukocytosis and tachypnea   Source of sepsis is most likely cellulitis and secondary bacteremia   Patient is systemically improved  WBC has normalized  Antibiotic plan as in above  Monitor temperature/WBC      3   Right leg cellulitis, most likely secondary to underlying venous stasis   Cellulitis appears to be superficial and clinically improving   No evidence of septic ankle clinically  Antibiotic plan as in above  Serial leg exams      4   Status post TVAR in 2020 for AS   Presence of alpha strep bacteremia, we need to consider the possibility of prosthetic valve endocarditis   Patient will need further work-up     She will likely need long-term IV antibiotic  Transesophageal echocardiogram negative  Antibiotic plan as in above      5   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  Transesophageal echocardiogram without evidence of pacemaker infection  Antibiotics as above     6   Probable right leg venous stasis, with poorly controlled leg edema   Discussed with patient regarding keeping leg elevated to control edema  Leg elevation to control edema      7   CVA, with residual right-sided weakness      Discussed the above management plan with the primary service    Antibiotics:  Ceftriaxone 4  Antibiotics 7  Negative blood cultures 2023    Subjective:  Patient has no fever, chills, sweats; no nausea, vomiting, diarrhea; no cough, shortness of breath; no pain  No new symptoms  Objective:  Vitals:  Temp:  [97 1 °F (36 2 °C)-98 2 °F (36 8 °C)] 97 9 °F (36 6 °C)  HR:  [60-62] 61  Resp:  [18-20] 18  BP: ()/(42-70) 120/70  SpO2:  [89 %-99 %] 91 %  Temp (24hrs), Av 7 °F (36 5 °C), Min:97 1 °F (36 2 °C), Max:98 2 °F (36 8 °C)  Current: Temperature: 97 9 °F (36 6 °C)    Physical Exam:   General Appearance:  Alert, interactive, nontoxic, no acute distress  Throat: Oropharynx moist without lesions  Lungs:   Clear to auscultation bilaterally; no wheezes, rhonchi or rales; respirations unlabored   Heart:  RRR; no murmur, rub or gallop   Abdomen:   Soft, non-tender, non-distended, positive bowel sounds  Extremities: No clubbing, cyanosis  Decreased leg erythema   Skin: No new rashes or lesions  No draining wounds noted  Labs, Imaging, & Other studies:   All pertinent labs and imaging studies were personally reviewed  Results from last 7 days   Lab Units 23  0503 23  0548 23  0513   WBC Thousand/uL 9 90 10 17* 8 43   HEMOGLOBIN g/dL 14 1 14 3 13 5   PLATELETS Thousands/uL 322 296 272     Results from last 7 days   Lab Units 23  0503 23  0548 23  0513 23  0601 23  2323   SODIUM mmol/L 135 134* 136   < > 135   POTASSIUM mmol/L 4 4 4 1 4 3   < > 3 8   CHLORIDE mmol/L 100 101 103   < > 97   CO2 mmol/L 26 25 25   < > 28   BUN mg/dL 24 16 16   < > 13   CREATININE mg/dL 0 75 0 69 0 61   < > 0 91   EGFR ml/min/1 73sq m 74 81 85   < > 59   CALCIUM mg/dL 9 7 9 3 8 8   < > 9 0   AST U/L  --  40  --   --  27   ALT U/L  --  44  --   --  33   ALK PHOS U/L  --  109  --   --  61    < > = values in this interval not displayed       Results from last 7 days   Lab Units 23  0043 01/26/23  0601 01/25/23  2323   BLOOD CULTURE  No Growth After 5 Days  No Growth After 5 Days    --  Streptococcus mitis oralis group*  Streptococcus mitis oralis group*   GRAM STAIN RESULT   --   --  Gram positive cocci in pairs, chains and clusters*  Gram positive cocci in pairs and chains*   URINE CULTURE   --  No Growth <1000 cfu/mL  --      Results from last 7 days   Lab Units 01/25/23  2323   PROCALCITONIN ng/ml 0 06

## 2023-02-01 NOTE — PLAN OF CARE
Problem: MOBILITY - ADULT  Goal: Maintain or return to baseline ADL function  Description: INTERVENTIONS:  -  Assess patient's ability to carry out ADLs; assess patient's baseline for ADL function and identify physical deficits which impact ability to perform ADLs (bathing, care of mouth/teeth, toileting, grooming, dressing, etc )  - Assess/evaluate cause of self-care deficits   - Assess range of motion  - Assess patient's mobility; develop plan if impaired  - Assess patient's need for assistive devices and provide as appropriate  - Encourage maximum independence but intervene and supervise when necessary  - Involve family in performance of ADLs  - Assess for home care needs following discharge   - Consider OT consult to assist with ADL evaluation and planning for discharge  - Provide patient education as appropriate  Outcome: Progressing  Goal: Maintains/Returns to pre admission functional level  Description: INTERVENTIONS:  - Perform BMAT or MOVE assessment daily    - Set and communicate daily mobility goal to care team and patient/family/caregiver  - Collaborate with rehabilitation services on mobility goals if consulted  - Perform Range of Motion  times a day  - Reposition patient every  hours    - Dangle patient  times a day  - Stand patient  times a day  - Ambulate patient  times a day  - Out of bed to chair  times a day   - Out of bed for meals  times a day  - Out of bed for toileting  - Record patient progress and toleration of activity level   Outcome: Progressing     Problem: Prexisting or High Potential for Compromised Skin Integrity  Goal: Skin integrity is maintained or improved  Description: INTERVENTIONS:  - Identify patients at risk for skin breakdown  - Assess and monitor skin integrity  - Assess and monitor nutrition and hydration status  - Monitor labs   - Assess for incontinence   - Turn and reposition patient  - Assist with mobility/ambulation  - Relieve pressure over bony prominences  - Avoid friction and shearing  - Provide appropriate hygiene as needed including keeping skin clean and dry  - Evaluate need for skin moisturizer/barrier cream  - Collaborate with interdisciplinary team   - Patient/family teaching  - Consider wound care consult   Outcome: Progressing     Problem: Potential for Falls  Goal: Patient will remain free of falls  Description: INTERVENTIONS:  - Educate patient/family on patient safety including physical limitations  - Instruct patient to call for assistance with activity   - Consult OT/PT to assist with strengthening/mobility   - Keep Call bell within reach  - Keep bed low and locked with side rails adjusted as appropriate  - Keep care items and personal belongings within reach  - Initiate and maintain comfort rounds  - Make Fall Risk Sign visible to staff  - Offer Toileting every  Hours, in advance of need  - Initiate/Maintain alarm  - Obtain necessary fall risk management equipment:   - Apply yellow socks and bracelet for high fall risk patients  - Consider moving patient to room near nurses station  Outcome: Progressing     Problem: GENITOURINARY - ADULT  Goal: Absence of urinary retention  Description: INTERVENTIONS:  - Assess patient’s ability to void and empty bladder  - Monitor I/O  - Bladder scan as needed  - Discuss with physician/AP medications to alleviate retention as needed  - Discuss catheterization for long term situations as appropriate  Outcome: Progressing     Problem: METABOLIC, FLUID AND ELECTROLYTES - ADULT  Goal: Fluid balance maintained  Description: INTERVENTIONS:  - Monitor labs   - Monitor I/O and WT  - Instruct patient on fluid and nutrition as appropriate  - Assess for signs & symptoms of volume excess or deficit  Outcome: Progressing  Goal: Glucose maintained within target range  Description: INTERVENTIONS:  - Monitor Blood Glucose as ordered  - Assess for signs and symptoms of hyperglycemia and hypoglycemia  - Administer ordered medications to maintain glucose within target range  - Assess nutritional intake and initiate nutrition service referral as needed  Outcome: Progressing     Problem: GENITOURINARY - ADULT  Goal: Absence of urinary retention  Description: INTERVENTIONS:  - Assess patient’s ability to void and empty bladder  - Monitor I/O  - Bladder scan as needed  - Discuss with physician/AP medications to alleviate retention as needed  - Discuss catheterization for long term situations as appropriate  Outcome: Progressing     Problem: SKIN/TISSUE INTEGRITY - ADULT  Goal: Skin Integrity remains intact(Skin Breakdown Prevention)  Description: Assess:  -Perform Earl assessment every   -Clean and moisturize skin every   -Inspect skin when repositioning, toileting, and assisting with ADLS  -Assess under medical devices such as  every   -Assess extremities for adequate circulation and sensation     Bed Management:  -Have minimal linens on bed & keep smooth, unwrinkled  -Change linens as needed when moist or perspiring  -Avoid sitting or lying in one position for more than  hours while in bed  -Keep HOB at degrees     Toileting:  -Offer bedside commode  -Assess for incontinence every   -Use incontinent care products after each incontinent episode such as     Activity:  -Mobilize patient  times a day  -Encourage activity and walks on unit  -Encourage or provide ROM exercises   -Turn and reposition patient every  Hours  -Use appropriate equipment to lift or move patient in bed  -Instruct/ Assist with weight shifting even when out of bed in chair  -Consider limitation of chair time  hour intervals    Skin Care:  -Avoid use of baby powder, tape, friction and shearing, hot water or constrictive clothing  -Relieve pressure over bony prominences using   -Do not massage red bony areas    Next Steps:  -Teach patient strategies to minimize risks such as    -Consider consults to  interdisciplinary teams such as   Outcome: Progressing     Problem: HEMATOLOGIC - ADULT  Goal: Maintains hematologic stability  Description: INTERVENTIONS  - Assess for signs and symptoms of bleeding or hemorrhage  - Monitor labs  - Administer supportive blood products/factors as ordered and appropriate  Outcome: Progressing     Problem: MUSCULOSKELETAL - ADULT  Goal: Maintain or return mobility to safest level of function  Description: INTERVENTIONS:  - Assess patient's ability to carry out ADLs; assess patient's baseline for ADL function and identify physical deficits which impact ability to perform ADLs (bathing, care of mouth/teeth, toileting, grooming, dressing, etc )  - Assess/evaluate cause of self-care deficits   - Assess range of motion  - Assess patient's mobility  - Assess patient's need for assistive devices and provide as appropriate  - Encourage maximum independence but intervene and supervise when necessary  - Involve family in performance of ADLs  - Assess for home care needs following discharge   - Consider OT consult to assist with ADL evaluation and planning for discharge  - Provide patient education as appropriate  Outcome: Progressing

## 2023-02-01 NOTE — PLAN OF CARE
Problem: INFECTION - ADULT  Goal: Absence or prevention of progression during hospitalization  Description: INTERVENTIONS:  - Assess and monitor for signs and symptoms of infection  - Monitor lab/diagnostic results  - Monitor all insertion sites, i e  indwelling lines, tubes, and drains  - [place] appropriate cooling/warming therapies per order  - Administer medications as ordered  - Instruct and encourage patient and family to use good hand hygiene technique  - Identify and instruct in appropriate isolation precautions for identified infection/condition  Outcome: Progressing     Problem: HEMATOLOGIC - ADULT  Goal: Maintains hematologic stability  Description: INTERVENTIONS  - Assess for signs and symptoms of bleeding or hemorrhage  - Monitor labs  - Administer supportive blood products/factors as ordered and appropriate  2/1/2023 1827 by [place], RN  Outcome: Progressing  2/1/2023 1826 by [place], RN  Outcome: Progressing     Problem: Prexisting or High Potential for Compromised Skin Integrity  Goal: Skin integrity is maintained or improved  Description: INTERVENTIONS:  - Identify patients at risk for skin breakdown  - Assess and monitor skin integrity  - Assess and monitor nutrition and hydration status  - Monitor labs   - Assess for incontinence   - Turn and reposition patient  - Assist with mobility/ambulation  - Relieve pressure over bony prominences  - Avoid friction and shearing  - Provide appropriate hygiene as needed including keeping skin clean and dry  - Evaluate need for skin moisturizer/barrier cream  - Collaborate with interdisciplinary team   - Patient/family teaching  - Consider wound care consult   2/1/2023 1827 by [place], RN  Outcome: Progressing  2/1/2023 1826 by [place], RN  Outcome: Progressing     Problem: METABOLIC, FLUID AND ELECTROLYTES - ADULT  Goal: Glucose maintained within target range  Description: INTERVENTIONS:  - Monitor Blood Glucose as ordered  - Assess for signs and symptoms of hyperglycemia and hypoglycemia  - Administer ordered medications to maintain glucose within target range  - Assess nutritional intake and initiate nutrition service referral as needed  2/1/2023 1827 by Boris Mena RN  Outcome: Progressing  2/1/2023 1826 by Boris Mena, RN  Outcome: Progressing

## 2023-02-01 NOTE — PROGRESS NOTES
INTERNAL MEDICINE RESIDENCY PROGRESS NOTE     Name: Shelly Fajardo   Age & Sex: 81 y o  female   MRN: [id number]  Unit/Bed#: -01   Encounter: [id number]    PATIENT INFORMATION     Name: Shelly Fajardo   Age & Sex: 81 y o  female   MRN: [id number]  Hospital Stay Days: 6    ASSESSMENT/PLAN     Principal Problem:    Sepsis ([facility] )  Active Problems:    Gram-positive bacteremia    History of stroke    Essential hypertension    Type 2 diabetes mellitus without complication, without long-term current use of insulin ([facility] )    Other persistent atrial fibrillation ([facility])    Severe aortic stenosis    Acute on chronic diastolic (congestive) heart failure ([facility])    Ambulatory dysfunction    Cellulitis of right lower extremity    Acute cystitis      * Sepsis ([facility] )  Assessment & Plan  Presented admission with tachycardia, leukocytosis, tachypnea with cellulitis of right lower extremity and bacteremia  Has improved overall  Afebrile since 48 hours  Blood culture 2/2 initially grew Streptococcus mitis oralis intermediate sensitive to penicillin, sensitive to ceftriaxone and vancomycin  ID following recommended high-dose IV ceftriaxone  Repeat blood culture 2/2 has been without any growth  Bacteremia cleared  BOLIVAR by cardiology without any vegetation noted  Hemodynamically stable  ID following  Recommended 6 weeks of IV antibiotic through 3/10  Patient received PICC line today 02/01/2023  Trend fever/leukocyte count    Gram-positive bacteremia  Assessment & Plan  2/2 blood culture growing Streptococcus mitis oralis intermediate sensitivity to penicillin, sensitive to ceftriaxone, vancomycin  Repeat 2/2 blood cultures remained without growth  Bacteremia cleared      See plan as in sepsis    Acute cystitis  Assessment & Plan  · CT a/p: urinary bladder wall appears mildly thickened and there is mild urothelial enhancement  · Patient meeting sepsis criteria   · Received IV ceftriaxone 1g x1 for cellulitis   · UA noted negative UTI  · Urine culture without growth  · Cystitis ruled out  Cellulitis of right lower extremity  Assessment & Plan  Patient has chronic right lower extremity venous stasis, edema  Venous Doppler negative for DVT on this admission  Antibiotics as above  Encouraged to keep right lower extremity elevated help reduce edema  Ambulatory dysfunction  Assessment & Plan  Patient lives alone  Presents with ambulatory dysfunction due to pain and weakness  · CT head wo negative  · At baseline walks with walker   · Fall precautions   · PT/OT: recommended post acute care rehab  Patient agrees  Acute on chronic diastolic (congestive) heart failure (HCC)  Assessment & Plan  · Had mild acute on chronic diastolic CHF on admission in setting of volume overload noted on CT  Treated with IV diuretics initially     Saturating well on room air  Currently looking euvolemic  Continue Lasix p o  20 mg daily and potassium supplements  Monitor I's and O's, daily weight, low-salt diet    Severe aortic stenosis  Assessment & Plan  S/p TAVR    Other persistent atrial fibrillation ([facility])  Assessment & Plan  · Rate controlled    · Continue Cardizem   · Not on anticoagulation due to history of bleeding  Type 2 diabetes mellitus without complication, without long-term current use of insulin ([facility])  Assessment & Plan  Lab Results   Component Value Date    HGBA1C 7 1 (H) 10/12/2021     · Humalog SSI and glucochecks ac and hs   · We will start Lantus 8 units at bedtime while inpatient  · Will try to increase metformin on discharge due to elevated A1c  · Monitor for hypoglycemia   · CCD    Essential hypertension  Assessment & Plan  · Continue lisinopril     History of stroke  Assessment & Plan  • Hx CVA with residual right sided hemiparesis, facial droop and mild aphasia  • continue ASA/statin       Disposition: Today or tomorrow pending rehab placement  SUBJECTIVE     Patient seen and examined   No acute events overnight  PICC line placed today, denies any complaints  Doing overall well  OBJECTIVE     Vitals:    23 1522 23 2315 23 0537 23 0729   BP: (!) 111/49 120/68  120/70   Pulse: 61 60  61   Resp: 18 18  18   Temp: 98 2 °F (36 8 °C) 97 9 °F (36 6 °C)     TempSrc:       SpO2: 93% 92%  91%   Weight:   94 5 kg (208 lb 5 4 oz)    Height:          Temperature:   Temp (24hrs), Av 1 °F (36 7 °C), Min:97 9 °F (36 6 °C), Max:98 2 °F (36 8 °C)    Temperature: 97 9 °F (36 6 °C)  Intake & Output:  I/O        0701   0700  0701   0700  0701   0700    P  O  840 480 240    Total Intake(mL/kg) 840 (8 8) 480 (5 1) 240 (2 5)    Urine (mL/kg/hr) 3000 (1 3) 1500 (0 7)     Total Output 3000 1500     Net -2160 -1020 +240               Weights:   IBW (Ideal Body Weight): 54 7 kg    Body mass index is 35 76 kg/m²  Weight (last 2 days)     Date/Time Weight    23 0537 94 5 (208 34)    23 1020 95 2 (209 88)    23 0600 95 2 (209 88)    23 0600 94 (207 23)    23 0513 95 1 (209 66)        Physical Exam  HENT:      Head: Normocephalic and atraumatic  Right Ear: External ear normal       Left Ear: External ear normal       Nose: Nose normal    Eyes:      Conjunctiva/sclera: Conjunctivae normal    Cardiovascular:      Rate and Rhythm: Normal rate  Pulses: Normal pulses  Pulmonary:      Effort: Pulmonary effort is normal    Musculoskeletal:      Right lower leg: Edema present  Comments: Chronic RLE edema due to venous stasis, mild erythema    Skin:     General: Skin is warm and dry  Findings: Erythema present  Comments: Mild erythema present in RLE    Neurological:      General: No focal deficit present  Mental Status: She is alert  Mental status is at baseline  Psychiatric:         Mood and Affect: Mood normal        LABORATORY DATA     Labs: I have personally reviewed pertinent reports    Results from last 7 days   Lab Units 02/01/23  0503 01/31/23  0548 01/30/23  0513 01/28/23  0448 01/27/23  0628 01/26/23  0601 01/25/23  2323   WBC Thousand/uL 9 90 10 17* 8 43   < > 8 78   < > 21 14*   HEMOGLOBIN g/dL 14 1 14 3 13 5   < > 12 9   < > 15 0   HEMATOCRIT % 42 8 43 2 41 4   < > 39 4   < > 45 6   PLATELETS Thousands/uL 322 296 272   < > 204   < > 258   NEUTROS PCT %  --   --   --   --  71  --  87*   MONOS PCT %  --   --   --   --  6  --  5    < > = values in this interval not displayed  Results from last 7 days   Lab Units 02/01/23  0503 01/31/23  0548 01/30/23  0513 01/26/23  0601 01/25/23  2323   POTASSIUM mmol/L 4 4 4 1 4 3   < > 3 8   CHLORIDE mmol/L 100 101 103   < > 97   CO2 mmol/L 26 25 25   < > 28   BUN mg/dL 24 16 16   < > 13   CREATININE mg/dL 0 75 0 69 0 61   < > 0 91   CALCIUM mg/dL 9 7 9 3 8 8   < > 9 0   ALK PHOS U/L  --  109  --   --  61   ALT U/L  --  44  --   --  33   AST U/L  --  40  --   --  27    < > = values in this interval not displayed  Results from last 7 days   Lab Units 01/29/23  0638 01/26/23  0858 01/25/23  2323   MAGNESIUM mg/dL 1 9 2 0 1 4*          Results from last 7 days   Lab Units 01/25/23  2323   INR  1 08   PTT seconds 25     Results from last 7 days   Lab Units 01/27/23  0628   LACTIC ACID mmol/L 1 1           IMAGING & DIAGNOSTIC TESTING     Radiology Results: I have personally reviewed pertinent reports  CT head without contrast    Result Date: 1/26/2023  Impression: No acute intracranial abnormality  Chronic microangiopathic changes  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  Workstation performed: [id number]     PE Study with CT Abdomen and Pelvis with contrast    Result Date: 1/26/2023  Impression: Findings suggesting cystitis/urinary tract infection  Correlation with urinalysis and urine culture and sensitivity is recommended  No evidence of pulmonary embolism is seen  Findings suggesting possible mild fluid overload  Clinical correlation recommended   Other nonemergent and chronic findings, as described above  Please see discussion  Workstation performed: [id number]     Other Diagnostic Testing: I have personally reviewed pertinent reports  ACTIVE MEDICATIONS     Current Facility-Administered Medications   Medication Dose Route Frequency   • acetaminophen (TYLENOL) tablet 650 mg  650 mg Oral Q6H PRN   • aspirin chewable tablet 81 mg  81 mg Oral Daily   • cefTRIAXone (ROCEPHIN) 2,000 mg in dextrose 5 % 50 mL IVPB  2,000 mg Intravenous Q24H   • Diclofenac Sodium (VOLTAREN) 1 % topical gel 2 g  2 g Topical 4x Daily   • diltiazem (CARDIZEM CD) 24 hr capsule 180 mg  180 mg Oral Daily   • enoxaparin (LOVENOX) subcutaneous injection 40 mg  40 mg Subcutaneous Daily   • Fluticasone Furoate-Vilanterol 100-25 mcg/actuation 1 puff  1 puff Inhalation Daily   • furosemide (LASIX) tablet 20 mg  20 mg Oral Daily   • insulin glargine (LANTUS) subcutaneous injection 5 Units 0 05 mL  5 Units Subcutaneous QAM   • insulin lispro (HumaLOG) 100 units/mL subcutaneous injection 1-6 Units  1-6 Units Subcutaneous 4x Daily (AC & HS)   • insulin lispro (HumaLOG) 100 units/mL subcutaneous injection 2 Units  2 Units Subcutaneous TID With Meals   • lisinopril (ZESTRIL) tablet 5 mg  5 mg Oral Daily   • pantoprazole (PROTONIX) EC tablet 20 mg  20 mg Oral Early Morning   • potassium chloride (K-DUR,KLOR-CON) CR tablet 10 mEq  10 mEq Oral Daily   • temazepam (RESTORIL) capsule 30 mg  30 mg Oral HS       VTE Pharmacologic Prophylaxis: Enoxaparin (Lovenox)  VTE Mechanical Prophylaxis: sequential compression device and foot pump applied    Portions of the record may have been created with voice recognition software  Occasional wrong word or "sound a like" substitutions may have occurred due to the inherent limitations of voice recognition software    Read the chart carefully and recognize, using context, where substitutions have occurred   ==  Jessica Anderson MD  [facility]  Internal Medicine Residency PGY-3

## 2023-02-01 NOTE — PLAN OF CARE
Problem: OCCUPATIONAL THERAPY ADULT  Goal: Performs self-care activities at highest level of function for planned discharge setting  See evaluation for individualized goals  Description: Treatment Interventions: Functional transfer training, UE strengthening/ROM, Endurance training, Patient/family training, Compensatory technique education          See flowsheet documentation for full assessment, interventions and recommendations  Outcome: Progressing  Note: Limitation: Decreased ADL status, Decreased UE ROM, Decreased UE strength, Decreased endurance, Decreased self-care trans, Decreased high-level ADLs, Non-func R UE  Prognosis: Good  Assessment: Patient participated in Skilled OT session this date with interventions consisting of therapeutic exercise to: increase functional use of BUEs, increase BUE muscle strength ,  therapeutic activities to: increase activity tolerance and increase dynamic sit/ stand balance during functional activity   Patient agreeable to OT treatment session, upon arrival patient was found supine in bed and in no apparent distress  Patient completed bed mobility with mod assist of 2 and functional transfers with min assist of 2  Pt ambulated in room with min assist of 2 utilizing giuliano-walker  Once seated in recliner, pt completed AROM of LUE and PROM/stretch of RUE to increase strength and endurance to increase independence with ADLs  In comparison to previous session, patient with improvements in functional mobility and endurance  Patient requiring verbal cues for correct technique, one step directives and frequent rest periods  Patient continues to be functioning below baseline level, occupational performance remains limited secondary to factors listed above and increased risk for falls and injury  From OT standpoint, recommendation at time of d/c would be Post acute rehabilitation services     Patient to benefit from continued Occupational Therapy treatment while in the hospital to address deficits as defined above and maximize level of functional independence with ADLs and functional mobility       OT Discharge Recommendation: Post acute rehabilitation services     Shayne MORRELL

## 2023-02-01 NOTE — PROCEDURES
Insert PICC line    Date/Time: 2/1/2023 3:51 PM  Performed by: Britney Duomnt RN  Authorized by: Mary Felder MD     Patient location:  Bedside  Consent:     Consent obtained:  Written    Consent given by:  Patient  Universal protocol:     Procedure explained and questions answered to patient or proxy's satisfaction: yes      Relevant documents present and verified: yes      Test results available and properly labeled: yes      Radiology Images displayed and confirmed  If images not available, report reviewed: yes      Required blood products, implants, devices, and special equipment available: yes      Site/side marked: yes      Immediately prior to procedure, a time out was called: yes      Patient identity confirmed:  Verbally with patient and arm band  Pre-procedure details:     Hand hygiene: Hand hygiene performed prior to insertion      Sterile barrier technique: All elements of maximal sterile technique followed      Skin preparation:  ChloraPrep    Skin preparation agent: Skin preparation agent completely dried prior to procedure    Indications:     PICC line indications: long term antibiotics    Anesthesia (see MAR for exact dosages):      Anesthesia method:  Local infiltration    Local anesthetic:  Lidocaine 1% w/o epi  Procedure details:     Location:  Cephalic    Vessel type: vein      Laterality:  Left    Approach: percutaneous technique used      Patient position:  Flat    Procedural supplies:  Single lumen    Catheter size:  4 Fr    Landmarks identified: yes      Ultrasound guidance: yes      Ultrasound image availability:  Not saved    Sterile ultrasound techniques: Sterile gel and sterile probe covers were used      Number of attempts:  2    Successful placement: yes      Vessel of catheter tip end:  Chest Xray needed to confirm placement    Total catheter length (cm):  47    Catheter out on skin (cm):  1    Max flow rate:  5ml/sec    Arm circumference:  33  Post-procedure details: Post-procedure:  Dressing applied and securement device placed    Assessment:  Blood return through all ports and placement verification pending x-ray result    Post-procedure complications: none      Patient tolerance of procedure:   Tolerated well, no immediate complications    Observer: Yes      Observer name:  Lorraine Waddell RN  Comments:      Placed By Mellisa Santiago RN

## 2023-02-01 NOTE — PHYSICAL THERAPY NOTE
Physical Therapy Treatment Note    Patient's Name: Shelly Fajardo    Admitting Diagnosis  Shortness of breath [R06 02]  Leg swelling [M79 89]  Weakness of right upper extremity [R29 898]    Problem List  Patient Active Problem List   Diagnosis    History of stroke    Essential hypertension    Type 2 diabetes mellitus without complication, without long-term current use of insulin (HCC)    Tibial plateau fracture    Other persistent atrial fibrillation (HCC)    Severe aortic stenosis    Carotid occlusion, left    Carotid stenosis, asymptomatic, right    S/P TAVR (transcatheter aortic valve replacement)    Right sided weakness    Expressive aphasia    Acute pulmonary embolism (HCC)    Bilateral leg edema    Adenopathy    Acute on chronic diastolic (congestive) heart failure (HCC)    Tachy-chana syndrome (HCC)    Mild intermittent asthma    Ambulatory dysfunction    Closed fracture of proximal end of humerus    Elevated bilirubin    Leukocytosis    Sepsis ([facility] )    Cellulitis of right lower extremity    Acute cystitis    Gram-positive bacteremia        02/01/23 1114   PT Last Visit   PT Visit Date 02/01/23   Note Type   Note Type Treatment   Pain Assessment   Pain Assessment Tool 0-10   Pain Score No Pain   Restrictions/Precautions   Weight Bearing Precautions Per Order No   Braces or Orthoses Other (Comment)  (none per patient)   Other Precautions Chair Alarm; Bed Alarm; Fall Risk   General   Chart Reviewed Yes   Response to Previous Treatment Patient with no complaints from previous session  Family/Caregiver Present No   Cognition   Overall Cognitive Status WFL   Arousal/Participation Alert   Attention Within functional limits   Orientation Level Oriented X4   Memory Within functional limits   Following Commands Follows all commands and directions without difficulty   Comments Pt agreeable to PT   Bed Mobility   Supine to Sit 3  Moderate assistance   Additional items Assist x 2;HOB elevated; Bedrails; Increased time required;Verbal cues;LE management   Sit to Supine 3  Moderate assistance   Additional items Assist x 2;HOB elevated; Bedrails; Increased time required;Verbal cues;LE management   Additional Comments Pt received at start of session supine in bed with HOB elevated  Following session, pt remained in reecliner with needs met, alarm activated and call bell within reach   Transfers   Sit to Stand 4  Minimal assistance   Additional items Armrests; Increased time required;Verbal cues; Assist x 2   Stand to Sit 4  Minimal assistance   Additional items Armrests; Increased time required;Verbal cues; Assist x 2   Additional Comments with use of L HW   Ambulation/Elevation   Gait pattern Decreased foot clearance; Inconsistent karen; Short stride;Decreased heel strike   Gait Assistance 3  Moderate assist  (progressed to min Ax2)   Additional items Assist x 2;Verbal cues  (with chair follow)   Assistive Device Mario-walker   Distance 3' to bedside chair; 25'   Stair Management Assistance Not tested   Balance   Static Sitting Fair +   Dynamic Sitting Fair   Static Standing Fair -   Dynamic Standing Poor +   Ambulatory Poor +   Endurance Deficit   Endurance Deficit Yes   Endurance Deficit Description decreased activity tolerance   Activity Tolerance   Activity Tolerance Patient limited by fatigue   Medical Staff Made Aware OT Sandie   Exercises   Hip Flexion Sitting;10 reps;AROM; Bilateral   Hip Abduction Sitting;10 reps;AROM; Bilateral   Knee AROM Long Arc Quad Sitting;10 reps;AROM; Bilateral   Ankle Pumps Sitting;20 reps;AROM; Bilateral   Assessment   Prognosis Good   Problem List Decreased strength;Decreased endurance; Impaired balance;Decreased mobility;Obesity   Assessment Pt seen for PT treatment session this date with interventions consisting of gait training w/ emphasis on improving pt's ability to ambulate level surfaces x 3' then 25' with mod A of 2 provided by therapist with MAGO CHAVES, Therapeutic exercise consisting of: BLE exercises performed seated in recliner and therapeutic activity consisting of training: bed mobility, supine<>sit transfers, sit<>stand transfers and vc and tactile cues for static standing posture faciliation  Pt agreeable to PT treatment session upon arrival, pt found supine in bed w/ HOB elevated, in no apparent distress and responsive  In comparison to previous session, pt with improvements in activity tolerance, balance and mobility  Post session: pt returned BTB, bed alarm engaged, all needs in reach and RN notified of session findings/recommendations  Continue to recommend post acute rehabilitation services at time of d/c in order to maximize pt's functional independence and safety w/ mobility  Pt continues to be functioning below baseline level, and remains limited 2* factors listed above and including continued need for medical management and monitoring, decreased strength and balance resulting in an increased risk for falls, decreased endurance and activity tolerance limiting mobility  PT will continue to see pt during current hospitalization in order to address the deficits listed above and provide interventions consistent w/ POC in effort to achieve STGs  Barriers to Discharge Decreased caregiver support   Goals   STG Expiration Date 02/05/23   PT Treatment Day 1   Plan   Treatment/Interventions LE strengthening/ROM; Functional transfer training;ADL retraining; Therapeutic exercise; Endurance training;Bed mobility;Gait training; Compensatory technique education;Spoke to nursing;OT;Patient/family training   Progress Progressing toward goals   PT Frequency 3-5x/wk   Recommendation   PT Discharge Recommendation Post acute rehabilitation services   AM-PAC Basic Mobility Inpatient   Turning in Flat Bed Without Bedrails 2   Lying on Back to Sitting on Edge of Flat Bed Without Bedrails 2   Moving Bed to Chair 2   Standing Up From Chair Using Arms 2   Walk in Room 2   Climb 3-5 Stairs With Railing 1   Basic Mobility Inpatient Raw Score 11   Basic Mobility Standardized Score 30 25   Highest Level Of Mobility   -HLM Goal 4: Move to chair/commode   JH-HLM Achieved 7: Walk 25 feet or more   Education   Education Provided Mobility training;Assistive device   Patient Reinforcement needed   End of Consult   Patient Position at End of Consult Supine;Bed/Chair alarm activated; All needs within reach       Shanika Arreola PT    Time In: 10:44  Time Out: 11:14  Total Time: 30 mins

## 2023-02-01 NOTE — PLAN OF CARE
Problem: PHYSICAL THERAPY ADULT  Goal: Performs mobility at highest level of function for planned discharge setting  See evaluation for individualized goals  Description: Treatment/Interventions: Functional transfer training, LE strengthening/ROM, Therapeutic exercise, Endurance training, Patient/family training, Bed mobility, Gait training, Spoke to nursing, OT          See flowsheet documentation for full assessment, interventions and recommendations  Outcome: Progressing  Note: Prognosis: Good  Problem List: Decreased strength, Decreased endurance, Impaired balance, Decreased mobility, Obesity  Assessment: Pt seen for PT treatment session this date with interventions consisting of gait training w/ emphasis on improving pt's ability to ambulate level surfaces x 3' then 25' with mod A of 2 provided by therapist with MAGO CHAVES, Therapeutic exercise consisting of: BLE exercises performed seated in recliner and therapeutic activity consisting of training: bed mobility, supine<>sit transfers, sit<>stand transfers and vc and tactile cues for static standing posture faciliation  Pt agreeable to PT treatment session upon arrival, pt found supine in bed w/ HOB elevated, in no apparent distress and responsive  In comparison to previous session, pt with improvements in activity tolerance, balance and mobility  Post session: pt returned BTB, bed alarm engaged, all needs in reach and RN notified of session findings/recommendations  Continue to recommend post acute rehabilitation services at time of d/c in order to maximize pt's functional independence and safety w/ mobility  Pt continues to be functioning below baseline level, and remains limited 2* factors listed above and including continued need for medical management and monitoring, decreased strength and balance resulting in an increased risk for falls, decreased endurance and activity tolerance limiting mobility   PT will continue to see pt during current hospitalization in order to address the deficits listed above and provide interventions consistent w/ POC in effort to achieve STGs  Barriers to Discharge: Decreased caregiver support     PT Discharge Recommendation: Post acute rehabilitation services    See flowsheet documentation for full assessment

## 2023-02-01 NOTE — OCCUPATIONAL THERAPY NOTE
Occupational Therapy Treatment Note     Patient Name: Kayy Adan  XTGLP'W Date: 2/1/2023  Problem List  Principal Problem:    Sepsis [facility])  Active Problems:    History of stroke    Essential hypertension    Type 2 diabetes mellitus without complication, without long-term current use of insulin ([facility] )    Other persistent atrial fibrillation (HCC)    Severe aortic stenosis    Acute on chronic diastolic (congestive) heart failure ([facility] )    Ambulatory dysfunction    Cellulitis of right lower extremity    Acute cystitis    Gram-positive bacteremia        02/01/23 1043   OT Last Visit   OT Visit Date 02/01/23   Note Type   Note Type Treatment   Pain Assessment   Pain Assessment Tool 0-10   Pain Score No Pain   Restrictions/Precautions   Weight Bearing Precautions Per Order No   Other Precautions Chair Alarm; Bed Alarm; Fall Risk   Lifestyle   Autonomy Patient reported independent with ADLs except for bathing  Patient lives alone in a one story house with ramped entrance, daughter lives next door " she is about 15 feet away from me"  patient has a life alert, meals on wheels and assistance 1x week for bathing and housekeeping  Patient reported she ambulates independently with [facility]  Reciprocal Relationships Supportive Family   Service to Others Retired Nurse   Intrinsic Gratification Watch TV   Bed Mobility   Supine to Sit 3  Moderate assistance   Additional items Assist x 2;HOB elevated; Bedrails; Increased time required;Verbal cues;LE management   Sit to Supine   (DNT: pt seated OOB in recliner at end of session)   Additional Comments Pt denied lightheaded/dizziness with transitional movements   Transfers   Sit to Stand 4  Minimal assistance   Additional items Assist x 2; Increased time required;Verbal cues;Armrests   Stand to Sit 4  Minimal assistance   Additional items Assist x 2;Armrests; Increased time required;Verbal cues   Functional Mobility   Functional Mobility 4  Minimal assistance  (Assist of 2) Additional Comments Pt ambulated in room with assist of 2  Pt limited by fatigue  Additional items Hemiwalker   Therapeutic Exercise - ROM   UE-ROM Yes   ROM- Right Upper Extremities   R Elbow PROM;Elbow flexion   R Wrist PROM; Wrist flexion;Wrist extension   R Hand Prolonged stretch; Thumb; Index finger; Long finger;Ring finger;Little finger   R Position Seated;Against gravity   R Weight/Reps/Sets 1 set x 10 reps each   ROM - Left Upper Extremities    L Shoulder AROM; Flexion; Extension  (Protraction/Retraction)   L Elbow AROM;Elbow flexion;Elbow extension   L Position Seated;Against gravity   L Weight/Reps/Sets 1 set x 10 reps each   Cognition   Overall Cognitive Status WFL   Arousal/Participation Alert; Responsive; Cooperative   Attention Within functional limits   Orientation Level Oriented X4   Memory Within functional limits   Following Commands Follows all commands and directions without difficulty   Comments Pt agreeable to OT session   Activity Tolerance   Activity Tolerance Patient limited by fatigue   Medical Staff Made Aware This session, pt required and most appropriately benefited from skilled OT/PT co-treat due to extensive physical assistance of two therapists, significant regression from functional baseline and decreased activity tolerance  Assessment   Assessment Patient participated in Skilled OT session this date with interventions consisting of therapeutic exercise to: increase functional use of BUEs, increase BUE muscle strength ,  therapeutic activities to: increase activity tolerance and increase dynamic sit/ stand balance during functional activity   Patient agreeable to OT treatment session, upon arrival patient was found supine in bed and in no apparent distress  Patient completed bed mobility with mod assist of 2 and functional transfers with min assist of 2  Pt ambulated in room with min assist of 2 utilizing giuliano-walker   Once seated in recliner, pt completed AROM of LUE and PROM/stretch of RUE to increase strength and endurance to increase independence with ADLs  In comparison to previous session, patient with improvements in functional mobility and endurance  Patient requiring verbal cues for correct technique, one step directives and frequent rest periods  Patient continues to be functioning below baseline level, occupational performance remains limited secondary to factors listed above and increased risk for falls and injury  From OT standpoint, recommendation at time of d/c would be Post acute rehabilitation services  Patient to benefit from continued Occupational Therapy treatment while in the hospital to address deficits as defined above and maximize level of functional independence with ADLs and functional mobility  Plan   Treatment Interventions Functional transfer training;UE strengthening/ROM; Endurance training;Patient/family training; Compensatory technique education   Goal Expiration Date 02/08/23   OT Treatment Day 1   OT Frequency 3-5x/wk   Recommendation   OT Discharge Recommendation Post acute rehabilitation services   AM-PAC Daily Activity Inpatient   Lower Body Dressing 2   Bathing 2   Toileting 2   Upper Body Dressing 2   Grooming 3   Eating 3   Daily Activity Raw Score 14   Daily Activity Standardized Score (Calc for Raw Score >=11) 33 39   AM-PAC Applied Cognition Inpatient   Following a Speech/Presentation 4   Understanding Ordinary Conversation 4   Taking Medications 3   Remembering Where Things Are Placed or Put Away 3   Remembering List of 4-5 Errands 3   Taking Care of Complicated Tasks 3   Applied Cognition Raw Score 20   Applied Cognition Standardized Score 41 76   Modified [place] Scale   Modified [place] Scale 4   End of Consult   Patient Position at End of Consult Bedside chair;Bed/Chair alarm activated; All needs within reach   Nurse Communication Nurse aware of consult     Radha MORRELL

## 2023-02-01 NOTE — DISCHARGE SUMMARY
INTERNAL MEDICINE RESIDENCY DISCHARGE SUMMARY     Kobi Gomez   81 y o  female  MRN: [id number]  Room/Bed: /-01     [address] [place] [address] 3RD FLOOR MED SURG   Encounter: [id number]    Active Problems:    Gram-positive bacteremia    History of stroke    Essential hypertension    Type 2 diabetes mellitus without complication, without long-term current use of insulin ([address] )    Other persistent atrial fibrillation (HCC)    Severe aortic stenosis    Acute on chronic diastolic (congestive) heart failure (HCC)    Ambulatory dysfunction    Acute cystitis      * Sepsis (HCC)-resolved as of 2/2/2023  Assessment & Plan  Presented admission with tachycardia, leukocytosis, tachypnea with cellulitis of right lower extremity and bacteremia  Has improved overall  Afebrile since 48 hours  Blood culture 2/2 initially grew Streptococcus mitis oralis intermediate sensitive to penicillin, sensitive to ceftriaxone and vancomycin  ID following recommended high-dose IV ceftriaxone  Repeat blood culture 2/2 has been without any growth  Bacteremia cleared  BOLIVAR by cardiology without any vegetation noted  Hemodynamically stable  ID following  Recommended 6 weeks of IV antibiotic through 3/10  Patient received PICC line today 02/01/2023  Trend fever/leukocyte count    Gram-positive bacteremia  Assessment & Plan  2/2 blood culture growing Streptococcus mitis oralis intermediate sensitivity to penicillin, sensitive to ceftriaxone, vancomycin  Repeat 2/2 blood cultures remained without growth  Bacteremia cleared  See plan as in sepsis    Acute cystitis  Assessment & Plan  · CT a/p: urinary bladder wall appears mildly thickened and there is mild urothelial enhancement  · Patient meeting sepsis criteria   · Received IV ceftriaxone 1g x1 for cellulitis   · UA noted negative UTI  · Urine culture without growth  · Cystitis ruled out      Ambulatory dysfunction  Assessment & Plan  Patient lives alone  Presents with ambulatory dysfunction due to pain and weakness  · CT head wo negative  · At baseline walks with walker   · Fall precautions   · PT/OT: recommended post acute care rehab  Patient agrees  Acute on chronic diastolic (congestive) heart failure (HCC)  Assessment & Plan  · Had mild acute on chronic diastolic CHF on admission in setting of volume overload noted on CT  Treated with IV diuretics initially     Saturating well on room air  Currently looking euvolemic  Continue Lasix p o  20 mg daily and potassium supplements  Monitor I's and O's, daily weight, low-salt diet    Severe aortic stenosis  Assessment & Plan  S/p TAVR    Other persistent atrial fibrillation (HCC)  Assessment & Plan  · Rate controlled    · Continue Cardizem   · Not on anticoagulation due to history of bleeding  Type 2 diabetes mellitus without complication, without long-term current use of insulin ([facility])  Assessment & Plan  Lab Results   Component Value Date    HGBA1C 7 1 (H) 10/12/2021     · Humalog SSI and glucochecks ac and hs   · Can restart metformin  Recommended frequent glucose monitoring  Can increase metformin dose if needed  Essential hypertension  Assessment & Plan  · Continue lisinopril     History of stroke  Assessment & Plan  • Hx CVA with residual right sided hemiparesis, facial droop and mild aphasia  • continue ASA/statin     Cellulitis of right lower extremity-resolved as of 2/2/2023  Assessment & Plan  Patient has chronic right lower extremity venous stasis, edema  Venous Doppler negative for DVT on this admission  Antibiotics as above  Encouraged to keep right lower extremity elevated help reduce edema  Physical Exam  Vitals reviewed  Constitutional:       General: She is not in acute distress  Appearance: Normal appearance  She is obese  She is not ill-appearing or toxic-appearing  Comments: Elderly female patient, morbidly obese, acutely nontoxic-appearing  Deconditioned  HENT:      Head: Normocephalic and atraumatic  Eyes:      General: No scleral icterus  Right eye: No discharge  Left eye: No discharge  Pupils: Pupils are equal, round, and reactive to light  Cardiovascular:      Rate and Rhythm: Normal rate and regular rhythm  Pulses: Normal pulses  Heart sounds: Normal heart sounds  No murmur heard  Pulmonary:      Effort: Pulmonary effort is normal  No respiratory distress  Breath sounds: Normal breath sounds  No wheezing or rales  Chest:      Chest wall: No tenderness  Abdominal:      General: Abdomen is flat  Bowel sounds are normal  There is no distension  Palpations: Abdomen is soft  Tenderness: There is no abdominal tenderness  Musculoskeletal:         General: No deformity  Normal range of motion  Cervical back: Normal range of motion and neck supple  No rigidity  Right lower leg: No edema  Left lower leg: No edema  Skin:     General: Skin is warm  Coloration: Skin is not jaundiced or pale  Findings: Erythema (Right lower extremity erythema improving slowly) present  No rash  Neurological:      General: No focal deficit present  Mental Status: She is alert and oriented to person, place, and time  Mental status is at baseline  Cranial Nerves: No cranial nerve deficit  Motor: No weakness  Psychiatric:         Mood and Affect: Mood normal          Behavior: Behavior normal          [address] COURSE     Patient is an 81year old female PMH aortic stenosis s/p TAVR, a fib, diastolic heart failure, SSS s/p pacemaker, severe pulmonary HTN, D2M admitted for sepsis 2/2 RLE cellulitis and gram positive streptococcus mitis oralis bacteremia  Venous Doppler negative for DVT  CT a/p ruled out cystitis and UA noted negative UTI  Patient completed a 3 day course of IV Cefazolin and transitioned to IV Ceftriaxone after sensitivities   Patients cellulitis, sepsis, and bacteremia resolved on 1/27/2023 after antibiotics and supportive care  Cardiology completed BOLIVAR and did not visualize vegetations on aortic, mitral, or tricuspid valve, could not exclude vegetation on pulmonic valve, but ruled out prosthetic valve endocarditis  ID recommend 6 weeks of antibiotic treatment (through 3/10/2023) after clearance of bacteremia and cleared her for PICC placement  PICC placement completed on 02/01/2023  And was evaluated by PT OT recommended rehab  Patient discharged to rehab  DISCHARGE INFORMATION     PCP at Discharge: Josh Phillips DO    Admitting Provider: Chayito Kahn MD  Admission Date: 1/25/2023    Discharge Provider: Kodi Kimball,Quiana  Discharge Date: 02/01/2023    Discharge Disposition: Non [facility] SNF/TCU/SNU  Discharge Condition: stable  Discharge with Lines: yes    Type: PICC  Reason for line: IV antibiotic  When to remove: After completion of IV antibiotic  Line managed by: Rehab        Discharge Diet: diabetic diet  Activity Restrictions: none  Test Results Pending at Discharge: N/A    Discharge Diagnoses:  Principal Problem (Resolved):    Sepsis ([facility] )  Active Problems:    Gram-positive bacteremia    History of stroke    Essential hypertension    Type 2 diabetes mellitus without complication, without long-term current use of insulin ([facility] )    Other persistent atrial fibrillation (HCC)    Severe aortic stenosis    Acute on chronic diastolic (congestive) heart failure (HCC)    Ambulatory dysfunction    Acute cystitis  Resolved Problems:    Cellulitis of right lower extremity      Consulting Providers:  Dr Shine Barreto MD - Infectious Disease  Dr Ronell Klinefelter MD - Cardiology    Diagnostic & Therapeutic Procedures Performed:  CT head without contrast    Result Date: 1/26/2023  Impression: No acute intracranial abnormality  Chronic microangiopathic changes  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   Workstation performed: [id number]     PE Study with CT Abdomen and Pelvis with contrast    Result Date: 1/26/2023  Impression: Findings suggesting cystitis/urinary tract infection  Correlation with urinalysis and urine culture and sensitivity is recommended  No evidence of pulmonary embolism is seen  Findings suggesting possible mild fluid overload  Clinical correlation recommended  Other nonemergent and chronic findings, as described above  Please see discussion  Workstation performed: [id number]       Code Status: Level 1 - Full Code  Advance Directive & Living Will: <no information>  Power of :    POLST:      Medications:  Current Discharge Medication List        Current Discharge Medication List        Current Discharge Medication List      CONTINUE these medications which have NOT CHANGED    Details   alendronate (FOSAMAX) 70 mg tablet Take 70 mg by mouth Once a week      ascorbic acid (VITAMIN C) 500 mg tablet Take 500 mg by mouth 2 (two) times a day      BABY ASPIRIN PO Take 81 mg by mouth daily      cholecalciferol (VITAMIN D3) 1,000 units tablet Take 1,000 Units by mouth daily      cyanocobalamin (VITAMIN B-12) 100 mcg tablet Take by mouth daily      diltiazem (CARDIZEM CD) 240 mg 24 hr capsule Take 1 capsule (240 mg total) by mouth daily  Qty: 30 capsule, Refills: 0    Associated Diagnoses: Tachy-chana syndrome (HCC)      fluticasone-vilanterol (BREO ELLIPTA) 100-25 mcg/inh inhaler Inhale 1 puff daily Rinse mouth after use    Qty: 3 Inhaler, Refills: 3    Comments: Please send by [employer]  Associated Diagnoses: Mild intermittent asthma without complication      furosemide (LASIX) 20 mg tablet Take 1 tablet (20 mg total) by mouth daily  Qty: 30 tablet, Refills: 0    Associated Diagnoses: Acute diastolic congestive heart failure (HCC)      lisinopril (ZESTRIL) 10 mg tablet Take 1 tablet (10 mg total) by mouth daily  Qty: 30 tablet, Refills: 0    Associated Diagnoses: Essential hypertension      metFORMIN (GLUCOPHAGE) 500 mg tablet Take 500 mg by mouth daily with breakfast       potassium chloride (K-DUR,KLOR-CON) 10 mEq tablet Take 1 tablet (10 mEq total) by mouth daily  Qty: 30 tablet, Refills: 0    Associated Diagnoses: Acute diastolic congestive heart failure (HCC)      acetaminophen (TYLENOL) 325 mg tablet Take 650 mg by mouth every 6 (six) hours as needed for mild pain      docusate sodium (COLACE) 100 mg capsule Take 1 capsule (100 mg total) by mouth 2 (two) times a day  Qty: 10 capsule, Refills: 0    Associated Diagnoses: S/P TAVR (transcatheter aortic valve replacement)      lubiprostone (AMITIZA) 24 mcg capsule Take 24 mcg by mouth 2 (two) times a day with meals      omeprazole (PriLOSEC) 20 mg delayed release capsule Take 1 capsule (20 mg total) by mouth daily Take 30 minutes prior to breakfast  Qty: 30 capsule, Refills: 0    Associated Diagnoses: S/P TAVR (transcatheter aortic valve replacement)      polyethylene glycol (MIRALAX) 17 g packet Take 17 g by mouth daily  Qty: 14 each, Refills: 0    Associated Diagnoses: Constipation      predniSONE 20 mg tablet Take 2 tablets (40 mg total) by mouth daily  Qty: 5 tablet, Refills: 0    Associated Diagnoses: Essential hypertension      temazepam (RESTORIL) 30 mg capsule Take 30 mg by mouth daily at bedtime              Allergies: Allergies   Allergen Reactions   • Atorvastatin      dizziness and shaky   • Rivaroxaban      Dizziness, and shaky  FOLLOW-UP     PCP Outpatient Follow-up:  F/u 2 weeks after discharge     Consulting Providers Follow-up:      Active Issues Requiring Follow-up:       Discharge Statement:   I spent 45 minutes minutes discharging the patient  This time was spent on the day of discharge  I had direct contact with the patient on the day of discharge  Additional documentation is required if more than 30 minutes were spent on discharge  Portions of the record may have been created with voice recognition software    Occasional wrong word or "sound a like" substitutions may have occurred due to the inherent limitations of voice recognition software    Read the chart carefully and recognize, using context, where substitutions have occurred     ==  Toño Davies MD  [address]  Internal Medicine Resident PGY-3

## 2023-02-02 VITALS
HEART RATE: 60 BPM | WEIGHT: 204.59 LBS | TEMPERATURE: 97.5 F | SYSTOLIC BLOOD PRESSURE: 136 MMHG | RESPIRATION RATE: 16 BRPM | DIASTOLIC BLOOD PRESSURE: 54 MMHG | BODY MASS INDEX: 34.93 KG/M2 | OXYGEN SATURATION: 94 % | HEIGHT: 64 IN

## 2023-02-02 PROBLEM — L03.115 CELLULITIS OF RIGHT LOWER EXTREMITY: Status: RESOLVED | Noted: 2023-01-26 | Resolved: 2023-02-02

## 2023-02-02 PROBLEM — A41.9 SEPSIS (HCC): Status: RESOLVED | Noted: 2023-01-26 | Resolved: 2023-02-02

## 2023-02-02 LAB
GLUCOSE SERPL-MCNC: 173 MG/DL (ref 65–140)
GLUCOSE SERPL-MCNC: 258 MG/DL (ref 65–140)

## 2023-02-02 RX ADMIN — INSULIN LISPRO 1 UNITS: 100 INJECTION, SOLUTION INTRAVENOUS; SUBCUTANEOUS at 07:55

## 2023-02-02 RX ADMIN — FUROSEMIDE 20 MG: 20 TABLET ORAL at 09:13

## 2023-02-02 RX ADMIN — INSULIN LISPRO 3 UNITS: 100 INJECTION, SOLUTION INTRAVENOUS; SUBCUTANEOUS at 11:23

## 2023-02-02 RX ADMIN — ASPIRIN 81 MG: 81 TABLET, CHEWABLE ORAL at 09:12

## 2023-02-02 RX ADMIN — FLUTICASONE FUROATE AND VILANTEROL TRIFENATATE 1 PUFF: 100; 25 POWDER RESPIRATORY (INHALATION) at 09:14

## 2023-02-02 RX ADMIN — LISINOPRIL 5 MG: 5 TABLET ORAL at 09:13

## 2023-02-02 RX ADMIN — DICLOFENAC SODIUM TOPICAL GEL, 1%, 2 G: 10 GEL TOPICAL at 11:24

## 2023-02-02 RX ADMIN — INSULIN GLARGINE 5 UNITS: 100 INJECTION, SOLUTION SUBCUTANEOUS at 09:13

## 2023-02-02 RX ADMIN — DICLOFENAC SODIUM TOPICAL GEL, 1%, 2 G: 10 GEL TOPICAL at 09:15

## 2023-02-02 RX ADMIN — DILTIAZEM HYDROCHLORIDE 180 MG: 180 CAPSULE, COATED, EXTENDED RELEASE ORAL at 09:14

## 2023-02-02 RX ADMIN — POTASSIUM CHLORIDE 10 MEQ: 750 TABLET, EXTENDED RELEASE ORAL at 11:24

## 2023-02-02 RX ADMIN — INSULIN LISPRO 2 UNITS: 100 INJECTION, SOLUTION INTRAVENOUS; SUBCUTANEOUS at 07:56

## 2023-02-02 RX ADMIN — INSULIN LISPRO 2 UNITS: 100 INJECTION, SOLUTION INTRAVENOUS; SUBCUTANEOUS at 11:23

## 2023-02-02 RX ADMIN — CEFTRIAXONE SODIUM 2000 MG: 10 INJECTION, POWDER, FOR SOLUTION INTRAVENOUS at 13:29

## 2023-02-02 RX ADMIN — PANTOPRAZOLE SODIUM 20 MG: 20 TABLET, DELAYED RELEASE ORAL at 05:38

## 2023-02-02 RX ADMIN — ENOXAPARIN SODIUM 40 MG: 40 INJECTION SUBCUTANEOUS at 09:13

## 2023-02-02 NOTE — CASE MANAGEMENT
Case Management Discharge Planning Note    Patient name Jerene Gilford  Location [address] 316/-[id number] MRN [id number]  : 1940 Date 2023       Current Admission Date: 2023  Current Admission Diagnosis:Acute on chronic diastolic (congestive) heart failure [facility])   Patient Active Problem List    Diagnosis Date Noted   • Gram-positive bacteremia 2023   • Acute cystitis 2023   • Ambulatory dysfunction 2022   • Closed fracture of proximal end of humerus 2022   • Elevated bilirubin 2022   • Leukocytosis 2022   • Mild intermittent asthma 2020   • Tachy-chana syndrome ([facility] ) 2020   • Acute on chronic diastolic (congestive) heart failure ([facility] ) 2020   • Acute pulmonary embolism ([facility] ) 2020   • Bilateral leg edema 2020   • Adenopathy 2020   • Right sided weakness 2020   • Expressive aphasia 2020   • S/P TAVR (transcatheter aortic valve replacement) 2020   • Carotid occlusion, left 2020   • Carotid stenosis, asymptomatic, right 2020   • Severe aortic stenosis 10/26/2019   • Tibial plateau fracture    • Other persistent atrial fibrillation ([facility] ) 10/23/2019   • History of stroke    • Essential hypertension    • Type 2 diabetes mellitus without complication, without long-term current use of insulin ([facility])       LOS (days): 7  Geometric Mean LOS (GMLOS) (days): 5 00  Days to GMLOS:-2 2     OBJECTIVE:  Risk of Unplanned Readmission Score: 13 9         Current admission status: Inpatient   Preferred Pharmacy:   [facility] [address], [address]  [facility] [address] [address]  Phone: [phone] Fax: [phone], [address] [facility]  [address] [facility]  Phone: [phone] Fax: [phone]    Primary Care Provider: Art Terry DO    Primary Insurance: MEDICARE  Secondary Insurance: DISCHARGE DETAILS:                                          Other Referral/Resources/Interventions Provided:  Referral Comments: Late Note for 2/1:  S/W daughter Salvador Verdin on 2/1 via t/c to confirm her interest in [place] [facility] [place] for placement of pt  She is agreeable  Made her aware that facility had accepted pt and she has been deemed medically stable for d/c once PICC line is placed  Told daughter that effort would be made to send pt today if transportation is available  Daughter reports she will call patient herself to make aware  Multiple attempts needed to place PICC;  per Dr Rachael Haynes, he will hold d/c until tomorrow           Treatment Team Recommendation: Short Term Rehab, SNF  Discharge Destination Plan[de-identified] Short Term Rehab, SNF  Transport at Discharge : BLS Ambulance

## 2023-02-02 NOTE — CASE MANAGEMENT
Case Management Discharge Planning Note    Patient name Pattie Bence  Location [address] 316/-87 MRN [id number]  : 1940 Date 2023       Current Admission Date: 2023  Current Admission Diagnosis:Acute on chronic diastolic (congestive) heart failure [facility])   Patient Active Problem List    Diagnosis Date Noted   • Gram-positive bacteremia 2023   • Acute cystitis 2023   • Ambulatory dysfunction 2022   • Closed fracture of proximal end of humerus 2022   • Elevated bilirubin 2022   • Leukocytosis 2022   • Mild intermittent asthma 2020   • Tachy-chana syndrome ([facility] ) 2020   • Acute on chronic diastolic (congestive) heart failure ([facility] ) 2020   • Acute pulmonary embolism ([facility] ) 2020   • Bilateral leg edema 2020   • Adenopathy 2020   • Right sided weakness 2020   • Expressive aphasia 2020   • S/P TAVR (transcatheter aortic valve replacement) 2020   • Carotid occlusion, left 2020   • Carotid stenosis, asymptomatic, right 2020   • Severe aortic stenosis 10/26/2019   • Tibial plateau fracture    • Other persistent atrial fibrillation ([facility] ) 10/23/2019   • History of stroke    • Essential hypertension    • Type 2 diabetes mellitus without complication, without long-term current use of insulin ([facility])       LOS (days): 7  Geometric Mean LOS (GMLOS) (days): 5 00  Days to GMLOS:-2 2     OBJECTIVE:  Risk of Unplanned Readmission Score: 13 9         Current admission status: Inpatient   Preferred Pharmacy:   [facility] [address], [address] [place] [address] [address] [address]  Phone: [phone] Fax: [phone] [facility], [address] - [address] [facility]  [address] [address]  Phone: [phone] Fax: [phone]    Primary Care Provider: Shereen Escoto DO    Primary Insurance: MEDICARE  Secondary Insurance: DISCHARGE DETAILS:                                          Other Referral/Resources/Interventions Provided:  Referral Comments: T/C to pt's daughter Yissel Leyva to make her aware of d/c today to [facility] w p/u at [phone]  Daughter agreeable  IMM reviewed  Visited pt and told her same;  reviewed IMM and left copy at bedside, of which daughter is aware  Both report they are comfortable w today's d/c  LMN completed and in pt's medical record  CM area of AVS completed  Dr Leslie Walsh, nurse Joce Baez made aware of arrangements via TT  Facility made aware of p/u via [address]  Treatment Team Recommendation: Short Term Rehab, SNF  Discharge Destination Plan[de-identified] Short Term Rehab, SNF  Transport at Discharge : BLS Ambulance                             IMM Given (Date):: 02/02/23  IMM Given to[de-identified] Patient  Family notified[de-identified] reviewed w daughter Yissel Leyva via t/c;  made her aware copy provided to pt   pt and daughter comfortable w today's d/c

## 2023-02-02 NOTE — PROGRESS NOTES
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   We need to consider the possibility of prosthetic valve endocarditis and infected PPM   Transesophageal echocardiogram without valvular vegetation or evidence of pacemaker infection  Bacteremia is cleared  Continue ceftriaxone through 3/10/2023 to complete 6 weeks total  Recheck CBC with differential and CMP weekly while on the ceftriaxone  Remove PICC line after last dose of IV antibiotics     2   Sepsis, present on admission, presenting with leukocytosis and tachypnea   Source of sepsis is most likely cellulitis and secondary bacteremia   Patient is systemically improved  WBC has normalized  Antibiotic plan as in above  Monitor temperature/WBC      3   Right leg cellulitis, most likely secondary to underlying venous stasis  Cellulitis appears to be superficial and clinically improving   No evidence of septic ankle clinically  Antibiotic plan as in above  Serial leg exams      4   Status post TVAR in 2020 for AS   Presence of alpha strep bacteremia, we need to consider the possibility of prosthetic valve endocarditis   Patient will need further work-up      She will likely need long-term IV antibiotic  Transesophageal echocardiogram negative  Antibiotic plan as in above      5   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  Transesophageal echocardiogram without evidence of pacemaker infection  Antibiotics as above     6   Probable right leg venous stasis, with poorly controlled leg edema   Discussed with patient regarding keeping leg elevated to control edema    Leg elevation to control edema      7   CVA, with residual right-sided weakness  Discussed the above management plan with the primary service    Antibiotics:  Ceftriaxone 5  Antibiotics 8  Negative blood cultures 2023    Subjective:  Patient has no fever, chills, sweats; no nausea, vomiting, diarrhea; no cough, shortness of breath; no pain  No new symptoms  Objective:  Vitals:  Temp:  [97 5 °F (36 4 °C)-98 5 °F (36 9 °C)] 97 5 °F (36 4 °C)  HR:  [60-68] 60  Resp:  [16-19] 16  BP: (103-160)/(48-65) 136/54  SpO2:  [92 %-94 %] 94 %  Temp (24hrs), Av °F (36 7 °C), Min:97 5 °F (36 4 °C), Max:98 5 °F (36 9 °C)  Current: Temperature: 97 5 °F (36 4 °C)    Physical Exam:   General Appearance:  Alert, interactive, nontoxic, no acute distress  Throat: Oropharynx moist without lesions  Lungs:   Clear to auscultation bilaterally; no wheezes, rhonchi or rales; respirations unlabored   Heart:  RRR; no murmur, rub or gallop   Abdomen:   Soft, non-tender, non-distended, positive bowel sounds  Extremities: No clubbing, cyanosis or edema   Skin: No new rashes or lesions  No draining wounds noted  Labs, Imaging, & Other studies:   All pertinent labs and imaging studies were personally reviewed  Results from last 7 days   Lab Units 23  0503 23  0548 23  0513   WBC Thousand/uL 9 90 10 17* 8 43   HEMOGLOBIN g/dL 14 1 14 3 13 5   PLATELETS Thousands/uL 322 296 272     Results from last 7 days   Lab Units 23  0503 23  0548 23  0513   SODIUM mmol/L 135 134* 136   POTASSIUM mmol/L 4 4 4 1 4 3   CHLORIDE mmol/L 100 101 103   CO2 mmol/L 26 25 25   BUN mg/dL 24 16 16   CREATININE mg/dL 0 75 0 69 0 61   EGFR ml/min/1 73sq m 74 81 85   CALCIUM mg/dL 9 7 9 3 8 8   AST U/L  --  40  --    ALT U/L  --  44  --    ALK PHOS U/L  --  109  --      Results from last 7 days   Lab Units 23  0043   BLOOD CULTURE  No Growth After 5 Days  No Growth After 5 Days

## 2023-02-02 NOTE — PLAN OF CARE
Problem: MOBILITY - ADULT  Goal: Maintain or return to baseline ADL function  Description: INTERVENTIONS:  -  Assess patient's ability to carry out ADLs; assess patient's baseline for ADL function and identify physical deficits which impact ability to perform ADLs (bathing, care of mouth/teeth, toileting, grooming, dressing, etc )  - Assess/evaluate cause of self-care deficits   - Assess range of motion  - Assess patient's mobility; develop plan if impaired  - Assess patient's need for assistive devices and provide as appropriate  - Encourage maximum independence but intervene and supervise when necessary  - Involve family in performance of ADLs  - Assess for home care needs following discharge   - Consider OT consult to assist with ADL evaluation and planning for discharge  - Provide patient education as appropriate  Outcome: Progressing     Problem: HEMATOLOGIC - ADULT  Goal: Maintains hematologic stability  Description: INTERVENTIONS  - Assess for signs and symptoms of bleeding or hemorrhage  - Monitor labs  - Administer supportive blood products/factors as ordered and appropriate  Outcome: Progressing     Problem: INFECTION - ADULT  Goal: Absence or prevention of progression during hospitalization  Description: INTERVENTIONS:  - Assess and monitor for signs and symptoms of infection  - Monitor lab/diagnostic results  - Monitor all insertion sites, i e  indwelling lines, tubes, and drains  - [place] appropriate cooling/warming therapies per order  - Administer medications as ordered  - Instruct and encourage patient and family to use good hand hygiene technique  - Identify and instruct in appropriate isolation precautions for identified infection/condition  Outcome: Progressing

## 2023-02-02 NOTE — PLAN OF CARE
Problem: INFECTION - ADULT  Goal: Absence or prevention of progression during hospitalization  Description: INTERVENTIONS:  - Assess and monitor for signs and symptoms of infection  - Monitor lab/diagnostic results  - Monitor all insertion sites, i e  indwelling lines, tubes, and drains  - [place] appropriate cooling/warming therapies per order  - Administer medications as ordered  - Instruct and encourage patient and family to use good hand hygiene technique  - Identify and instruct in appropriate isolation precautions for identified infection/condition  Outcome: Progressing     Problem: Prexisting or High Potential for Compromised Skin Integrity  Goal: Skin integrity is maintained or improved  Description: INTERVENTIONS:  - Identify patients at risk for skin breakdown  - Assess and monitor skin integrity  - Assess and monitor nutrition and hydration status  - Monitor labs   - Assess for incontinence   - Turn and reposition patient  - Assist with mobility/ambulation  - Relieve pressure over bony prominences  - Avoid friction and shearing  - Provide appropriate hygiene as needed including keeping skin clean and dry  - Evaluate need for skin moisturizer/barrier cream  - Collaborate with interdisciplinary team   - Patient/family teaching  - Consider wound care consult   Outcome: Progressing

## 2023-02-06 DIAGNOSIS — B95.5 STREPTOCOCCAL BACTEREMIA: Primary | ICD-10-CM

## 2023-02-06 DIAGNOSIS — R78.81 STREPTOCOCCAL BACTEREMIA: Primary | ICD-10-CM

## 2023-02-06 NOTE — PROGRESS NOTES
OPAT NOTE:    Supervising/Discharge physician: Jos Harper    Diagnosis: Strep Mitis Oralis Bacteremia    Drug: Ceftriaxone    Dose/Frequency: 2gQ24    Infusion/[facility] contact: Minus Like    Next appointment 2/14

## 2023-02-14 ENCOUNTER — OFFICE VISIT (OUTPATIENT)
Dept: INFECTIOUS DISEASES | Facility: CLINIC | Age: 83
End: 2023-02-14

## 2023-02-14 VITALS
OXYGEN SATURATION: 95 % | RESPIRATION RATE: 16 BRPM | HEIGHT: 64 IN | SYSTOLIC BLOOD PRESSURE: 142 MMHG | DIASTOLIC BLOOD PRESSURE: 54 MMHG | TEMPERATURE: 97.7 F | HEART RATE: 70 BPM | BODY MASS INDEX: 35.12 KG/M2

## 2023-02-14 DIAGNOSIS — E11.9 TYPE 2 DIABETES MELLITUS WITHOUT COMPLICATION, WITHOUT LONG-TERM CURRENT USE OF INSULIN (HCC): ICD-10-CM

## 2023-02-14 DIAGNOSIS — Z95.2 S/P TAVR (TRANSCATHETER AORTIC VALVE REPLACEMENT): ICD-10-CM

## 2023-02-14 DIAGNOSIS — Z95.0 PACEMAKER: ICD-10-CM

## 2023-02-14 DIAGNOSIS — R78.81 GRAM-POSITIVE BACTEREMIA: Primary | ICD-10-CM

## 2023-02-14 DIAGNOSIS — Z86.73 HISTORY OF STROKE: ICD-10-CM

## 2023-02-14 RX ORDER — GUAIFENESIN/DEXTROMETHORPHAN 100-10MG/5
5 LIQUID (ML) ORAL EVERY 6 HOURS PRN
COMMUNITY

## 2023-02-14 RX ORDER — LACTULOSE 10 G/15ML
20 SOLUTION ORAL
COMMUNITY

## 2023-02-14 RX ORDER — BISACODYL 10 MG
10 SUPPOSITORY, RECTAL RECTAL
COMMUNITY

## 2023-02-14 RX ORDER — LOPERAMIDE HYDROCHLORIDE 2 MG/1
2 CAPSULE ORAL 4 TIMES DAILY PRN
COMMUNITY

## 2023-02-14 NOTE — ASSESSMENT & PLAN NOTE
Streptococcus mitis oralis bacteremia  Source of infection was most likely RLE cellulitis  It was documented that patient's dentition was good and she sees the dentist regularly  Patient has a bioprosthetic AV and 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  BOLIVAR did not show any evidence of valvular vegetation or evidence of PPM infection  Patient was discharged to complete a 6 week course of IV ceftriaxone  Patient doing well on the ceftriaxone  Labs stable  No new complaints      Plan  - continue ceftriaxone 2 grams IV every 24 hours as ordered through 3/10/2023 to complete a total of 6 weeks  - continue weekly CBCD, CMP  - suggest surveillance blood cultures 2 weeks after completing the ceftriaxone due to indwelling PPM  - RTO in 2-3 weeks

## 2023-02-14 NOTE — PROGRESS NOTES
Assessment/Plan:    Gram-positive bacteremia  Streptococcus mitis oralis bacteremia  Source of infection was most likely RLE cellulitis  It was documented that patient's dentition was good and she sees the dentist regularly  Patient has a bioprosthetic AV and 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  BOLIVAR did not show any evidence of valvular vegetation or evidence of PPM infection  Patient was discharged to complete a 6 week course of IV ceftriaxone  Patient doing well on the ceftriaxone  Labs stable  No new complaints  Plan  - continue ceftriaxone 2 grams IV every 24 hours as ordered through 3/10/2023 to complete a total of 6 weeks  - continue weekly CBCD, CMP  - suggest surveillance blood cultures 2 weeks after completing the ceftriaxone due to indwelling PPM  - RTO in 2-3 weeks    S/P TAVR (transcatheter aortic valve replacement)  Placed in 2020 for AS    Pacemaker  Remains in place    Type 2 diabetes mellitus without complication, without long-term current use of insulin ([facility])    Lab Results   Component Value Date    HGBA1C 7 1 (H) 10/12/2021       History of stroke   with residual right-sided weakness       Diagnoses and all orders for this visit:    Gram-positive bacteremia    S/P TAVR (transcatheter aortic valve replacement)    Pacemaker    History of stroke    Type 2 diabetes mellitus without complication, without long-term current use of insulin ([facility] [address] )    Other orders  -     cefTRIAXone 2,000 mg in dextrose 5 % 50 mL IVPB; Inject 2,000 mg into a catheter in a vein every 24 hours  -     bisacodyl (Dulcolax) 10 mg suppository; Insert 10 mg into the rectum every third day as needed for constipation  -     dextromethorphan-guaiFENesin ([employer] Tussin DM)  mg/5 mL oral liquid; Take 5 mL by mouth every 6 (six) hours as needed for cough  -     lactulose (CHRONULAC) 10 g/15 mL solution; Take 20 g by mouth every third day as needed  -     loperamide (IMODIUM) 2 mg capsule;  Take 2 mg by mouth 4 (four) times a day as needed for diarrhea          Subjective:      Patient ID: Layla Sweeney is a 81 y o  female  HPI  80 y/o female presents for office follow up today regarding Streptococcus mitis oralis bacteremia  Source of infection was most likely RLE cellulitis  It was documented that patient's dentition was good and she sees the dentist regularly  Patient has a bioprosthetic AV and 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  BOLIVAR did not show any evidence of valvular vegetation or evidence of PPM infection  Patient was discharged to complete a 6 week course of IV ceftriaxone  She is doing well  She has no new complaints today  The following portions of the patient's history were reviewed and updated as appropriate: allergies, current medications, past family history, past medical history, past social history, past surgical history and problem list     Review of Systems   Constitutional: Negative for chills and fever  Respiratory: Negative for cough and shortness of breath  Gastrointestinal: Positive for constipation  Negative for abdominal pain, diarrhea, nausea and vomiting  Skin: Negative for rash  Psychiatric/Behavioral: Negative for behavioral problems and confusion  Objective:      /54   Pulse 70   Temp 97 7 °F (36 5 °C)   Resp 16   Ht 5' 4" (1 626 m)   SpO2 95%   BMI 35 12 kg/m²          Physical Exam  Vitals reviewed  Constitutional:       General: She is not in acute distress  Appearance: Normal appearance  She is not ill-appearing, toxic-appearing or diaphoretic  HENT:      Head: Normocephalic and atraumatic  Eyes:      General: No scleral icterus  Right eye: No discharge  Left eye: No discharge  Conjunctiva/sclera: Conjunctivae normal    Cardiovascular:      Rate and Rhythm: Normal rate  Pulmonary:      Effort: Pulmonary effort is normal  No respiratory distress  Breath sounds: Normal breath sounds  No wheezing or rales     Abdominal:      General: Bowel sounds are normal  There is no distension  Palpations: Abdomen is soft  Tenderness: There is no abdominal tenderness  Skin:     General: Skin is warm and dry  Coloration: Skin is not jaundiced or pale  Findings: No erythema or rash  Neurological:      Mental Status: She is alert and oriented to person, place, and time     Psychiatric:         Mood and Affect: Mood normal          Behavior: Behavior normal            Labs:   2/7/2023  Wbc: 8 6  Hgb: 14 3  Plt: 317  Cr: 0 61  LFTs: WNL

## 2023-02-28 ENCOUNTER — OFFICE VISIT (OUTPATIENT)
Dept: INFECTIOUS DISEASES | Facility: CLINIC | Age: 83
End: 2023-02-28

## 2023-02-28 VITALS
BODY MASS INDEX: 34.83 KG/M2 | HEART RATE: 79 BPM | HEIGHT: 64 IN | RESPIRATION RATE: 18 BRPM | WEIGHT: 204 LBS | OXYGEN SATURATION: 96 % | TEMPERATURE: 97.9 F | SYSTOLIC BLOOD PRESSURE: 118 MMHG | DIASTOLIC BLOOD PRESSURE: 60 MMHG

## 2023-02-28 DIAGNOSIS — R78.81 GRAM-POSITIVE BACTEREMIA: ICD-10-CM

## 2023-02-28 DIAGNOSIS — B95.5 STREPTOCOCCAL BACTEREMIA: Primary | ICD-10-CM

## 2023-02-28 DIAGNOSIS — Z95.2 S/P TAVR (TRANSCATHETER AORTIC VALVE REPLACEMENT): ICD-10-CM

## 2023-02-28 DIAGNOSIS — Z86.73 HISTORY OF STROKE: ICD-10-CM

## 2023-02-28 DIAGNOSIS — R78.81 STREPTOCOCCAL BACTEREMIA: Primary | ICD-10-CM

## 2023-02-28 DIAGNOSIS — E11.9 TYPE 2 DIABETES MELLITUS WITHOUT COMPLICATION, WITHOUT LONG-TERM CURRENT USE OF INSULIN (HCC): ICD-10-CM

## 2023-02-28 DIAGNOSIS — Z95.0 PACEMAKER: ICD-10-CM

## 2023-02-28 NOTE — ASSESSMENT & PLAN NOTE
Streptococcus mitis oralis bacteremia  Source of infection was most likely RLE cellulitis  It was documented that patient's dentition was good and she sees the dentist regularly  Patient has a bioprosthetic AV and 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  BOLIVAR did not show any evidence of valvular vegetation or evidence of PPM infection  Patient was discharged to complete a 6 week course of IV ceftriaxone      Patient continues to do well on the ceftriaxone  Labs remain stable  No new complaints      Plan  - continue ceftriaxone 2 grams IV every 24 hours as ordered through 3/10/2023 to complete a total of 6 weeks  - continue weekly CBCD, CMP while on IV antibiotic  - PICC can be removed after last dose of IV ceftriaxone infused on 3/10/2023  - suggest surveillance blood cultures 2 weeks after completing the ceftriaxone due to indwelling PPM - on or around 3/24/2023  Lab slips provided to patient as she will likely be at home     - no further ID follow up scheduled at this time

## 2023-02-28 NOTE — PATIENT INSTRUCTIONS
- continue Ceftriaxone as ordered through 3/10/2023  - PICC can be removed after last dose  - please have surveillance blood cultures done on or around 3/24/2023  - no follow up scheduled at this time

## 2023-02-28 NOTE — PROGRESS NOTES
Assessment/Plan:    Gram-positive bacteremia  Streptococcus mitis oralis bacteremia  Source of infection was most likely RLE cellulitis  It was documented that patient's dentition was good and she sees the dentist regularly  Patient has a bioprosthetic AV and 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  BOLIVAR did not show any evidence of valvular vegetation or evidence of PPM infection  Patient was discharged to complete a 6 week course of IV ceftriaxone      Patient continues to do well on the ceftriaxone  Labs remain stable  No new complaints      Plan  - continue ceftriaxone 2 grams IV every 24 hours as ordered through 3/10/2023 to complete a total of 6 weeks  - continue weekly CBCD, CMP while on IV antibiotic  - PICC can be removed after last dose of IV ceftriaxone infused on 3/10/2023  - suggest surveillance blood cultures 2 weeks after completing the ceftriaxone due to indwelling PPM - on or around 3/24/2023  Lab slips provided to patient as she will likely be at home  - no further ID follow up scheduled at this time    S/P TAVR (transcatheter aortic valve replacement)  Placed in 2020 for AS    Pacemaker  Remains in place  Will check surveillance blood cultures as outlined above    Type 2 diabetes mellitus without complication, without long-term current use of insulin ([facility])    Lab Results   Component Value Date    HGBA1C 7 1 (H) 10/12/2021        Diagnoses and all orders for this visit:    Streptococcal bacteremia  -     Blood culture; Future  -     Blood culture; Future    Gram-positive bacteremia    S/P TAVR (transcatheter aortic valve replacement)    Pacemaker    Type 2 diabetes mellitus without complication, without long-term current use of insulin ([facility])    History of stroke    Other orders  -     [employer] RE; Insert into the rectum          Subjective:      Patient ID: Walt Rene is a 81 y o  female  HPI  82 y/o female presents for 3 week office follow up today regarding strep mitis oralis bacteremia    She continues to do well on the antibiotic  She has no new complaints today  She states her PICC line continues to work well  The following portions of the patient's history were reviewed and updated as appropriate: allergies, current medications, past family history, past medical history, past social history, past surgical history and problem list     Review of Systems   Constitutional: Negative for chills and fever  Respiratory: Negative for cough and shortness of breath  Gastrointestinal: Negative for abdominal pain, diarrhea, nausea and vomiting  Skin: Negative for rash  Psychiatric/Behavioral: Negative for behavioral problems and confusion  Objective:      /60   Pulse 79   Temp 97 9 °F (36 6 °C)   Resp 18   Ht 5' 4" (1 626 m)   Wt 92 5 kg (204 lb)   SpO2 96%   BMI 35 02 kg/m²          Physical Exam  Vitals reviewed  Constitutional:       General: She is not in acute distress  Appearance: Normal appearance  She is not ill-appearing, toxic-appearing or diaphoretic  HENT:      Head: Normocephalic and atraumatic  Eyes:      General: No scleral icterus  Right eye: No discharge  Left eye: No discharge  Conjunctiva/sclera: Conjunctivae normal    Cardiovascular:      Rate and Rhythm: Normal rate  Heart sounds: Murmur heard  Pulmonary:      Effort: Pulmonary effort is normal  No respiratory distress  Breath sounds: Normal breath sounds  No stridor  No wheezing, rhonchi or rales  Chest:      Chest wall: No tenderness  Abdominal:      General: Bowel sounds are normal  There is no distension  Palpations: Abdomen is soft  Skin:     General: Skin is warm and dry  Coloration: Skin is not jaundiced or pale  Findings: No erythema or rash  Comments: PICC insertion site without erythema, drainage or tenderness   Neurological:      Mental Status: She is alert  Mental status is at baseline     Psychiatric:         Mood and Affect: Mood normal          Behavior: Behavior normal          Labs:   2/22/2023  Wbc: 6 4  Hgb: 14 8  Plt: 240  Cr: 0 62  LFTs: WNL

## 2023-03-06 NOTE — PROGRESS NOTES
[address] [facility]  Progress Note Danita Johnston 1940, [de-identified] y o  female MRN: [id number]  Unit/Bed#: -01 Encounter: [id number]  Primary Care Provider: Mau Abdul DO   Date and time admitted to hospital: 7/3/2022 11:41 PM    * Ambulatory dysfunction  Assessment & Plan  · Status post fall at home this morning, patient lives alone  · CT head negative  · Fall precautions  · PT/OT eval recommending STR     Closed fracture of proximal end of humerus  Assessment & Plan  · Patient reports noting a pop when EMS attempted to help patient back into bed at home prior to arrival  · Right shoulder x-ray revealing fracture proximal humerus head  · Currently in sling  · PT/OT eval recommending STR  · Ortho consulted recommending the following  · NWB sling  · No indication for surgery at this time Patient has 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  · Patient requested xrays of bilateral knees due to pain imaging ordered by ortho will follow  · P r n   Lidocaine patch, Motrin for pain    Type 2 diabetes mellitus without complication, without long-term current use of insulin [facility])  Assessment & Plan  Lab Results   Component Value Date    HGBA1C 7 1 (H) 10/12/2021       Recent Labs     07/05/22  1140 07/05/22  1617 07/05/22 2023 07/06/22  0733   POCGLU 218* 240* 274* 176*       Blood Sugar Average: Last 72 hrs:  · (P) 217 [id number] blood glucose checks q i d , hypoglycemia protocol  · Hold home metformin  · Sliding scale insulin    Leukocytosis  Assessment & Plan  · Noted to have elevated white blood cell count, patient reports this past week she had a fever at home but currently denies any, has been afebrile here, continue to monitor  · Procalcitonin normal  · UA micro suggestive of UTI  · Urine culture pending   · Continue IV rocephin can transition to oral upon discharge in treat for 5 days   · COVID/flu/RSV negative    Elevated bilirubin  Assessment & Plan  · Bilirubin elevated at 2 06 which appears to be new per review of chart  · Patient denies any abdominal pain  · Improved 1 23   · Discussed finding with patient follow-up outpatient with PCP and GI    Mild intermittent asthma  Assessment & Plan  · Patient with known history however patient reports she no longer has asthma  · Patient is not on any out patient medications  · Patient reporting symptoms x 2 weeks of wheezing  · No acute findings pleural plaques known history patient overall feels better  · Given no complaints of SOB will start prednisone 40 mg daily with taper  · Wheezing resolved overall patient medically feels better will put her on steroid taper            VTE Pharmacologic Prophylaxis: VTE Score: 4 Moderate Risk (Score 3-4) - Pharmacological DVT Prophylaxis Ordered: enoxaparin (Lovenox)  Patient Centered Rounds: I performed bedside rounds with nursing staff today  Discussions with Specialists or Other Care Team Provider: case management    Education and Discussions with Family / Patient: Updated  (daughter) via phone  aware of plan of care pending placement     Time Spent for Care: 30 minutes  More than 50% of total time spent on counseling and coordination of care as described above  Current Length of Stay: 2 day(s)  Current Patient Status: Inpatient   Certification Statement: The patient will continue to require additional inpatient hospital stay due to medical stable pending safe dispo plan   Discharge Plan: Medically stable pending safe dispo plan per case management    Code Status: Level 3 - DNAR and DNI    Subjective:   Patient reports she is currently pain-free is worried about constipation discussion regarding schedule stool softener and patient prefers milk of magnesia which was added  Patient understands plan is pending case management in placement daughter is aware the plan of care and understands need for placement in all questions are answered at this time      Objective: Vitals:   Temp (24hrs), Av 3 °F (36 8 °C), Min:98 1 °F (36 7 °C), Max:98 5 °F (36 9 °C)    Temp:  [98 1 °F (36 7 °C)-98 5 °F (36 9 °C)] 98 3 °F (36 8 °C)  HR:  [60-64] 60  Resp:  [8-19] 18  BP: (133-150)/(50-86) 134/50  SpO2:  [90 %-97 %] 91 %  Body mass index is 35 43 kg/m²  Input and Output Summary (last 24 hours): Intake/Output Summary (Last 24 hours) at 2022 1113  Last data filed at 2022 0900  Gross per 24 hour   Intake 540 ml   Output 200 ml   Net 340 ml       Physical Exam:   Physical Exam  Vitals and nursing note reviewed  Constitutional:       General: She is not in acute distress  Appearance: She is well-developed  HENT:      Head: Normocephalic and atraumatic  Eyes:      Conjunctiva/sclera: Conjunctivae normal    Cardiovascular:      Rate and Rhythm: Normal rate and regular rhythm  Heart sounds: No murmur heard  Pulmonary:      Effort: Pulmonary effort is normal  No respiratory distress  Breath sounds: Normal breath sounds  Abdominal:      Palpations: Abdomen is soft  Tenderness: There is no abdominal tenderness  Musculoskeletal:      Cervical back: Neck supple  Comments: Sling intact to right upper extremity pulses palpable   Skin:     General: Skin is warm and dry  Neurological:      Mental Status: She is alert and oriented to person, place, and time  Mental status is at baseline     Psychiatric:         Mood and Affect: Mood normal          Behavior: Behavior normal           Additional Data:     Labs:  Results from last 7 days   Lab Units 22  0604   WBC Thousand/uL 12 47*   HEMOGLOBIN g/dL 12 4   HEMATOCRIT % 38 0   PLATELETS Thousands/uL 222   NEUTROS PCT % 73   LYMPHS PCT % 17   MONOS PCT % 8   EOS PCT % 1     Results from last 7 days   Lab Units 22  0604   SODIUM mmol/L 139   POTASSIUM mmol/L 4 0   CHLORIDE mmol/L 102   CO2 mmol/L 29   BUN mg/dL 19   CREATININE mg/dL 0 80   ANION GAP mmol/L 8   CALCIUM mg/dL 8 3   ALBUMIN g/dL 2 8*   TOTAL BILIRUBIN mg/dL 1 23*   ALK PHOS U/L 77   ALT U/L 54   AST U/L 35   GLUCOSE RANDOM mg/dL 147*         Results from last 7 days   Lab Units 07/06/22  1103 07/06/22  0733 07/05/22  2023 07/05/22  1617 07/05/22  1140 07/05/22  0728 07/04/22  2116 07/04/22  1816 07/04/22  1540 07/04/22  1233 07/04/22  1045 07/04/22  0715   POC GLUCOSE mg/dl 232* 176* 274* 240* 218* 170* 187* 191* 244* 215* 231* 242*         Results from last 7 days   Lab Units 07/04/22  1039   PROCALCITONIN ng/ml 0 15       Lines/Drains:  Invasive Devices  Report    Peripheral Intravenous Line  Duration           Peripheral IV 07/03/22 Left Arm 2 days                      Imaging: No pertinent imaging reviewed      Recent Cultures (last 7 days):   Results from last 7 days   Lab Units 07/04/22  0853   URINE CULTURE  Culture too young- will reincubate       Last 24 Hours Medication List:   Current Facility-Administered Medications   Medication Dose Route Frequency Provider Last Rate    aspirin  81 mg Oral Daily Ephriam Simi, PA-C      cefTRIAXone  1,000 mg Intravenous Q24H Julio C Alvarado MD 1,000 mg (07/06/22 1016)    cholecalciferol  1,000 Units Oral Daily Ephriam Simi, PA-C      diltiazem  240 mg Oral Daily Ephriam Simi, PA-C      [START ON 7/7/2022] docusate sodium  100 mg Oral Daily LINDA Varghese      enoxaparin  40 mg Subcutaneous Daily Ephriam Simi, PA-C      fluticasone-vilanterol  1 puff Inhalation Daily Ephriam Simi, PA-C      furosemide  20 mg Oral Daily Ephriam Simi, PA-C      oxyCODONE-acetaminophen  1 tablet Oral Q6H PRN Erwin Small MD      Or    HYDROmorphone  0 5 mg Intravenous Q6H PRN Julio C Alvarado MD      ibuprofen  400 mg Oral Q6H PRN Ephriam Simi, PA-C      insulin lispro  1-5 Units Subcutaneous TID AC Ephriam Simi, PA-C      insulin lispro  1-5 Units Subcutaneous HS Ephriam Simi, PA-C      lidocaine  1 patch Topical Daily PRN Ephriam FLORINA Belcher      lubiprostone  24 mcg Oral BID With Meals Maximilian Green PA-C      magnesium hydroxide  30 mL Oral BID PRN Patsie Brittle, CRNP      pantoprazole  40 mg Oral Daily Maximilian Green PA-C      predniSONE  40 mg Oral Daily Patsie Brittle, CRNP      temazepam  30 mg Oral HS Maximilian Green PA-C          Today, Patient Was Seen By: Patsie Brittle, CRNP    **Please Note: This note may have been constructed using a voice recognition system  ** Complex Repair And Melolabial Flap Text: The defect edges were debeveled with a #15 scalpel blade.  The primary defect was closed partially with a complex linear closure.  Given the location of the remaining defect, shape of the defect and the proximity to free margins a melolabial flap was deemed most appropriate for complete closure of the defect.  Using a sterile surgical marker, an appropriate advancement flap was drawn incorporating the defect and placing the expected incisions within the relaxed skin tension lines where possible. The area thus outlined was incised deep to adipose tissue with a #15 scalpel blade. The skin margins were undermined to an appropriate distance in all directions utilizing iris scissors and carried over to close the primary defect.

## 2023-03-23 ENCOUNTER — TELEPHONE (OUTPATIENT)
Dept: INFECTIOUS DISEASES | Facility: CLINIC | Age: 83
End: 2023-03-23

## 2023-03-23 NOTE — TELEPHONE ENCOUNTER
Jayce calls regarding patient being sent home  He confirmed if blood culture can be done next week  Confirmed this way ok  Tomorrow or after

## 2023-03-30 ENCOUNTER — APPOINTMENT (OUTPATIENT)
Dept: LAB | Facility: HOSPITAL | Age: 83
End: 2023-03-30

## 2023-03-30 DIAGNOSIS — R78.81 STREPTOCOCCAL BACTEREMIA: ICD-10-CM

## 2023-03-30 DIAGNOSIS — B95.5 STREPTOCOCCAL BACTEREMIA: ICD-10-CM

## 2023-04-02 LAB
BACTERIA BLD CULT: NORMAL
BACTERIA BLD CULT: NORMAL

## 2023-04-03 PROBLEM — N30.00 ACUTE CYSTITIS: Status: RESOLVED | Noted: 2023-01-26 | Resolved: 2023-04-03

## 2023-04-04 LAB
BACTERIA BLD CULT: NORMAL
BACTERIA BLD CULT: NORMAL

## 2023-10-06 ENCOUNTER — VBI (OUTPATIENT)
Dept: ADMINISTRATIVE | Facility: OTHER | Age: 83
End: 2023-10-06

## 2023-11-26 ENCOUNTER — VBI (OUTPATIENT)
Dept: ADMINISTRATIVE | Facility: OTHER | Age: 83
End: 2023-11-26

## 2023-11-28 ENCOUNTER — VBI (OUTPATIENT)
Dept: ADMINISTRATIVE | Facility: OTHER | Age: 83
End: 2023-11-28

## 2023-12-05 ENCOUNTER — VBI (OUTPATIENT)
Dept: ADMINISTRATIVE | Facility: OTHER | Age: 83
End: 2023-12-05

## 2023-12-17 ENCOUNTER — VBI (OUTPATIENT)
Dept: ADMINISTRATIVE | Facility: OTHER | Age: 83
End: 2023-12-17

## 2024-01-25 NOTE — ASSESSMENT & PLAN NOTE
Daughter has some questions (her POA) about her medications. [phone]   · Status post fall at home this morning, patient lives alone  · CT head negative  · Fall precautions  · PT/OT eval recommending STR

## 2025-01-25 NOTE — PATIENT INSTRUCTIONS
Please start taking metoprolol 25 mg twice a day  A prescription has been sent to the pharmacy  Please return to our office on Monday for a nursing blood pressure and heart rate check  Please follow up in our office in 4 weeks, sooner if any acute issues arise  [facility] Division of Kidney Diseases & Hypertension  FOLLOW UP NOTE  [phone]--------------------------------------------------------------------------------  Chief Complaint: Unspecified open wound, unspecified lower leg, initial encounter    24 hour events/subjective: Pt. seen and examined earlier today, resting comfortably. Received zofran and no longer feels nauseous. No HA, fever, chills, CP, SOB.    PAST HISTORY  --------------------------------------------------------------------------------  No significant changes to PMH, PSH, FHx, SHx, unless otherwise noted    ALLERGIES & MEDICATIONS  --------------------------------------------------------------------------------  Allergies    Levaquin (Rash)    Intolerances    Standing Inpatient Medications  apixaban 2.5 milliGRAM(s) Oral every 12 hours  buMETAnide 2 milliGRAM(s) Oral daily  carvedilol 12.5 milliGRAM(s) Oral every 12 hours  cholecalciferol 2000 Unit(s) Oral daily  dextrose 5%. 1000 milliLiter(s) IV Continuous <Continuous>  dextrose 5%. 1000 milliLiter(s) IV Continuous <Continuous>  dextrose 50% Injectable 25 Gram(s) IV Push once  dextrose 50% Injectable 12.5 Gram(s) IV Push once  dextrose 50% Injectable 25 Gram(s) IV Push once  dorzolamide 2% Ophthalmic Solution 1 Drop(s) Both EYES <User Schedule>  fluticasone propionate/ salmeterol 250-50 MICROgram(s) Diskus 1 Dose(s) Inhalation two times a day  glucagon  Injectable 1 milliGRAM(s) IntraMuscular once  hydrALAZINE 50 milliGRAM(s) Oral two times a day  insulin lispro (ADMELOG) corrective regimen sliding scale   SubCutaneous three times a day before meals  insulin lispro (ADMELOG) corrective regimen sliding scale   SubCutaneous at bedtime  montelukast 10 milliGRAM(s) Oral daily  NIFEdipine XL 60 milliGRAM(s) Oral daily  polyethylene glycol 3350 17 Gram(s) Oral daily  sodium bicarbonate 650 milliGRAM(s) Oral two times a day    PRN Inpatient Medications  acetaminophen     Tablet .. 650 milliGRAM(s) Oral every 6 hours PRN  albuterol/ipratropium for Nebulization 3 milliLiter(s) Nebulizer every 6 hours PRN  dextrose Oral Gel 15 Gram(s) Oral once PRN  ondansetron Injectable 4 milliGRAM(s) IV Push every 4 hours PRN  senna 2 Tablet(s) Oral at bedtime PRN    REVIEW OF SYSTEMS  --------------------------------------------------------------------------------  Gen: No fevers/chills  Head/Eyes/Ears: No HA  Respiratory: +SOB improved  CV: No CP  GI: No abdominal pain, diarrhea, +nausea  : No dysuria, hematuria  MSK: No edema    All other systems were reviewed and are negative, except as noted.    VITALS/PHYSICAL EXAM  --------------------------------------------------------------------------------  T(C): 36.3 (01-25-25 @ 11:14), Max: 36.6 (01-24-25 @ 21:16)  HR: 60 (01-25-25 @ 11:14) (60 - 63)  BP: 109/61 (01-25-25 @ 11:14) (98/54 - 109/61)  RR: 18 (01-25-25 @ 11:14) (18 - 18)  SpO2: 91% (01-25-25 @ 11:14) (91% - 95%)  Wt(kg): --    01-24-25 @ 07:01  -  01-25-25 @ 07:00  --------------------------------------------------------  IN: 0 mL / OUT: 700 mL / NET: -700 mL    Physical Exam:  Gen: NAD  HEENT: Anicteric  Pulm: Crackles B/L  CV: RRR, S1S2  Abd: +BS, soft, nontender/nondistended  : No suprapubic tenderness  Extremities: no bilateral LE edema noted  Neuro: Awake  Skin: Warm    LABS/STUDIES  --------------------------------------------------------------------------------              8.9    4.67  >-----------<  208      [01-25-25 @ 08:29]              28.3     136  |  99  |  110  ----------------------------<  191      [01-24-25 @ 07:10]  5.2   |  23  |  4.50        Ca     9.0     [01-24-25 @ 07:10]      Mg     2.8     [01-25-25 @ 08:29]      Phos  6.3     [01-25-25 @ 08:29]    Creatinine Trend:  SCr 4.50 [01-24 @ 07:10]  SCr 4.25 [01-23 @ 07:25]  SCr 3.91 [01-22 @ 07:09]  SCr 4.03 [01-21 @ 17:12]  SCr 4.07 [01-21 @ 07:12]    Urine Creatinine 88      [01-20-25 @ 16:46]  Urine Sodium 49      [01-20-25 @ 16:46]  Urine Urea Nitrogen 339      [01-20-25 @ 16:46]

## (undated) DEVICE — GAUZE SPONGES,USP TYPE VII GAUZE, 12 PLY: Brand: CURITY

## (undated) DEVICE — INTENDED FOR TISSUE SEPARATION, AND OTHER PROCEDURES THAT REQUIRE A SHARP SURGICAL BLADE TO PUNCTURE OR CUT.: Brand: BARD-PARKER ® CARBON RIB-BACK BLADES

## (undated) DEVICE — TRAY FOLEY 16FR SURESTEP TEMP SENS URIMETER STAT LOK

## (undated) DEVICE — GLOVE INDICATOR PI UNDERGLOVE SZ 7 BLUE

## (undated) DEVICE — CHLORAPREP HI-LITE 26ML ORANGE

## (undated) DEVICE — BETHLEHEM MAJOR GENERAL PACK: Brand: CARDINAL HEALTH

## (undated) DEVICE — HEAVY DUTY TABLE COVER: Brand: CONVERTORS

## (undated) DEVICE — 3M™ TEGADERM™ TRANSPARENT FILM DRESSING FRAME STYLE, 1626W, 4 IN X 4-3/4 IN (10 CM X 12 CM), 50/CT 4CT/CASE: Brand: 3M™ TEGADERM™

## (undated) DEVICE — RADIFOCUS GLIDEWIRE: Brand: GLIDEWIRE

## (undated) DEVICE — SUT SILK 0 CT-1 30 IN 424H

## (undated) DEVICE — CARDIOVASCULAR SPLIT DRAPE: Brand: CONVERTORS

## (undated) DEVICE — GLOVE SRG BIOGEL ECLIPSE 7

## (undated) DEVICE — ADHESIVE SKN CLSR HISTOACRYL FLEX 0.5ML LF

## (undated) DEVICE — PAD GROUNDING ADULT

## (undated) DEVICE — X-RAY DETECTABLE SPONGES,16 PLY: Brand: VISTEC

## (undated) DEVICE — DRESSING ALLEVYN LIFE SACRAL 6.75 X 6.5 IN

## (undated) DEVICE — Device

## (undated) DEVICE — CARDIO PERI-GROIN: Brand: CONVERTORS

## (undated) DEVICE — ADHESIVE SKIN HIGH VISCOSITY EXOFIN 1ML

## (undated) DEVICE — DEFIB ADULT ELECTRODE CARDINAL

## (undated) DEVICE — THERMOFLECT BLANKET, L, 25EA                               TS THERMOFLECT BLANKET, 48" X 84", SILVER, 5/BG, 5 BG/CS NW: Brand: THERMOFLECT

## (undated) DEVICE — 3000CC GUARDIAN II: Brand: GUARDIAN